# Patient Record
Sex: FEMALE | Race: WHITE | NOT HISPANIC OR LATINO | Employment: FULL TIME | URBAN - METROPOLITAN AREA
[De-identification: names, ages, dates, MRNs, and addresses within clinical notes are randomized per-mention and may not be internally consistent; named-entity substitution may affect disease eponyms.]

---

## 2017-04-03 ENCOUNTER — OFFICE VISIT (OUTPATIENT)
Dept: OBSTETRICS AND GYNECOLOGY | Facility: CLINIC | Age: 37
End: 2017-04-03
Payer: COMMERCIAL

## 2017-04-03 VITALS
WEIGHT: 157.63 LBS | SYSTOLIC BLOOD PRESSURE: 122 MMHG | DIASTOLIC BLOOD PRESSURE: 70 MMHG | BODY MASS INDEX: 27.93 KG/M2 | HEIGHT: 63 IN

## 2017-04-03 DIAGNOSIS — L70.9 ACNE, UNSPECIFIED ACNE TYPE: ICD-10-CM

## 2017-04-03 DIAGNOSIS — Z30.41 ENCOUNTER FOR SURVEILLANCE OF CONTRACEPTIVE PILLS: ICD-10-CM

## 2017-04-03 DIAGNOSIS — Z01.419 VISIT FOR GYNECOLOGIC EXAMINATION: Primary | ICD-10-CM

## 2017-04-03 PROCEDURE — 99395 PREV VISIT EST AGE 18-39: CPT | Mod: S$GLB,,, | Performed by: OBSTETRICS & GYNECOLOGY

## 2017-04-03 PROCEDURE — 99999 PR PBB SHADOW E&M-EST. PATIENT-LVL II: CPT | Mod: PBBFAC,,, | Performed by: OBSTETRICS & GYNECOLOGY

## 2017-04-03 RX ORDER — DROSPIRENONE AND ETHINYL ESTRADIOL 0.03MG-3MG
1 KIT ORAL DAILY
Qty: 84 TABLET | Refills: 3 | Status: SHIPPED | OUTPATIENT
Start: 2017-04-03 | End: 2018-04-13 | Stop reason: SDUPTHER

## 2017-04-03 NOTE — PROGRESS NOTES
HISTORY OF PRESENT ILLNESS:    Dov Hernandez is a 36 y.o. female, , No LMP recorded.,  presents for a routine exam and has no complaints. PAP DUE .  TAKING SPIRONOLACTONE; AFTER RESTARTING POOL (ACNE SYMPTOMS) - SKIN IS CLEAR AND MUCH LESS HERNANDEZ.  KIDS NOW 2YO AND 7YO    LAST VISIT 3/2016:   S/P VASECTOMY AND HAS HAD MORE PMS, ACNE - COUNSELED. IF WANTS TO RESTART OCP FOR CYCLE CONTROL AND SKIN WILL LET ME KNOW. PAP SUBMITTED     Last visit 2015 and PRIOR ROUTINE VISIT 3/2015:  PAINFUL BREAST LUMP LEFT UPPER OUTER, PAST 3-4 WEEKS, TENDER - DISCUSSED STOP OCP AND DECR CAFFEINE  ALSO ITCHING CREASE BETWEEN THIGH AND VULVA PAST MONTH - DISCUSSED YEAST AND LOCAL CARE - LOTRISONE RXN  PLANS TO D/C OCP AS SOON AS  CLEARED WITH SECOND SA S/P VASECTOMY  LAST ROUTINE VISIT 3/2015:  PAP DUE NEXT , AND REFILL OCP.  LAST VISIT 3/2014 PP:  She is status post  6 weeks ago. Her hospitalization was complicated BY SPINAL H/A. She is not breastfeeding. She desires oral contraceptives (estrogen/progesterone) for contraception. She denies postpartum depression. EVENTUALLY PLANS VASECTOMY; RXN ELIUD UNTIL THEN  2014  WITH SMALL SECOND DEGREE LAC, FEMALE    Past Medical History:   Diagnosis Date    Abnormal Pap smear     sean Guerrero pap since       Past Surgical History:   Procedure Laterality Date    COLPOSCOPY         MEDICATIONS AND ALLERGIES:      Current Outpatient Prescriptions:     drospirenone-ethinyl estradiol (AYAN) 3-0.03 mg per tablet, Take 1 tablet by mouth once daily., Disp: 84 tablet, Rfl: 3    ketoconazole (NIZORAL) 2 % cream, Apply topically 2 (two) times daily. Qd for maintenance, Disp: 60 g, Rfl: 3    ketoconazole (NIZORAL) 2 % shampoo, Apply topically every other day., Disp: 120 mL, Rfl: 5    minocycline (MINOCIN,DYNACIN) 100 MG capsule, Take 100 mg by mouth once daily., Disp: , Rfl: 3    multivitamin (ONE DAILY MULTIVITAMIN) per tablet, Take 1 tablet by mouth  once daily., Disp: , Rfl:     spironolactone (ALDACTONE) 50 MG tablet, Take 1 tablet (50 mg total) by mouth once daily. (Patient taking differently: Take 50 mg by mouth once daily. TAKING 150 MG DAILY), Disp: 90 tablet, Rfl: 3    triamcinolone acetonide 0.025% (KENALOG) 0.025 % Oint, Qd prn flare, Disp: 45 g, Rfl: 1    Review of patient's allergies indicates:   Allergen Reactions    Latex, natural rubber Rash       Family History   Problem Relation Age of Onset    Breast cancer Neg Hx     Colon cancer Neg Hx     Ovarian cancer Neg Hx     Melanoma Neg Hx     Kidney cancer Father     Cancer Brother 22     Parotid Gland cancer    Hyperlipidemia Mother        Social History     Social History    Marital status:      Spouse name: N/A    Number of children: 2    Years of education: N/A     Occupational History     [employer]     Social History Main Topics    Smoking status: Never Smoker    Smokeless tobacco: Never Used    Alcohol use Yes      Comment: social    Drug use: No    Sexual activity: Yes     Partners: Male     Birth control/ protection: None     Other Topics Concern    Not on file     Social History Narrative    Family [employer] office mgr, , 2 children (4, 18 mo), nonsmoker none, ETOH rare , GYN Yolanda       COMPREHENSIVE GYN HISTORY:  PAP History: Denies abnormal Paps.  Infection History: Denies STDs. Denies PID.  Benign History: Denies uterine fibroids. Denies ovarian cysts. Denies endometriosis. Denies other conditions.  Cancer History: Denies cervical cancer. Denies uterine cancer or hyperplasia. Denies ovarian cancer. Denies vulvar cancer or pre-cancer. Denies vaginal cancer or pre-cancer. Denies breast cancer. Denies colon cancer.  Sexual Activity History: Reports currently being sexually active  Menstrual History: Monthly, mild-moderate.  Contraception:vasectomy    ROS:  GENERAL: No weight changes. No swelling. No fatigue. No  "fever.  CARDIOVASCULAR: No chest pain. No shortness of breath. No leg cramps.   NEUROLOGICAL: No headaches. No vision changes.  BREASTS: No pain. No lumps. No discharge.  ABDOMEN: No pain. No nausea. No vomiting. No diarrhea. No constipation.  REPRODUCTIVE: No abnormal bleeding.   VULVA: No pain. No lesions. No itching.  VAGINA: No relaxation. No itching. No odor. No discharge. No lesions.  URINARY: No incontinence. No nocturia. No frequency. No dysuria.    /70  Ht 5' 2.5" (1.588 m)  Wt 71.5 kg (157 lb 10.1 oz)  BMI 28.37 kg/m2    PE:  APPEARANCE: Well nourished, well developed, in no acute distress.  AFFECT: WNL, alert and oriented x 3.  SKIN: No acne or hirsutism.  NECK: Neck symmetric, without masses or thyromegaly.  NODES: No inguinal, cervical, axillary or femoral lymph node enlargement.  CHEST: Good respiratory effort.   ABDOMEN: Soft. No tenderness or masses. No hepatosplenomegaly. No hernias.  BREASTS: Symmetrical, no skin changes, visible lesions, palpable masses or nipple discharge bilaterally.  PELVIC: External female genitalia without lesions.  Female hair distribution. Adequate perineal body, Normal urethral meatus. Vagina moist and well rugated without lesions or discharge.  No significant cystocele or rectocele present. Cervix pink without lesions, discharge or tenderness. Uterus is normal size, regular, mobile and nontender. Adnexa without masses or tenderness.  EXTREMITIES: No edema    PROCEDURES:      DIAGNOSIS:  1. Visit for gynecologic examination     2. Encounter for surveillance of contraceptive pills     3. Acne, unspecified acne type  drospirenone-ethinyl estradiol (AYAN) 3-0.03 mg per tablet       LABS AND TESTS ORDERED:    MEDICATIONS PRESCRIBED:    COUNSELING:   The patient was counseled today on ACS PAP guidelines, with recommendations for yearly pelvic exams unless their uterus, cervix, and ovaries were removed for benign reasons; in that case, examinations every 3-5 years are " recommended.  The patient was also counseled regarding monthly breast self-examination, routine STD screening for at-risk populations, prophylactic immunizations for transmitted infections such as  HPV, Pertussis, or Influenza as appropriate, and yearly mammograms when indicated by ACS guidelines.  She was advised to see her primary care physician for all other health maintenance.    FOLLOW-UP with me annually.

## 2017-05-05 ENCOUNTER — PATIENT MESSAGE (OUTPATIENT)
Dept: OBSTETRICS AND GYNECOLOGY | Facility: CLINIC | Age: 37
End: 2017-05-05

## 2017-05-05 DIAGNOSIS — Z00.00 ROUTINE GENERAL MEDICAL EXAMINATION AT A HEALTH CARE FACILITY: Primary | ICD-10-CM

## 2017-09-15 ENCOUNTER — LAB VISIT (OUTPATIENT)
Dept: LAB | Facility: HOSPITAL | Age: 37
End: 2017-09-15
Attending: FAMILY MEDICINE
Payer: COMMERCIAL

## 2017-09-15 DIAGNOSIS — Z00.00 ROUTINE GENERAL MEDICAL EXAMINATION AT A HEALTH CARE FACILITY: ICD-10-CM

## 2017-09-15 LAB
ALBUMIN SERPL BCP-MCNC: 3.4 G/DL
ALP SERPL-CCNC: 34 U/L
ALT SERPL W/O P-5'-P-CCNC: 30 U/L
ANION GAP SERPL CALC-SCNC: 3 MMOL/L
AST SERPL-CCNC: 21 U/L
BASOPHILS # BLD AUTO: 0.02 K/UL
BASOPHILS NFR BLD: 0.3 %
BILIRUB SERPL-MCNC: 0.5 MG/DL
BUN SERPL-MCNC: 11 MG/DL
CALCIUM SERPL-MCNC: 9.2 MG/DL
CHLORIDE SERPL-SCNC: 105 MMOL/L
CHOLEST SERPL-MCNC: 222 MG/DL
CHOLEST/HDLC SERPL: 3.5 {RATIO}
CO2 SERPL-SCNC: 28 MMOL/L
CREAT SERPL-MCNC: 0.9 MG/DL
DIFFERENTIAL METHOD: ABNORMAL
EOSINOPHIL # BLD AUTO: 0 K/UL
EOSINOPHIL NFR BLD: 0.6 %
ERYTHROCYTE [DISTWIDTH] IN BLOOD BY AUTOMATED COUNT: 11.8 %
EST. GFR  (AFRICAN AMERICAN): >60 ML/MIN/1.73 M^2
EST. GFR  (NON AFRICAN AMERICAN): >60 ML/MIN/1.73 M^2
GLUCOSE SERPL-MCNC: 89 MG/DL
HCT VFR BLD AUTO: 37.3 %
HDLC SERPL-MCNC: 63 MG/DL
HDLC SERPL: 28.4 %
HGB BLD-MCNC: 12.8 G/DL
LDLC SERPL CALC-MCNC: 130.4 MG/DL
LYMPHOCYTES # BLD AUTO: 1.9 K/UL
LYMPHOCYTES NFR BLD: 29.8 %
MCH RBC QN AUTO: 31.4 PG
MCHC RBC AUTO-ENTMCNC: 34.3 G/DL
MCV RBC AUTO: 91 FL
MONOCYTES # BLD AUTO: 0.4 K/UL
MONOCYTES NFR BLD: 6.3 %
NEUTROPHILS # BLD AUTO: 4 K/UL
NEUTROPHILS NFR BLD: 62.8 %
NONHDLC SERPL-MCNC: 159 MG/DL
PLATELET # BLD AUTO: 223 K/UL
PMV BLD AUTO: 9.4 FL
POTASSIUM SERPL-SCNC: 4.4 MMOL/L
PROT SERPL-MCNC: 7 G/DL
RBC # BLD AUTO: 4.08 M/UL
SODIUM SERPL-SCNC: 136 MMOL/L
TRIGL SERPL-MCNC: 143 MG/DL
TSH SERPL DL<=0.005 MIU/L-ACNC: 1.91 UIU/ML
WBC # BLD AUTO: 6.35 K/UL

## 2017-09-15 PROCEDURE — 80061 LIPID PANEL: CPT

## 2017-09-15 PROCEDURE — 36415 COLL VENOUS BLD VENIPUNCTURE: CPT

## 2017-09-15 PROCEDURE — 80053 COMPREHEN METABOLIC PANEL: CPT

## 2017-09-15 PROCEDURE — 85025 COMPLETE CBC W/AUTO DIFF WBC: CPT

## 2017-09-15 PROCEDURE — 84443 ASSAY THYROID STIM HORMONE: CPT

## 2017-09-21 ENCOUNTER — OFFICE VISIT (OUTPATIENT)
Dept: FAMILY MEDICINE | Facility: CLINIC | Age: 37
End: 2017-09-21
Payer: COMMERCIAL

## 2017-09-21 VITALS
BODY MASS INDEX: 27.97 KG/M2 | SYSTOLIC BLOOD PRESSURE: 121 MMHG | TEMPERATURE: 98 F | WEIGHT: 157.88 LBS | HEART RATE: 68 BPM | DIASTOLIC BLOOD PRESSURE: 88 MMHG | HEIGHT: 63 IN

## 2017-09-21 DIAGNOSIS — F32.81 PMDD (PREMENSTRUAL DYSPHORIC DISORDER): ICD-10-CM

## 2017-09-21 DIAGNOSIS — L70.2 ACNE VARIOLIFORMIS: ICD-10-CM

## 2017-09-21 DIAGNOSIS — Z00.00 ROUTINE GENERAL MEDICAL EXAMINATION AT A HEALTH CARE FACILITY: Primary | ICD-10-CM

## 2017-09-21 PROCEDURE — 99999 PR PBB SHADOW E&M-EST. PATIENT-LVL III: CPT | Mod: PBBFAC,,, | Performed by: FAMILY MEDICINE

## 2017-09-21 PROCEDURE — 99395 PREV VISIT EST AGE 18-39: CPT | Mod: S$GLB,,, | Performed by: FAMILY MEDICINE

## 2017-09-21 NOTE — PATIENT INSTRUCTIONS
"Follow with GYN    I offered FLuoxetine to help with PMDD, but she declines at this time.     Influenza vaccine -she declines    ==================    You can buy over the counter PROBIOTIC or ACIDOPHILUS to take daily for one month    ==========      Have you heard of the Whole 30? It eliminates all foods that are inflammatory to the gut for one month & allows the lining to heal. Then you can reintroduce foods one at a time & see how you react to each one.     Read about the Whole 30 plan. It's 30 days of "detox" for your body.  It's tough & cathartic, but if you can follow it for 30 days -- that's all you need.     [email or web address]       First try to GET YOUR HEART RATE UP EVERY DAY - just 20 minutes of strolling, or walking [place] the [place], or chasing kids. LAY & PUFF so the exercise can pump your heart muscle & push blood effectively to your entire body.  Watch the sunrise or sunset & get out in nature.      Buy a step counter (to hook onto your belt or shoe) & strive for 10,000 steps a day.     Eat MORE VEGETABLES EVERY DAY. Goal is 3 different colors of veggies daily (not canned)    Please give me updates on how you feel (fatigue, bloating, mental alertness, shortness of breath) after each food change.       ==============================================================    RECOMMENDATIONS FOR FEMALES    Prevent the #1 cause of death- cardiovascular disease (HEART ATTACK & STROKE) by checking for normal blood pressure, cholesterol, sugars, & by not smoking.     VACCINES: Yearly FLU shot,    I recommend  high fiber (5 fresh fruits or vegetables daily), low fat diet and aerobic  exercise (huffing/ puffing/ sweating for 20 min straight at least 4 days a week)    Follow up yearly fasting lipids, CMP, CBC, TSH prior.   ==============================================================      "

## 2017-09-21 NOTE — PROGRESS NOTES
Subjective:      Patient ID: Dov Hernandez is a 36 y.o. female.    Chief Complaint: Annual Exam    Here today for general exam.     She has noticed more acne & mood changes, noticeable by her  and family, prior to menses.  This began when she discontinued birth control.   Her GYN restarted and she feels better.  She is asking my opinion concerning any risks of hormones long term. She has been tx with spironolactone & minocycline by Dermatologist, but neither helped & she had recurrent yeast infections. Her  had vasectomy.     With starting back on OCP, she does notice that she has a yeast infection  prior to her monthly  cycle.     Denies any chest pain, shortness of breath, nausea vomiting constipation diarrhea, blood in stool, heartburn    Normal kidney US , dad  of kidney cancer. She denies urinary symptoms or blood in urine.       No results found for: MICALBCREAT  Lab Results   Component Value Date    LDLCALC 130.4 09/15/2017    LDLCALC 153.2 10/17/2016    CHOL 222 (H) 09/15/2017    HDL 63 09/15/2017    TRIG 143 09/15/2017       Lab Results   Component Value Date     09/15/2017    K 4.4 09/15/2017     09/15/2017    CO2 28 09/15/2017    GLU 89 09/15/2017    BUN 11 09/15/2017    CREATININE 0.9 09/15/2017    CALCIUM 9.2 09/15/2017    PROT 7.0 09/15/2017    ALBUMIN 3.4 (L) 09/15/2017    BILITOT 0.5 09/15/2017    ALKPHOS 34 (L) 09/15/2017    AST 21 09/15/2017    ALT 30 09/15/2017    ANIONGAP 3 (L) 09/15/2017    ESTGFRAFRICA >60 09/15/2017    EGFRNONAA >60 09/15/2017    WBC 6.35 09/15/2017    HGB 12.8 09/15/2017    HCT 37.3 09/15/2017    MCV 91 09/15/2017     09/15/2017    TSH 1.914 09/15/2017         Current Outpatient Prescriptions on File Prior to Visit   Medication Sig    drospirenone-ethinyl estradiol (AYAN) 3-0.03 mg per tablet Take 1 tablet by mouth once daily.    multivitamin (ONE DAILY MULTIVITAMIN) per tablet Take 1 tablet by mouth once daily.     "triamcinolone acetonide 0.025% (KENALOG) 0.025 % Oint Qd prn flare    ketoconazole (NIZORAL) 2 % cream Apply topically 2 (two) times daily. Qd for maintenance    ketoconazole (NIZORAL) 2 % shampoo Apply topically every other day.     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 She appears well-developed and well-nourished.   HENT:   Head: Normocephalic and atraumatic.   Right Ear: External ear normal.   Left Ear: External ear normal.   Nose: Nose normal.   Mouth/Throat: Oropharynx is clear and moist.   Eyes: EOM are normal. Pupils are equal, round, and reactive to light.   Neck: Neck supple. No thyromegaly present.   Cardiovascular: Normal rate, regular rhythm, normal heart sounds and intact distal pulses.    No murmur heard.  Pulmonary/Chest: Effort normal and breath sounds normal. She has no wheezes.   Abdominal: Soft. Bowel sounds are normal. She exhibits no distension and no mass. There is no tenderness. There is no rebound and no guarding.   Musculoskeletal: She exhibits no edema.   Lymphadenopathy:     She has no cervical adenopathy.   Neurological: She is alert " "and oriented to person, place, and time.   Skin: Skin is warm and dry.   Psychiatric: She has a normal mood and affect. Her behavior is normal.     Assessment:     1. Routine general medical examination at a health care facility    2. PMDD (premenstrual dysphoric disorder)    3. Acne varioliformis      Plan:          Patient Instructions   Follow with GYN    I offered FLuoxetine to help with PMDD, but she declines at this time.     Influenza vaccine -she declines    ==================    You can buy over the counter PROBIOTIC or ACIDOPHILUS to take daily for one month    ==========      Have you heard of the Whole 30? It eliminates all foods that are inflammatory to the gut for one month & allows the lining to heal. Then you can reintroduce foods one at a time & see how you react to each one.     Read about the Whole 30 plan. It's 30 days of "detox" for your body.  It's tough & cathartic, but if you can follow it for 30 days -- that's all you need.     [email or web address]       First try to GET YOUR HEART RATE UP EVERY DAY - just 20 minutes of strolling, or walking [place] the [place], or chasing kids. LAY & PUFF so the exercise can pump your heart muscle & push blood effectively to your entire body.  Watch the sunrise or sunset & get out in nature.      Buy a step counter (to hook onto your belt or shoe) & strive for 10,000 steps a day.     Eat MORE VEGETABLES EVERY DAY. Goal is 3 different colors of veggies daily (not canned)    Please give me updates on how you feel (fatigue, bloating, mental alertness, shortness of breath) after each food change.       ==============================================================    RECOMMENDATIONS FOR FEMALES    Prevent the #1 cause of death- cardiovascular disease (HEART ATTACK & STROKE) by checking for normal blood pressure, cholesterol, sugars, & by not smoking.     VACCINES: Yearly FLU shot,    I recommend  high fiber (5 fresh fruits or vegetables daily), low fat diet and " aerobic  exercise (huffing/ puffing/ sweating for 20 min straight at least 4 days a week)    Follow up yearly fasting lipids, CMP, CBC, TSH prior.   ==============================================================

## 2018-04-13 DIAGNOSIS — L70.9 ACNE, UNSPECIFIED ACNE TYPE: ICD-10-CM

## 2018-04-13 RX ORDER — DROSPIRENONE AND ETHINYL ESTRADIOL 0.03MG-3MG
1 KIT ORAL DAILY
Qty: 84 TABLET | Refills: 0 | Status: SHIPPED | OUTPATIENT
Start: 2018-04-13 | End: 2018-06-12 | Stop reason: SDUPTHER

## 2018-04-13 NOTE — TELEPHONE ENCOUNTER
----- Message from Valdo Butterfield sent at 4/13/2018  1:29 PM CDT -----  Contact: pt      Can the clinic reply in [facility]: no      Please refill the medication(s) listed below. Please call the patient when the prescription(s) is ready for  at this phone number   [phone] (home)       Medication #1 drospirenone-ethinyl estradiol (AYAN) 3-0.03 mg per tablet      Preferred Pharmacy: [facility]/pharmacy #[id number] - [place] - [address] AT [facility]

## 2018-06-12 DIAGNOSIS — L70.9 ACNE, UNSPECIFIED ACNE TYPE: ICD-10-CM

## 2018-06-13 ENCOUNTER — PATIENT MESSAGE (OUTPATIENT)
Dept: OBSTETRICS AND GYNECOLOGY | Facility: CLINIC | Age: 38
End: 2018-06-13

## 2018-06-13 RX ORDER — DROSPIRENONE AND ETHINYL ESTRADIOL 0.03MG-3MG
1 KIT ORAL DAILY
Qty: 84 TABLET | Refills: 0 | Status: SHIPPED | OUTPATIENT
Start: 2018-06-13 | End: 2018-06-13 | Stop reason: SDUPTHER

## 2018-06-13 RX ORDER — DROSPIRENONE AND ETHINYL ESTRADIOL 0.03MG-3MG
1 KIT ORAL DAILY
Qty: 84 TABLET | Refills: 0 | Status: SHIPPED | OUTPATIENT
Start: 2018-06-13 | End: 2018-09-27 | Stop reason: SDUPTHER

## 2018-06-19 ENCOUNTER — TELEPHONE (OUTPATIENT)
Dept: FAMILY MEDICINE | Facility: CLINIC | Age: 38
End: 2018-06-19

## 2018-06-19 DIAGNOSIS — Z00.00 ANNUAL PHYSICAL EXAM: Primary | ICD-10-CM

## 2018-06-19 NOTE — TELEPHONE ENCOUNTER
----- Message from Liliana Steward sent at 6/19/2018  3:25 PM CDT -----  Doctor appointment and lab have been scheduled.  Please link lab orders to the lab appointment.  Date of doctor appointment:  9/26  Physical or EP: Physical   Date of lab appointment:  9/20  Comments:

## 2018-09-20 ENCOUNTER — LAB VISIT (OUTPATIENT)
Dept: LAB | Facility: HOSPITAL | Age: 38
End: 2018-09-20
Attending: FAMILY MEDICINE
Payer: COMMERCIAL

## 2018-09-20 DIAGNOSIS — Z00.00 ANNUAL PHYSICAL EXAM: ICD-10-CM

## 2018-09-20 LAB
ALBUMIN SERPL BCP-MCNC: 3.4 G/DL
ALP SERPL-CCNC: 31 U/L
ALT SERPL W/O P-5'-P-CCNC: 19 U/L
ANION GAP SERPL CALC-SCNC: 8 MMOL/L
AST SERPL-CCNC: 16 U/L
BASOPHILS # BLD AUTO: 0.01 K/UL
BASOPHILS NFR BLD: 0.2 %
BILIRUB SERPL-MCNC: 0.5 MG/DL
BUN SERPL-MCNC: 10 MG/DL
CALCIUM SERPL-MCNC: 9.5 MG/DL
CHLORIDE SERPL-SCNC: 106 MMOL/L
CHOLEST SERPL-MCNC: 195 MG/DL
CHOLEST/HDLC SERPL: 2.7 {RATIO}
CO2 SERPL-SCNC: 24 MMOL/L
CREAT SERPL-MCNC: 0.8 MG/DL
DIFFERENTIAL METHOD: ABNORMAL
EOSINOPHIL # BLD AUTO: 0.1 K/UL
EOSINOPHIL NFR BLD: 1 %
ERYTHROCYTE [DISTWIDTH] IN BLOOD BY AUTOMATED COUNT: 11.9 %
EST. GFR  (AFRICAN AMERICAN): >60 ML/MIN/1.73 M^2
EST. GFR  (NON AFRICAN AMERICAN): >60 ML/MIN/1.73 M^2
GLUCOSE SERPL-MCNC: 92 MG/DL
HCT VFR BLD AUTO: 37.6 %
HDLC SERPL-MCNC: 72 MG/DL
HDLC SERPL: 36.9 %
HGB BLD-MCNC: 12.9 G/DL
LDLC SERPL CALC-MCNC: 101.4 MG/DL
LYMPHOCYTES # BLD AUTO: 1.7 K/UL
LYMPHOCYTES NFR BLD: 33.7 %
MCH RBC QN AUTO: 31.5 PG
MCHC RBC AUTO-ENTMCNC: 34.3 G/DL
MCV RBC AUTO: 92 FL
MONOCYTES # BLD AUTO: 0.5 K/UL
MONOCYTES NFR BLD: 10.1 %
NEUTROPHILS # BLD AUTO: 2.8 K/UL
NEUTROPHILS NFR BLD: 54.8 %
NONHDLC SERPL-MCNC: 123 MG/DL
PLATELET # BLD AUTO: 237 K/UL
PMV BLD AUTO: 9.4 FL
POTASSIUM SERPL-SCNC: 4.7 MMOL/L
PROT SERPL-MCNC: 6.7 G/DL
RBC # BLD AUTO: 4.1 M/UL
SODIUM SERPL-SCNC: 138 MMOL/L
TRIGL SERPL-MCNC: 108 MG/DL
WBC # BLD AUTO: 5.13 K/UL

## 2018-09-20 PROCEDURE — 80061 LIPID PANEL: CPT

## 2018-09-20 PROCEDURE — 80053 COMPREHEN METABOLIC PANEL: CPT

## 2018-09-20 PROCEDURE — 36415 COLL VENOUS BLD VENIPUNCTURE: CPT

## 2018-09-20 PROCEDURE — 85025 COMPLETE CBC W/AUTO DIFF WBC: CPT

## 2018-09-26 ENCOUNTER — OFFICE VISIT (OUTPATIENT)
Dept: FAMILY MEDICINE | Facility: CLINIC | Age: 38
End: 2018-09-26
Payer: COMMERCIAL

## 2018-09-26 VITALS
BODY MASS INDEX: 28.29 KG/M2 | TEMPERATURE: 99 F | HEART RATE: 65 BPM | SYSTOLIC BLOOD PRESSURE: 124 MMHG | WEIGHT: 159.63 LBS | DIASTOLIC BLOOD PRESSURE: 82 MMHG | HEIGHT: 63 IN

## 2018-09-26 DIAGNOSIS — Z00.00 ANNUAL PHYSICAL EXAM: Primary | ICD-10-CM

## 2018-09-26 DIAGNOSIS — F32.81 PMDD (PREMENSTRUAL DYSPHORIC DISORDER): ICD-10-CM

## 2018-09-26 DIAGNOSIS — R22.42 LUMP OF LEFT THIGH: ICD-10-CM

## 2018-09-26 PROCEDURE — 99395 PREV VISIT EST AGE 18-39: CPT | Mod: S$GLB,,, | Performed by: FAMILY MEDICINE

## 2018-09-26 PROCEDURE — 99999 PR PBB SHADOW E&M-EST. PATIENT-LVL IV: CPT | Mod: PBBFAC,,, | Performed by: FAMILY MEDICINE

## 2018-09-26 RX ORDER — FLUOXETINE 10 MG/1
10 CAPSULE ORAL DAILY
Qty: 30 CAPSULE | Refills: 11 | Status: SHIPPED | OUTPATIENT
Start: 2018-09-26 | End: 2019-06-17

## 2018-09-26 NOTE — PROGRESS NOTES
Subjective:      Patient ID: Dov Hernandez is a 37 y.o. female.    Chief Complaint: Annual Exam and Mass (left leg)    Here today for general exam.     She is requesting medication options to help with premenstrual syndrome, mainly her emotions.  Gynecologist gave her birth control but she does not really want to be on hormones since her symptoms are mainly emotional around her menses.     She has an enlarging tender lump on the back of her left leg she would like this evaluated    Cholesterol has greatly decreased with biking daily and eating fish, although her weight has been stable    She is still concerned about her father's hx of kidney cancer. 2014 US of kidneys was normal.  Denies any back pain or blood in urine    Denies any chest pain, shortness of breath, nausea vomiting constipation diarrhea, blood in stool, heartburn      No results found for: MICALBCREAT  Lab Results   Component Value Date    LDLCALC 101.4 09/20/2018    LDLCALC 130.4 09/15/2017    CHOL 195 09/20/2018    HDL 72 09/20/2018    TRIG 108 09/20/2018       Lab Results   Component Value Date     09/20/2018    K 4.7 09/20/2018     09/20/2018    CO2 24 09/20/2018    GLU 92 09/20/2018    BUN 10 09/20/2018    CREATININE 0.8 09/20/2018    CALCIUM 9.5 09/20/2018    PROT 6.7 09/20/2018    ALBUMIN 3.4 (L) 09/20/2018    BILITOT 0.5 09/20/2018    ALKPHOS 31 (L) 09/20/2018    AST 16 09/20/2018    ALT 19 09/20/2018    ANIONGAP 8 09/20/2018    ESTGFRAFRICA >60 09/20/2018    EGFRNONAA >60 09/20/2018    WBC 5.13 09/20/2018    HGB 12.9 09/20/2018    HCT 37.6 09/20/2018    MCV 92 09/20/2018     09/20/2018    TSH 1.914 09/15/2017         Current Outpatient Medications on File Prior to Visit   Medication Sig    drospirenone-ethinyl estradiol (ELIUD) 3-0.03 mg per tablet Take 1 tablet by mouth once daily.    multivitamin (ONE DAILY MULTIVITAMIN) per tablet Take 1 tablet by mouth once daily.    triamcinolone acetonide 0.025% (KENALOG)  "0.025 % Oint Qd prn flare    [DISCONTINUED] ketoconazole (NIZORAL) 2 % cream Apply topically 2 (two) times daily. Qd for maintenance    [DISCONTINUED] ketoconazole (NIZORAL) 2 % shampoo Apply topically every other day.     No current facility-administered medications on file prior to visit.      Past Medical History:   Diagnosis Date    Abnormal Pap smear     Yolandasean pap since    Acne varioliformis 9/21/2017    PMDD (premenstrual dysphoric disorder) 9/21/2017     Past Surgical History:   Procedure Laterality Date    COLPOSCOPY       Social History     Social History Narrative    Family [employer] office mgr, , 2 children (4, 18 mo), Mom Renee, nonsmoker none, ETOH rare , GYN Yolanda     Family History   Problem Relation Age of Onset    Kidney cancer Father 58    Cancer Brother 22        Parotid Gland cancer    Hyperlipidemia Mother     Breast cancer Neg Hx     Colon cancer Neg Hx     Ovarian cancer Neg Hx     Melanoma Neg Hx      Vitals:    09/26/18 1027   BP: 124/82   Pulse: 65   Temp: 98.5 °F (36.9 °C)   TempSrc: Oral   Weight: 72.4 kg (159 lb 9.8 oz)   Height: 5' 2.5" (1.588 m)   PainSc:   2   PainLoc: Leg     Objective:   Physical Exam   Constitutional: She is oriented to person, place, and time. She appears well-developed and well-nourished.   HENT:   Head: Normocephalic and atraumatic.   Right Ear: External ear normal.   Left Ear: External ear normal.   Nose: Nose normal.   Mouth/Throat: Oropharynx is clear and moist.   Eyes: EOM are normal. Pupils are equal, round, and reactive to light.   Neck: Neck supple. No thyromegaly present.   Cardiovascular: Normal rate, regular rhythm, normal heart sounds and intact distal pulses.   No murmur heard.  Pulmonary/Chest: Effort normal and breath sounds normal. She has no wheezes.   No CVA tenderness   Abdominal: Soft. Bowel sounds are normal. She exhibits no distension and no mass. There is no tenderness. There is no rebound and no " guarding.   Musculoskeletal: She exhibits no edema.   4 cm x 4 cm soft non tender lump on the left mid hamstring, there is a small bite wai above it   Lymphadenopathy:     She has no cervical adenopathy.   Neurological: She is alert and oriented to person, place, and time.   Skin: Skin is warm and dry.   Psychiatric: She has a normal mood and affect. Her behavior is normal.     Assessment:     1. Annual physical exam    2. Lump of left thigh    3. PMDD (premenstrual dysphoric disorder)      Plan:     Orders Placed This Encounter    US Soft Tissue Misc    FLUoxetine (PROZAC) 10 MG capsule       Patient Instructions   Can take fluoxetine 10 mg once a week to see if this helps with premenstrual dysphoric disorder, we can increase the dosage to several times a week, or a higher dosage once a week.  She is considering stopping oral contraceptive pill because she mainly wants medication for her emotions    Follow with GYN for female health & cancer prevention    ==============================  RECOMMENDATIONS FOR FEMALES  ==============================  Your #1 MEDICINE is DAILY EXERCISE - 15-20 minutes of huffing & puffing EVERY DAY.     Prevent the #1 cause of death- cardiovascular disease (HEART ATTACK & STROKE) by checking for normal blood pressure, cholesterol, sugars, & by not smoking.     VACCINES: Yearly FLU shot,     I recommend  high fiber (5 fresh fruits or vegetables daily), low fat diet and aerobic  exercise (huffing/ puffing/ sweating for 20 min straight at least 4 days a week)    Follow up yearly with mammogram, fasting lipids, CMP, CBC prior.   ==============================================================

## 2018-09-26 NOTE — PATIENT INSTRUCTIONS
Can take fluoxetine 10 mg once a week to see if this helps with premenstrual dysphoric disorder, we can increase the dosage to several times a week, or a higher dosage once a week.  She is considering stopping oral contraceptive pill because she mainly wants medication for her emotions    Follow with GYN for female health & cancer prevention    ==============================  RECOMMENDATIONS FOR FEMALES  ==============================  Your #1 MEDICINE is DAILY EXERCISE - 15-20 minutes of huffing & puffing EVERY DAY.     Prevent the #1 cause of death- cardiovascular disease (HEART ATTACK & STROKE) by checking for normal blood pressure, cholesterol, sugars, & by not smoking.     VACCINES: Yearly FLU shot,     I recommend  high fiber (5 fresh fruits or vegetables daily), low fat diet and aerobic  exercise (huffing/ puffing/ sweating for 20 min straight at least 4 days a week)    Follow up yearly with mammogram, fasting lipids, CMP, CBC prior.   ==============================================================

## 2018-09-27 ENCOUNTER — HOSPITAL ENCOUNTER (OUTPATIENT)
Dept: RADIOLOGY | Facility: HOSPITAL | Age: 38
Discharge: HOME | End: 2018-09-27
Attending: FAMILY MEDICINE
Payer: COMMERCIAL

## 2018-09-27 DIAGNOSIS — L70.9 ACNE, UNSPECIFIED ACNE TYPE: ICD-10-CM

## 2018-09-27 DIAGNOSIS — R22.42 LUMP OF LEFT THIGH: ICD-10-CM

## 2018-09-27 PROCEDURE — 76999 ECHO EXAMINATION PROCEDURE: CPT | Mod: TC

## 2018-09-27 PROCEDURE — 76882 US LMTD JT/FCL EVL NVASC XTR: CPT | Mod: 26,LT,, | Performed by: RADIOLOGY

## 2018-09-27 RX ORDER — DROSPIRENONE AND ETHINYL ESTRADIOL 0.03MG-3MG
KIT ORAL
Qty: 84 TABLET | Refills: 0 | Status: SHIPPED | OUTPATIENT
Start: 2018-09-27 | End: 2018-12-01 | Stop reason: SDUPTHER

## 2018-09-28 NOTE — PROGRESS NOTES
Hello.     Your THIGH ULTRASOUND showed nothing worrisome. It may just be slightly enlarged normal muscle tissue or fatty tissue in the area. Overall, nothing worrisome

## 2018-12-01 DIAGNOSIS — L70.9 ACNE, UNSPECIFIED ACNE TYPE: ICD-10-CM

## 2018-12-01 RX ORDER — DROSPIRENONE AND ETHINYL ESTRADIOL 0.03MG-3MG
KIT ORAL
Qty: 84 TABLET | Refills: 0 | Status: SHIPPED | OUTPATIENT
Start: 2018-12-01 | End: 2019-03-06 | Stop reason: SDUPTHER

## 2019-01-17 ENCOUNTER — TELEPHONE (OUTPATIENT)
Dept: FAMILY MEDICINE | Facility: CLINIC | Age: 39
End: 2019-01-17

## 2019-01-17 NOTE — TELEPHONE ENCOUNTER
----- Message from Demetrice Palacio sent at 1/17/2019 10:33 AM CST -----  Contact: Patient [phone]  Pt is calling to speak with someone in the office.She states that her daughter tested positive for the flu and she is not sure as to wether or not she should come in and be tested or if she can just get something sent over to her pharmacy. Please call back and advise.      Thanks

## 2019-01-17 NOTE — TELEPHONE ENCOUNTER
I sent Tamiflu for grandma due to her age & exposure to several children with flu    Since Dov is young & healthy, protocol is for her to go in & get test at urgent care if she has fever or symptoms. Then we can treat if positive . It is common for family members to be exposed, but not all of them actually acquire the flu

## 2019-01-29 DIAGNOSIS — Z20.828 EXPOSURE TO INFLUENZA: Primary | ICD-10-CM

## 2019-01-29 RX ORDER — OSELTAMIVIR PHOSPHATE 75 MG/1
75 CAPSULE ORAL 2 TIMES DAILY
Qty: 10 CAPSULE | Refills: 0 | Status: SHIPPED | OUTPATIENT
Start: 2019-01-29 | End: 2019-02-03

## 2019-03-06 DIAGNOSIS — L70.9 ACNE, UNSPECIFIED ACNE TYPE: ICD-10-CM

## 2019-03-06 RX ORDER — DROSPIRENONE AND ETHINYL ESTRADIOL 0.03MG-3MG
1 KIT ORAL DAILY
Qty: 84 TABLET | Refills: 0 | Status: SHIPPED | OUTPATIENT
Start: 2019-03-06 | End: 2019-05-15 | Stop reason: SDUPTHER

## 2019-05-13 DIAGNOSIS — Z00.00 ANNUAL PHYSICAL EXAM: Primary | ICD-10-CM

## 2019-05-15 ENCOUNTER — PATIENT MESSAGE (OUTPATIENT)
Dept: OBSTETRICS AND GYNECOLOGY | Facility: CLINIC | Age: 39
End: 2019-05-15

## 2019-05-15 DIAGNOSIS — L70.9 ACNE, UNSPECIFIED ACNE TYPE: ICD-10-CM

## 2019-05-15 RX ORDER — DROSPIRENONE AND ETHINYL ESTRADIOL 0.03MG-3MG
1 KIT ORAL DAILY
Qty: 28 TABLET | Refills: 0 | Status: SHIPPED | OUTPATIENT
Start: 2019-05-15 | End: 2019-06-17 | Stop reason: SDUPTHER

## 2019-05-15 NOTE — TELEPHONE ENCOUNTER
----- Message from Blanche Ledbetter sent at 5/15/2019 12:36 PM CDT -----  Contact: pt          Can the clinic reply in [facility]: no  Please refill the medication(s) listed below. Please call the patient when the prescription(s) is ready for  at this phone number      [phone]    Medication #1 drospirenone-ethinyl estradiol (ELIUD) 3-0.03 mg per tablet      Preferred Pharmacy:[facility]/PHARMACY #[id number] - [place] [address] [facility]

## 2019-05-16 RX ORDER — DROSPIRENONE AND ETHINYL ESTRADIOL 0.03MG-3MG
1 KIT ORAL DAILY
Qty: 28 TABLET | Refills: 0 | OUTPATIENT
Start: 2019-05-16

## 2019-06-11 DIAGNOSIS — L70.9 ACNE, UNSPECIFIED ACNE TYPE: ICD-10-CM

## 2019-06-12 RX ORDER — DROSPIRENONE AND ETHINYL ESTRADIOL 0.03MG-3MG
1 KIT ORAL DAILY
Qty: 28 TABLET | Refills: 0 | OUTPATIENT
Start: 2019-06-12

## 2019-06-17 ENCOUNTER — OFFICE VISIT (OUTPATIENT)
Dept: OBSTETRICS AND GYNECOLOGY | Facility: CLINIC | Age: 39
End: 2019-06-17
Payer: COMMERCIAL

## 2019-06-17 VITALS
BODY MASS INDEX: 28.28 KG/M2 | WEIGHT: 153.69 LBS | DIASTOLIC BLOOD PRESSURE: 76 MMHG | HEIGHT: 62 IN | SYSTOLIC BLOOD PRESSURE: 120 MMHG

## 2019-06-17 DIAGNOSIS — L70.9 ACNE, UNSPECIFIED ACNE TYPE: ICD-10-CM

## 2019-06-17 DIAGNOSIS — Z30.41 ENCOUNTER FOR SURVEILLANCE OF CONTRACEPTIVE PILLS: ICD-10-CM

## 2019-06-17 DIAGNOSIS — Z01.419 VISIT FOR GYNECOLOGIC EXAMINATION: Primary | ICD-10-CM

## 2019-06-17 PROCEDURE — 99395 PREV VISIT EST AGE 18-39: CPT | Mod: S$GLB,,, | Performed by: OBSTETRICS & GYNECOLOGY

## 2019-06-17 PROCEDURE — 88175 CYTOPATH C/V AUTO FLUID REDO: CPT

## 2019-06-17 PROCEDURE — 99999 PR PBB SHADOW E&M-EST. PATIENT-LVL III: CPT | Mod: PBBFAC,,, | Performed by: OBSTETRICS & GYNECOLOGY

## 2019-06-17 PROCEDURE — 87624 HPV HI-RISK TYP POOLED RSLT: CPT

## 2019-06-17 PROCEDURE — 99999 PR PBB SHADOW E&M-EST. PATIENT-LVL III: ICD-10-PCS | Mod: PBBFAC,,, | Performed by: OBSTETRICS & GYNECOLOGY

## 2019-06-17 PROCEDURE — 99395 PR PREVENTIVE VISIT,EST,18-39: ICD-10-PCS | Mod: S$GLB,,, | Performed by: OBSTETRICS & GYNECOLOGY

## 2019-06-17 RX ORDER — DROSPIRENONE AND ETHINYL ESTRADIOL 0.03MG-3MG
1 KIT ORAL DAILY
Qty: 84 TABLET | Refills: 3 | Status: SHIPPED | OUTPATIENT
Start: 2019-06-17 | End: 2020-06-02

## 2019-06-17 NOTE — PROGRESS NOTES
HISTORY OF PRESENT ILLNESS:    Dov Hernandez is a 38 y.o. female, , Patient's last menstrual period was 2019.,  presents for a routine exam and has no complaints.  COTESTING.  VASECTOMY, ELIUD FOR CYCLE CONTROL.  WILL D/W DERM POSS CHANGE TO SPIRONOLACTONE AND STOP ELIUD, BUT FOR NOW REFILL THRU THE YEAR.      LAST VISIT 2017:   PAP DUE .  TAKING SPIRONOLACTONE; AFTER RESTARTING POOL (ACNE SYMPTOMS) - SKIN IS CLEAR AND MUCH LESS HERNANDEZ.  KIDS NOW 2YO AND 7YO  LAST VISIT 3/2016:   S/P VASECTOMY AND HAS HAD MORE PMS, ACNE - COUNSELED. IF WANTS TO RESTART OCP FOR CYCLE CONTROL AND SKIN WILL LET ME KNOW. PAP SUBMITTED  Last visit 2015 and PRIOR ROUTINE VISIT 3/2015:  PAINFUL BREAST LUMP LEFT UPPER OUTER, PAST 3-4 WEEKS, TENDER - DISCUSSED STOP OCP AND DECR CAFFEINE  ALSO ITCHING CREASE BETWEEN THIGH AND VULVA PAST MONTH - DISCUSSED YEAST AND LOCAL CARE - LOTRISONE RXN  PLANS TO D/C OCP AS SOON AS  CLEARED WITH SECOND SA S/P VASECTOMY  LAST ROUTINE VISIT 3/2015:  PAP DUE NEXT , AND REFILL OCP.  LAST VISIT 3/2014 PP:  She is status post  6 weeks ago. Her hospitalization was complicated BY SPINAL H/A. She is not breastfeeding. She desires oral contraceptives (estrogen/progesterone) for contraception. She denies postpartum depression. EVENTUALLY PLANS VASECTOMY; RXN ELIUD UNTIL THEN  2014  WITH SMALL SECOND DEGREE LAC, FEMALE    Past Medical History:   Diagnosis Date    Abnormal Pap smear     sean Guerrero pap since    Acne varioliformis 2017    PMDD (premenstrual dysphoric disorder) 2017       Past Surgical History:   Procedure Laterality Date    COLPOSCOPY         MEDICATIONS AND ALLERGIES:      Current Outpatient Medications:     drospirenone-ethinyl estradiol (ELIUD) 3-0.03 mg per tablet, Take 1 tablet by mouth once daily., Disp: 84 tablet, Rfl: 3    multivitamin (ONE DAILY MULTIVITAMIN) per tablet, Take 1 tablet by mouth once daily., Disp: , Rfl:      Review of patient's allergies indicates:   Allergen Reactions    Latex, natural rubber Rash       Family History   Problem Relation Age of Onset    Kidney cancer Father 58    Cancer Brother 22        Parotid Gland cancer    Hyperlipidemia Mother     Breast cancer Neg Hx     Colon cancer Neg Hx     Ovarian cancer Neg Hx     Melanoma Neg Hx        Social History     Socioeconomic History    Marital status:      Spouse name: Not on file    Number of children: 2    Years of education: Not on file    Highest education level: Not on file   Occupational History    Occupation:      Employer: [employer]   Social Needs    Financial resource strain: Not on file    Food insecurity:     Worry: Not on file     Inability: Not on file    Transportation needs:     Medical: Not on file     Non-medical: Not on file   Tobacco Use    Smoking status: Never Smoker    Smokeless tobacco: Never Used   Substance and Sexual Activity    Alcohol use: Yes     Comment: social    Drug use: No    Sexual activity: Yes     Partners: Male     Birth control/protection: None   Lifestyle    Physical activity:     Days per week: Not on file     Minutes per session: Not on file    Stress: Not on file   Relationships    Social connections:     Talks on phone: Not on file     Gets together: Not on file     Attends Druze service: Not on file     Active member of club or organization: Not on file     Attends meetings of clubs or organizations: Not on file     Relationship status: Not on file   Other Topics Concern    Are you pregnant or think you may be? 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  Infection History: Denies STDs. Denies PID.  Benign History:  "Denies uterine fibroids. Denies ovarian cysts. Denies endometriosis. Denies other conditions.  Cancer History: Denies cervical cancer. Denies uterine cancer or hyperplasia. Denies ovarian cancer. Denies vulvar cancer or pre-cancer. Denies vaginal cancer or pre-cancer. Denies breast cancer. Denies colon cancer.  Sexual Activity History: Reports currently being sexually active  Menstrual History: Monthly, mild-moderate.  Contraception:vasectomy    ROS:  GENERAL: No weight changes. No swelling. No fatigue. No fever.  CARDIOVASCULAR: No chest pain. No shortness of breath. No leg cramps.   NEUROLOGICAL: No headaches. No vision changes.  BREASTS: No pain. No lumps. No discharge.  ABDOMEN: No pain. No nausea. No vomiting. No diarrhea. No constipation.  REPRODUCTIVE: No abnormal bleeding.   VULVA: No pain. No lesions. No itching.  VAGINA: No relaxation. No itching. No odor. No discharge. No lesions.  URINARY: No incontinence. No nocturia. No frequency. No dysuria.    /76   Ht 5' 2" (1.575 m)   Wt 69.7 kg (153 lb 10.6 oz)   LMP 06/08/2019   BMI 28.10 kg/m²     PE:  APPEARANCE: Well nourished, well developed, in no acute distress.  AFFECT: WNL, alert and oriented x 3.  SKIN: No acne or hirsutism.  NECK: Neck symmetric, without masses or thyromegaly.  NODES: No inguinal, cervical, axillary or femoral lymph node enlargement.  CHEST: Good respiratory effort.   ABDOMEN: Soft. No tenderness or masses. No hepatosplenomegaly. No hernias.  BREASTS: Symmetrical, no skin changes, visible lesions, palpable masses or nipple discharge bilaterally.  PELVIC: External female genitalia without lesions.  Female hair distribution. Adequate perineal body, Normal urethral meatus. Vagina moist and well rugated without lesions or discharge.  No significant cystocele or rectocele present. Cervix pink without lesions, discharge or tenderness. Uterus is normal size, regular, mobile and nontender. Adnexa without masses or " tenderness.  EXTREMITIES: No edema    PROCEDURES:  Pap    DIAGNOSIS:  1. Visit for gynecologic examination  Liquid-based pap smear, screening    HPV High Risk Genotypes, PCR   2. Encounter for surveillance of contraceptive pills     3. Acne, unspecified acne type  drospirenone-ethinyl estradiol (ELIUD) 3-0.03 mg per tablet       LABS AND TESTS ORDERED:    MEDICATIONS PRESCRIBED:    COUNSELING:   The patient was counseled today on ACS PAP guidelines, with recommendations for yearly pelvic exams unless their uterus, cervix, and ovaries were removed for benign reasons; in that case, examinations every 3-5 years are recommended.  The patient was also counseled regarding monthly breast self-examination, routine STD screening for at-risk populations, prophylactic immunizations for transmitted infections such as  HPV, Pertussis, or Influenza as appropriate, and yearly mammograms when indicated by ACS guidelines.  She was advised to see her primary care physician for all other health maintenance.    FOLLOW-UP with me annually.

## 2019-06-25 LAB
HPV HR 12 DNA CVX QL NAA+PROBE: NEGATIVE
HPV16 AG SPEC QL: NEGATIVE
HPV18 DNA SPEC QL NAA+PROBE: NEGATIVE

## 2019-07-04 ENCOUNTER — PATIENT MESSAGE (OUTPATIENT)
Dept: OBSTETRICS AND GYNECOLOGY | Facility: HOSPITAL | Age: 39
End: 2019-07-04

## 2019-09-24 ENCOUNTER — LAB VISIT (OUTPATIENT)
Dept: LAB | Facility: HOSPITAL | Age: 39
End: 2019-09-24
Attending: FAMILY MEDICINE
Payer: COMMERCIAL

## 2019-09-24 DIAGNOSIS — Z00.00 ANNUAL PHYSICAL EXAM: ICD-10-CM

## 2019-09-24 LAB
ALBUMIN SERPL BCP-MCNC: 3.4 G/DL (ref 3.5–5.2)
ALP SERPL-CCNC: 34 U/L (ref 55–135)
ALT SERPL W/O P-5'-P-CCNC: 17 U/L (ref 10–44)
ANION GAP SERPL CALC-SCNC: 4 MMOL/L (ref 8–16)
AST SERPL-CCNC: 17 U/L (ref 10–40)
BASOPHILS # BLD AUTO: 0.01 K/UL (ref 0–0.2)
BASOPHILS NFR BLD: 0.1 % (ref 0–1.9)
BILIRUB SERPL-MCNC: 0.5 MG/DL (ref 0.1–1)
BUN SERPL-MCNC: 11 MG/DL (ref 6–20)
CALCIUM SERPL-MCNC: 9.3 MG/DL (ref 8.7–10.5)
CHLORIDE SERPL-SCNC: 104 MMOL/L (ref 95–110)
CHOLEST SERPL-MCNC: 205 MG/DL (ref 120–199)
CHOLEST/HDLC SERPL: 2.7 {RATIO} (ref 2–5)
CO2 SERPL-SCNC: 30 MMOL/L (ref 23–29)
CREAT SERPL-MCNC: 0.8 MG/DL (ref 0.5–1.4)
DIFFERENTIAL METHOD: ABNORMAL
EOSINOPHIL # BLD AUTO: 0.1 K/UL (ref 0–0.5)
EOSINOPHIL NFR BLD: 0.7 % (ref 0–8)
ERYTHROCYTE [DISTWIDTH] IN BLOOD BY AUTOMATED COUNT: 11.9 % (ref 11.5–14.5)
EST. GFR  (AFRICAN AMERICAN): >60 ML/MIN/1.73 M^2
EST. GFR  (NON AFRICAN AMERICAN): >60 ML/MIN/1.73 M^2
GLUCOSE SERPL-MCNC: 87 MG/DL (ref 70–110)
HCT VFR BLD AUTO: 37.7 % (ref 37–48.5)
HDLC SERPL-MCNC: 77 MG/DL (ref 40–75)
HDLC SERPL: 37.6 % (ref 20–50)
HGB BLD-MCNC: 12.9 G/DL (ref 12–16)
LDLC SERPL CALC-MCNC: 107.8 MG/DL (ref 63–159)
LYMPHOCYTES # BLD AUTO: 2.7 K/UL (ref 1–4.8)
LYMPHOCYTES NFR BLD: 28.3 % (ref 18–48)
MCH RBC QN AUTO: 32.1 PG (ref 27–31)
MCHC RBC AUTO-ENTMCNC: 34.2 G/DL (ref 32–36)
MCV RBC AUTO: 94 FL (ref 82–98)
MONOCYTES # BLD AUTO: 1 K/UL (ref 0.3–1)
MONOCYTES NFR BLD: 10.2 % (ref 4–15)
NEUTROPHILS # BLD AUTO: 5.8 K/UL (ref 1.8–7.7)
NEUTROPHILS NFR BLD: 60.7 % (ref 38–73)
NONHDLC SERPL-MCNC: 128 MG/DL
PLATELET # BLD AUTO: 222 K/UL (ref 150–350)
PMV BLD AUTO: 9.6 FL (ref 9.2–12.9)
POTASSIUM SERPL-SCNC: 4.3 MMOL/L (ref 3.5–5.1)
PROT SERPL-MCNC: 6.8 G/DL (ref 6–8.4)
RBC # BLD AUTO: 4.02 M/UL (ref 4–5.4)
SODIUM SERPL-SCNC: 138 MMOL/L (ref 136–145)
TRIGL SERPL-MCNC: 101 MG/DL (ref 30–150)
WBC # BLD AUTO: 9.68 K/UL (ref 3.9–12.7)

## 2019-09-24 PROCEDURE — 85025 COMPLETE CBC W/AUTO DIFF WBC: CPT

## 2019-09-24 PROCEDURE — 80061 LIPID PANEL: CPT

## 2019-09-24 PROCEDURE — 36415 COLL VENOUS BLD VENIPUNCTURE: CPT

## 2019-09-24 PROCEDURE — 80053 COMPREHEN METABOLIC PANEL: CPT

## 2019-09-26 ENCOUNTER — PATIENT OUTREACH (OUTPATIENT)
Dept: ADMINISTRATIVE | Facility: HOSPITAL | Age: 39
End: 2019-09-26

## 2019-09-26 NOTE — PROGRESS NOTES
Pre-visit chart review completed.  Immunizations reviewed and updated.    Patient due for yearly flu vaccine.

## 2019-11-04 NOTE — PROGRESS NOTES
Subjective:      Dov Hernandez is a 38 y.o. y/o female who comes in today for a physical exam.    PMH:  Dov  has a past medical history of Abnormal Pap smear, Acne varioliformis (9/21/2017), and PMDD (premenstrual dysphoric disorder) (9/21/2017).    MEDS:  Dov has a current medication list which includes the following prescription(s): drospirenone-ethinyl estradiol and multivitamin.    ALLERGIES:  Review of patient's allergies indicates:   Allergen Reactions    Egg derived Diarrhea    Latex, natural rubber Rash       Surgical Hx:  Dov  has a past surgical history that includes Colposcopy.    Social Hx:  Dov  reports that she has never smoked. She has never used smokeless tobacco. She reports that she drinks alcohol. She reports that she does not use drugs. and  reports that she currently engages in sexual activity and has had partner(s) who are Male. She reports using the following method of birth control/protection: None.    Family Hx:  Dov's family history includes Cancer in her father; Cancer (age of onset: 22) in her brother; Hyperlipidemia in her mother; Kidney cancer (age of onset: 58) in her father.    Exercise: Has [employer] bike but does not use regularly. Has active lifestyle but does not dedicate time to exercise.     Diet: Healthy - lots of lean protein and veggies, low carb intake.     Preventive Health Screening & Recent Lab Results:              Annual PE:    CBC: 9/24/19, reviewed   CMP: 9/24/19, reviewed   TSH: 9/18/17, reviewed   Lipids: 9/24/19, reviewed   Hgb A1c: NA   GYN Exam: 6/17/19      Eye Exam: NA   Mammogram: NA   Colonoscopy: NA   Bone Density Study: NA   Immunizations-     Influenza: egg and latex allergies    Td: 10/28/13    Pneumovax: NA    Shingrix: NA   Other:    Review of Systems   Constitutional: Negative for fatigue, fever and unexpected weight change.   HENT: Negative for facial swelling.    Eyes: Negative for visual disturbance.   Respiratory: Positive for chest  tightness. Negative for shortness of breath.         Chest tightness 2 weeks ago for several days that resolved spontaneously   Cardiovascular: Negative for chest pain.   Gastrointestinal: Negative for constipation, diarrhea, nausea and vomiting.   Genitourinary: Negative for dysuria.   Musculoskeletal: Negative for gait problem.   Skin: Negative for rash.   Neurological: Negative for facial asymmetry and headaches.   Psychiatric/Behavioral: Negative for confusion.       Objective:     Physical Exam   Constitutional: She is oriented to person, place, and time. She appears well-developed and well-nourished. No distress.   HENT:   Head: Normocephalic.   Eyes: No scleral icterus.   Neck: Normal range of motion. Neck supple. No thyromegaly present.   Cardiovascular: Normal rate, regular rhythm and normal heart sounds.   Pulmonary/Chest: Effort normal and breath sounds normal. No stridor. No respiratory distress. She has no wheezes. She has no rales.   Abdominal: Soft. Bowel sounds are normal. She exhibits no distension and no mass. There is no tenderness. There is no rebound and no guarding.   Musculoskeletal: She exhibits no edema.   Lymphadenopathy:     She has no cervical adenopathy.   Neurological: She is alert and oriented to person, place, and time.   Skin: Skin is warm and dry. She is not diaphoretic.   Psychiatric: She has a normal mood and affect. Her behavior is normal.   Nursing note and vitals reviewed.       Assessment:     1. Annual physical exam    2. Acne varioliformis    3. Family history of renal cancer        Plan:     Dov was seen today for annual exam.    Diagnoses and all orders for this visit:    Annual physical exam  -     EKG 12-lead    Acne varioliformis    Family history of renal cancer  -     US Retroperitoneal Complete (Kidney and; Future

## 2019-11-05 ENCOUNTER — OFFICE VISIT (OUTPATIENT)
Dept: PRIMARY CARE CLINIC | Facility: CLINIC | Age: 39
End: 2019-11-05
Payer: COMMERCIAL

## 2019-11-05 VITALS
SYSTOLIC BLOOD PRESSURE: 126 MMHG | TEMPERATURE: 98 F | BODY MASS INDEX: 28.44 KG/M2 | OXYGEN SATURATION: 99 % | DIASTOLIC BLOOD PRESSURE: 82 MMHG | HEIGHT: 62 IN | WEIGHT: 154.56 LBS | HEART RATE: 75 BPM

## 2019-11-05 DIAGNOSIS — Z80.51 FAMILY HISTORY OF RENAL CANCER: ICD-10-CM

## 2019-11-05 DIAGNOSIS — L70.2 ACNE VARIOLIFORMIS: ICD-10-CM

## 2019-11-05 DIAGNOSIS — Z00.00 ANNUAL PHYSICAL EXAM: Primary | ICD-10-CM

## 2019-11-05 PROCEDURE — 93010 ELECTROCARDIOGRAM REPORT: CPT | Mod: S$GLB,,, | Performed by: INTERNAL MEDICINE

## 2019-11-05 PROCEDURE — 99395 PR PREVENTIVE VISIT,EST,18-39: ICD-10-PCS | Mod: S$GLB,,, | Performed by: NURSE PRACTITIONER

## 2019-11-05 PROCEDURE — 99999 PR PBB SHADOW E&M-EST. PATIENT-LVL IV: CPT | Mod: PBBFAC,,, | Performed by: NURSE PRACTITIONER

## 2019-11-05 PROCEDURE — 93005 ELECTROCARDIOGRAM TRACING: CPT | Mod: S$GLB,,, | Performed by: NURSE PRACTITIONER

## 2019-11-05 PROCEDURE — 93010 EKG 12-LEAD: ICD-10-PCS | Mod: S$GLB,,, | Performed by: INTERNAL MEDICINE

## 2019-11-05 PROCEDURE — 99999 PR PBB SHADOW E&M-EST. PATIENT-LVL IV: ICD-10-PCS | Mod: PBBFAC,,, | Performed by: NURSE PRACTITIONER

## 2019-11-05 PROCEDURE — 99395 PREV VISIT EST AGE 18-39: CPT | Mod: S$GLB,,, | Performed by: NURSE PRACTITIONER

## 2019-11-05 PROCEDURE — 93005 EKG 12-LEAD: ICD-10-PCS | Mod: S$GLB,,, | Performed by: NURSE PRACTITIONER

## 2019-11-18 ENCOUNTER — HOSPITAL ENCOUNTER (OUTPATIENT)
Dept: RADIOLOGY | Facility: HOSPITAL | Age: 39
Discharge: HOME | End: 2019-11-18
Attending: NURSE PRACTITIONER
Payer: COMMERCIAL

## 2019-11-18 DIAGNOSIS — Z80.51 FAMILY HISTORY OF RENAL CANCER: ICD-10-CM

## 2019-11-18 PROCEDURE — 76770 US EXAM ABDO BACK WALL COMP: CPT | Mod: 26,,, | Performed by: RADIOLOGY

## 2019-11-18 PROCEDURE — 76770 US RETROPERITONEAL COMPLETE: ICD-10-PCS | Mod: 26,,, | Performed by: RADIOLOGY

## 2019-11-18 PROCEDURE — 76770 US EXAM ABDO BACK WALL COMP: CPT | Mod: TC

## 2020-08-14 DIAGNOSIS — Z11.59 ENCOUNTER FOR HEPATITIS C SCREENING TEST FOR LOW RISK PATIENT: ICD-10-CM

## 2020-08-14 DIAGNOSIS — Z00.00 ANNUAL PHYSICAL EXAM: Primary | ICD-10-CM

## 2020-10-12 ENCOUNTER — OFFICE VISIT (OUTPATIENT)
Dept: OBSTETRICS AND GYNECOLOGY | Facility: CLINIC | Age: 40
End: 2020-10-12
Payer: COMMERCIAL

## 2020-10-12 VITALS
HEIGHT: 62 IN | SYSTOLIC BLOOD PRESSURE: 120 MMHG | BODY MASS INDEX: 27.26 KG/M2 | WEIGHT: 148.13 LBS | DIASTOLIC BLOOD PRESSURE: 80 MMHG

## 2020-10-12 DIAGNOSIS — Z30.41 ENCOUNTER FOR SURVEILLANCE OF CONTRACEPTIVE PILLS: ICD-10-CM

## 2020-10-12 DIAGNOSIS — L70.9 ACNE, UNSPECIFIED ACNE TYPE: ICD-10-CM

## 2020-10-12 DIAGNOSIS — Z01.419 VISIT FOR GYNECOLOGIC EXAMINATION: Primary | ICD-10-CM

## 2020-10-12 PROCEDURE — 99395 PREV VISIT EST AGE 18-39: CPT | Mod: S$GLB,,, | Performed by: OBSTETRICS & GYNECOLOGY

## 2020-10-12 PROCEDURE — 99999 PR PBB SHADOW E&M-EST. PATIENT-LVL III: ICD-10-PCS | Mod: PBBFAC,,, | Performed by: OBSTETRICS & GYNECOLOGY

## 2020-10-12 PROCEDURE — 99395 PR PREVENTIVE VISIT,EST,18-39: ICD-10-PCS | Mod: S$GLB,,, | Performed by: OBSTETRICS & GYNECOLOGY

## 2020-10-12 PROCEDURE — 99999 PR PBB SHADOW E&M-EST. PATIENT-LVL III: CPT | Mod: PBBFAC,,, | Performed by: OBSTETRICS & GYNECOLOGY

## 2020-10-12 RX ORDER — DROSPIRENONE AND ETHINYL ESTRADIOL 0.03MG-3MG
1 KIT ORAL DAILY
Qty: 84 TABLET | Refills: 4 | Status: SHIPPED | OUTPATIENT
Start: 2020-10-12 | End: 2021-11-01 | Stop reason: SDUPTHER

## 2020-10-12 NOTE — PROGRESS NOTES
HISTORY OF PRESENT ILLNESS:    Dov Hernandez is a 39 y.o. female, , Patient's last menstrual period was 2020.,  presents for a routine exam and has no complaints. HAD NL COTEST 2019 AND REFILL OCP - CYCLE CONTROL AND ACNE.  CONTRACEP WITH VASECTOMY.    ADDED 2 MORE Milmine TERRNAKITA TO THE FAMILY . .     2019:  COTESTING.  VASECTOMY, ELIUD FOR CYCLE CONTROL.  WILL D/W DERM POSS CHANGE TO SPIRONOLACTONE AND STOP ELIUD, BUT FOR NOW REFILL THRU THE YEAR.    LAST VISIT 2017:   PAP DUE .  TAKING SPIRONOLACTONE; AFTER RESTARTING POOL (ACNE SYMPTOMS) - SKIN IS CLEAR AND MUCH LESS HERNANDEZ.  KIDS NOW 4YO AND 5YO  LAST VISIT 3/2016:   S/P VASECTOMY AND HAS HAD MORE PMS, ACNE - COUNSELED. IF WANTS TO RESTART OCP FOR CYCLE CONTROL AND SKIN WILL LET ME KNOW. PAP SUBMITTED  Last visit 2015 and PRIOR ROUTINE VISIT 3/2015:  PAINFUL BREAST LUMP LEFT UPPER OUTER, PAST 3-4 WEEKS, TENDER - DISCUSSED STOP OCP AND DECR CAFFEINE  ALSO ITCHING CREASE BETWEEN THIGH AND VULVA PAST MONTH - DISCUSSED YEAST AND LOCAL CARE - LOTRISONE RXN  PLANS TO D/C OCP AS SOON AS  CLEARED WITH SECOND SA S/P VASECTOMY  LAST ROUTINE VISIT 3/2015:  PAP DUE NEXT , AND REFILL OCP.  LAST VISIT 3/2014 PP:  She is status post  6 weeks ago. Her hospitalization was complicated BY SPINAL H/A. She is not breastfeeding. She desires oral contraceptives (estrogen/progesterone) for contraception. She denies postpartum depression. EVENTUALLY PLANS VASECTOMY; RXN ELIUD UNTIL THEN  2014  WITH SMALL SECOND DEGREE LAC, FEMALE    Past Medical History:   Diagnosis Date    Abnormal Pap smear     sean Guerrero pap since    Acne varioliformis 2017    PMDD (premenstrual dysphoric disorder) 2017       Past Surgical History:   Procedure Laterality Date    COLPOSCOPY         MEDICATIONS AND ALLERGIES:      Current Outpatient Medications:     drospirenone-ethinyl estradioL (ELIUD) 3-0.03 mg per tablet, Take 1 tablet by  mouth once daily., Disp: 84 tablet, Rfl: 4    multivitamin (ONE DAILY MULTIVITAMIN) per tablet, Take 1 tablet by mouth once daily., Disp: , Rfl:     Review of patient's allergies indicates:   Allergen Reactions    Egg derived Diarrhea    Latex, natural rubber Rash       Family History   Problem Relation Age of Onset    Kidney cancer Father 58    Cancer Father     Cancer Brother 22        Parotid Gland cancer    Hyperlipidemia Mother     Breast cancer Neg Hx     Colon cancer Neg Hx     Ovarian cancer Neg Hx     Melanoma Neg Hx        Social History     Socioeconomic History    Marital status:      Spouse name: Not on file    Number of children: 2    Years of education: Not on file    Highest education level: Not on file   Occupational History    Occupation:      Employer: [employer]   Social Needs    Financial resource strain: Not on file    Food insecurity     Worry: Not on file     Inability: Not on file    Transportation needs     Medical: Not on file     Non-medical: Not on file   Tobacco Use    Smoking status: Never Smoker    Smokeless tobacco: Never Used   Substance and Sexual Activity    Alcohol use: Yes     Comment: social    Drug use: No    Sexual activity: Yes     Partners: Male     Birth control/protection: None   Lifestyle    Physical activity     Days per week: Not on file     Minutes per session: Not on file    Stress: Not on file   Relationships    Social connections     Talks on phone: Not on file     Gets together: Not on file     Attends [employer] service: Not on file     Active member of club or organization: Not on file     Attends meetings of clubs or organizations: Not on file     Relationship status: Not on file   Other Topics Concern    Are you pregnant or think you may be? 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  Infection History: Denies STDs. Denies PID.  Benign History: Denies uterine fibroids. Denies ovarian cysts. Denies endometriosis. Denies other conditions.  Cancer History: Denies cervical cancer. Denies uterine cancer or hyperplasia. Denies ovarian cancer. Denies vulvar cancer or pre-cancer. Denies vaginal cancer or pre-cancer. Denies breast cancer. Denies colon cancer.  Sexual Activity History: Reports currently being sexually active  Menstrual History: Monthly, mild-moderate.  Contraception:vasectomy    ROS:  GENERAL: No weight changes. No swelling. No fatigue. No fever.  CARDIOVASCULAR: No chest pain. No shortness of breath. No leg cramps.   NEUROLOGICAL: No headaches. No vision changes.  BREASTS: No pain. No lumps. No discharge.  ABDOMEN: No pain. No nausea. No vomiting. No diarrhea. No constipation.  REPRODUCTIVE: No abnormal bleeding.   VULVA: No pain. No lesions. No itching.  VAGINA: No relaxation. No itching. No odor. No discharge. No lesions.  URINARY: No incontinence. No nocturia. No frequency. No dysuria.    /80   Ht 5' 2" (1.575 m)   Wt 67.2 kg (148 lb 2.4 oz)   LMP 09/30/2020   BMI 27.10 kg/m²     PE:  APPEARANCE: Well nourished, well developed, in no acute distress.  AFFECT: WNL, alert and oriented x 3.  SKIN: No acne or hirsutism.  NECK: Neck symmetric, without masses or thyromegaly.  NODES: No inguinal, cervical, axillary or femoral lymph node enlargement.  CHEST: Good respiratory effort.   ABDOMEN: Soft. No tenderness or masses. No hepatosplenomegaly. No hernias.  BREASTS: Symmetrical, no skin changes, visible lesions, palpable masses or nipple discharge bilaterally.  PELVIC: External female genitalia without lesions.  Female hair distribution. Adequate perineal body, Normal urethral meatus. Vagina moist and well rugated without lesions or discharge.  No significant " cystocele or rectocele present. Cervix pink without lesions, discharge or tenderness. Uterus is normal size, regular, mobile and nontender. Adnexa without masses or tenderness.  EXTREMITIES: No edema    PROCEDURES:    DIAGNOSIS:  1. Visit for gynecologic examination     2. Encounter for surveillance of contraceptive pills     3. Acne, unspecified acne type  drospirenone-ethinyl estradioL (ELIUD) 3-0.03 mg per tablet       LABS AND TESTS ORDERED:    MEDICATIONS PRESCRIBED:    COUNSELING:   The patient was counseled today on ACS PAP guidelines, with recommendations for yearly pelvic exams unless their uterus, cervix, and ovaries were removed for benign reasons; in that case, examinations every 3-5 years are recommended.  The patient was also counseled regarding monthly breast self-examination, routine STD screening for at-risk populations, prophylactic immunizations for transmitted infections such as  HPV, Pertussis, or Influenza as appropriate, and yearly mammograms when indicated by ACS guidelines.  She was advised to see her primary care physician for all other health maintenance.    FOLLOW-UP with me annually.

## 2020-11-05 ENCOUNTER — LAB VISIT (OUTPATIENT)
Dept: LAB | Facility: HOSPITAL | Age: 40
End: 2020-11-05
Attending: FAMILY MEDICINE
Payer: COMMERCIAL

## 2020-11-05 DIAGNOSIS — Z11.59 ENCOUNTER FOR HEPATITIS C SCREENING TEST FOR LOW RISK PATIENT: ICD-10-CM

## 2020-11-05 DIAGNOSIS — Z00.00 ANNUAL PHYSICAL EXAM: ICD-10-CM

## 2020-11-05 LAB
ALBUMIN SERPL BCP-MCNC: 3.4 G/DL (ref 3.5–5.2)
ALP SERPL-CCNC: 34 U/L (ref 55–135)
ALT SERPL W/O P-5'-P-CCNC: 38 U/L (ref 10–44)
ANION GAP SERPL CALC-SCNC: 10 MMOL/L (ref 8–16)
AST SERPL-CCNC: 40 U/L (ref 10–40)
BASOPHILS # BLD AUTO: 0.01 K/UL (ref 0–0.2)
BASOPHILS NFR BLD: 0.2 % (ref 0–1.9)
BILIRUB SERPL-MCNC: 0.4 MG/DL (ref 0.1–1)
BUN SERPL-MCNC: 9 MG/DL (ref 6–20)
CALCIUM SERPL-MCNC: 9 MG/DL (ref 8.7–10.5)
CHLORIDE SERPL-SCNC: 107 MMOL/L (ref 95–110)
CHOLEST SERPL-MCNC: 187 MG/DL (ref 120–199)
CHOLEST/HDLC SERPL: 3.1 {RATIO} (ref 2–5)
CO2 SERPL-SCNC: 23 MMOL/L (ref 23–29)
CREAT SERPL-MCNC: 0.8 MG/DL (ref 0.5–1.4)
DIFFERENTIAL METHOD: ABNORMAL
EOSINOPHIL # BLD AUTO: 0 K/UL (ref 0–0.5)
EOSINOPHIL NFR BLD: 0.7 % (ref 0–8)
ERYTHROCYTE [DISTWIDTH] IN BLOOD BY AUTOMATED COUNT: 11.3 % (ref 11.5–14.5)
EST. GFR  (AFRICAN AMERICAN): >60 ML/MIN/1.73 M^2
EST. GFR  (NON AFRICAN AMERICAN): >60 ML/MIN/1.73 M^2
GLUCOSE SERPL-MCNC: 91 MG/DL (ref 70–110)
HCT VFR BLD AUTO: 37 % (ref 37–48.5)
HCV AB SERPL QL IA: NEGATIVE
HDLC SERPL-MCNC: 61 MG/DL (ref 40–75)
HDLC SERPL: 32.6 % (ref 20–50)
HGB BLD-MCNC: 12.7 G/DL (ref 12–16)
IMM GRANULOCYTES # BLD AUTO: 0.01 K/UL (ref 0–0.04)
IMM GRANULOCYTES NFR BLD AUTO: 0.2 % (ref 0–0.5)
LDLC SERPL CALC-MCNC: 90.4 MG/DL (ref 63–159)
LYMPHOCYTES # BLD AUTO: 1.4 K/UL (ref 1–4.8)
LYMPHOCYTES NFR BLD: 33.6 % (ref 18–48)
MCH RBC QN AUTO: 31.7 PG (ref 27–31)
MCHC RBC AUTO-ENTMCNC: 34.3 G/DL (ref 32–36)
MCV RBC AUTO: 92 FL (ref 82–98)
MONOCYTES # BLD AUTO: 0.4 K/UL (ref 0.3–1)
MONOCYTES NFR BLD: 10.6 % (ref 4–15)
NEUTROPHILS # BLD AUTO: 2.3 K/UL (ref 1.8–7.7)
NEUTROPHILS NFR BLD: 54.7 % (ref 38–73)
NONHDLC SERPL-MCNC: 126 MG/DL
NRBC BLD-RTO: 0 /100 WBC
PLATELET # BLD AUTO: 200 K/UL (ref 150–350)
PMV BLD AUTO: 9.9 FL (ref 9.2–12.9)
POTASSIUM SERPL-SCNC: 4.3 MMOL/L (ref 3.5–5.1)
PROT SERPL-MCNC: 6.7 G/DL (ref 6–8.4)
RBC # BLD AUTO: 4.01 M/UL (ref 4–5.4)
SODIUM SERPL-SCNC: 140 MMOL/L (ref 136–145)
TRIGL SERPL-MCNC: 178 MG/DL (ref 30–150)
WBC # BLD AUTO: 4.17 K/UL (ref 3.9–12.7)

## 2020-11-05 PROCEDURE — 85025 COMPLETE CBC W/AUTO DIFF WBC: CPT

## 2020-11-05 PROCEDURE — 36415 COLL VENOUS BLD VENIPUNCTURE: CPT

## 2020-11-05 PROCEDURE — 86803 HEPATITIS C AB TEST: CPT

## 2020-11-05 PROCEDURE — 80061 LIPID PANEL: CPT

## 2020-11-05 PROCEDURE — 80053 COMPREHEN METABOLIC PANEL: CPT

## 2020-11-25 ENCOUNTER — PATIENT MESSAGE (OUTPATIENT)
Dept: OBSTETRICS AND GYNECOLOGY | Facility: CLINIC | Age: 40
End: 2020-11-25

## 2020-11-27 ENCOUNTER — TELEPHONE (OUTPATIENT)
Dept: OBSTETRICS AND GYNECOLOGY | Facility: CLINIC | Age: 40
End: 2020-11-27

## 2020-11-27 NOTE — TELEPHONE ENCOUNTER
Reached out to patient , called pharmacy regarding prescription, clarified rx , patient verbalized understanding      ----- Message from Andie Camacho sent at 11/27/2020  2:29 PM CST -----  Who Called: LILLY QUIROS is the reqeust in detail: Patient is calling in reference to medication drospirenone-ethinyl estradioL (ELIUD) 3-0.03 mg per tablet. She stated she went to [facility] and they don't have prescription refill there. She would like for staff to contact her. Please adviseCan the clinic reply by [facility]?: yesBest Call Back Number:[phone]

## 2020-12-15 ENCOUNTER — OFFICE VISIT (OUTPATIENT)
Dept: PRIMARY CARE CLINIC | Facility: CLINIC | Age: 40
End: 2020-12-15
Payer: COMMERCIAL

## 2020-12-15 VITALS
WEIGHT: 148.81 LBS | OXYGEN SATURATION: 99 % | HEART RATE: 72 BPM | BODY MASS INDEX: 27.38 KG/M2 | HEIGHT: 62 IN | SYSTOLIC BLOOD PRESSURE: 126 MMHG | DIASTOLIC BLOOD PRESSURE: 84 MMHG

## 2020-12-15 DIAGNOSIS — Z00.00 ROUTINE GENERAL MEDICAL EXAMINATION AT A HEALTH CARE FACILITY: Primary | ICD-10-CM

## 2020-12-15 DIAGNOSIS — F32.81 PMDD (PREMENSTRUAL DYSPHORIC DISORDER): ICD-10-CM

## 2020-12-15 PROCEDURE — 3008F PR BODY MASS INDEX (BMI) DOCUMENTED: ICD-10-PCS | Mod: CPTII,S$GLB,, | Performed by: FAMILY MEDICINE

## 2020-12-15 PROCEDURE — 3008F BODY MASS INDEX DOCD: CPT | Mod: CPTII,S$GLB,, | Performed by: FAMILY MEDICINE

## 2020-12-15 PROCEDURE — 99999 PR PBB SHADOW E&M-EST. PATIENT-LVL III: CPT | Mod: PBBFAC,,, | Performed by: FAMILY MEDICINE

## 2020-12-15 PROCEDURE — 99999 PR PBB SHADOW E&M-EST. PATIENT-LVL III: ICD-10-PCS | Mod: PBBFAC,,, | Performed by: FAMILY MEDICINE

## 2020-12-15 PROCEDURE — 99395 PR PREVENTIVE VISIT,EST,18-39: ICD-10-PCS | Mod: S$GLB,,, | Performed by: FAMILY MEDICINE

## 2020-12-15 PROCEDURE — 99395 PREV VISIT EST AGE 18-39: CPT | Mod: S$GLB,,, | Performed by: FAMILY MEDICINE

## 2020-12-15 PROCEDURE — 1126F PR PAIN SEVERITY QUANTIFIED, NO PAIN PRESENT: ICD-10-PCS | Mod: S$GLB,,, | Performed by: FAMILY MEDICINE

## 2020-12-15 PROCEDURE — 1126F AMNT PAIN NOTED NONE PRSNT: CPT | Mod: S$GLB,,, | Performed by: FAMILY MEDICINE

## 2020-12-15 RX ORDER — FLUOXETINE 10 MG/1
10 CAPSULE ORAL DAILY
Qty: 30 CAPSULE | Refills: 11 | Status: SHIPPED | OUTPATIENT
Start: 2020-12-15 | End: 2021-12-17

## 2020-12-15 NOTE — PROGRESS NOTES
Subjective:      Patient ID: Dov Hernandez is a 40 y.o. female.    Chief Complaint: Annual Exam    Here today for general exam.     She follows with gyn.  She has been taking birth control for many years for emotions, acne.  Her  had vasectomy.  She would like to try getting off of hormones    Denies any chest pain, shortness of breath, nausea vomiting constipation diarrhea, blood in stool, heartburn      Current Outpatient Medications:     drospirenone-ethinyl estradioL (ELIUD) 3-0.03 mg per tablet, Take 1 tablet by mouth once daily., Disp: 84 tablet, Rfl: 4      Lab Results   Component Value Date    HGBA1C 4.9 06/04/2013     No results found for: MICALBCREAT  Lab Results   Component Value Date    LDLCALC 90.4 11/05/2020    LDLCALC 107.8 09/24/2019    CHOL 187 11/05/2020    HDL 61 11/05/2020    TRIG 178 (H) 11/05/2020       Lab Results   Component Value Date     11/05/2020    K 4.3 11/05/2020     11/05/2020    CO2 23 11/05/2020    GLU 91 11/05/2020    BUN 9 11/05/2020    CREATININE 0.8 11/05/2020    CALCIUM 9.0 11/05/2020    PROT 6.7 11/05/2020    ALBUMIN 3.4 (L) 11/05/2020    BILITOT 0.4 11/05/2020    ALKPHOS 34 (L) 11/05/2020    AST 40 11/05/2020    ALT 38 11/05/2020    ANIONGAP 10 11/05/2020    ESTGFRAFRICA >60 11/05/2020    EGFRNONAA >60 11/05/2020    WBC 4.17 11/05/2020    HGB 12.7 11/05/2020    HGB 12.9 09/24/2019    HCT 37.0 11/05/2020    MCV 92 11/05/2020     11/05/2020    TSH 1.914 09/15/2017    HEPCAB Negative 11/05/2020       No results found for: LH, FSH, TOTALTESTOST, PROGESTERONE, ESTRADIOL, [id number], [id number], FERRITIN, IRON, TRANSFERRIN, TIBC, FESATURATED, ZINC      Past Medical History:   Diagnosis Date    Acne varioliformis 9/21/2017    PMDD (premenstrual dysphoric disorder) 9/21/2017     Past Surgical History:   Procedure Laterality Date    COLPOSCOPY       Social History     Social History Narrative    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    , 2  "children (10 yo son, 6 yo daughter [place])    Mom Renee Tejeda    nonsmoker      GYN Yolanda     Family History   Problem Relation Age of Onset    Kidney cancer Father 58    Cancer Father     Cancer Brother 22        Parotid Gland cancer    Hyperlipidemia Mother     Breast cancer Neg Hx     Colon cancer Neg Hx     Ovarian cancer Neg Hx     Melanoma Neg Hx      Vitals:    12/15/20 1408   BP: 126/84   Pulse: 72   SpO2: 99%   Weight: 67.5 kg (148 lb 13 oz)   Height: 5' 2" (1.575 m)   PainSc: 0-No pain     Objective:   Physical Exam  Constitutional:       Appearance: She is well-developed.   HENT:      Head: Normocephalic and atraumatic.      Nose:      Comments: Wearing mask due to current COVID 19 pandemic, Nose & mouth exam deferred  Eyes:      Pupils: Pupils are equal, round, and reactive to light.   Neck:      Musculoskeletal: Neck supple.      Thyroid: No thyromegaly.   Cardiovascular:      Rate and Rhythm: Normal rate and regular rhythm.      Heart sounds: Normal heart sounds. No murmur.   Pulmonary:      Effort: Pulmonary effort is normal.      Breath sounds: Normal breath sounds. No wheezing.   Abdominal:      General: Bowel sounds are normal. There is no distension.      Palpations: Abdomen is soft. There is no mass.      Tenderness: There is no abdominal tenderness. There is no guarding or rebound.   Lymphadenopathy:      Cervical: No cervical adenopathy.   Skin:     General: Skin is warm and dry.   Neurological:      Mental Status: She is alert and oriented to person, place, and time.   Psychiatric:         Behavior: Behavior normal.       Assessment:     1. Routine general medical examination at a health care facility    2. PMDD (premenstrual dysphoric disorder)      Plan:     Orders Placed This Encounter    FLUoxetine 10 MG capsule   try daily or prn    Follow with GYN for female health & cancer prevention      ==============================  RECOMMENDATIONS FOR " FEMALES  ==============================  Your #1 MEDICINE is DAILY EXERCISE - 15-20 minutes of huffing & puffing EVERY DAY.     Prevent the #1 cause of death- cardiovascular disease (HEART ATTACK & STROKE) by checking for normal blood pressure, cholesterol, sugars, & by not smoking.     VACCINES: Yearly FLU shot,    I recommend  high fiber (5 fresh fruits or vegetables daily), low fat diet and aerobic  exercise (huffing/ puffing/ sweating for 20 min straight at least 4 days a week)    Follow up yearly with mammogram, fasting lipids, CMP, CBC prior.   ==============================================================        There are no Patient Instructions on file for this visit.

## 2020-12-15 NOTE — PROGRESS NOTES
Subjective:      Patient ID: Dov Hernandez is a 40 y.o. female.    Chief Complaint: Annual Exam    Here today for general exam.     Denies any chest pain, shortness of breath, nausea vomiting constipation diarrhea, blood in stool, heartburn      Current Outpatient Medications:     drospirenone-ethinyl estradioL (ELIUD) 3-0.03 mg per tablet, Take 1 tablet by mouth once daily., Disp: 84 tablet, Rfl: 4    FLUoxetine 10 MG capsule, Take 1 capsule (10 mg total) by mouth once daily., Disp: 30 capsule, Rfl: 11    Lab Results   Component Value Date    HGBA1C 4.9 06/04/2013     No results found for: MICALBCREAT  Lab Results   Component Value Date    LDLCALC 90.4 11/05/2020    LDLCALC 107.8 09/24/2019    CHOL 187 11/05/2020    HDL 61 11/05/2020    TRIG 178 (H) 11/05/2020       Lab Results   Component Value Date     11/05/2020    K 4.3 11/05/2020     11/05/2020    CO2 23 11/05/2020    GLU 91 11/05/2020    BUN 9 11/05/2020    CREATININE 0.8 11/05/2020    CALCIUM 9.0 11/05/2020    PROT 6.7 11/05/2020    ALBUMIN 3.4 (L) 11/05/2020    BILITOT 0.4 11/05/2020    ALKPHOS 34 (L) 11/05/2020    AST 40 11/05/2020    ALT 38 11/05/2020    ANIONGAP 10 11/05/2020    ESTGFRAFRICA >60 11/05/2020    EGFRNONAA >60 11/05/2020    WBC 4.17 11/05/2020    HGB 12.7 11/05/2020    HGB 12.9 09/24/2019    HCT 37.0 11/05/2020    MCV 92 11/05/2020     11/05/2020    TSH 1.914 09/15/2017    HEPCAB Negative 11/05/2020       No results found for: LH, FSH, TOTALTESTOST, PROGESTERONE, ESTRADIOL, [id number], [id number], FERRITIN, IRON, TRANSFERRIN, TIBC, FESATURATED, ZINC      Past Medical History:   Diagnosis Date    Acne varioliformis 9/21/2017    PMDD (premenstrual dysphoric disorder) 9/21/2017     Past Surgical History:   Procedure Laterality Date    COLPOSCOPY       Social History     Social History Narrative    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, , 2 children (8 yo son, 3 yo daughter), Mom Renee Tejeda, nonsmoker none,  "ETOH rare , GYN Yolanda     Family History   Problem Relation Age of Onset    Kidney cancer Father 58    Cancer Father     Cancer Brother 22        Parotid Gland cancer    Hyperlipidemia Mother     Breast cancer Neg Hx     Colon cancer Neg Hx     Ovarian cancer Neg Hx     Melanoma Neg Hx      Vitals:    12/15/20 1408   BP: 126/84   Pulse: 72   SpO2: 99%   Weight: 67.5 kg (148 lb 13 oz)   Height: 5' 2" (1.575 m)   PainSc: 0-No pain     Objective:   Physical Exam  Constitutional:       Appearance: She is well-developed.   HENT:      Head: Normocephalic and atraumatic.      Nose:      Comments: Wearing mask due to current COVID 19 pandemic, Nose & mouth exam deferred  Eyes:      Pupils: Pupils are equal, round, and reactive to light.   Neck:      Musculoskeletal: Neck supple.      Thyroid: No thyromegaly.   Cardiovascular:      Rate and Rhythm: Normal rate and regular rhythm.      Heart sounds: Normal heart sounds. No murmur.   Pulmonary:      Effort: Pulmonary effort is normal.      Breath sounds: Normal breath sounds. No wheezing.   Abdominal:      General: Bowel sounds are normal. There is no distension.      Palpations: Abdomen is soft. There is no mass.      Tenderness: There is no abdominal tenderness. There is no guarding or rebound.   Lymphadenopathy:      Cervical: No cervical adenopathy.   Skin:     General: Skin is warm and dry.   Neurological:      Mental Status: She is alert and oriented to person, place, and time.   Psychiatric:         Behavior: Behavior normal.       Assessment:     1. Routine general medical examination at a health care facility    2. PMDD (premenstrual dysphoric disorder)      Plan:     Orders Placed This Encounter    FLUoxetine 10 MG capsule     Try daily     She may try stopping OCP    Follow with GYN for female health & cancer prevention    Due for  Vaccines  - at your pharmacy      ==============================  RECOMMENDATIONS FOR " FEMALES  ==============================  Your #1 MEDICINE is DAILY EXERCISE - 15-20 minutes of huffing & puffing EVERY DAY.     Prevent the #1 cause of death- cardiovascular disease (HEART ATTACK & STROKE) by checking for normal blood pressure, cholesterol, sugars, & by not smoking.     VACCINES: Yearly FLU shot,     I recommend  high fiber (5 fresh fruits or vegetables daily), low fat diet and aerobic  exercise (huffing/ puffing/ sweating for 20 min straight at least 4 days a week)    Follow up yearly with mammogram, fasting lipids, CMP, CBC prior.   ==============================================================        There are no Patient Instructions on file for this visit.

## 2021-11-02 ENCOUNTER — OFFICE VISIT (OUTPATIENT)
Dept: OBSTETRICS AND GYNECOLOGY | Facility: CLINIC | Age: 41
End: 2021-11-02
Payer: COMMERCIAL

## 2021-11-02 VITALS
BODY MASS INDEX: 27.41 KG/M2 | SYSTOLIC BLOOD PRESSURE: 104 MMHG | DIASTOLIC BLOOD PRESSURE: 82 MMHG | HEIGHT: 62 IN | WEIGHT: 148.94 LBS

## 2021-11-02 DIAGNOSIS — L70.9 ACNE, UNSPECIFIED ACNE TYPE: ICD-10-CM

## 2021-11-02 DIAGNOSIS — Z01.419 VISIT FOR GYNECOLOGIC EXAMINATION: Primary | ICD-10-CM

## 2021-11-02 DIAGNOSIS — Z12.31 SCREENING MAMMOGRAM FOR HIGH-RISK PATIENT: ICD-10-CM

## 2021-11-02 DIAGNOSIS — Z30.41 ENCOUNTER FOR SURVEILLANCE OF CONTRACEPTIVE PILLS: ICD-10-CM

## 2021-11-02 PROCEDURE — 3074F SYST BP LT 130 MM HG: CPT | Mod: CPTII,S$GLB,, | Performed by: OBSTETRICS & GYNECOLOGY

## 2021-11-02 PROCEDURE — 3008F PR BODY MASS INDEX (BMI) DOCUMENTED: ICD-10-PCS | Mod: CPTII,S$GLB,, | Performed by: OBSTETRICS & GYNECOLOGY

## 2021-11-02 PROCEDURE — 3079F DIAST BP 80-89 MM HG: CPT | Mod: CPTII,S$GLB,, | Performed by: OBSTETRICS & GYNECOLOGY

## 2021-11-02 PROCEDURE — 1160F PR REVIEW ALL MEDS BY PRESCRIBER/CLIN PHARMACIST DOCUMENTED: ICD-10-PCS | Mod: CPTII,S$GLB,, | Performed by: OBSTETRICS & GYNECOLOGY

## 2021-11-02 PROCEDURE — 1160F RVW MEDS BY RX/DR IN RCRD: CPT | Mod: CPTII,S$GLB,, | Performed by: OBSTETRICS & GYNECOLOGY

## 2021-11-02 PROCEDURE — 3074F PR MOST RECENT SYSTOLIC BLOOD PRESSURE < 130 MM HG: ICD-10-PCS | Mod: CPTII,S$GLB,, | Performed by: OBSTETRICS & GYNECOLOGY

## 2021-11-02 PROCEDURE — 99395 PR PREVENTIVE VISIT,EST,18-39: ICD-10-PCS | Mod: S$GLB,,, | Performed by: OBSTETRICS & GYNECOLOGY

## 2021-11-02 PROCEDURE — 99395 PREV VISIT EST AGE 18-39: CPT | Mod: S$GLB,,, | Performed by: OBSTETRICS & GYNECOLOGY

## 2021-11-02 PROCEDURE — 1159F MED LIST DOCD IN RCRD: CPT | Mod: CPTII,S$GLB,, | Performed by: OBSTETRICS & GYNECOLOGY

## 2021-11-02 PROCEDURE — 1159F PR MEDICATION LIST DOCUMENTED IN MEDICAL RECORD: ICD-10-PCS | Mod: CPTII,S$GLB,, | Performed by: OBSTETRICS & GYNECOLOGY

## 2021-11-02 PROCEDURE — 99999 PR PBB SHADOW E&M-EST. PATIENT-LVL III: CPT | Mod: PBBFAC,,, | Performed by: OBSTETRICS & GYNECOLOGY

## 2021-11-02 PROCEDURE — 99999 PR PBB SHADOW E&M-EST. PATIENT-LVL III: ICD-10-PCS | Mod: PBBFAC,,, | Performed by: OBSTETRICS & GYNECOLOGY

## 2021-11-02 PROCEDURE — 3079F PR MOST RECENT DIASTOLIC BLOOD PRESSURE 80-89 MM HG: ICD-10-PCS | Mod: CPTII,S$GLB,, | Performed by: OBSTETRICS & GYNECOLOGY

## 2021-11-02 PROCEDURE — 3008F BODY MASS INDEX DOCD: CPT | Mod: CPTII,S$GLB,, | Performed by: OBSTETRICS & GYNECOLOGY

## 2021-11-02 RX ORDER — DROSPIRENONE AND ETHINYL ESTRADIOL 0.03MG-3MG
1 KIT ORAL DAILY
Qty: 84 TABLET | Refills: 4 | Status: SHIPPED | OUTPATIENT
Start: 2021-11-02 | End: 2022-01-17

## 2021-11-11 ENCOUNTER — TELEPHONE (OUTPATIENT)
Dept: PRIMARY CARE CLINIC | Facility: CLINIC | Age: 41
End: 2021-11-11
Payer: COMMERCIAL

## 2021-11-11 DIAGNOSIS — Z00.00 ROUTINE GENERAL MEDICAL EXAMINATION AT A HEALTH CARE FACILITY: Primary | ICD-10-CM

## 2021-12-13 ENCOUNTER — LAB VISIT (OUTPATIENT)
Dept: LAB | Facility: HOSPITAL | Age: 41
End: 2021-12-13
Attending: FAMILY MEDICINE
Payer: COMMERCIAL

## 2021-12-13 DIAGNOSIS — Z00.00 ROUTINE GENERAL MEDICAL EXAMINATION AT A HEALTH CARE FACILITY: ICD-10-CM

## 2021-12-13 LAB
ALBUMIN SERPL BCP-MCNC: 3.4 G/DL (ref 3.5–5.2)
ALP SERPL-CCNC: 29 U/L (ref 55–135)
ALT SERPL W/O P-5'-P-CCNC: 16 U/L (ref 10–44)
ANION GAP SERPL CALC-SCNC: 12 MMOL/L (ref 8–16)
AST SERPL-CCNC: 18 U/L (ref 10–40)
BASOPHILS # BLD AUTO: 0.04 K/UL (ref 0–0.2)
BASOPHILS NFR BLD: 0.7 % (ref 0–1.9)
BILIRUB SERPL-MCNC: 0.3 MG/DL (ref 0.1–1)
BUN SERPL-MCNC: 11 MG/DL (ref 6–20)
CALCIUM SERPL-MCNC: 8.6 MG/DL (ref 8.7–10.5)
CHLORIDE SERPL-SCNC: 106 MMOL/L (ref 95–110)
CHOLEST SERPL-MCNC: 188 MG/DL (ref 120–199)
CHOLEST/HDLC SERPL: 2.6 {RATIO} (ref 2–5)
CO2 SERPL-SCNC: 20 MMOL/L (ref 23–29)
CREAT SERPL-MCNC: 0.7 MG/DL (ref 0.5–1.4)
DIFFERENTIAL METHOD: ABNORMAL
EOSINOPHIL # BLD AUTO: 0.1 K/UL (ref 0–0.5)
EOSINOPHIL NFR BLD: 1.7 % (ref 0–8)
ERYTHROCYTE [DISTWIDTH] IN BLOOD BY AUTOMATED COUNT: 12.1 % (ref 11.5–14.5)
EST. GFR  (AFRICAN AMERICAN): >60 ML/MIN/1.73 M^2
EST. GFR  (NON AFRICAN AMERICAN): >60 ML/MIN/1.73 M^2
GLUCOSE SERPL-MCNC: 84 MG/DL (ref 70–110)
HCT VFR BLD AUTO: 37.7 % (ref 37–48.5)
HDLC SERPL-MCNC: 73 MG/DL (ref 40–75)
HDLC SERPL: 38.8 % (ref 20–50)
HGB BLD-MCNC: 12.3 G/DL (ref 12–16)
IMM GRANULOCYTES # BLD AUTO: 0.01 K/UL (ref 0–0.04)
IMM GRANULOCYTES NFR BLD AUTO: 0.2 % (ref 0–0.5)
LDLC SERPL CALC-MCNC: 100.6 MG/DL (ref 63–159)
LYMPHOCYTES # BLD AUTO: 1.7 K/UL (ref 1–4.8)
LYMPHOCYTES NFR BLD: 28.3 % (ref 18–48)
MCH RBC QN AUTO: 32.4 PG (ref 27–31)
MCHC RBC AUTO-ENTMCNC: 32.6 G/DL (ref 32–36)
MCV RBC AUTO: 99 FL (ref 82–98)
MONOCYTES # BLD AUTO: 0.6 K/UL (ref 0.3–1)
MONOCYTES NFR BLD: 9.1 % (ref 4–15)
NEUTROPHILS # BLD AUTO: 3.6 K/UL (ref 1.8–7.7)
NEUTROPHILS NFR BLD: 60 % (ref 38–73)
NONHDLC SERPL-MCNC: 115 MG/DL
NRBC BLD-RTO: 0 /100 WBC
PLATELET # BLD AUTO: 217 K/UL (ref 150–450)
PMV BLD AUTO: 10.2 FL (ref 9.2–12.9)
POTASSIUM SERPL-SCNC: 4.1 MMOL/L (ref 3.5–5.1)
PROT SERPL-MCNC: 6.4 G/DL (ref 6–8.4)
RBC # BLD AUTO: 3.8 M/UL (ref 4–5.4)
SODIUM SERPL-SCNC: 138 MMOL/L (ref 136–145)
TRIGL SERPL-MCNC: 72 MG/DL (ref 30–150)
WBC # BLD AUTO: 6.04 K/UL (ref 3.9–12.7)

## 2021-12-13 PROCEDURE — 85025 COMPLETE CBC W/AUTO DIFF WBC: CPT | Performed by: FAMILY MEDICINE

## 2021-12-13 PROCEDURE — 36415 COLL VENOUS BLD VENIPUNCTURE: CPT | Mod: PO | Performed by: FAMILY MEDICINE

## 2021-12-13 PROCEDURE — 80053 COMPREHEN METABOLIC PANEL: CPT | Performed by: FAMILY MEDICINE

## 2021-12-13 PROCEDURE — 80061 LIPID PANEL: CPT | Performed by: FAMILY MEDICINE

## 2021-12-17 ENCOUNTER — OFFICE VISIT (OUTPATIENT)
Dept: PRIMARY CARE CLINIC | Facility: CLINIC | Age: 41
End: 2021-12-17
Payer: COMMERCIAL

## 2021-12-17 VITALS
DIASTOLIC BLOOD PRESSURE: 84 MMHG | WEIGHT: 148.56 LBS | RESPIRATION RATE: 18 BRPM | HEART RATE: 67 BPM | HEIGHT: 62 IN | OXYGEN SATURATION: 99 % | BODY MASS INDEX: 27.34 KG/M2 | SYSTOLIC BLOOD PRESSURE: 118 MMHG | TEMPERATURE: 98 F

## 2021-12-17 DIAGNOSIS — L70.2 ACNE VARIOLIFORMIS: ICD-10-CM

## 2021-12-17 DIAGNOSIS — Z00.00 ROUTINE GENERAL MEDICAL EXAMINATION AT A HEALTH CARE FACILITY: Primary | ICD-10-CM

## 2021-12-17 DIAGNOSIS — R12 HEARTBURN: ICD-10-CM

## 2021-12-17 PROBLEM — F32.81 PMDD (PREMENSTRUAL DYSPHORIC DISORDER): Status: RESOLVED | Noted: 2017-09-21 | Resolved: 2021-12-17

## 2021-12-17 PROCEDURE — 99396 PR PREVENTIVE VISIT,EST,40-64: ICD-10-PCS | Mod: S$GLB,,, | Performed by: FAMILY MEDICINE

## 2021-12-17 PROCEDURE — 99999 PR PBB SHADOW E&M-EST. PATIENT-LVL IV: ICD-10-PCS | Mod: PBBFAC,,, | Performed by: FAMILY MEDICINE

## 2021-12-17 PROCEDURE — 99396 PREV VISIT EST AGE 40-64: CPT | Mod: S$GLB,,, | Performed by: FAMILY MEDICINE

## 2021-12-17 PROCEDURE — 99999 PR PBB SHADOW E&M-EST. PATIENT-LVL IV: CPT | Mod: PBBFAC,,, | Performed by: FAMILY MEDICINE

## 2021-12-17 RX ORDER — AZELAIC ACID 0.15 G/G
GEL TOPICAL
COMMUNITY
Start: 2021-10-13 | End: 2023-03-21

## 2021-12-17 RX ORDER — SPIRONOLACTONE 50 MG/1
TABLET, FILM COATED ORAL
COMMUNITY
Start: 2021-10-13 | End: 2023-03-21

## 2022-01-18 ENCOUNTER — PATIENT MESSAGE (OUTPATIENT)
Dept: ADMINISTRATIVE | Facility: HOSPITAL | Age: 42
End: 2022-01-18
Payer: COMMERCIAL

## 2022-01-19 ENCOUNTER — HOSPITAL ENCOUNTER (OUTPATIENT)
Dept: RADIOLOGY | Facility: HOSPITAL | Age: 42
Discharge: HOME | End: 2022-01-19
Attending: OBSTETRICS & GYNECOLOGY
Payer: COMMERCIAL

## 2022-01-19 DIAGNOSIS — Z12.31 SCREENING MAMMOGRAM FOR HIGH-RISK PATIENT: ICD-10-CM

## 2022-01-19 PROCEDURE — 77063 MAMMO DIGITAL SCREENING BILAT WITH TOMO: ICD-10-PCS | Mod: 26,,, | Performed by: RADIOLOGY

## 2022-01-19 PROCEDURE — 77063 BREAST TOMOSYNTHESIS BI: CPT | Mod: 26,,, | Performed by: RADIOLOGY

## 2022-01-19 PROCEDURE — 77063 BREAST TOMOSYNTHESIS BI: CPT | Mod: TC

## 2022-01-19 PROCEDURE — 77067 SCR MAMMO BI INCL CAD: CPT | Mod: 26,,, | Performed by: RADIOLOGY

## 2022-01-19 PROCEDURE — 77067 MAMMO DIGITAL SCREENING BILAT WITH TOMO: ICD-10-PCS | Mod: 26,,, | Performed by: RADIOLOGY

## 2022-04-13 ENCOUNTER — TELEPHONE (OUTPATIENT)
Dept: PRIMARY CARE CLINIC | Facility: CLINIC | Age: 42
End: 2022-04-13
Payer: COMMERCIAL

## 2022-04-13 NOTE — TELEPHONE ENCOUNTER
----- Message from Herlinda Schrader sent at 4/13/2022  3:43 PM CDT -----  Contact: self/[phone]  Patient called, requested a courtesy call from Dr Loyola's nurse about having some questions - did not want to specify. Thank you

## 2022-04-13 NOTE — TELEPHONE ENCOUNTER
Dov called stating she went to see Podiatry 2 wks ago for a  issue with her  toe nail , the foot doctor told her it is fungus  And he would like to start a oral anti-fungal medication x 3 mths she had labs done today to check liver and kidneys before she start med. Pt is asking will the anti-fungal med interact with her birth control

## 2022-09-27 DIAGNOSIS — Z00.00 ANNUAL PHYSICAL EXAM: Primary | ICD-10-CM

## 2022-12-13 ENCOUNTER — LAB VISIT (OUTPATIENT)
Dept: LAB | Facility: HOSPITAL | Age: 42
End: 2022-12-13
Attending: FAMILY MEDICINE
Payer: COMMERCIAL

## 2022-12-13 DIAGNOSIS — Z00.00 ANNUAL PHYSICAL EXAM: ICD-10-CM

## 2022-12-13 LAB
ALBUMIN SERPL BCP-MCNC: 3.5 G/DL (ref 3.5–5.2)
ALP SERPL-CCNC: 32 U/L (ref 55–135)
ALT SERPL W/O P-5'-P-CCNC: 23 U/L (ref 10–44)
ANION GAP SERPL CALC-SCNC: 9 MMOL/L (ref 8–16)
AST SERPL-CCNC: 18 U/L (ref 10–40)
BASOPHILS # BLD AUTO: 0.03 K/UL (ref 0–0.2)
BASOPHILS NFR BLD: 0.6 % (ref 0–1.9)
BILIRUB SERPL-MCNC: 0.6 MG/DL (ref 0.1–1)
BUN SERPL-MCNC: 12 MG/DL (ref 6–20)
CALCIUM SERPL-MCNC: 9.2 MG/DL (ref 8.7–10.5)
CHLORIDE SERPL-SCNC: 105 MMOL/L (ref 95–110)
CHOLEST SERPL-MCNC: 210 MG/DL (ref 120–199)
CHOLEST/HDLC SERPL: 2.6 {RATIO} (ref 2–5)
CO2 SERPL-SCNC: 23 MMOL/L (ref 23–29)
CREAT SERPL-MCNC: 0.8 MG/DL (ref 0.5–1.4)
DIFFERENTIAL METHOD: ABNORMAL
EOSINOPHIL # BLD AUTO: 0.1 K/UL (ref 0–0.5)
EOSINOPHIL NFR BLD: 1.4 % (ref 0–8)
ERYTHROCYTE [DISTWIDTH] IN BLOOD BY AUTOMATED COUNT: 11.9 % (ref 11.5–14.5)
EST. GFR  (NO RACE VARIABLE): >60 ML/MIN/1.73 M^2
GLUCOSE SERPL-MCNC: 88 MG/DL (ref 70–110)
HCT VFR BLD AUTO: 37.3 % (ref 37–48.5)
HDLC SERPL-MCNC: 80 MG/DL (ref 40–75)
HDLC SERPL: 38.1 % (ref 20–50)
HGB BLD-MCNC: 12.3 G/DL (ref 12–16)
IMM GRANULOCYTES # BLD AUTO: 0.01 K/UL (ref 0–0.04)
IMM GRANULOCYTES NFR BLD AUTO: 0.2 % (ref 0–0.5)
LDLC SERPL CALC-MCNC: 105.6 MG/DL (ref 63–159)
LYMPHOCYTES # BLD AUTO: 1.6 K/UL (ref 1–4.8)
LYMPHOCYTES NFR BLD: 31.5 % (ref 18–48)
MCH RBC QN AUTO: 31.9 PG (ref 27–31)
MCHC RBC AUTO-ENTMCNC: 33 G/DL (ref 32–36)
MCV RBC AUTO: 97 FL (ref 82–98)
MONOCYTES # BLD AUTO: 0.5 K/UL (ref 0.3–1)
MONOCYTES NFR BLD: 10.4 % (ref 4–15)
NEUTROPHILS # BLD AUTO: 2.8 K/UL (ref 1.8–7.7)
NEUTROPHILS NFR BLD: 55.9 % (ref 38–73)
NONHDLC SERPL-MCNC: 130 MG/DL
NRBC BLD-RTO: 0 /100 WBC
PLATELET # BLD AUTO: 234 K/UL (ref 150–450)
PMV BLD AUTO: 10.4 FL (ref 9.2–12.9)
POTASSIUM SERPL-SCNC: 4.1 MMOL/L (ref 3.5–5.1)
PROT SERPL-MCNC: 6.7 G/DL (ref 6–8.4)
RBC # BLD AUTO: 3.86 M/UL (ref 4–5.4)
SODIUM SERPL-SCNC: 137 MMOL/L (ref 136–145)
TRIGL SERPL-MCNC: 122 MG/DL (ref 30–150)
WBC # BLD AUTO: 5.08 K/UL (ref 3.9–12.7)

## 2022-12-13 PROCEDURE — 85025 COMPLETE CBC W/AUTO DIFF WBC: CPT | Performed by: FAMILY MEDICINE

## 2022-12-13 PROCEDURE — 36415 COLL VENOUS BLD VENIPUNCTURE: CPT | Mod: PO | Performed by: FAMILY MEDICINE

## 2022-12-13 PROCEDURE — 80061 LIPID PANEL: CPT | Performed by: FAMILY MEDICINE

## 2022-12-13 PROCEDURE — 80053 COMPREHEN METABOLIC PANEL: CPT | Performed by: FAMILY MEDICINE

## 2022-12-22 ENCOUNTER — OFFICE VISIT (OUTPATIENT)
Dept: PRIMARY CARE CLINIC | Facility: CLINIC | Age: 42
End: 2022-12-22
Payer: COMMERCIAL

## 2022-12-22 VITALS
BODY MASS INDEX: 28.28 KG/M2 | OXYGEN SATURATION: 99 % | SYSTOLIC BLOOD PRESSURE: 132 MMHG | TEMPERATURE: 99 F | HEART RATE: 65 BPM | WEIGHT: 153.69 LBS | HEIGHT: 62 IN | DIASTOLIC BLOOD PRESSURE: 84 MMHG

## 2022-12-22 DIAGNOSIS — Z00.00 ROUTINE GENERAL MEDICAL EXAMINATION AT A HEALTH CARE FACILITY: Primary | ICD-10-CM

## 2022-12-22 PROCEDURE — 1160F PR REVIEW ALL MEDS BY PRESCRIBER/CLIN PHARMACIST DOCUMENTED: ICD-10-PCS | Mod: CPTII,S$GLB,, | Performed by: FAMILY MEDICINE

## 2022-12-22 PROCEDURE — 99999 PR PBB SHADOW E&M-EST. PATIENT-LVL IV: CPT | Mod: PBBFAC,,, | Performed by: FAMILY MEDICINE

## 2022-12-22 PROCEDURE — 99396 PR PREVENTIVE VISIT,EST,40-64: ICD-10-PCS | Mod: S$GLB,,, | Performed by: FAMILY MEDICINE

## 2022-12-22 PROCEDURE — 3008F BODY MASS INDEX DOCD: CPT | Mod: CPTII,S$GLB,, | Performed by: FAMILY MEDICINE

## 2022-12-22 PROCEDURE — 3075F PR MOST RECENT SYSTOLIC BLOOD PRESS GE 130-139MM HG: ICD-10-PCS | Mod: CPTII,S$GLB,, | Performed by: FAMILY MEDICINE

## 2022-12-22 PROCEDURE — 99999 PR PBB SHADOW E&M-EST. PATIENT-LVL IV: ICD-10-PCS | Mod: PBBFAC,,, | Performed by: FAMILY MEDICINE

## 2022-12-22 PROCEDURE — 3008F PR BODY MASS INDEX (BMI) DOCUMENTED: ICD-10-PCS | Mod: CPTII,S$GLB,, | Performed by: FAMILY MEDICINE

## 2022-12-22 PROCEDURE — 1159F PR MEDICATION LIST DOCUMENTED IN MEDICAL RECORD: ICD-10-PCS | Mod: CPTII,S$GLB,, | Performed by: FAMILY MEDICINE

## 2022-12-22 PROCEDURE — 99396 PREV VISIT EST AGE 40-64: CPT | Mod: S$GLB,,, | Performed by: FAMILY MEDICINE

## 2022-12-22 PROCEDURE — 3079F DIAST BP 80-89 MM HG: CPT | Mod: CPTII,S$GLB,, | Performed by: FAMILY MEDICINE

## 2022-12-22 PROCEDURE — 1159F MED LIST DOCD IN RCRD: CPT | Mod: CPTII,S$GLB,, | Performed by: FAMILY MEDICINE

## 2022-12-22 PROCEDURE — 1160F RVW MEDS BY RX/DR IN RCRD: CPT | Mod: CPTII,S$GLB,, | Performed by: FAMILY MEDICINE

## 2022-12-22 PROCEDURE — 3079F PR MOST RECENT DIASTOLIC BLOOD PRESSURE 80-89 MM HG: ICD-10-PCS | Mod: CPTII,S$GLB,, | Performed by: FAMILY MEDICINE

## 2022-12-22 PROCEDURE — 3075F SYST BP GE 130 - 139MM HG: CPT | Mod: CPTII,S$GLB,, | Performed by: FAMILY MEDICINE

## 2022-12-22 NOTE — PATIENT INSTRUCTIONS
Hello. I am excited that you are committed to your your goal of Healthy Living  -   Balance in life with movement, mindfulness, awareness of what we put into our body will affect our body systems (including emotions),     ...and in turn weight loss.     I'm sorry but I'm not prescribing weight loss injectable medications, as I dont' have the availability in my schedule to see patients monthly for follow up and counselling.     Also, I my staff is not equipped to call insurances for prior authorizations of these medications. They are often not covered by insurance.     For weight loss medication options, including injectable,  I recommend you seeing one of my colleagues who is a weight loss medication specialist in [place]    =============    Dr Flores Believe you can weight loss, [phone], [address], , $80 initial visit & q mo. $25 Mounjasolo Vidal  [address] [facility], [place] [address], [phone]    [facility] $350 initial visit, $99, coupons across from [place], [facility] $25      =============    I highly recommend my Lifestyle Medicine collegue , but she does not prescribe weight loss medicine. Her approach is very mindful & finding balance in life     Dr Shawn Caruso, Pulmonologist & Lifestyle Medicine Board Certified  [email or web address]    She also offers a grocery walk-through with patients & motivational Zoom meetings

## 2022-12-22 NOTE — PROGRESS NOTES
"Subjective:      Patient ID: Dov Hernandez is a 42 y.o. female.    Chief Complaint: Annual Exam    Here today for general exam.     Labs WNL< HDL great 80 with eating great & exercise. I feel like I can't lose weight. I"d love to lose 15#    Follows w GYN    Derm prescribes OCP & spironolactone for acne    Denies any chest pain, shortness of breath, nausea vomiting constipation diarrhea, blood in stool, heartburn    Current Outpatient Medications   Medication Instructions    azelaic acid (AZELEX) 15 % gel Topical (Top)    drospirenone-ethinyl estradioL (ELIUD) 3-0.03 mg per tablet TAKE 1 TABLET BY MOUTH EVERY DAY    spironolactone (ALDACTONE) 50 MG tablet Oral       Lab Results   Component Value Date    HGBA1C 4.9 06/04/2013     No results found for: MICALBCREAT  Lab Results   Component Value Date    LDLCALC 105.6 12/13/2022    LDLCALC 100.6 12/13/2021    CHOL 210 (H) 12/13/2022    HDL 80 (H) 12/13/2022    TRIG 122 12/13/2022       Lab Results   Component Value Date     12/13/2022    K 4.1 12/13/2022     12/13/2022    CO2 23 12/13/2022    GLU 88 12/13/2022    BUN 12 12/13/2022    CREATININE 0.8 12/13/2022    CALCIUM 9.2 12/13/2022    PROT 6.7 12/13/2022    ALBUMIN 3.5 12/13/2022    BILITOT 0.6 12/13/2022    ALKPHOS 32 (L) 12/13/2022    AST 18 12/13/2022    ALT 23 12/13/2022    ANIONGAP 9 12/13/2022    ESTGFRAFRICA >60.0 12/13/2021    EGFRNONAA >60.0 12/13/2021    WBC 5.08 12/13/2022    HGB 12.3 12/13/2022    HGB 12.3 12/13/2021    HCT 37.3 12/13/2022    MCV 97 12/13/2022     12/13/2022    TSH 1.914 09/15/2017    HEPCAB Negative 11/05/2020       No results found for: LH, FSH, TOTALTESTOST, PROGESTERONE, ESTRADIOL, [id number], [id number], FERRITIN, IRON, TRANSFERRIN, TIBC, FESATURATED, ZINC      Past Medical History:   Diagnosis Date    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    OCP & aziza from derm, Doxy caused yeast    Heartburn 12/17/2021     Past Surgical History:   Procedure Laterality Date    " "COLPOSCOPY       Social History     Social History Narrative    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    , 2 children (10 yo son, 6 yo daughter [address] [place])    Mom Renee Tejeda    nonsmoker      GYN Yolanda     Family History   Problem Relation Age of Onset    Kidney cancer Father 58    Cancer Father     Cancer Brother 22        Parotid Gland cancer    Hyperlipidemia Mother     Breast cancer Neg Hx     Colon cancer Neg Hx     Ovarian cancer Neg Hx     Melanoma Neg Hx      Vitals:    12/22/22 1224   BP: 132/84   Pulse: 65   Temp: 98.6 °F (37 °C)   TempSrc: Oral   SpO2: 99%   Weight: 69.7 kg (153 lb 10.6 oz)   Height: 5' 2" (1.575 m)   PainSc: 0-No pain     Objective:   Physical Exam  Constitutional:       Appearance: She is well-developed.   HENT:      Head: Normocephalic and atraumatic.      Nose: Nose normal.      Mouth/Throat:      Mouth: Mucous membranes are moist.      Pharynx: Oropharynx is clear.   Eyes:      Pupils: Pupils are equal, round, and reactive to light.   Neck:      Thyroid: No thyromegaly.   Cardiovascular:      Rate and Rhythm: Normal rate and regular rhythm.      Heart sounds: Normal heart sounds. No murmur heard.  Pulmonary:      Effort: Pulmonary effort is normal.      Breath sounds: Normal breath sounds. No wheezing.   Abdominal:      General: Bowel sounds are normal. There is no distension.      Palpations: Abdomen is soft. There is no mass.      Tenderness: There is no abdominal tenderness. There is no guarding or rebound.   Musculoskeletal:      Cervical back: Neck supple.   Lymphadenopathy:      Cervical: No cervical adenopathy.   Skin:     General: Skin is warm and dry.   Neurological:      Mental Status: She is alert and oriented to person, place, and time.   Psychiatric:         Behavior: Behavior normal.     Assessment:     1. Routine general medical examination at a health care facility      Plan:        Follow with GYN for female health & cancer prevention  Continue " medications    ==============================  RECOMMENDATIONS FOR FEMALES  ==============================  Your #1 MEDICINE is DAILY EXERCISE - 15-20 minutes of huffing & puffing EVERY DAY.     Prevent the #1 cause of death- cardiovascular disease (HEART ATTACK & STROKE) by checking for normal blood pressure, cholesterol, sugars, & by not smoking.     VACCINES: Yearly FLU shot  I recommend  high fiber (5 fresh fruits or vegetables daily), low fat diet and aerobic  exercise (huffing/ puffing/ sweating for 20 min straight at least 4 days a week)    Follow up yearly with mammogram, fasting lipids, CMP, CBC prior.   ==============================================================

## 2023-01-12 ENCOUNTER — TELEPHONE (OUTPATIENT)
Dept: PRIMARY CARE CLINIC | Facility: CLINIC | Age: 43
End: 2023-01-12
Payer: COMMERCIAL

## 2023-01-12 DIAGNOSIS — Z12.31 SCREENING MAMMOGRAM FOR BREAST CANCER: Primary | ICD-10-CM

## 2023-01-12 NOTE — TELEPHONE ENCOUNTER
----- Message from Jesse Lopez sent at 1/12/2023  8:49 AM CST -----  Contact: Pt [phone]  Caller is requesting to schedule their annual screening mammogram appointment. Order is not listed in Epic.  Please enter order and contact patient to schedule.  Would the patient like a call back, or a response through their [facility] portal?:   yes

## 2023-03-08 ENCOUNTER — HOSPITAL ENCOUNTER (OUTPATIENT)
Dept: RADIOLOGY | Facility: HOSPITAL | Age: 43
Discharge: HOME | End: 2023-03-08
Attending: FAMILY MEDICINE
Payer: COMMERCIAL

## 2023-03-08 DIAGNOSIS — Z12.31 SCREENING MAMMOGRAM FOR BREAST CANCER: ICD-10-CM

## 2023-03-08 PROCEDURE — 77067 MAMMO DIGITAL SCREENING BILAT WITH TOMO: ICD-10-PCS | Mod: 26,,, | Performed by: RADIOLOGY

## 2023-03-08 PROCEDURE — 77067 SCR MAMMO BI INCL CAD: CPT | Mod: 26,,, | Performed by: RADIOLOGY

## 2023-03-08 PROCEDURE — 77063 BREAST TOMOSYNTHESIS BI: CPT | Mod: 26,,, | Performed by: RADIOLOGY

## 2023-03-08 PROCEDURE — 77067 SCR MAMMO BI INCL CAD: CPT | Mod: TC

## 2023-03-08 PROCEDURE — 77063 MAMMO DIGITAL SCREENING BILAT WITH TOMO: ICD-10-PCS | Mod: 26,,, | Performed by: RADIOLOGY

## 2023-03-16 ENCOUNTER — PATIENT MESSAGE (OUTPATIENT)
Dept: PRIMARY CARE CLINIC | Facility: CLINIC | Age: 43
End: 2023-03-16
Payer: COMMERCIAL

## 2023-03-17 ENCOUNTER — PATIENT MESSAGE (OUTPATIENT)
Dept: OBSTETRICS AND GYNECOLOGY | Facility: CLINIC | Age: 43
End: 2023-03-17
Payer: COMMERCIAL

## 2023-03-17 ENCOUNTER — PATIENT MESSAGE (OUTPATIENT)
Dept: PRIMARY CARE CLINIC | Facility: CLINIC | Age: 43
End: 2023-03-17
Payer: COMMERCIAL

## 2023-03-21 ENCOUNTER — OFFICE VISIT (OUTPATIENT)
Dept: OBSTETRICS AND GYNECOLOGY | Facility: CLINIC | Age: 43
End: 2023-03-21
Attending: OBSTETRICS & GYNECOLOGY
Payer: COMMERCIAL

## 2023-03-21 VITALS
HEIGHT: 62 IN | DIASTOLIC BLOOD PRESSURE: 84 MMHG | SYSTOLIC BLOOD PRESSURE: 136 MMHG | BODY MASS INDEX: 28.11 KG/M2 | WEIGHT: 152.75 LBS

## 2023-03-21 DIAGNOSIS — L70.9 ACNE, UNSPECIFIED ACNE TYPE: ICD-10-CM

## 2023-03-21 DIAGNOSIS — Z01.419 WELL WOMAN EXAM WITH ROUTINE GYNECOLOGICAL EXAM: Primary | ICD-10-CM

## 2023-03-21 PROCEDURE — 88175 CYTOPATH C/V AUTO FLUID REDO: CPT | Performed by: OBSTETRICS & GYNECOLOGY

## 2023-03-21 PROCEDURE — 1159F MED LIST DOCD IN RCRD: CPT | Mod: CPTII,S$GLB,, | Performed by: OBSTETRICS & GYNECOLOGY

## 2023-03-21 PROCEDURE — 99999 PR PBB SHADOW E&M-EST. PATIENT-LVL III: ICD-10-PCS | Mod: PBBFAC,,, | Performed by: OBSTETRICS & GYNECOLOGY

## 2023-03-21 PROCEDURE — 99999 PR PBB SHADOW E&M-EST. PATIENT-LVL III: CPT | Mod: PBBFAC,,, | Performed by: OBSTETRICS & GYNECOLOGY

## 2023-03-21 PROCEDURE — 1159F PR MEDICATION LIST DOCUMENTED IN MEDICAL RECORD: ICD-10-PCS | Mod: CPTII,S$GLB,, | Performed by: OBSTETRICS & GYNECOLOGY

## 2023-03-21 PROCEDURE — 3075F PR MOST RECENT SYSTOLIC BLOOD PRESS GE 130-139MM HG: ICD-10-PCS | Mod: CPTII,S$GLB,, | Performed by: OBSTETRICS & GYNECOLOGY

## 2023-03-21 PROCEDURE — 99396 PREV VISIT EST AGE 40-64: CPT | Mod: S$GLB,,, | Performed by: OBSTETRICS & GYNECOLOGY

## 2023-03-21 PROCEDURE — 3079F DIAST BP 80-89 MM HG: CPT | Mod: CPTII,S$GLB,, | Performed by: OBSTETRICS & GYNECOLOGY

## 2023-03-21 PROCEDURE — 3079F PR MOST RECENT DIASTOLIC BLOOD PRESSURE 80-89 MM HG: ICD-10-PCS | Mod: CPTII,S$GLB,, | Performed by: OBSTETRICS & GYNECOLOGY

## 2023-03-21 PROCEDURE — 87624 HPV HI-RISK TYP POOLED RSLT: CPT | Performed by: OBSTETRICS & GYNECOLOGY

## 2023-03-21 PROCEDURE — 3008F BODY MASS INDEX DOCD: CPT | Mod: CPTII,S$GLB,, | Performed by: OBSTETRICS & GYNECOLOGY

## 2023-03-21 PROCEDURE — 3008F PR BODY MASS INDEX (BMI) DOCUMENTED: ICD-10-PCS | Mod: CPTII,S$GLB,, | Performed by: OBSTETRICS & GYNECOLOGY

## 2023-03-21 PROCEDURE — 99396 PR PREVENTIVE VISIT,EST,40-64: ICD-10-PCS | Mod: S$GLB,,, | Performed by: OBSTETRICS & GYNECOLOGY

## 2023-03-21 PROCEDURE — 3075F SYST BP GE 130 - 139MM HG: CPT | Mod: CPTII,S$GLB,, | Performed by: OBSTETRICS & GYNECOLOGY

## 2023-03-21 NOTE — PROGRESS NOTES
"  CC: Well woman exam    Dov Hernandez is a 42 y.o. female  presents for well woman exam.  LMP: Patient's last menstrual period was 2023 (exact date)..  recent mmg requires more imaging.  Doing well with OCPs, refilled recently. Due for pap, last several have been normal.  + sexually active, partner has had a vasectomy.   x 2.      Past Medical History:   Diagnosis Date    Acne varioliformis 2017    OCP & aziza from derm, Doxy caused yeast    Heartburn 2021     Past Surgical History:   Procedure Laterality Date    COLPOSCOPY       Social History     Socioeconomic History    Marital status:     Number of children: 2   Occupational History    Occupation:      Employer: [employer]   Tobacco Use    Smoking status: Never    Smokeless tobacco: Never   Substance and Sexual Activity    Alcohol use: Yes     Comment: social    Drug use: No    Sexual activity: Yes     Partners: Male     Birth control/protection: None   Social History Narrative    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    , 2 children (10 yo son, 6 yo daughter [employer])    Mom Renee Tejeda    nonsmoker      GYN Yolanda     Family History   Problem Relation Age of Onset    Kidney cancer Father 58    Cancer Father     Cancer Brother 22        Parotid Gland cancer    Hyperlipidemia Mother     Breast cancer Neg Hx     Colon cancer Neg Hx     Ovarian cancer Neg Hx     Melanoma Neg Hx      OB History          2    Para   2    Term   2            AB        Living   2         SAB        IAB        Ectopic        Multiple        Live Births   2                 /84   Ht 5' 2" (1.575 m)   Wt 69.3 kg (152 lb 12.5 oz)   LMP 2023 (Exact Date)   BMI 27.94 kg/m²       ROS:  GENERAL: Denies weight gain or weight loss. Feeling well overall.   SKIN: Denies rash or lesions.   HEAD: Denies head injury or headache.   NODES: Denies enlarged lymph nodes.   CHEST: Denies chest pain " or shortness of breath.   CARDIOVASCULAR: Denies palpitations or left sided chest pain.   ABDOMEN: No abdominal pain, constipation, diarrhea, nausea, vomiting or rectal bleeding. Has noted an area with some bulging with laugh, does not remain swollen, non tender.  URINARY: No frequency, dysuria, hematuria, or burning on urination. No incontinence.  REPRODUCTIVE: See HPI.   BREASTS: The patient performs breast self-examination and denies pain, lumps, or nipple discharge.   HEMATOLOGIC: No easy bruisability or excessive bleeding.   MUSCULOSKELETAL: Denies joint pain or swelling.   NEUROLOGIC: Denies syncope or weakness.   PSYCHIATRIC: Denies depression, anxiety or mood swings.    PHYSICAL EXAM:  APPEARANCE: Well nourished, well developed, in no acute distress.  AFFECT: WNL, alert and oriented x 3  SKIN: No acne or hirsutism  NECK: Neck symmetric without masses or thyromegaly  NODES: No inguinal, cervical, axillary, or femoral lymph node enlargement  CHEST: Good respiratory effect  ABDOMEN: Soft.  No tenderness or masses.  No hepatosplenomegaly.  No hernias.  BREASTS: Symmetrical, no skin changes or visible lesions.  No palpable masses, nipple discharge bilaterally. Density noted caudad to nipple, fibrous in nature.  PELVIC: Normal external genitalia without lesions.  Normal hair distribution.  Adequate perineal body, normal urethral meatus.  Vagina moist and well rugated without lesions or discharge.  Cervix pink, without lesions, discharge or tenderness.  No significant cystocele or rectocele.  Bimanual exam shows uterus to be normal size, regular, mobile and nontender.  Adnexa without masses or tenderness.    EXTREMITIES: No edema.    Well woman exam with routine gynecological exam  -     Liquid-Based Pap Smear, Screening  -     HPV High Risk Genotypes, PCR    Acne, unspecified acne type            Patient was counseled today on A.C.S. Pap guidelines and recommendations for yearly pelvic exams, mammograms and  monthly self breast exams; to see her PCP for other health maintenance.     No follow-ups on file.

## 2023-03-28 ENCOUNTER — HOSPITAL ENCOUNTER (OUTPATIENT)
Dept: RADIOLOGY | Facility: HOSPITAL | Age: 43
Discharge: HOME | End: 2023-03-28
Attending: FAMILY MEDICINE
Payer: COMMERCIAL

## 2023-03-28 DIAGNOSIS — R92.8 ABNORMAL MAMMOGRAM: ICD-10-CM

## 2023-03-28 LAB
FINAL PATHOLOGIC DIAGNOSIS: NORMAL
HPV HR 12 DNA SPEC QL NAA+PROBE: NEGATIVE
HPV16 AG SPEC QL: NEGATIVE
HPV18 DNA SPEC QL NAA+PROBE: NEGATIVE
Lab: NORMAL

## 2023-03-28 PROCEDURE — 77065 DX MAMMO INCL CAD UNI: CPT | Mod: 26,RT,, | Performed by: RADIOLOGY

## 2023-03-28 PROCEDURE — 77065 MAMMO DIGITAL DIAGNOSTIC RIGHT WITH TOMO: ICD-10-PCS | Mod: 26,RT,, | Performed by: RADIOLOGY

## 2023-03-28 PROCEDURE — 77061 BREAST TOMOSYNTHESIS UNI: CPT | Mod: TC,RT

## 2023-03-28 PROCEDURE — 77061 BREAST TOMOSYNTHESIS UNI: CPT | Mod: 26,RT,, | Performed by: RADIOLOGY

## 2023-03-28 PROCEDURE — 77061 MAMMO DIGITAL DIAGNOSTIC RIGHT WITH TOMO: ICD-10-PCS | Mod: 26,RT,, | Performed by: RADIOLOGY

## 2023-03-28 NOTE — LETTER
March 29, 2023    Dov Hernandez  [address] [place] [address]      Dear Ms. Hernandez,    Your exam on 3/28/23 showed an abnormality that requires a biopsy.  The only way that you can be sure that the abnormality is benign (not cancer) is to sample the area of concern and send these samples to a lab for pathological analysis.  This breast biopsy is done at the [facility] has re-located at [address] [place] [address], entrance on [address].  It is in the [facility].  Our nurse Jose will contact you to set up your appointment.  She can be reached at [phone].  When these results are available, your health care provider or our facility will contact you concerning the results and any follow up tests or appointments that may be required.                                             A full report of your mammography results will be sent to you and: Michaela Loyola MD             He/she has been informed about the need for this biopsy.  You should contact your physician or other health care provider as soon as possible (if you have not already done so).    Breast Density: The right breast has scattered areas of fibroglandular density.If your mammogram demonstrates that you have dense breast tissue, which could hide abnormalities, and you have other risk factors for breast cancer that have been identified, you might benefit from supplemental screening tests that may be suggested by your ordering physician. Dense breast tissue, in and of itself, is a relatively common condition. Therefore, this information is not provided to cause undue concern, but rather to raise your awareness and to promote discussion with your physician regarding the presence of other risk factors, in addition to dense breast tissue. A summary of your mammography results will be sent to you, and a full mammography report will be sent to your physician and also to you. You should contact your physician if you have any  questions or concerns regarding your summary or report of results.  Your images will become part of your medical record at [facility] and they will be on file for your ongoing care. The contact number is [phone]. If, in the future, you change health care providers or go to a different location for a mammogram, you should tell them where and when this mammogram was done.      Thank you for choosing us for your healthcare needs.  We look forward to continuing to care for you and your family.    Sincerely,  Lalitha Jean MD     Result:   Mammo Digital Diagnostic Right with Homer     History:  Patient is 42 y.o. and is seen for inconclusive screening mammogram.     Films Compared:  Compared to: 03/08/2023 Mammo Digital Screening Bilat w/ Homer and   01/19/2022 Mammo Digital Screening Bilat w/ Homer     Findings:  This procedure was performed using tomosynthesis. Computer-aided detection   was utilized in the interpretation of this examination.  The right breast has scattered areas of fibroglandular density.    There are 39 mm fine linear or fine-linear branching calcifications in a   segmental distribution seen in the right breast in the middle depth. The   calcifications correlate with the prior mammogram finding.     Impression:  Right  Calcifications: Right breast 39 mm calcifications at the middle position.   Assessment: 4 - Suspicious finding. Stereotactic Biopsy is recommended.     BI-RADS Category:   Overall: 4 - Suspicious       Recommendation:  Stereotactic Biopsy is recommended. I discussed these findings and   recommendations with the patient in detail at the time of exam.      Your estimated lifetime risk of breast cancer (to age 85) based on   Tyrer-Cuzick risk assessment model is Tyrer-Cuzick: 6.57 %. According to   the American Cancer Society, patients with a lifetime breast cancer risk   of 20% or higher might benefit from supplemental screening tests.

## 2023-03-28 NOTE — PROGRESS NOTES
"Vero. I"ll follow along with your breast biopsy 4/11/23. Take care & reach out if you need anything  "

## 2023-03-29 ENCOUNTER — TELEPHONE (OUTPATIENT)
Dept: RADIOLOGY | Facility: HOSPITAL | Age: 43
End: 2023-03-29
Payer: COMMERCIAL

## 2023-03-29 NOTE — TELEPHONE ENCOUNTER
Spoke with patient. Reviewed breast biopsy procedure and reviewed instructions for breast biopsy. Patient expressed understanding and all questions were answered. Provided patient with my phone number to call for any further concerns or questions.   Patient scheduled breast biopsy at the [facility] on 4/13/2023.

## 2023-04-13 ENCOUNTER — HOSPITAL ENCOUNTER (OUTPATIENT)
Dept: RADIOLOGY | Facility: HOSPITAL | Age: 43
Discharge: HOME | End: 2023-04-13
Attending: FAMILY MEDICINE
Payer: COMMERCIAL

## 2023-04-13 DIAGNOSIS — R92.8 ABNORMAL MAMMOGRAM: ICD-10-CM

## 2023-04-13 PROCEDURE — 88342 IMHCHEM/IMCYTCHM 1ST ANTB: CPT | Mod: 26,59,, | Performed by: STUDENT IN AN ORGANIZED HEALTH CARE EDUCATION/TRAINING PROGRAM

## 2023-04-13 PROCEDURE — 88360 TUMOR IMMUNOHISTOCHEM/MANUAL: CPT | Mod: 26,,, | Performed by: STUDENT IN AN ORGANIZED HEALTH CARE EDUCATION/TRAINING PROGRAM

## 2023-04-13 PROCEDURE — 88342 CHG IMMUNOCYTOCHEMISTRY: ICD-10-PCS | Mod: 26,59,, | Performed by: STUDENT IN AN ORGANIZED HEALTH CARE EDUCATION/TRAINING PROGRAM

## 2023-04-13 PROCEDURE — 88342 IMHCHEM/IMCYTCHM 1ST ANTB: CPT | Performed by: STUDENT IN AN ORGANIZED HEALTH CARE EDUCATION/TRAINING PROGRAM

## 2023-04-13 PROCEDURE — 19081 MAMMO BREAST STEREOTACTIC BREAST BIOPSY RIGHT: ICD-10-PCS | Mod: RT,,, | Performed by: RADIOLOGY

## 2023-04-13 PROCEDURE — 88341 PR IHC OR ICC EACH ADD'L SINGLE ANTIBODY  STAINPR: ICD-10-PCS | Mod: 26,59,, | Performed by: STUDENT IN AN ORGANIZED HEALTH CARE EDUCATION/TRAINING PROGRAM

## 2023-04-13 PROCEDURE — 88305 TISSUE EXAM BY PATHOLOGIST: CPT | Performed by: STUDENT IN AN ORGANIZED HEALTH CARE EDUCATION/TRAINING PROGRAM

## 2023-04-13 PROCEDURE — 88305 TISSUE EXAM BY PATHOLOGIST: ICD-10-PCS | Mod: 26,,, | Performed by: STUDENT IN AN ORGANIZED HEALTH CARE EDUCATION/TRAINING PROGRAM

## 2023-04-13 PROCEDURE — 77065 DX MAMMO INCL CAD UNI: CPT | Mod: TC,RT

## 2023-04-13 PROCEDURE — A4648 IMPLANTABLE TISSUE MARKER: HCPCS

## 2023-04-13 PROCEDURE — 88305 TISSUE EXAM BY PATHOLOGIST: CPT | Mod: 26,,, | Performed by: STUDENT IN AN ORGANIZED HEALTH CARE EDUCATION/TRAINING PROGRAM

## 2023-04-13 PROCEDURE — 88360 TUMOR IMMUNOHISTOCHEM/MANUAL: CPT | Performed by: STUDENT IN AN ORGANIZED HEALTH CARE EDUCATION/TRAINING PROGRAM

## 2023-04-13 PROCEDURE — 25000003 PHARM REV CODE 250: Performed by: FAMILY MEDICINE

## 2023-04-13 PROCEDURE — 88360 PR  TUMOR IMMUNOHISTOCHEM/MANUAL: ICD-10-PCS | Mod: 26,,, | Performed by: STUDENT IN AN ORGANIZED HEALTH CARE EDUCATION/TRAINING PROGRAM

## 2023-04-13 PROCEDURE — 88341 IMHCHEM/IMCYTCHM EA ADD ANTB: CPT | Performed by: STUDENT IN AN ORGANIZED HEALTH CARE EDUCATION/TRAINING PROGRAM

## 2023-04-13 PROCEDURE — 27200939 MAMMO BREAST STEREOTACTIC BREAST BIOPSY RIGHT

## 2023-04-13 PROCEDURE — 88341 IMHCHEM/IMCYTCHM EA ADD ANTB: CPT | Mod: 26,59,, | Performed by: STUDENT IN AN ORGANIZED HEALTH CARE EDUCATION/TRAINING PROGRAM

## 2023-04-13 PROCEDURE — 19081 BX BREAST 1ST LESION STRTCTC: CPT | Mod: RT,,, | Performed by: RADIOLOGY

## 2023-04-13 PROCEDURE — 77065 MAMMO DIGITAL DIAGNOSTIC RIGHT: ICD-10-PCS | Mod: 26,RT,, | Performed by: RADIOLOGY

## 2023-04-13 PROCEDURE — 77065 DX MAMMO INCL CAD UNI: CPT | Mod: 26,RT,, | Performed by: RADIOLOGY

## 2023-04-13 RX ORDER — BUPIVACAINE HCL/EPINEPHRINE 0.5-1:200K
20 VIAL (ML) INJECTION ONCE
Status: COMPLETED | OUTPATIENT
Start: 2023-04-13 | End: 2023-04-13

## 2023-04-13 RX ORDER — LIDOCAINE HYDROCHLORIDE 10 MG/ML
3 INJECTION, SOLUTION EPIDURAL; INFILTRATION; INTRACAUDAL; PERINEURAL ONCE
Status: COMPLETED | OUTPATIENT
Start: 2023-04-13 | End: 2023-04-13

## 2023-04-13 RX ADMIN — LIDOCAINE HYDROCHLORIDE 30 MG: 10 INJECTION, SOLUTION EPIDURAL; INFILTRATION; INTRACAUDAL; PERINEURAL at 01:04

## 2023-04-13 RX ADMIN — BUPIVACAINE HYDROCHLORIDE AND EPINEPHRINE BITARTRATE 20 ML: 5; .005 INJECTION, SOLUTION PERINEURAL at 01:04

## 2023-04-21 ENCOUNTER — TELEPHONE (OUTPATIENT)
Dept: SURGERY | Facility: CLINIC | Age: 43
End: 2023-04-21
Payer: COMMERCIAL

## 2023-04-21 DIAGNOSIS — C50.911 INVASIVE DUCTAL CARCINOMA OF BREAST, RIGHT: Primary | ICD-10-CM

## 2023-04-21 LAB
FINAL PATHOLOGIC DIAGNOSIS: NORMAL
GROSS: NORMAL
Lab: NORMAL

## 2023-04-21 NOTE — NURSING
Oncology Navigation   Intake  Date of Diagnosis: 04/13/23  Cancer Type: Breast  Internal / External Referral: Internal  Date of Referral: 04/21/23  Initial Nurse Navigator Contact: 04/21/23  Referral to Initial Contact Timeline (days): 0  Date Worked: 04/21/23  First Appointment Available: 04/25/23  Appointment Date: 04/25/23  First Available Date vs. Scheduled Date (days): 0  Multiple appointments: Yes     Treatment  Current Status: Staging work-up    Surgical Oncologist: Dr. Jenna Pickard  Consult Date: 04/25/23    Medical Oncologist: Dr. Jessica Griffith  Consult Date: 04/25/23       Procedures: Biopsy  Biopsy Schedule Date: 04/13/23       ER: Positive  [place]: Positive  Her2: Negative       Support Systems: Spouse/significant other     Acuity      Follow Up  No follow-ups on file.

## 2023-04-21 NOTE — TELEPHONE ENCOUNTER
Called Patient with results of breast biopsy from 4-13-23. Explained that the biopsy showed Invasive ductal carcinoma. Discussed what this means and that the next step is to meet with a breast surgeon & an oncologist. Appts were made for 4-25-23 with Adal Griffith & Neeraj. Reviewed location of breast center. Patient verbalized understanding. All questions answered at this time & explained that I will also be arranging a breast MRI.

## 2023-04-23 ENCOUNTER — PATIENT MESSAGE (OUTPATIENT)
Dept: OBSTETRICS AND GYNECOLOGY | Facility: CLINIC | Age: 43
End: 2023-04-23
Payer: COMMERCIAL

## 2023-04-23 ENCOUNTER — PATIENT MESSAGE (OUTPATIENT)
Dept: PRIMARY CARE CLINIC | Facility: CLINIC | Age: 43
End: 2023-04-23
Payer: COMMERCIAL

## 2023-04-24 ENCOUNTER — PATIENT MESSAGE (OUTPATIENT)
Dept: HEMATOLOGY/ONCOLOGY | Facility: CLINIC | Age: 43
End: 2023-04-24
Payer: COMMERCIAL

## 2023-04-25 ENCOUNTER — DOCUMENTATION ONLY (OUTPATIENT)
Dept: SURGERY | Facility: CLINIC | Age: 43
End: 2023-04-25
Payer: COMMERCIAL

## 2023-04-25 ENCOUNTER — OFFICE VISIT (OUTPATIENT)
Dept: HEMATOLOGY/ONCOLOGY | Facility: CLINIC | Age: 43
End: 2023-04-25
Payer: COMMERCIAL

## 2023-04-25 ENCOUNTER — DOCUMENTATION ONLY (OUTPATIENT)
Dept: HEMATOLOGY/ONCOLOGY | Facility: CLINIC | Age: 43
End: 2023-04-25
Payer: COMMERCIAL

## 2023-04-25 ENCOUNTER — OFFICE VISIT (OUTPATIENT)
Dept: SURGERY | Facility: CLINIC | Age: 43
End: 2023-04-25
Payer: COMMERCIAL

## 2023-04-25 ENCOUNTER — PATIENT MESSAGE (OUTPATIENT)
Dept: SURGERY | Facility: CLINIC | Age: 43
End: 2023-04-25

## 2023-04-25 ENCOUNTER — HOSPITAL ENCOUNTER (OUTPATIENT)
Dept: RADIOLOGY | Facility: OTHER | Age: 43
Discharge: HOME | End: 2023-04-25
Attending: SURGERY
Payer: COMMERCIAL

## 2023-04-25 VITALS
DIASTOLIC BLOOD PRESSURE: 89 MMHG | SYSTOLIC BLOOD PRESSURE: 151 MMHG | BODY MASS INDEX: 28.97 KG/M2 | RESPIRATION RATE: 18 BRPM | WEIGHT: 153.44 LBS | HEART RATE: 80 BPM | OXYGEN SATURATION: 100 % | HEIGHT: 61 IN

## 2023-04-25 VITALS
WEIGHT: 153 LBS | DIASTOLIC BLOOD PRESSURE: 89 MMHG | HEIGHT: 61 IN | RESPIRATION RATE: 18 BRPM | OXYGEN SATURATION: 100 % | BODY MASS INDEX: 28.89 KG/M2 | SYSTOLIC BLOOD PRESSURE: 151 MMHG | HEART RATE: 80 BPM

## 2023-04-25 DIAGNOSIS — C50.911 INVASIVE DUCTAL CARCINOMA OF BREAST, RIGHT: Primary | ICD-10-CM

## 2023-04-25 DIAGNOSIS — Z17.0 MALIGNANT NEOPLASM OF OVERLAPPING SITES OF RIGHT BREAST IN FEMALE, ESTROGEN RECEPTOR POSITIVE: Primary | ICD-10-CM

## 2023-04-25 DIAGNOSIS — C50.811 MALIGNANT NEOPLASM OF OVERLAPPING SITES OF RIGHT BREAST IN FEMALE, ESTROGEN RECEPTOR POSITIVE: Primary | ICD-10-CM

## 2023-04-25 DIAGNOSIS — C50.911 INVASIVE DUCTAL CARCINOMA OF BREAST, RIGHT: ICD-10-CM

## 2023-04-25 DIAGNOSIS — Z01.818 PRE-OP TESTING: ICD-10-CM

## 2023-04-25 PROCEDURE — 99205 PR OFFICE/OUTPT VISIT, NEW, LEVL V, 60-74 MIN: ICD-10-PCS | Mod: S$GLB,,, | Performed by: SURGERY

## 2023-04-25 PROCEDURE — 3008F BODY MASS INDEX DOCD: CPT | Mod: CPTII,S$GLB,, | Performed by: SURGERY

## 2023-04-25 PROCEDURE — 1159F MED LIST DOCD IN RCRD: CPT | Mod: CPTII,S$GLB,, | Performed by: SURGERY

## 2023-04-25 PROCEDURE — 3079F DIAST BP 80-89 MM HG: CPT | Mod: CPTII,S$GLB,, | Performed by: SURGERY

## 2023-04-25 PROCEDURE — 99205 PR OFFICE/OUTPT VISIT, NEW, LEVL V, 60-74 MIN: ICD-10-PCS | Mod: S$GLB,,, | Performed by: INTERNAL MEDICINE

## 2023-04-25 PROCEDURE — 99999 PR PBB SHADOW E&M-EST. PATIENT-LVL III: ICD-10-PCS | Mod: PBBFAC,,, | Performed by: SURGERY

## 2023-04-25 PROCEDURE — 3008F BODY MASS INDEX DOCD: CPT | Mod: CPTII,S$GLB,, | Performed by: INTERNAL MEDICINE

## 2023-04-25 PROCEDURE — 77049 MRI BREAST C-+ W/CAD BI: CPT | Mod: TC

## 2023-04-25 PROCEDURE — 99205 OFFICE O/P NEW HI 60 MIN: CPT | Mod: S$GLB,,, | Performed by: SURGERY

## 2023-04-25 PROCEDURE — 99999 PR PBB SHADOW E&M-EST. PATIENT-LVL III: ICD-10-PCS | Mod: PBBFAC,,, | Performed by: INTERNAL MEDICINE

## 2023-04-25 PROCEDURE — 99205 OFFICE O/P NEW HI 60 MIN: CPT | Mod: S$GLB,,, | Performed by: INTERNAL MEDICINE

## 2023-04-25 PROCEDURE — 3077F SYST BP >= 140 MM HG: CPT | Mod: CPTII,S$GLB,, | Performed by: INTERNAL MEDICINE

## 2023-04-25 PROCEDURE — 99999 PR PBB SHADOW E&M-EST. PATIENT-LVL III: CPT | Mod: PBBFAC,,, | Performed by: SURGERY

## 2023-04-25 PROCEDURE — 3079F DIAST BP 80-89 MM HG: CPT | Mod: CPTII,S$GLB,, | Performed by: INTERNAL MEDICINE

## 2023-04-25 PROCEDURE — 77049 MRI BREAST C-+ W/CAD BI: CPT | Mod: 26,,, | Performed by: RADIOLOGY

## 2023-04-25 PROCEDURE — 3077F SYST BP >= 140 MM HG: CPT | Mod: CPTII,S$GLB,, | Performed by: SURGERY

## 2023-04-25 PROCEDURE — 1160F PR REVIEW ALL MEDS BY PRESCRIBER/CLIN PHARMACIST DOCUMENTED: ICD-10-PCS | Mod: CPTII,S$GLB,, | Performed by: SURGERY

## 2023-04-25 PROCEDURE — 25500020 PHARM REV CODE 255: Performed by: SURGERY

## 2023-04-25 PROCEDURE — 3008F PR BODY MASS INDEX (BMI) DOCUMENTED: ICD-10-PCS | Mod: CPTII,S$GLB,, | Performed by: SURGERY

## 2023-04-25 PROCEDURE — 3079F PR MOST RECENT DIASTOLIC BLOOD PRESSURE 80-89 MM HG: ICD-10-PCS | Mod: CPTII,S$GLB,, | Performed by: INTERNAL MEDICINE

## 2023-04-25 PROCEDURE — 3079F PR MOST RECENT DIASTOLIC BLOOD PRESSURE 80-89 MM HG: ICD-10-PCS | Mod: CPTII,S$GLB,, | Performed by: SURGERY

## 2023-04-25 PROCEDURE — 3077F PR MOST RECENT SYSTOLIC BLOOD PRESSURE >= 140 MM HG: ICD-10-PCS | Mod: CPTII,S$GLB,, | Performed by: INTERNAL MEDICINE

## 2023-04-25 PROCEDURE — 77049 MRI BREAST W/WO CONTRAST, W/CAD, BILATERAL: ICD-10-PCS | Mod: 26,,, | Performed by: RADIOLOGY

## 2023-04-25 PROCEDURE — A9577 INJ MULTIHANCE: HCPCS | Performed by: SURGERY

## 2023-04-25 PROCEDURE — 1160F RVW MEDS BY RX/DR IN RCRD: CPT | Mod: CPTII,S$GLB,, | Performed by: SURGERY

## 2023-04-25 PROCEDURE — 99999 PR PBB SHADOW E&M-EST. PATIENT-LVL III: CPT | Mod: PBBFAC,,, | Performed by: INTERNAL MEDICINE

## 2023-04-25 PROCEDURE — 3077F PR MOST RECENT SYSTOLIC BLOOD PRESSURE >= 140 MM HG: ICD-10-PCS | Mod: CPTII,S$GLB,, | Performed by: SURGERY

## 2023-04-25 PROCEDURE — 3008F PR BODY MASS INDEX (BMI) DOCUMENTED: ICD-10-PCS | Mod: CPTII,S$GLB,, | Performed by: INTERNAL MEDICINE

## 2023-04-25 PROCEDURE — 1159F PR MEDICATION LIST DOCUMENTED IN MEDICAL RECORD: ICD-10-PCS | Mod: CPTII,S$GLB,, | Performed by: SURGERY

## 2023-04-25 RX ADMIN — GADOBENATE DIMEGLUMINE 14 ML: 529 INJECTION, SOLUTION INTRAVENOUS at 02:04

## 2023-04-25 NOTE — NURSING
Nurse Navigator Note:     Met with patient during her consult with Dr. Pickard.  Patient and I reviewed the information she discussed with Dr. Pickard, including treatment options, diagnosis, and future plans for workup. Patient and I went through the new patient booklet, explained some of the information and why it is provided.     Also offered patient consults with our other specialty clinics: Integrative Oncology, Survivorship and/or Women's Gynecologic needs, our breast physical therapy department for pre-op and post-operative assessments, Oncologic Psychology for psychological support, and Oncologic Nutrition for nutritional counseling. Explained to patient that all of these support services are completely optional. Discussed that physical therapy may call patient to offer pre-op appt, and what that appt would entail.     Patient was given a copy of her appointments, Dr. Pickard's card, and my card. Encouraged her to call me if she has any questions or concerns or would like to schedule any additional appointments. Verbalized understanding of all information.  Oncology Navigation   Intake  Date of Diagnosis: 04/13/23  Cancer Type: Breast  Internal / External Referral: Internal  Date of Referral: 04/21/23  Initial Nurse Navigator Contact: 04/21/23  Referral to Initial Contact Timeline (days): 0  Date Worked: 04/25/23  First Appointment Available: 04/25/23  Appointment Date: 04/25/23  First Available Date vs. Scheduled Date (days): 0  Multiple appointments: Yes     Treatment  Current Status: Staging work-up    Surgical Oncologist: Dr. Jenna Pickard  Plastic Surgeon: St Bryan  Type of Surgery: bilateral mastectomy right SLNB with flap  Consult Date: 04/25/23    Medical Oncologist: Dr. Jessica Griffith  Consult Date: 04/25/23       Procedures: MRI; Genetic test  Biopsy Schedule Date: 04/13/23  Genetic Testing Date Sent: 04/25/23  MRI Schedule Date: 04/25/23    General Referrals: Integrative Medicine                                              hide    ER: Positive  [place]: Positive  Her2: Negative       Support Systems: Spouse/significant other; Family members     Acuity  Treatment Tolerability: Has not started treatment yet/treatment fully completed and side effects resolved  Hospitalization Within the Past Month: 0   Needed: 0  Support: 0  Verbalizes Financial Concerns: 0  Transportation: 0  History of noncompliance/frequent no shows and cancellations: 0  Verbalizes the need for more education: 0  Navigation Acuity: 0     Follow Up  No follow-ups on file.

## 2023-04-25 NOTE — Clinical Note
Dr. Loyola,   I am seeing Mrs. Hernandez for a new diagnosis of breast cancer.  It appears early and favorable.  We are obtaining an MRI and genetic testing for more information, but she is leaning towards bilateral mastectomies and reconstruction. She will need endocrine therapy afterwards and need for chemotherapy will be determined by her final pathology after surgery.   Thanks for sending her! Jenna Pickard MD Breast Surgical Oncology

## 2023-04-25 NOTE — H&P (VIEW-ONLY)
Breast Surgery  [facility]  Department of Surgery      REFERRING PROVIDER:   Michaela Loyola MD  [address] [place] [address]    Chief Complaint: IDC of right breast      Subjective:      Patient ID: oDv Hernandez is a 42 y.o. female who presents with IDC of the right breast.    Initial screening mammogram on 3/08/23 demonstrated right breast calcifications at the middle position. Follow-up mammogram (3/28/23) showed right breast 39 mm calcifications at the middle position. A stereotactic biopsy was performed on 23 with pathology revealing infiltrating ductal carcinoma of the breast.     Patient does routinely do self breast exams.  Patient has not noted a change on breast exam.  Patient denies nipple discharge. Patient denies to previous breast biopsy. Patient denies a personal history of breast cancer.    Findings at that time were the following:   Tumor size: 3.9 cm   Tumor ndgndrndanddndend:nd nd2nd Estrogen Receptor: Positive (30%)   Progesterone Receptor: Positive (30%)   Her-2 karrie: Negative   Lymph node status: none   Lymphatic invasion: none     GYN History:  Age of menarche was 14.   Age of menopause was N/A.    Last menstrual period was 3/17/2023.   Patient denies hormonal replacement therapy.   OCP x 10 years  Patient is .   Age of first live birth was 29.   Patient did not breast feed.    Past Medical History:   Diagnosis Date    Acne varioliformis 2017    OCP & aziza from derm, Doxy caused yeast    Heartburn 2021     Past Surgical History:   Procedure Laterality Date    COLPOSCOPY       Current Outpatient Medications on File Prior to Visit   Medication Sig Dispense Refill    drospirenone-ethinyl estradioL (ELIUD) 3-0.03 mg per tablet TAKE 1 TABLET BY MOUTH EVERY DAY 84 tablet 4     No current facility-administered medications on file prior to visit.     Social History     Socioeconomic History    Marital status:     Number of children: 2   Occupational History  "   Occupation:      Employer: [employer]   Tobacco Use    Smoking status: Never    Smokeless tobacco: Never   Substance and Sexual Activity    Alcohol use: Yes     Comment: social    Drug use: No    Sexual activity: Yes     Partners: Male     Birth control/protection: None   Social History Narrative    Family [employer]    , 2 children (10 yo son, 6 yo daughter [place])    Mom Renee Tejeda    nonsmoker      GYN Yolanda     Family History   Problem Relation Age of Onset    Kidney cancer Father 58    Cancer Father     Cancer Brother 22        Parotid Gland cancer    Hyperlipidemia Mother     Breast cancer Neg Hx     Colon cancer Neg Hx     Ovarian cancer Neg Hx     Melanoma Neg Hx         Review of Systems   Constitutional:  Negative for chills, fatigue, fever and unexpected weight change.   HENT:  Negative for congestion.    Eyes:  Negative for redness and visual disturbance.   Respiratory:  Negative for cough, shortness of breath and wheezing.    Cardiovascular:  Negative for chest pain.   Gastrointestinal:  Negative for abdominal distention and abdominal pain.   Musculoskeletal:  Negative for back pain.   Skin:  Negative for color change, pallor, rash and wound.   Neurological:  Negative for dizziness and headaches.   Objective:   BP (!) 151/89   Pulse 80   Resp 18   Ht 5' 1" (1.549 m)   Wt 69.4 kg (153 lb)   SpO2 100%   BMI 28.91 kg/m²     Physical Exam   Vitals reviewed.  Constitutional: She is oriented to person, place, and time.   HENT:   Head: Normocephalic.   Eyes: Right eye exhibits no discharge. Left eye exhibits no discharge.   Cardiovascular:  Normal rate and normal pulses.            Pulmonary/Chest: Effort normal. No respiratory distress. She has no wheezes. Right breast exhibits skin change. Right breast exhibits no inverted nipple, no mass, no nipple discharge and no tenderness. Left breast exhibits no inverted nipple, no mass, no nipple " discharge, no skin change and no tenderness.       Abdominal: Soft. Normal appearance and bowel sounds are normal. She exhibits no distension.   Musculoskeletal: Normal range of motion. No swelling. Lymphadenopathy:      Cervical: No cervical adenopathy.     Neurological: She is alert and oriented to person, place, and time.   Skin: Skin is warm and dry. Capillary refill takes less than 2 seconds. No erythema. No pallor.     Psychiatric: Her behavior is normal. Mood normal.     Radiology review: Images personally reviewed by me in the clinic.     Assessment:       1. Malignant neoplasm of overlapping sites of right breast in female, estrogen receptor positive    2. Pre-op testing        Plan:     Options for management were discussed with the patient and her family. We reviewed the existing data noting the equivalency of breast conserving surgery with radiation therapy and mastectomy. We also reviewed the guidelines of the National Comprehensive Cancer Network for Stage 1 breast carcinoma. We discussed the need for lumpectomy margins to be negative for carcinoma, the necessity for postoperative radiation therapy after breast conservation in most cases, the possibility of a failed or false negative sentinel lymph node biopsy and the potential need for complete lymphadenectomy for a failed or positive sentinel lymph node biopsy were fully discussed. In the setting of mastectomy, delayed or immediate reconstruction options are available and were discussed.     In the setting of lumpectomy, radiation therapy would be recommended majority of the time.  The duration and treatment side effects were discussed with the patient.  This will coordinated with the radiation oncologist pending final pathology.    We also discussed the role of systemic therapy in the treatment of early stage breast cancer.  We discussed that this is based on tumor biology and aquiles status and will be determined based on final pathology.  We  discussed that if the cancer is hormone positive, endocrine therapy would be recommended in most cases and its use can reduce the risk of recurrence as well as improve survival. Side effects of treatment were briefly discussed. We also discussed the potential role for chemotherapy based on a number of factors such as tumor phenotype (ER+ vs. triple negative vs. Nno6sio+) and this would be determined in coordination with the medical oncologist.    Will obtain bilateral breast MRI, scheduled for 4/26/2023  Fertility preservation was discussed with patient in detail. Referral to fertility specialist offered. Patient declined referral. States she is not planning on having more children in the future.   Met with Dr. Griffith today, will proceed with upfront surgery   The patient, in consultation with her family, has elected to proceed with bilateral total mastectomy and right sentinel lymph node biopsy. The operative risks of bleeding, infection, recurrence, scarring, and anesthetic complications and the possibility of requiring further surgery were all noted and informed consent obtained.  Referral to Plastic Surgery ([facility]) for autologous reconstruction. May be a candidate for immediate ROSITA recon if no additional findings on MRI.  NSM ok, but will need to take some skin over tumor in the lower breast.  Genetic testing to be done today.    Surgery scheduled. Follow-up in clinic roughly 14 days after surgery.     Patient was educated on breast cancer, receptors, wire localization lumpectomy, mastectomy, sentinel lymph node mapping and biopsy, axillary lymph node dissection, reconstruction, breast prosthesis with post-mastectomy bra and radiation therapy. Patient was given patient information binder including [facility] breast cancer treatment brochure.  All her questions were answered.    Total time spent with the patient: 65 minutes.  45 minutes of face to face consultation and 20 minutes of chart review and  coordination of care.

## 2023-04-25 NOTE — PROGRESS NOTES
CONSULT NOTE    Subjective:       Patient ID: Dov Hernandez is a 42 y.o. female.  MRN: [id number]  : 1980    Chief Complaint: new patient      History of Present Illness:   Dov Hernandez is a 42 y.o. female who is referred for newly diagnosed right breast IDC. She was seen in our multidisciplinary breast Clinic with Dr. Pickard prior to initiation of therapy for discussion of treatment options.  I have discussed the plan with her.       She had a normal screening mammogram in . This year, she underwent a screening mammogram in March that showed right breast calcifications, measured to be 39 mm on diagnostic mammogram.   She underwent a right breast biopsy that showed IDC, 2 mm, ER 30%, KS 30%, Her 2 karrie negative, Grade 1, low Ki 67 at 5% with associated DCIS.      She denies a breast mass or skin/ nipple changes. Accompanied by her mother and  today.     FH:   Father had RCC, diagnosed at 59, passed away at 61.   Brother had parotid gland cancer at the age of 19. In remission, had surgery and RT.     Gyn   Menarche: 14  Premenopausal 4/10/23, regular   , age at first pregnancy 29.   OCP use for 10 years for cystic acne and hormonal mood issues.     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     Stress level 2.     Oncology History:  3/8/23:  Screening mammogram  Impression:  Right  Calcifications: Right breast calcifications at the middle position. Assessment: 0 - Incomplete. Special Views: Magnification View is recommended.      Left  There is no mammographic evidence of malignancy in the left breast.     BI-RADS Category:   Overall: 0 - Incomplete: Needs Additional Imaging Evaluation    3/28/23:  Diagnostic mammogram  Impression:  Right  Calcifications: Right breast 39 mm calcifications at the middle position. Assessment: 4 - Suspicious finding. Stereotactic Biopsy is recommended.      BI-RADS Category:   Overall: 4 - Suspicious        4/13/23:  Right breast biopsy  Final Pathologic Diagnosis Breast, right, biopsy:       - Invasive ductal carcinoma       - Tumor size: 2 mm in this material        - Histologic grade (Tony Histologic Score)           - Glandular/Tubular Differentiation: 3           - Nuclear pleomorphism: 1           - Mitotic Rate: 1           - Overall Grade: grade 1       - Ductal carcinoma in situ:           - Nuclear Grade (in situ carcinoma): low-grade           - Necrosis: not present           - Architectural pattern: solid and cribriform      Breast, right, biopsy:       - Invasive ductal carcinoma       - Tumor size: 2 mm in this material        - Histologic grade (Tony Histologic Score)           - Glandular/Tubular Differentiation: 3           - Nuclear pleomorphism: 1           - Mitotic Rate: 1           - Overall Grade: grade 1       - Ductal carcinoma in situ:           - Nuclear Grade (in situ carcinoma): low-grade           - Necrosis: not present           - Architectural pattern: solid and cribriform     Note: Immunohistochemical studies for cytokeratin AE1/AE3, SMA, CK5/6 and p63 support the above diagnosis. This case is also reviewed by Dr. ANGELA Kearney, who concurs.     Results of Immunohistochemical Studies   Estrogen Receptor       - Positive nuclear staining in 30% of tumor cells       - Weak intensity     Progesterone Receptor       - Positive nuclear staining in 30% of tumor cells       - Weak intensity     HER2       - Negative     Ki-67       - Percentage of cells with nuclear positivity: less than 5%       History:  Past Medical History:   Diagnosis Date    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    OCP & aziza from derm, Doxy caused yeast    Heartburn 12/17/2021      Past Surgical History:   Procedure Laterality Date    COLPOSCOPY       Family History   Problem Relation Age of Onset    Kidney cancer Father 58    Cancer Father     Cancer Brother 22        Parotid Gland cancer    Hyperlipidemia  "Mother     Breast cancer Neg Hx     Colon cancer Neg Hx     Ovarian cancer Neg Hx     Melanoma Neg Hx       Social History     Tobacco Use    Smoking status: Never    Smokeless tobacco: Never   Substance and Sexual Activity    Alcohol use: Yes     Comment: social    Drug use: No    Sexual activity: Yes     Partners: Male     Birth control/protection: None        ROS:   Review of Systems   Constitutional:  Negative for fever, malaise/fatigue and weight loss.   HENT:  Negative for congestion, ear pain, nosebleeds and sore throat.    Eyes:  Negative for blurred vision, double vision and photophobia.   Respiratory:  Negative for cough, hemoptysis, sputum production, shortness of breath, wheezing and stridor.    Cardiovascular:  Negative for chest pain, palpitations, orthopnea and leg swelling.   Gastrointestinal:  Negative for abdominal pain, blood in stool, constipation, diarrhea, heartburn, melena, nausea and vomiting.   Genitourinary:  Negative for dysuria and hematuria.   Musculoskeletal:  Negative for back pain, myalgias and neck pain.   Skin:  Negative for itching and rash.   Neurological:  Negative for dizziness, focal weakness, seizures, weakness and headaches.   Endo/Heme/Allergies:  Negative for polydipsia. Does not bruise/bleed easily.   Psychiatric/Behavioral:  Negative for depression and memory loss. The patient is not nervous/anxious and does not have insomnia.       Objective:     Vitals:    04/25/23 0824   BP: (!) 151/89   Pulse: 80   Resp: 18   SpO2: 100%   Weight: 69.6 kg (153 lb 7 oz)   Height: 5' 1" (1.549 m)   PainSc: 0-No pain       Physical Examination:   Physical Exam  Vitals and nursing note reviewed.   Constitutional:       General: She is not in acute distress.     Appearance: She is not diaphoretic.   HENT:      Head: Normocephalic.      Mouth/Throat:      Pharynx: No oropharyngeal exudate.   Eyes:      General: No scleral icterus.     Conjunctiva/sclera: Conjunctivae normal.   Neck:      " Thyroid: No thyromegaly.   Cardiovascular:      Rate and Rhythm: Normal rate and regular rhythm.      Heart sounds: Normal heart sounds. No murmur heard.  Pulmonary:      Effort: Pulmonary effort is normal. No respiratory distress.      Breath sounds: No stridor. No wheezing or rales.   Chest:      Chest wall: No tenderness.       Abdominal:      General: Bowel sounds are normal. There is no distension.      Palpations: Abdomen is soft. There is no mass.      Tenderness: There is no abdominal tenderness. There is no rebound.   Musculoskeletal:         General: No tenderness or deformity. Normal range of motion.      Cervical back: Neck supple.   Lymphadenopathy:      Cervical: No cervical adenopathy.   Skin:     General: Skin is warm and dry.      Findings: No erythema or rash.   Neurological:      Mental Status: She is alert and oriented to person, place, and time.      Cranial Nerves: No cranial nerve deficit.      Coordination: Coordination normal.      Gait: Gait is intact.   Psychiatric:         Mood and Affect: Affect normal.         Cognition and Memory: Memory normal.         Judgment: Judgment normal.        Diagnostic Tests:  Significant Imaging: I have reviewed and interpreted all pertinent imaging results/findings.      Laboratory Data:  All pertinent labs have been reviewed.    Labs:   Lab Results   Component Value Date    WBC 7.09 04/25/2023    HGB 13.3 04/25/2023    HCT 39.2 04/25/2023    MCV 93 04/25/2023     04/25/2023       Assessment/Plan:   Malignant neoplasm of overlapping sites of right breast in female, estrogen receptor positive  cT2cN0, G1, ER/NJ + 30%, Her 2 karrie negative     We discussed that she has early stage breast cancer, clinically node negative, with favorable features given low grade and low Ki 67% and possible extensive DCIS.      She is a candidate for upfront surgery given no additional findings are noted on upcoming MRI. Given her age and FH, would recommend genetic  testing.   By the time of this dictation, she was seen by Dr. Pickard and has opted for b/l mastectomy with reconstruction.   As long as invasive tumor is >5 mm, I would recommend oncotype testing on the final pathology to determine chemotherapy benefit.   She is low- moderate ER positive and would be a candidate for endocrine therapy. We will discuss after final pathology is available.      ECOG SCORE    0 - Fully active-able to carry on all pre-disease performance without restriction           Discussion:   No follow-ups on file.    Plan was discussed with the patient at length, and she verbalized understanding. Dov was given an opportunity to ask questions that were answered to her satisfaction, and she was advised to call in the interval if any problems or questions arise.    Electronically signed by Jessica Griffith MD      Route Chart for Scheduling    Med Onc Chart Routing      Follow up with physician . 3 weeks post op   Follow up with I[facility]    Infusion scheduling note    Injection scheduling note    Labs    Imaging    Pharmacy appointment    Other referrals

## 2023-04-25 NOTE — PROGRESS NOTES
Genetics Lay Navigator Note:    Nurse Navigator : GISEL Dixon RN    Multi-D Pt. :   Surg Onc. : Neeraj  Med Onc. : Nacho    Met with patient at her consult with Dr. Pickard (4/25/2023), to facilitate genetic testing. Set patient up with [employer] genetic counselor over the phone to complete counseling prior to testing. Patient verbalized understanding to all counseling information. [employer] brochure with number to call with questions or concerns provided to patient as well as my card. Encouraged patient to call me or [employer] at any time.     Lab appointment made and patient escorted with [employer] kit to lab for specimen draw and processing. Patient instructed that results will be provided as soon as they are available. No questions or concerns from patient about plan of care.     [employer] Genetic Pedigree scanned in media and  dto this documentation note.    [employer] Tracking # [id number]

## 2023-04-25 NOTE — PROGRESS NOTES
Breast Surgery  [facility]  Department of Surgery      REFERRING PROVIDER:   Michaela Loyola MD  [address] [place] [address]    Chief Complaint: IDC of right breast      Subjective:      Patient ID: Dov Hernandez is a 42 y.o. female who presents with IDC of the right breast.    Initial screening mammogram on 3/08/23 demonstrated right breast calcifications at the middle position. Follow-up mammogram (3/28/23) showed right breast 39 mm calcifications at the middle position. A stereotactic biopsy was performed on 23 with pathology revealing infiltrating ductal carcinoma of the breast.     Patient does routinely do self breast exams.  Patient has not noted a change on breast exam.  Patient denies nipple discharge. Patient denies to previous breast biopsy. Patient denies a personal history of breast cancer.    Findings at that time were the following:   Tumor size: 3.9 cm   Tumor ndgndrndanddndend:nd nd2nd Estrogen Receptor: Positive (30%)   Progesterone Receptor: Positive (30%)   Her-2 karrie: Negative   Lymph node status: none   Lymphatic invasion: none     GYN History:  Age of menarche was 14.   Age of menopause was N/A.    Last menstrual period was 3/17/2023.   Patient denies hormonal replacement therapy.   OCP x 10 years  Patient is .   Age of first live birth was 29.   Patient did not breast feed.    Past Medical History:   Diagnosis Date    Acne varioliformis 2017    OCP & aziza from derm, Doxy caused yeast    Heartburn 2021     Past Surgical History:   Procedure Laterality Date    COLPOSCOPY       Current Outpatient Medications on File Prior to Visit   Medication Sig Dispense Refill    drospirenone-ethinyl estradioL (ELIUD) 3-0.03 mg per tablet TAKE 1 TABLET BY MOUTH EVERY DAY 84 tablet 4     No current facility-administered medications on file prior to visit.     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   HENT:  Negative for congestion.    Eyes:  Negative for redness and visual disturbance.   Respiratory:  Negative for cough, shortness of breath and wheezing.    Cardiovascular:  Negative for chest pain.   Gastrointestinal:  Negative for abdominal distention and abdominal pain.   Musculoskeletal:  Negative for back pain.   Skin:  Negative for color change, pallor, rash and wound.   Neurological:  Negative for dizziness and headaches.   Objective:   BP (!) 151/89   Pulse 80   Resp 18   Ht 5' 1" (1.549 m)   Wt 69.4 kg (153 lb)   SpO2 100%   BMI 28.91 kg/m²     Physical Exam   Vitals reviewed.  Constitutional: She is oriented to person, place, and time.   HENT:   Head: Normocephalic.   Eyes: Right eye exhibits no discharge. Left eye exhibits no discharge.   Cardiovascular:  Normal rate and normal pulses.            Pulmonary/Chest: Effort normal. No respiratory distress. She has no wheezes. Right breast exhibits skin change. Right breast exhibits no inverted nipple, no mass, no nipple discharge and no tenderness. Left breast exhibits no inverted nipple, no mass, no nipple " discharge, no skin change and no tenderness.       Abdominal: Soft. Normal appearance and bowel sounds are normal. She exhibits no distension.   Musculoskeletal: Normal range of motion. No swelling. Lymphadenopathy:      Cervical: No cervical adenopathy.     Neurological: She is alert and oriented to person, place, and time.   Skin: Skin is warm and dry. Capillary refill takes less than 2 seconds. No erythema. No pallor.     Psychiatric: Her behavior is normal. Mood normal.     Radiology review: Images personally reviewed by me in the clinic.     Assessment:       1. Malignant neoplasm of overlapping sites of right breast in female, estrogen receptor positive    2. Pre-op testing        Plan:     Options for management were discussed with the patient and her family. We reviewed the existing data noting the equivalency of breast conserving surgery with radiation therapy and mastectomy. We also reviewed the guidelines of the National Comprehensive Cancer Network for Stage 1 breast carcinoma. We discussed the need for lumpectomy margins to be negative for carcinoma, the necessity for postoperative radiation therapy after breast conservation in most cases, the possibility of a failed or false negative sentinel lymph node biopsy and the potential need for complete lymphadenectomy for a failed or positive sentinel lymph node biopsy were fully discussed. In the setting of mastectomy, delayed or immediate reconstruction options are available and were discussed.     In the setting of lumpectomy, radiation therapy would be recommended majority of the time.  The duration and treatment side effects were discussed with the patient.  This will coordinated with the radiation oncologist pending final pathology.    We also discussed the role of systemic therapy in the treatment of early stage breast cancer.  We discussed that this is based on tumor biology and aquiles status and will be determined based on final pathology.  We  discussed that if the cancer is hormone positive, endocrine therapy would be recommended in most cases and its use can reduce the risk of recurrence as well as improve survival. Side effects of treatment were briefly discussed. We also discussed the potential role for chemotherapy based on a number of factors such as tumor phenotype (ER+ vs. triple negative vs. Wst6hdt+) and this would be determined in coordination with the medical oncologist.    Will obtain bilateral breast MRI, scheduled for 4/26/2023  Fertility preservation was discussed with patient in detail. Referral to fertility specialist offered. Patient declined referral. States she is not planning on having more children in the future.   Met with Dr. Griffith today, will proceed with upfront surgery   The patient, in consultation with her family, has elected to proceed with bilateral total mastectomy and right sentinel lymph node biopsy. The operative risks of bleeding, infection, recurrence, scarring, and anesthetic complications and the possibility of requiring further surgery were all noted and informed consent obtained.  Referral to Plastic Surgery ([place]) for autologous reconstruction. May be a candidate for immediate ROSITA recon if no additional findings on MRI.  NSM ok, but will need to take some skin over tumor in the lower breast.  Genetic testing to be done today.    Surgery scheduled. Follow-up in clinic roughly 14 days after surgery.     Patient was educated on breast cancer, receptors, wire localization lumpectomy, mastectomy, sentinel lymph node mapping and biopsy, axillary lymph node dissection, reconstruction, breast prosthesis with post-mastectomy bra and radiation therapy. Patient was given patient information binder including [facility] breast cancer treatment brochure.  All her questions were answered.    Total time spent with the patient: 65 minutes.  45 minutes of face to face consultation and 20 minutes of chart review and  coordination of care.

## 2023-04-26 PROBLEM — C50.811 MALIGNANT NEOPLASM OF OVERLAPPING SITES OF RIGHT BREAST IN FEMALE, ESTROGEN RECEPTOR POSITIVE: Status: ACTIVE | Noted: 2023-04-26

## 2023-04-26 PROBLEM — Z17.0 MALIGNANT NEOPLASM OF OVERLAPPING SITES OF RIGHT BREAST IN FEMALE, ESTROGEN RECEPTOR POSITIVE: Status: ACTIVE | Noted: 2023-04-26

## 2023-04-27 ENCOUNTER — PATIENT MESSAGE (OUTPATIENT)
Dept: SURGERY | Facility: CLINIC | Age: 43
End: 2023-04-27
Payer: COMMERCIAL

## 2023-05-01 ENCOUNTER — SURGICAL CONSULT (OUTPATIENT)
Dept: PLASTIC SURGERY | Facility: CLINIC | Age: 43
End: 2023-05-01
Attending: PLASTIC SURGERY
Payer: COMMERCIAL

## 2023-05-01 VITALS — SYSTOLIC BLOOD PRESSURE: 159 MMHG | DIASTOLIC BLOOD PRESSURE: 72 MMHG | HEART RATE: 82 BPM

## 2023-05-01 DIAGNOSIS — C50.911 MALIGNANT NEOPLASM OF RIGHT FEMALE BREAST, UNSPECIFIED ESTROGEN RECEPTOR STATUS, UNSPECIFIED SITE OF BREAST: Primary | ICD-10-CM

## 2023-05-01 PROCEDURE — 99202 PR OFFICE/OUTPT VISIT, NEW, LEVL II, 15-29 MIN: ICD-10-PCS | Mod: S$GLB,,, | Performed by: PLASTIC SURGERY

## 2023-05-01 PROCEDURE — 99202 OFFICE O/P NEW SF 15 MIN: CPT | Mod: S$GLB,,, | Performed by: PLASTIC SURGERY

## 2023-05-01 NOTE — PROGRESS NOTES
Patient presents for evaluation.  She was recently diagnosed with right breast cancer     Surgical plan at this time is bilateral mastectomy, likely nipple sparing    She is here today to discuss restorative options    Past Medical History:   Diagnosis Date    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    OCP & aziza from derm, Doxy caused yeast    Heartburn 12/17/2021       Scheduled Meds:  Continuous Infusions:  PRN Meds:.    Review of patient's allergies indicates:   Allergen Reactions    Egg derived Diarrhea    Latex, natural rubber Rash     On exam:    BMI is 28.9     Adequate infraumbilical adipose tissue     Sternal notch to nipple on the left is 25 cm, same on the right     Base width is 13 cm bilaterally    Imf to nipple is 8 cm bilaterally     Assessment: Right breast cancer to undergo bilateral mastectomy    We discussed extensively restorative options.  Patient is interested in autogenous     I do believe she would be a good match considering the size of her current breasts     We did discuss also the preop intraop and postop course of an autogenous reconstruction     She does appear to have good support that would help her recovery     She also understands that any restorative plan typically involve 2 surgeries with a second-stage at about 3 4 months.  In her case that second-stage likely would involve small lift if indeed Dr. Benítez confirmed it is safe to keep the nipple     Some fat grafting would be performed at that time as well     She is seeing Dr. Roche as a 2nd opinion on Friday     She will make a decision in which direction to go afterwards     Next order of business for us would be obtain a CT of the abdomen to map the vascular anatomy issue wants to remain with our team

## 2023-05-02 ENCOUNTER — TUMOR BOARD CONFERENCE (OUTPATIENT)
Dept: SURGERY | Facility: CLINIC | Age: 43
End: 2023-05-02
Payer: COMMERCIAL

## 2023-05-02 ENCOUNTER — PATIENT MESSAGE (OUTPATIENT)
Dept: PLASTIC SURGERY | Facility: CLINIC | Age: 43
End: 2023-05-02
Payer: COMMERCIAL

## 2023-05-02 NOTE — PROGRESS NOTES
Oncology History   Malignant neoplasm of overlapping sites of right breast in female, estrogen receptor positive   4/13/2023 Biopsy    Breast, right, biopsy:   Invasive ductal carcinoma   Tumor size: 2 mm in this material   Overall Grade: grade 1     4/13/2023 Breast Tumor Markers    Estrogen: Positive (weak intensity 30%)  Progesterone: Positive (weak intensity 30%)  HER2: Negative   Ki67: 5%     4/25/2023 Genetic Testing    In process      4/25/2023 Imaging Significant Findings    MRI revealed 2 cm mass correlating with biopsy proven malignancy      4/26/2023 Initial Diagnosis    Malignant neoplasm of overlapping sites of right breast in female, estrogen receptor positive       4/26/2023 Cancer Staged    Staging form: Breast, AJCC 8th Edition  - Clinical stage from 4/26/2023: Stage IB (cT2, cN0, cM0, G1, ER+, NV+, HER2: Equivocal)       5/2/2023 Tumor Conference       The team agreed to proceed with upfront surgery for further sampling of the tumor

## 2023-05-05 ENCOUNTER — PATIENT MESSAGE (OUTPATIENT)
Dept: SURGERY | Facility: CLINIC | Age: 43
End: 2023-05-05
Payer: COMMERCIAL

## 2023-05-08 ENCOUNTER — PATIENT MESSAGE (OUTPATIENT)
Dept: OBSTETRICS AND GYNECOLOGY | Facility: CLINIC | Age: 43
End: 2023-05-08
Payer: COMMERCIAL

## 2023-05-08 ENCOUNTER — PATIENT MESSAGE (OUTPATIENT)
Dept: SURGERY | Facility: CLINIC | Age: 43
End: 2023-05-08
Payer: COMMERCIAL

## 2023-05-09 ENCOUNTER — PATIENT MESSAGE (OUTPATIENT)
Dept: PRIMARY CARE CLINIC | Facility: CLINIC | Age: 43
End: 2023-05-09
Payer: COMMERCIAL

## 2023-05-09 ENCOUNTER — PATIENT MESSAGE (OUTPATIENT)
Dept: OBSTETRICS AND GYNECOLOGY | Facility: CLINIC | Age: 43
End: 2023-05-09
Payer: COMMERCIAL

## 2023-05-09 ENCOUNTER — PATIENT MESSAGE (OUTPATIENT)
Dept: HEMATOLOGY/ONCOLOGY | Facility: CLINIC | Age: 43
End: 2023-05-09
Payer: COMMERCIAL

## 2023-05-10 ENCOUNTER — OFFICE VISIT (OUTPATIENT)
Dept: HEMATOLOGY/ONCOLOGY | Facility: CLINIC | Age: 43
End: 2023-05-10
Payer: COMMERCIAL

## 2023-05-10 DIAGNOSIS — Z17.0 MALIGNANT NEOPLASM OF OVERLAPPING SITES OF RIGHT BREAST IN FEMALE, ESTROGEN RECEPTOR POSITIVE: Primary | ICD-10-CM

## 2023-05-10 DIAGNOSIS — F41.9 ANXIETY: ICD-10-CM

## 2023-05-10 DIAGNOSIS — C50.811 MALIGNANT NEOPLASM OF OVERLAPPING SITES OF RIGHT BREAST IN FEMALE, ESTROGEN RECEPTOR POSITIVE: Primary | ICD-10-CM

## 2023-05-10 DIAGNOSIS — C50.911 INVASIVE DUCTAL CARCINOMA OF BREAST, RIGHT: ICD-10-CM

## 2023-05-10 PROCEDURE — 99214 PR OFFICE/OUTPT VISIT, EST, LEVL IV, 30-39 MIN: ICD-10-PCS | Mod: 95,,, | Performed by: INTERNAL MEDICINE

## 2023-05-10 PROCEDURE — 99214 OFFICE O/P EST MOD 30 MIN: CPT | Mod: 95,,, | Performed by: INTERNAL MEDICINE

## 2023-05-10 RX ORDER — LORAZEPAM 0.5 MG/1
0.5 TABLET ORAL EVERY 12 HOURS PRN
Qty: 30 TABLET | Refills: 0 | Status: SHIPPED | OUTPATIENT
Start: 2023-05-10 | End: 2023-09-20

## 2023-05-10 RX ORDER — VENLAFAXINE HYDROCHLORIDE 37.5 MG/1
37.5 CAPSULE, EXTENDED RELEASE ORAL DAILY
Qty: 30 CAPSULE | Refills: 2 | Status: SHIPPED | OUTPATIENT
Start: 2023-05-10 | End: 2023-08-07 | Stop reason: SDUPTHER

## 2023-05-10 NOTE — PROGRESS NOTES
FOLLOW UP TELEMEDICINE VISIT    Subjective:      Patient ID: Dov Hernandez is a 42 y.o. female.  MRN: [id number]  : 1980    An audio and visual care visit was performed with the patient because of the COVID-19 pandemic recommendations for social distancing.    TELEMEDICINE  The patient location is: home  The chief complaint leading to consultation is: Breast cancer, anxiety     Visit type: Virtual visit with synchronous audio and video    Total time spent with patient: 11 minutes  31 minutes of total time spent on the encounter, which includes face to face time and non-face to face time preparing to see the patient (eg, review of tests), obtaining and/or reviewing separately obtained history, documenting clinical information in the electronic or other health record, independently interpreting results (not separately reported) and communicating results to the patient/family/caregiver, or care coordination (not separately reported).    Each patient to whom he or she provides medical services by telemedicine is:  (1) informed of the relationship between the physician and patient and the respective role of any other health care provider with respect to management of the patient; and (2) notified that he or she may decline to receive medical services by telemedicine and may withdraw from such care at any time.    History of Present Illness:   DARIELA Hernandez is a 42 y.o. female who is referred for newly diagnosed right breast IDC.        She had a normal screening mammogram in . This year, she underwent a screening mammogram in March that showed right breast calcifications, measured to be 39 mm on diagnostic mammogram.   She underwent a right breast biopsy that showed IDC, 2 mm, ER 30%, CA 30%, Her 2 karrie negative, Grade 1, low Ki 67 at 5% with associated DCIS.      Interim history:  She has had a breast MRI that showed a 2 cm mass. We have also discussed her case at our multi d breast tumor board.    Feels nervous, tearful today. Surgery planned for 5/24.   Oncology History:  Oncology History   Malignant neoplasm of overlapping sites of right breast in female, estrogen receptor positive   4/13/2023 Biopsy    Breast, right, biopsy:   Invasive ductal carcinoma   Tumor size: 2 mm in this material   Overall Grade: grade 1     4/13/2023 Breast Tumor Markers    Estrogen: Positive (weak intensity 30%)  Progesterone: Positive (weak intensity 30%)  HER2: Negative   Ki67: 5%     4/25/2023 Genetic Testing    In process      4/25/2023 Imaging Significant Findings    MRI revealed 2 cm mass correlating with biopsy proven malignancy      4/26/2023 Initial Diagnosis    Malignant neoplasm of overlapping sites of right breast in female, estrogen receptor positive       4/26/2023 Cancer Staged    Staging form: Breast, AJCC 8th Edition  - Clinical stage from 4/26/2023: Stage IB (cT2, cN0, cM0, G1, ER+, TX+, HER2: Equivocal)       5/2/2023 Tumor Conference       The team agreed to proceed with upfront surgery for further sampling of the tumor        Past medical, surgical, family, and social history were reviewed today and there are no changes of note unless mentioned in HPI.   MEDS and ALLERGIES were reviewed with patient and meds reconciled.     History:  Past Medical History:   Diagnosis Date    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    OCP & aziza from derm, Doxy caused yeast    Heartburn 12/17/2021      Past Surgical History:   Procedure Laterality Date    COLPOSCOPY      WISDOM TOOTH EXTRACTION       Family History   Problem Relation Age of Onset    Kidney cancer Father 58    Cancer Father     Cancer Brother 22        Parotid Gland cancer    Hyperlipidemia Mother     Breast cancer Neg Hx     Colon cancer Neg Hx     Ovarian cancer Neg Hx     Melanoma Neg Hx       Social History     Tobacco Use    Smoking status: Never    Smokeless tobacco: Never   Substance and Sexual Activity    Alcohol use: Yes     Comment: social    Drug use: No     Sexual activity: Yes     Partners: Male     Birth control/protection: None        ROS:  Review of Systems   Constitutional:  Negative for fever, malaise/fatigue and weight loss.   HENT:  Negative for congestion, hearing loss, nosebleeds and sore throat.    Eyes:  Negative for double vision and photophobia.   Respiratory:  Negative for cough, hemoptysis, sputum production, shortness of breath and wheezing.    Cardiovascular:  Negative for chest pain, palpitations, orthopnea and leg swelling.   Gastrointestinal:  Negative for abdominal pain, blood in stool, constipation, diarrhea, heartburn, nausea and vomiting.   Genitourinary:  Negative for dysuria, hematuria and urgency.   Musculoskeletal:  Negative for back pain, joint pain and myalgias.   Skin:  Negative for itching and rash.   Neurological:  Negative for dizziness, tingling, seizures, weakness and headaches.   Endo/Heme/Allergies:  Negative for polydipsia. Does not bruise/bleed easily.   Psychiatric/Behavioral:  Negative for depression and memory loss. The patient is nervous/anxious. The patient does not have insomnia.      Objective:   There were no vitals filed for this visit.  Wt Readings from Last 10 Encounters:   05/15/23 68 kg (150 lb)   04/25/23 69.4 kg (153 lb)   04/25/23 69.6 kg (153 lb 7 oz)   03/21/23 69.3 kg (152 lb 12.5 oz)   12/22/22 69.7 kg (153 lb 10.6 oz)   12/17/21 67.4 kg (148 lb 9.4 oz)   11/02/21 67.5 kg (148 lb 14.7 oz)   12/15/20 67.5 kg (148 lb 13 oz)   10/12/20 67.2 kg (148 lb 2.4 oz)   11/05/19 70.1 kg (154 lb 8.7 oz)       Physical Examination:   Constitutional: she is alert, pleasant, and does not appear to be in any physical distress   HENT: Mouth/Throat:  Tongue is midline without evidence of glossitis  Eyes: No obvious jaundice or conjunctivitis.  EOM are normal.   Pulmonary/Chest: No stridor noted. No excess chest muscle movement.  Neurological: she is alert and oriented to person, place, and time. No cranial nerve deficit.  Skin:   No rash noted. No erythema.   Psychiatric: she has a normal mood and affect. Speech and memory normal.     Diagnostic Tests:  Significant Imaging: I have reviewed and interpreted all pertinent imaging results/findings.    Laboratory Data:  All pertinent labs have been reviewed.    Labs:   Lab Results   Component Value Date    WBC 7.09 04/25/2023    RBC 4.21 04/25/2023    HGB 13.3 04/25/2023    HCT 39.2 04/25/2023    MCV 93 04/25/2023     04/25/2023    GLU 86 04/25/2023     04/25/2023    K 4.2 04/25/2023    BUN 8 04/25/2023    CREATININE 0.7 04/25/2023    AST 17 04/25/2023    ALT 19 04/25/2023    BILITOT 0.5 04/25/2023     Assessment/Plan:   Malignant neoplasm of overlapping sites of right breast in female, estrogen receptor positive  cT2cN0, G1, ER/MI + 30%, Her 2 karrie negative      We discussed that she has early stage breast cancer, clinically node negative, with favorable features given low grade and low Ki 67% and possible extensive DCIS.      She is a candidate for upfront surgery given no additional findings on MRI.   Follow genetic testing.   B/l mastectomy is planned for 5/24.   If invasive tumor is >5 mm, I would recommend oncotype testing on the final pathology to determine chemotherapy benefit.   She is low- moderate ER positive and would be a candidate for endocrine therapy. We will discuss after final pathology is available.        Anxiety  Optimize supportive care and start low dose Effexor and ativan as needed.     -     Ambulatory referral/consult to Hematology/Oncology/Psychology; Future; Expected date: 05/17/2023  -     venlafaxine (EFFEXOR XR) 37.5 MG 24 hr capsule; Take 1 capsule (37.5 mg total) by mouth once daily.  Dispense: 30 capsule; Refill: 2  -     LORazepam (ATIVAN) 0.5 MG tablet; Take 1 tablet (0.5 mg total) by mouth every 12 (twelve) hours as needed for Anxiety.  Dispense: 30 tablet; Refill: 0    Invasive ductal carcinoma of breast, right       ECOG SCORE            Discussion:   No follow-ups on file.    Plan was discussed with the patient at length, and she verbalized understanding. Dov was given an opportunity to ask questions that were answered to her satisfaction, and she was advised to call in the interval if any problems or questions arise.  Discussed COVID-19 and social distancing in great detail, avoid all non-essential visits out of the home if possible and avoid sick contacts.     Electronically signed by Jessica Griffith MD     Route Chart for Scheduling    Med Onc Chart Routing  Urgent    Follow up with physician . 6/21. Needs to see Integrative and onc psych asap   Follow up with I[facility]    Infusion scheduling note    Injection scheduling note    Labs    Imaging    Pharmacy appointment    Other referrals

## 2023-05-11 ENCOUNTER — PATIENT MESSAGE (OUTPATIENT)
Dept: SURGERY | Facility: CLINIC | Age: 43
End: 2023-05-11
Payer: COMMERCIAL

## 2023-05-11 ENCOUNTER — HOSPITAL ENCOUNTER (OUTPATIENT)
Dept: RADIOLOGY | Facility: OTHER | Age: 43
Discharge: HOME | End: 2023-05-11
Attending: SPECIALIST
Payer: COMMERCIAL

## 2023-05-11 ENCOUNTER — PATIENT MESSAGE (OUTPATIENT)
Dept: HEMATOLOGY/ONCOLOGY | Facility: CLINIC | Age: 43
End: 2023-05-11
Payer: COMMERCIAL

## 2023-05-11 DIAGNOSIS — Z85.3 HISTORY OF RIGHT BREAST CANCER: ICD-10-CM

## 2023-05-11 PROCEDURE — 74174 CTA ABD&PLVS W/CONTRAST: CPT | Mod: 26,,, | Performed by: RADIOLOGY

## 2023-05-11 PROCEDURE — 74174: ICD-10-PCS | Mod: 26,,, | Performed by: RADIOLOGY

## 2023-05-11 PROCEDURE — 74174 CTA ABD&PLVS W/CONTRAST: CPT | Mod: TC

## 2023-05-11 PROCEDURE — 25500020 PHARM REV CODE 255: Performed by: SPECIALIST

## 2023-05-11 RX ADMIN — IOHEXOL 100 ML: 350 INJECTION, SOLUTION INTRAVENOUS at 12:05

## 2023-05-13 ENCOUNTER — ANESTHESIA EVENT (OUTPATIENT)
Dept: SURGERY | Facility: OTHER | Age: 43
DRG: 580 | End: 2023-05-13
Payer: COMMERCIAL

## 2023-05-13 NOTE — ANESTHESIA PREPROCEDURE EVALUATION
05/13/2023  Dov Hernandez is a 42 y.o., female.      Pre-op Assessment    I have reviewed the Patient Summary Reports.     I have reviewed the Nursing Notes. I have reviewed the NPO Status.   I have reviewed the Medications.     Review of Systems  Anesthesia Hx:  Denies Family Hx of Anesthesia complications.   Denies Personal Hx of Anesthesia complications.   Social:  Social Alcohol Use, Non-Smoker    Hematology/Oncology:  Hematology Normal      Current/Recent Cancer. Breast right   Cardiovascular:  Cardiovascular Normal Exercise tolerance: good     Pulmonary:  Pulmonary Normal    Renal/:  Renal/ Normal     Hepatic/GI:  Hepatic/GI Normal    Musculoskeletal:  Musculoskeletal Normal    Neurological:  Neurology Normal    Endocrine:  Endocrine Normal    Psych:   anxiety          Physical Exam  General: Well nourished and Cooperative    Airway:  Mallampati: I   Mouth Opening: Normal  TM Distance: Normal  Neck ROM: Normal ROM    Dental:  Intact        Anesthesia Plan  Type of Anesthesia, risks & benefits discussed:    Anesthesia Type: Gen ETT  Intra-op Monitoring Plan: Standard ASA Monitors  Post Op Pain Control Plan: multimodal analgesia  Induction:  IV  Airway Plan: Video  Informed Consent: Informed consent signed with the Patient and all parties understand the risks and agree with anesthesia plan.  All questions answered.   ASA Score: 2  Day of Surgery Review of History & Physical: H&P Update referred to the surgeon/provider.  Anesthesia Plan Notes: Recent Labs  Will take home ativan DOS    Ready For Surgery From Anesthesia Perspective.     .

## 2023-05-15 ENCOUNTER — HOSPITAL ENCOUNTER (OUTPATIENT)
Dept: PREADMISSION TESTING | Facility: OTHER | Age: 43
Discharge: HOME | End: 2023-05-15
Attending: SURGERY
Payer: COMMERCIAL

## 2023-05-15 ENCOUNTER — TELEPHONE (OUTPATIENT)
Dept: INFUSION THERAPY | Facility: HOSPITAL | Age: 43
End: 2023-05-15
Payer: COMMERCIAL

## 2023-05-15 VITALS
DIASTOLIC BLOOD PRESSURE: 87 MMHG | BODY MASS INDEX: 28.32 KG/M2 | RESPIRATION RATE: 18 BRPM | WEIGHT: 150 LBS | HEIGHT: 61 IN | OXYGEN SATURATION: 100 % | TEMPERATURE: 98 F | SYSTOLIC BLOOD PRESSURE: 158 MMHG | HEART RATE: 72 BPM

## 2023-05-15 RX ORDER — PROCHLORPERAZINE EDISYLATE 5 MG/ML
5 INJECTION INTRAMUSCULAR; INTRAVENOUS EVERY 30 MIN PRN
Status: CANCELLED | OUTPATIENT
Start: 2023-05-15

## 2023-05-15 RX ORDER — DIPHENHYDRAMINE HYDROCHLORIDE 50 MG/ML
12.5 INJECTION INTRAMUSCULAR; INTRAVENOUS EVERY 30 MIN PRN
Status: CANCELLED | OUTPATIENT
Start: 2023-05-15

## 2023-05-15 RX ORDER — SODIUM CHLORIDE, SODIUM LACTATE, POTASSIUM CHLORIDE, CALCIUM CHLORIDE 600; 310; 30; 20 MG/100ML; MG/100ML; MG/100ML; MG/100ML
INJECTION, SOLUTION INTRAVENOUS CONTINUOUS
Status: CANCELLED | OUTPATIENT
Start: 2023-05-15

## 2023-05-15 RX ORDER — HYDROMORPHONE HYDROCHLORIDE 2 MG/ML
0.4 INJECTION, SOLUTION INTRAMUSCULAR; INTRAVENOUS; SUBCUTANEOUS EVERY 5 MIN PRN
Status: CANCELLED | OUTPATIENT
Start: 2023-05-15

## 2023-05-15 RX ORDER — OXYCODONE HYDROCHLORIDE 5 MG/1
5 TABLET ORAL
Status: CANCELLED | OUTPATIENT
Start: 2023-05-15

## 2023-05-15 RX ORDER — MEPERIDINE HYDROCHLORIDE 25 MG/ML
12.5 INJECTION INTRAMUSCULAR; INTRAVENOUS; SUBCUTANEOUS ONCE AS NEEDED
Status: CANCELLED | OUTPATIENT
Start: 2023-05-15 | End: 2023-05-16

## 2023-05-15 RX ORDER — SODIUM CHLORIDE 0.9 % (FLUSH) 0.9 %
3 SYRINGE (ML) INJECTION
Status: CANCELLED | OUTPATIENT
Start: 2023-05-15

## 2023-05-15 NOTE — DISCHARGE INSTRUCTIONS
Information to Prepare you for your Surgery    PRE-ADMIT TESTING -  [phone]    [address] [facility]          Your surgery has been scheduled at [facility]. We are pleased to have the opportunity to serve you. For Further Information please call [phone].    On the day of surgery please report to the Information Desk on the 1st floor.    CONTACT YOUR PHYSICIAN'S OFFICE THE DAY PRIOR TO YOUR SURGERY TO OBTAIN YOUR ARRIVAL TIME.     The evening before surgery do not eat anything after 9 p.m. ( this includes hard candy, chewing gum and mints).  You may only have GATORADE, POWERADE AND WATER  from 9 p.m. until you leave your home.   DO NOT DRINK ANY LIQUIDS ON THE WAY TO THE HOSPITAL.      Why does your anesthesiologist allow you to drink Gatorade/Powerade before surgery?  Gatorade/Powerade helps to increase your comfort before surgery and to decrease your nausea after surgery. The carbohydrates in Gatorade/Powerade help reduce your body's stress response to surgery.  If you are a diabetic-drink only water prior to surgery.       Patients may have 2 visitors pre and post procedure. Only 2 visitors will be allowed in the Surgical building with the patient. No one under the age of 12 will be allowed into the facility.    SPECIAL MEDICATION INSTRUCTIONS: TAKE medications checked off by the Anesthesiologist on your Medication List.    Angiogram Patients: Take medications as instructed by your physician, including aspirin.     Surgery Patients:    If you take ASPIRIN - Your PHYSICIAN/SURGEON will need to inform you IF/OR when you need to stop taking aspirin prior to your surgery.     The week prior to surgery do not ot take any medications containing IBUPROFEN or NSAIDS ( Advil, Motrin, Goodys, BC, Aleve, Naproxen etc) If you are not sure if you should take a medicine please call your surgeon's office.  Ok to take Tylenol    Do Not Wear any make-up  (especially eye make-up) to surgery. Please remove any false eyelashes or eyelash extensions. If you arrive the day of surgery with makeup/eyelashes on you will be required to remove prior to surgery. (There is a risk of corneal abrasions if eye makeup/eyelash extensions are not removed)      Leave all valuables at home.   Do Not wear any jewelry or watches, including any metal in body piercings. Jewelry must be removed prior to coming to the hospital.  There is a possibility that rings that are unable to be removed may be cut off if they are on the surgical extremity.    Please remove all hair extensions, wigs, clips and any other metal accessories/ ornaments from your hair.  These items may pose a flammable/fire risk in Surgery and must be removed.    Do not shave your surgical area at least 5 days prior to your surgery. The surgical prep will be performed at the hospital according to Infection Control regulations.    Contact Lens must be removed before surgery. Either do not wear the contact lens or bring a case and solution for storage.  Please bring a container for eyeglasses or dentures as required.  Bring any paperwork your physician has provided, such as consent forms,  history and physicals, doctor's orders, etc.   Bring comfortable clothes that are loose fitting to wear upon discharge. Take into consideration the type of surgery being performed.  Maintain your diet as advised per your physician the day prior to surgery.      Adequate rest the night before surgery is advised.   Park in the Parking lot behind the hospital or in the [facility] across the street from the parking lot. Parking is complimentary.  If you will be discharged the same day as your procedure, please arrange for a responsible adult to drive you home or to accompany you if traveling by taxi.   YOU WILL NOT BE PERMITTED TO DRIVE OR TO LEAVE THE HOSPITAL ALONE AFTER SURGERY.   If you are being discharged the same day, it is  strongly recommended that you arrange for someone to remain with you for the first 24 hrs following your surgery.    The Surgeon will speak to your family/visitor after your surgery regarding the outcome of your surgery and post op care.  The Surgeon may speak to you after your surgery, but there is a possibility you may not remember the details.  Please check with your family members regarding the conversation with the Surgeon.    We strongly recommend whoever is bringing you home be present for discharge instructions.  This will ensure a thorough understanding for your post op home care.    ALL CHILDREN MUST ALWAYS BE ACCOMPANIED BY AN ADULT.    Visitors-Refer to current Visitor policy handouts.    Thank you for your cooperation.  The Staff of [facility].            Bathing Instructions with Hibiclens    Shower the evening before and morning of your procedure with Chlorhexidine (Hibiclens)  do not use Chlorhexidine on your face or genitals. Do not get in your eyes.  Wash your face with water and your regular face wash/soap  Use your regular shampoo  Apply Chlorhexidine (Hibiclens) directly on your skin or on a wet washcloth and wash gently. When showering: Move away from the shower stream when applying Chlorhexidine (Hibiclens) to avoid rinsing off too soon.  Rinse thoroughly with warm water  Do not dilute Chlorhexidine (Hibiclens)   Dry off as usual, do not use any deodorant, powder, body lotions, perfume, after shave or cologne.

## 2023-05-16 ENCOUNTER — PATIENT MESSAGE (OUTPATIENT)
Dept: PSYCHIATRY | Facility: CLINIC | Age: 43
End: 2023-05-16

## 2023-05-16 ENCOUNTER — CLINICAL SUPPORT (OUTPATIENT)
Dept: PSYCHIATRY | Facility: CLINIC | Age: 43
End: 2023-05-16
Payer: COMMERCIAL

## 2023-05-16 ENCOUNTER — TELEPHONE (OUTPATIENT)
Dept: SURGERY | Facility: CLINIC | Age: 43
End: 2023-05-16
Payer: COMMERCIAL

## 2023-05-16 DIAGNOSIS — F41.9 ANXIETY: ICD-10-CM

## 2023-05-16 DIAGNOSIS — C50.811 MALIGNANT NEOPLASM OF OVERLAPPING SITES OF RIGHT BREAST IN FEMALE, ESTROGEN RECEPTOR POSITIVE: Primary | ICD-10-CM

## 2023-05-16 DIAGNOSIS — Z17.0 MALIGNANT NEOPLASM OF OVERLAPPING SITES OF RIGHT BREAST IN FEMALE, ESTROGEN RECEPTOR POSITIVE: Primary | ICD-10-CM

## 2023-05-16 PROCEDURE — 99499 UNLISTED E&M SERVICE: CPT | Mod: 95,,, | Performed by: PSYCHOLOGIST

## 2023-05-16 PROCEDURE — 99499 NO LOS: ICD-10-PCS | Mod: 95,,, | Performed by: PSYCHOLOGIST

## 2023-05-16 NOTE — PROGRESS NOTES
INFORMED CONSENT: This case is under the supervision of a licensed clinical psychologist. Patient made aware that services are provided by a psychology intern, with suervision by a licensed clinical psychologist who is a cosigner on this note.Dov Hernandez   identify confirmed with two identifiers.  The patient has been informed of the risks and benefits associated with engaging in psychotherapy, the handling of protected health information, the rights of privacy and the limits of confidentiality. The patient has also been informed of the importance of reporting any suicidal or homicidal ideation to this or any provider to ensure safety of all parties, and the Dov Hernandez expressed understanding. The patient was agreeable to these terms and freely participates in individual psychotherapy.  PSYCHO-ONCOLOGY INTAKE    Diagnostic Interview - CPT 46435    Date: 5/16/2023  Site: [facility]     Evaluation Length (direct face-to-face time):  1 hour     Referral Source: Jessica Griffith MD   Oncologist:   PCP: Michaela Loyola MD    Clinical status of patient: Outpatient    Dov Hernandez, a 42 y.o. female, seen for initial evaluation visit.  Met with patient.    Chief complaint/reason for encounter: adjustment to illness, anxiety    Medical/Surgical History:    Patient Active Problem List   Diagnosis    Pain in joint, pelvic region and thigh    Acne varioliformis    Heartburn    Malignant neoplasm of overlapping sites of right breast in female, estrogen receptor positive       Health Behaviors:       ETOH Use: Yes (rare)       Tobacco Use: No   Illicit Drug Use:  No     Prescription Misuse:No   Caffeine: minimal   Exercise:The patient engages in environmental activity only. The patient engages in intermittent activity (bike riding, walks)   Firearms:  No    Family History:   Psychiatric illness: No     Alcohol/Drug Abuse: No     Suicide: No      Past Psychiatric History:   Inpatient  "treatment: No     Outpatient treatment: No     Prior substance abuse treatment: No     Suicide Attempts: No     Psychotropic Medications:  Current: Ativan and Effexor       Past: Prozac    Current medications as per below, allergies reviewed in chart.    Current Outpatient Medications   Medication    LORazepam (ATIVAN) 0.5 MG tablet    venlafaxine (EFFEXOR XR) 37.5 MG 24 hr capsule     No current facility-administered medications for this visit.          Social situation/Stressors: Dov Hernandez lives with her , mother and two kids (10 y.o son and 7 y.o daughter) in [place], [place].  She is a full-time business owner. The patient reports she has very good social support and enjoys spending time with her family. Patient reports her two brothers and their wives live nearby and frequently have family dinners. Dov Hernandez is  a Worship believer.  Dov Hernandez's hobbies include bike riding with her family, managing her [employer] business, and listening to music. Patient expressed her biggest stressor right now is her upcoming surgery, stating "I've never had a major surgery". Patient endorses difficulty with anxious thinking and trouble relaxing.    Additional stressors:    Strengths:Steady employment, financial stability, Housing stability, Able to vocalize needs, Values and traditions, Interpersonal relationships and supports available - family, relatives, friends, and Financial stability  Liabilities: Other: none    Current Evaluation:     Mental Status Exam: Dov Hernandez arrived  promptly for the assessment session. The patient was fully cooperative throughout the interview and was an adequate historian   Appearance: age appropriate, appropriately  dressed, adequately  groomed  Behavior/Cooperation: friendly and cooperative  Speech: normal in rate, volume, and tone  Mood: anxious  Affect: mood congruent  Thought Process: goal-directed, logical  Thought Content: normal, no suicidality, no " homicidality, delusions, or paranoia;did not appear to be responding to internal stimuli during the interview.   Orientation: grossly intact  Memory: Grossly intact  Attention Span/Concentration: Attends to interview without distraction; reports no difficulty  Fund of Knowledge: average  Estimate of Intelligence: average from verbal skills and history  Cognition: grossly intact  Insight: patient has awareness of illness; good insight into own behavior and behavior of others  Judgment: the patient's behavior is adequate to circumstances    DISTRESS SCREENING 5/16/2023 5/10/2023 4/22/2023   Distress Score 5 5 0 - No Distress   Practical Problems None of these None of these None of these   Family Problems None of these None of these None of these   Emotional Problems Depression;Fears;Nervousness;Sadness;Worry Depression;Fears;Nervousness;Sadness;Worry None of these   Spiritual / Scientology Concerns No No No   Physical Problems None of these None of these None of these              Pain: 0    History of present illness:    Oncology History   Malignant neoplasm of overlapping sites of right breast in female, estrogen receptor positive   4/13/2023 Biopsy    Breast, right, biopsy:   Invasive ductal carcinoma   Tumor size: 2 mm in this material   Overall Grade: grade 1     4/13/2023 Breast Tumor Markers    Estrogen: Positive (weak intensity 30%)  Progesterone: Positive (weak intensity 30%)  HER2: Negative   Ki67: 5%     4/25/2023 Genetic Testing    In process      4/25/2023 Imaging Significant Findings    MRI revealed 2 cm mass correlating with biopsy proven malignancy      4/26/2023 Initial Diagnosis    Malignant neoplasm of overlapping sites of right breast in female, estrogen receptor positive       4/26/2023 Cancer Staged    Staging form: Breast, AJCC 8th Edition  - Clinical stage from 4/26/2023: Stage IB (cT2, cN0, cM0, G1, ER+, MS+, HER2: Equivocal)       5/2/2023 Tumor Conference       The team agreed to proceed with  "upfront surgery for further sampling of the tumor           Dov Hernandez has adjusted to illness with slight difficulty primarily through focus on family. She has engaged in appropriate information gathering.  The patient has excellent family/friend support.  Her support system is coping adequately with the diagnosis/treatment/prognosis. Possible Illness-related psychosocial stressors include absence from work and changes in ability to engage in leisure activities.  The patient has a good partnership with [place] [facility] [place]. The patient reports the following barriers to cancer care:none.       Patient Reported Cancer Treatment Symptoms:  no complaints    Behavioral Health Symptoms:   Mood: Depression: depressed mood and anxiety no prior and prior depression:. ; no SI/HI  Breann: Denies  Psychosis: Denies   Anxiety: Feeling nervous, anxious, or on edge, Uncontrollable worry (about upcoming surgery, "what ifs"), Difficulty relaxing, and Fear of unknown;  no prior  Generalized anxiety: Denies    Panic Disorder: Denies  Social/specific phobia: Denies   OCD: Denies  Trauma: Denies  Sexual Dysfunction:  Denies  Substance abuse: denied  Cognitive functioning: denied  Health behaviors: noncontributory  Sleep: no concerns , no sleep onset difficulty , no EDS , AM caffeine, no use of OTC/melatonin/hypnotics/benzodiazepines    Pain: Ms. Hernandez reports no pain.   CAM Therapies: None         Assessment - Diagnosis - Goals:       ICD-10-CM ICD-9-CM   1. Malignant neoplasm of overlapping sites of right breast in female, estrogen receptor positive  C50.811 174.8    Z17.0 V86.0   2. Anxiety  F41.9 300.00         Plan:individual psychotherapy    Summary and Recommendations  Dov Hernandez is a 42 y.o. female referred by Jessica Griffith MD for psychological evaluation and treatment.  Ms. Hernandez appears to be coping with some difficulty with her diagnosis and proposed treatment course.  Patient has good " support system, good relationship with spouse or significant other, gets along well with co-workers, good relationship with children, and good relationship with parents. Mood protective strategies during cancer treatment were discussed.  Patient was referred to the [facility] to develop initial coping strategies. She is interested in individual therapy for cancer coping skills and will follow up with me for that purpose. Patient was given information about the availability of a yoga class. Patient was provided with active coping skills (breathing, mindfulness, activity planning/behavioral activation, and thought challenging).    GOALS:   Write down worries daily and bring to next session  Pleasant events scheduling  Relaxation exercises (instructions and options sent to patient)  Pleasant events scheduling and increased social interaction  Identify and decrease cognitive distortions contributing negatively to mood  Improve self-regulation through CBT strategies  [facility]    Next Session: Patient will schedule follow-up with . Will try to meet prior to surgery next Wednesday, May 24th depending upon availability.    Elenita Palacio MA., MS    Sade Box, PhD  Clinical Psychologist  [place] License #[id number]  [place] License #[id number]

## 2023-05-16 NOTE — TELEPHONE ENCOUNTER
Genetic Lay Navigator Note:    Called patient with the results of genetic testing. 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 Discussed that the results do not indicate any further action is needed at this time, and that the company will notify us if any additional recommendations become available. Explained that we will provide a formal copy of report via [employer] or mail to patient.    Offered patient a phone session with a genetic counselor through [employer], or a in person session with our [facility] genetic counselors. Also discussed that this will remain an available option for patient at any time in the future.     Patient was instructed to call with any additional questions or concerns. Verbalized understanding of all information.    Provider notified of results and that patient was given them.

## 2023-05-17 ENCOUNTER — TELEPHONE (OUTPATIENT)
Dept: HEMATOLOGY/ONCOLOGY | Facility: CLINIC | Age: 43
End: 2023-05-17
Payer: COMMERCIAL

## 2023-05-18 ENCOUNTER — PATIENT MESSAGE (OUTPATIENT)
Dept: OBSTETRICS AND GYNECOLOGY | Facility: CLINIC | Age: 43
End: 2023-05-18
Payer: COMMERCIAL

## 2023-05-23 ENCOUNTER — TELEPHONE (OUTPATIENT)
Dept: SURGERY | Facility: CLINIC | Age: 43
End: 2023-05-23
Payer: COMMERCIAL

## 2023-05-23 ENCOUNTER — TELEPHONE (OUTPATIENT)
Dept: INFUSION THERAPY | Facility: HOSPITAL | Age: 43
End: 2023-05-23
Payer: COMMERCIAL

## 2023-05-23 NOTE — TELEPHONE ENCOUNTER
Confirmed arrival time for surgery on 5/24/23 at the [facility] with Dr.Amy Pickard. Arrival time is for 6 am surgery is scheduled for 8 am . Nothing to eat after 9 pm this evening 5/23/23 . Clear liquids Gatorade up until patient leaves home in the morning. Informed  to please leave all jewelry home . Patient voiced understanding to this call

## 2023-05-24 ENCOUNTER — HOSPITAL ENCOUNTER (OUTPATIENT)
Dept: RADIOLOGY | Facility: OTHER | Age: 43
Discharge: HOME | End: 2023-05-24
Attending: SURGERY
Payer: COMMERCIAL

## 2023-05-24 ENCOUNTER — HOSPITAL ENCOUNTER (INPATIENT)
Facility: OTHER | Age: 43
LOS: 3 days | Discharge: HOME | DRG: 580 | End: 2023-05-27
Attending: SURGERY | Admitting: SPECIALIST
Payer: COMMERCIAL

## 2023-05-24 ENCOUNTER — ANESTHESIA (OUTPATIENT)
Dept: SURGERY | Facility: OTHER | Age: 43
DRG: 580 | End: 2023-05-24
Payer: COMMERCIAL

## 2023-05-24 ENCOUNTER — PATIENT MESSAGE (OUTPATIENT)
Dept: PSYCHOLOGY | Facility: HOSPITAL | Age: 43
End: 2023-05-24
Payer: COMMERCIAL

## 2023-05-24 DIAGNOSIS — C50.811 MALIGNANT NEOPLASM OF OVERLAPPING SITES OF RIGHT BREAST IN FEMALE, ESTROGEN RECEPTOR POSITIVE: Primary | ICD-10-CM

## 2023-05-24 DIAGNOSIS — C50.919 BREAST CANCER: ICD-10-CM

## 2023-05-24 DIAGNOSIS — C50.811 MALIGNANT NEOPLASM OF OVERLAPPING SITES OF RIGHT BREAST IN FEMALE, ESTROGEN RECEPTOR POSITIVE: ICD-10-CM

## 2023-05-24 DIAGNOSIS — Z17.0 MALIGNANT NEOPLASM OF OVERLAPPING SITES OF RIGHT BREAST IN FEMALE, ESTROGEN RECEPTOR POSITIVE: ICD-10-CM

## 2023-05-24 DIAGNOSIS — Z17.0 MALIGNANT NEOPLASM OF OVERLAPPING SITES OF RIGHT BREAST IN FEMALE, ESTROGEN RECEPTOR POSITIVE: Primary | ICD-10-CM

## 2023-05-24 LAB
B-HCG UR QL: NEGATIVE
CTP QC/QA: YES

## 2023-05-24 PROCEDURE — 63600175 PHARM REV CODE 636 W HCPCS: Performed by: SURGERY

## 2023-05-24 PROCEDURE — 63600175 PHARM REV CODE 636 W HCPCS: Performed by: STUDENT IN AN ORGANIZED HEALTH CARE EDUCATION/TRAINING PROGRAM

## 2023-05-24 PROCEDURE — 71000039 HC RECOVERY, EACH ADD'L HOUR: Performed by: SURGERY

## 2023-05-24 PROCEDURE — 25000003 PHARM REV CODE 250: Performed by: NURSE ANESTHETIST, CERTIFIED REGISTERED

## 2023-05-24 PROCEDURE — 88341 IMHCHEM/IMCYTCHM EA ADD ANTB: CPT | Mod: 59 | Performed by: PATHOLOGY

## 2023-05-24 PROCEDURE — 88341 IMHCHEM/IMCYTCHM EA ADD ANTB: CPT | Mod: 26,,, | Performed by: PATHOLOGY

## 2023-05-24 PROCEDURE — 36000709 HC OR TIME LEV III EA ADD 15 MIN: Performed by: SURGERY

## 2023-05-24 PROCEDURE — 63600175 PHARM REV CODE 636 W HCPCS: Performed by: NURSE ANESTHETIST, CERTIFIED REGISTERED

## 2023-05-24 PROCEDURE — 27800903 OPTIME MED/SURG SUP & DEVICES OTHER IMPLANTS: Performed by: SURGERY

## 2023-05-24 PROCEDURE — 88307 TISSUE EXAM BY PATHOLOGIST: CPT | Mod: 59 | Performed by: PATHOLOGY

## 2023-05-24 PROCEDURE — 63600175 PHARM REV CODE 636 W HCPCS: Performed by: ANESTHESIOLOGY

## 2023-05-24 PROCEDURE — 88342 IMHCHEM/IMCYTCHM 1ST ANTB: CPT | Mod: 26,,, | Performed by: PATHOLOGY

## 2023-05-24 PROCEDURE — 37000009 HC ANESTHESIA EA ADD 15 MINS: Performed by: SURGERY

## 2023-05-24 PROCEDURE — A9520 TC99 TILMANOCEPT DIAG 0.5MCI: HCPCS

## 2023-05-24 PROCEDURE — 88342 IMHCHEM/IMCYTCHM 1ST ANTB: CPT | Performed by: PATHOLOGY

## 2023-05-24 PROCEDURE — D9220A PRA ANESTHESIA: ICD-10-PCS | Mod: CRNA,,, | Performed by: NURSE ANESTHETIST, CERTIFIED REGISTERED

## 2023-05-24 PROCEDURE — 19303 PR MASTECTOMY, SIMPLE, COMPLETE: ICD-10-PCS | Mod: 50,,, | Performed by: SURGERY

## 2023-05-24 PROCEDURE — 38525 PR BIOPSY/REM LYMPH NODES, AXILLARY: ICD-10-PCS | Mod: 51,RT,, | Performed by: SURGERY

## 2023-05-24 PROCEDURE — D9220A PRA ANESTHESIA: Mod: ANES,,, | Performed by: ANESTHESIOLOGY

## 2023-05-24 PROCEDURE — 36000708 HC OR TIME LEV III 1ST 15 MIN: Performed by: SURGERY

## 2023-05-24 PROCEDURE — 25000003 PHARM REV CODE 250: Performed by: SURGERY

## 2023-05-24 PROCEDURE — 81025 URINE PREGNANCY TEST: CPT | Performed by: ANESTHESIOLOGY

## 2023-05-24 PROCEDURE — P9045 ALBUMIN (HUMAN), 5%, 250 ML: HCPCS | Mod: JZ,JG | Performed by: NURSE ANESTHETIST, CERTIFIED REGISTERED

## 2023-05-24 PROCEDURE — 38525 BIOPSY/REMOVAL LYMPH NODES: CPT | Mod: 51,RT,, | Performed by: SURGERY

## 2023-05-24 PROCEDURE — 88342 CHG IMMUNOCYTOCHEMISTRY: ICD-10-PCS | Mod: 26,,, | Performed by: PATHOLOGY

## 2023-05-24 PROCEDURE — C9290 INJ, BUPIVACAINE LIPOSOME: HCPCS | Performed by: SURGERY

## 2023-05-24 PROCEDURE — 37000008 HC ANESTHESIA 1ST 15 MINUTES: Performed by: SURGERY

## 2023-05-24 PROCEDURE — 99900035 HC TECH TIME PER 15 MIN (STAT)

## 2023-05-24 PROCEDURE — 27201423 OPTIME MED/SURG SUP & DEVICES STERILE SUPPLY: Performed by: SURGERY

## 2023-05-24 PROCEDURE — 19303 MAST SIMPLE COMPLETE: CPT | Mod: 50,,, | Performed by: SURGERY

## 2023-05-24 PROCEDURE — 11000001 HC ACUTE MED/SURG PRIVATE ROOM

## 2023-05-24 PROCEDURE — D9220A PRA ANESTHESIA: Mod: CRNA,,, | Performed by: NURSE ANESTHETIST, CERTIFIED REGISTERED

## 2023-05-24 PROCEDURE — 88307 TISSUE EXAM BY PATHOLOGIST: CPT | Mod: 26,,, | Performed by: PATHOLOGY

## 2023-05-24 PROCEDURE — 88307 PR  SURG PATH,LEVEL V: ICD-10-PCS | Mod: 26,,, | Performed by: PATHOLOGY

## 2023-05-24 PROCEDURE — C1781 MESH (IMPLANTABLE): HCPCS | Performed by: SURGERY

## 2023-05-24 PROCEDURE — 38900 PR INTRAOPERATIVE SENTINEL LYMPH NODE ID W DYE INJECTION: ICD-10-PCS | Mod: RT,,, | Performed by: SURGERY

## 2023-05-24 PROCEDURE — 94761 N-INVAS EAR/PLS OXIMETRY MLT: CPT

## 2023-05-24 PROCEDURE — 88341 PR IHC OR ICC EACH ADD'L SINGLE ANTIBODY  STAINPR: ICD-10-PCS | Mod: 26,,, | Performed by: PATHOLOGY

## 2023-05-24 PROCEDURE — D9220A PRA ANESTHESIA: ICD-10-PCS | Mod: ANES,,, | Performed by: ANESTHESIOLOGY

## 2023-05-24 PROCEDURE — 38900 IO MAP OF SENT LYMPH NODE: CPT | Mod: RT,,, | Performed by: SURGERY

## 2023-05-24 PROCEDURE — 63600175 PHARM REV CODE 636 W HCPCS: Mod: JZ,JG | Performed by: NURSE ANESTHETIST, CERTIFIED REGISTERED

## 2023-05-24 PROCEDURE — 25000003 PHARM REV CODE 250: Performed by: STUDENT IN AN ORGANIZED HEALTH CARE EDUCATION/TRAINING PROGRAM

## 2023-05-24 PROCEDURE — 71000033 HC RECOVERY, INTIAL HOUR: Performed by: SURGERY

## 2023-05-24 DEVICE — IMPLANTABLE DEVICE: Type: IMPLANTABLE DEVICE | Site: ABDOMEN | Status: FUNCTIONAL

## 2023-05-24 DEVICE — COUPLER MICROVAS ANSTMS 2.5MM: Type: IMPLANTABLE DEVICE | Site: BREAST | Status: FUNCTIONAL

## 2023-05-24 DEVICE — CONNECTOR NERVE 2X15MM: Type: IMPLANTABLE DEVICE | Site: BREAST | Status: FUNCTIONAL

## 2023-05-24 DEVICE — GRAFT AVANCE NERVE 1-2MMX70MM: Type: IMPLANTABLE DEVICE | Site: BREAST | Status: FUNCTIONAL

## 2023-05-24 RX ORDER — EPHEDRINE SULFATE 50 MG/ML
INJECTION, SOLUTION INTRAVENOUS
Status: DISCONTINUED | OUTPATIENT
Start: 2023-05-24 | End: 2023-05-24

## 2023-05-24 RX ORDER — MIDAZOLAM HYDROCHLORIDE 1 MG/ML
INJECTION INTRAMUSCULAR; INTRAVENOUS
Status: DISCONTINUED | OUTPATIENT
Start: 2023-05-24 | End: 2023-05-24

## 2023-05-24 RX ORDER — ACETAMINOPHEN 325 MG/1
650 TABLET ORAL EVERY 4 HOURS PRN
Status: DISCONTINUED | OUTPATIENT
Start: 2023-05-24 | End: 2023-05-27 | Stop reason: HOSPADM

## 2023-05-24 RX ORDER — PROCHLORPERAZINE EDISYLATE 5 MG/ML
5 INJECTION INTRAMUSCULAR; INTRAVENOUS EVERY 30 MIN PRN
Status: DISCONTINUED | OUTPATIENT
Start: 2023-05-24 | End: 2023-05-24 | Stop reason: HOSPADM

## 2023-05-24 RX ORDER — PROPOFOL 10 MG/ML
VIAL (ML) INTRAVENOUS
Status: DISCONTINUED | OUTPATIENT
Start: 2023-05-24 | End: 2023-05-24

## 2023-05-24 RX ORDER — LIDOCAINE HYDROCHLORIDE AND EPINEPHRINE 10; 10 MG/ML; UG/ML
INJECTION, SOLUTION INFILTRATION; PERINEURAL
Status: DISCONTINUED | OUTPATIENT
Start: 2023-05-24 | End: 2023-05-24 | Stop reason: HOSPADM

## 2023-05-24 RX ORDER — ROCURONIUM BROMIDE 10 MG/ML
INJECTION, SOLUTION INTRAVENOUS
Status: DISCONTINUED | OUTPATIENT
Start: 2023-05-24 | End: 2023-05-24

## 2023-05-24 RX ORDER — SODIUM CHLORIDE, SODIUM LACTATE, POTASSIUM CHLORIDE, CALCIUM CHLORIDE 600; 310; 30; 20 MG/100ML; MG/100ML; MG/100ML; MG/100ML
INJECTION, SOLUTION INTRAVENOUS CONTINUOUS
Status: DISCONTINUED | OUTPATIENT
Start: 2023-05-24 | End: 2023-05-24

## 2023-05-24 RX ORDER — OXYCODONE HYDROCHLORIDE 5 MG/1
10 TABLET ORAL EVERY 6 HOURS PRN
Status: DISCONTINUED | OUTPATIENT
Start: 2023-05-25 | End: 2023-05-24

## 2023-05-24 RX ORDER — CEFAZOLIN SODIUM 1 G/3ML
2 INJECTION, POWDER, FOR SOLUTION INTRAMUSCULAR; INTRAVENOUS ONCE
Status: COMPLETED | OUTPATIENT
Start: 2023-05-24 | End: 2023-05-24

## 2023-05-24 RX ORDER — LIDOCAINE HYDROCHLORIDE 20 MG/ML
INJECTION INTRAVENOUS
Status: DISCONTINUED | OUTPATIENT
Start: 2023-05-24 | End: 2023-05-24

## 2023-05-24 RX ORDER — TRANEXAMIC ACID 100 MG/ML
INJECTION, SOLUTION INTRAVENOUS
Status: DISCONTINUED | OUTPATIENT
Start: 2023-05-24 | End: 2023-05-24

## 2023-05-24 RX ORDER — KETAMINE HCL IN 0.9 % NACL 50 MG/5 ML
SYRINGE (ML) INTRAVENOUS
Status: DISCONTINUED | OUTPATIENT
Start: 2023-05-24 | End: 2023-05-24

## 2023-05-24 RX ORDER — CEPHALEXIN 500 MG/1
500 CAPSULE ORAL EVERY 8 HOURS
Status: DISCONTINUED | OUTPATIENT
Start: 2023-05-25 | End: 2023-05-27 | Stop reason: HOSPADM

## 2023-05-24 RX ORDER — SODIUM CHLORIDE 0.9 % (FLUSH) 0.9 %
3 SYRINGE (ML) INJECTION
Status: DISCONTINUED | OUTPATIENT
Start: 2023-05-24 | End: 2023-05-24 | Stop reason: HOSPADM

## 2023-05-24 RX ORDER — HYDROMORPHONE HYDROCHLORIDE 2 MG/ML
INJECTION, SOLUTION INTRAMUSCULAR; INTRAVENOUS; SUBCUTANEOUS
Status: DISCONTINUED | OUTPATIENT
Start: 2023-05-24 | End: 2023-05-24

## 2023-05-24 RX ORDER — DEXTROSE MONOHYDRATE AND SODIUM CHLORIDE 5; .9 G/100ML; G/100ML
INJECTION, SOLUTION INTRAVENOUS CONTINUOUS
Status: DISCONTINUED | OUTPATIENT
Start: 2023-05-24 | End: 2023-05-25

## 2023-05-24 RX ORDER — DIPHENHYDRAMINE HYDROCHLORIDE 50 MG/ML
12.5 INJECTION INTRAMUSCULAR; INTRAVENOUS EVERY 30 MIN PRN
Status: DISCONTINUED | OUTPATIENT
Start: 2023-05-24 | End: 2023-05-24 | Stop reason: HOSPADM

## 2023-05-24 RX ORDER — DIPHENHYDRAMINE HYDROCHLORIDE 50 MG/ML
INJECTION INTRAMUSCULAR; INTRAVENOUS
Status: DISCONTINUED | OUTPATIENT
Start: 2023-05-24 | End: 2023-05-24

## 2023-05-24 RX ORDER — INDOCYANINE GREEN AND WATER 25 MG
KIT INJECTION
Status: DISCONTINUED | OUTPATIENT
Start: 2023-05-24 | End: 2023-05-24

## 2023-05-24 RX ORDER — HYDROMORPHONE HYDROCHLORIDE 2 MG/ML
1 INJECTION, SOLUTION INTRAMUSCULAR; INTRAVENOUS; SUBCUTANEOUS EVERY 4 HOURS PRN
Status: DISCONTINUED | OUTPATIENT
Start: 2023-05-24 | End: 2023-05-25

## 2023-05-24 RX ORDER — HEPARIN SODIUM 10000 [USP'U]/ML
INJECTION, SOLUTION INTRAVENOUS; SUBCUTANEOUS
Status: DISCONTINUED | OUTPATIENT
Start: 2023-05-24 | End: 2023-05-24 | Stop reason: HOSPADM

## 2023-05-24 RX ORDER — BUPIVACAINE HYDROCHLORIDE 2.5 MG/ML
INJECTION, SOLUTION EPIDURAL; INFILTRATION; INTRACAUDAL
Status: DISCONTINUED | OUTPATIENT
Start: 2023-05-24 | End: 2023-05-24 | Stop reason: HOSPADM

## 2023-05-24 RX ORDER — VENLAFAXINE HYDROCHLORIDE 37.5 MG/1
37.5 CAPSULE, EXTENDED RELEASE ORAL DAILY
Status: DISCONTINUED | OUTPATIENT
Start: 2023-05-25 | End: 2023-05-27 | Stop reason: HOSPADM

## 2023-05-24 RX ORDER — DEXAMETHASONE SODIUM PHOSPHATE 4 MG/ML
INJECTION, SOLUTION INTRA-ARTICULAR; INTRALESIONAL; INTRAMUSCULAR; INTRAVENOUS; SOFT TISSUE
Status: DISCONTINUED | OUTPATIENT
Start: 2023-05-24 | End: 2023-05-24

## 2023-05-24 RX ORDER — MUPIROCIN 20 MG/G
OINTMENT TOPICAL 2 TIMES DAILY
Status: DISCONTINUED | OUTPATIENT
Start: 2023-05-24 | End: 2023-05-27 | Stop reason: HOSPADM

## 2023-05-24 RX ORDER — OXYCODONE HYDROCHLORIDE 5 MG/1
5 TABLET ORAL EVERY 6 HOURS PRN
Status: DISCONTINUED | OUTPATIENT
Start: 2023-05-25 | End: 2023-05-24

## 2023-05-24 RX ORDER — OXYCODONE HYDROCHLORIDE 5 MG/1
5 TABLET ORAL
Status: DISCONTINUED | OUTPATIENT
Start: 2023-05-24 | End: 2023-05-24 | Stop reason: HOSPADM

## 2023-05-24 RX ORDER — DIAZEPAM 5 MG/1
5 TABLET ORAL EVERY 6 HOURS PRN
Status: DISCONTINUED | OUTPATIENT
Start: 2023-05-24 | End: 2023-05-27 | Stop reason: HOSPADM

## 2023-05-24 RX ORDER — ALBUMIN HUMAN 50 G/1000ML
SOLUTION INTRAVENOUS CONTINUOUS PRN
Status: DISCONTINUED | OUTPATIENT
Start: 2023-05-24 | End: 2023-05-24

## 2023-05-24 RX ORDER — DOCUSATE SODIUM 100 MG/1
100 CAPSULE, LIQUID FILLED ORAL EVERY 12 HOURS
Status: DISCONTINUED | OUTPATIENT
Start: 2023-05-24 | End: 2023-05-27 | Stop reason: HOSPADM

## 2023-05-24 RX ORDER — PROPOFOL 10 MG/ML
VIAL (ML) INTRAVENOUS CONTINUOUS PRN
Status: DISCONTINUED | OUTPATIENT
Start: 2023-05-24 | End: 2023-05-24

## 2023-05-24 RX ORDER — LIDOCAINE HYDROCHLORIDE 40 MG/ML
INJECTION, SOLUTION RETROBULBAR
Status: DISCONTINUED | OUTPATIENT
Start: 2023-05-24 | End: 2023-05-24 | Stop reason: HOSPADM

## 2023-05-24 RX ORDER — HYDROMORPHONE HYDROCHLORIDE 2 MG/ML
0.5 INJECTION, SOLUTION INTRAMUSCULAR; INTRAVENOUS; SUBCUTANEOUS EVERY 6 HOURS PRN
Status: DISCONTINUED | OUTPATIENT
Start: 2023-05-24 | End: 2023-05-25

## 2023-05-24 RX ORDER — HYDROMORPHONE HYDROCHLORIDE 2 MG/ML
0.4 INJECTION, SOLUTION INTRAMUSCULAR; INTRAVENOUS; SUBCUTANEOUS EVERY 5 MIN PRN
Status: DISCONTINUED | OUTPATIENT
Start: 2023-05-24 | End: 2023-05-24 | Stop reason: HOSPADM

## 2023-05-24 RX ORDER — ENOXAPARIN SODIUM 100 MG/ML
40 INJECTION SUBCUTANEOUS ONCE
Status: COMPLETED | OUTPATIENT
Start: 2023-05-24 | End: 2023-05-24

## 2023-05-24 RX ORDER — MEPERIDINE HYDROCHLORIDE 25 MG/ML
12.5 INJECTION INTRAMUSCULAR; INTRAVENOUS; SUBCUTANEOUS ONCE AS NEEDED
Status: DISCONTINUED | OUTPATIENT
Start: 2023-05-24 | End: 2023-05-24 | Stop reason: HOSPADM

## 2023-05-24 RX ORDER — ONDANSETRON 2 MG/ML
4 INJECTION INTRAMUSCULAR; INTRAVENOUS EVERY 12 HOURS PRN
Status: DISCONTINUED | OUTPATIENT
Start: 2023-05-24 | End: 2023-05-27 | Stop reason: HOSPADM

## 2023-05-24 RX ORDER — FENTANYL CITRATE 50 UG/ML
INJECTION, SOLUTION INTRAMUSCULAR; INTRAVENOUS
Status: DISCONTINUED | OUTPATIENT
Start: 2023-05-24 | End: 2023-05-24

## 2023-05-24 RX ORDER — SUCCINYLCHOLINE CHLORIDE 20 MG/ML
INJECTION INTRAMUSCULAR; INTRAVENOUS
Status: DISCONTINUED | OUTPATIENT
Start: 2023-05-24 | End: 2023-05-24

## 2023-05-24 RX ORDER — ONDANSETRON 2 MG/ML
INJECTION INTRAMUSCULAR; INTRAVENOUS
Status: DISCONTINUED | OUTPATIENT
Start: 2023-05-24 | End: 2023-05-24

## 2023-05-24 RX ADMIN — INDOCYANINE GREEN 10 MG: KIT INTRAVENOUS at 11:05

## 2023-05-24 RX ADMIN — HYDROMORPHONE HYDROCHLORIDE 0.8 MG: 2 INJECTION INTRAMUSCULAR; INTRAVENOUS; SUBCUTANEOUS at 04:05

## 2023-05-24 RX ADMIN — DOCUSATE SODIUM 100 MG: 100 CAPSULE, LIQUID FILLED ORAL at 08:05

## 2023-05-24 RX ADMIN — ROCURONIUM BROMIDE 20 MG: 10 INJECTION INTRAVENOUS at 12:05

## 2023-05-24 RX ADMIN — CEFAZOLIN 2 G: 2 INJECTION, POWDER, FOR SOLUTION INTRAMUSCULAR; INTRAVENOUS at 07:05

## 2023-05-24 RX ADMIN — ROCURONIUM BROMIDE 25 MG: 10 INJECTION INTRAVENOUS at 10:05

## 2023-05-24 RX ADMIN — PHENYLEPHRINE HYDROCHLORIDE 0.2 MCG/KG/MIN: 10 INJECTION INTRAVENOUS at 11:05

## 2023-05-24 RX ADMIN — DEXAMETHASONE SODIUM PHOSPHATE 8 MG: 4 INJECTION, SOLUTION INTRAMUSCULAR; INTRAVENOUS at 08:05

## 2023-05-24 RX ADMIN — FENTANYL CITRATE 50 MCG: 50 INJECTION, SOLUTION INTRAMUSCULAR; INTRAVENOUS at 10:05

## 2023-05-24 RX ADMIN — FENTANYL CITRATE 50 MCG: 50 INJECTION, SOLUTION INTRAMUSCULAR; INTRAVENOUS at 01:05

## 2023-05-24 RX ADMIN — HYDROMORPHONE HYDROCHLORIDE 0.4 MG: 2 INJECTION INTRAMUSCULAR; INTRAVENOUS; SUBCUTANEOUS at 03:05

## 2023-05-24 RX ADMIN — DEXTROSE AND SODIUM CHLORIDE: 5; 900 INJECTION, SOLUTION INTRAVENOUS at 06:05

## 2023-05-24 RX ADMIN — ONDANSETRON HYDROCHLORIDE 4 MG: 2 INJECTION INTRAMUSCULAR; INTRAVENOUS at 08:05

## 2023-05-24 RX ADMIN — SODIUM CHLORIDE, SODIUM LACTATE, POTASSIUM CHLORIDE, AND CALCIUM CHLORIDE: 600; 310; 30; 20 INJECTION, SOLUTION INTRAVENOUS at 07:05

## 2023-05-24 RX ADMIN — ROCURONIUM BROMIDE 25 MG: 10 INJECTION INTRAVENOUS at 09:05

## 2023-05-24 RX ADMIN — PROPOFOL 180 MG: 10 INJECTION, EMULSION INTRAVENOUS at 08:05

## 2023-05-24 RX ADMIN — TRANEXAMIC ACID 1000 MG: 100 INJECTION, SOLUTION INTRAVENOUS at 08:05

## 2023-05-24 RX ADMIN — SODIUM CHLORIDE, SODIUM LACTATE, POTASSIUM CHLORIDE, AND CALCIUM CHLORIDE: 600; 310; 30; 20 INJECTION, SOLUTION INTRAVENOUS at 03:05

## 2023-05-24 RX ADMIN — ACETAMINOPHEN 650 MG: 325 TABLET, FILM COATED ORAL at 07:05

## 2023-05-24 RX ADMIN — SUCCINYLCHOLINE CHLORIDE 140 MG: 20 INJECTION, SOLUTION INTRAMUSCULAR; INTRAVENOUS at 08:05

## 2023-05-24 RX ADMIN — ALBUMIN (HUMAN): 2.5 SOLUTION INTRAVENOUS at 08:05

## 2023-05-24 RX ADMIN — ENOXAPARIN SODIUM 40 MG: 40 INJECTION SUBCUTANEOUS at 06:05

## 2023-05-24 RX ADMIN — SUGAMMADEX 100 MG: 100 INJECTION, SOLUTION INTRAVENOUS at 03:05

## 2023-05-24 RX ADMIN — DIPHENHYDRAMINE HYDROCHLORIDE 12.5 MG: 50 INJECTION, SOLUTION INTRAMUSCULAR; INTRAVENOUS at 02:05

## 2023-05-24 RX ADMIN — LIDOCAINE HYDROCHLORIDE 60 MG: 20 INJECTION, SOLUTION INTRAVENOUS at 08:05

## 2023-05-24 RX ADMIN — HYDROMORPHONE HYDROCHLORIDE 0.4 MG: 2 INJECTION INTRAMUSCULAR; INTRAVENOUS; SUBCUTANEOUS at 02:05

## 2023-05-24 RX ADMIN — PROPOFOL 50 MCG/KG/MIN: 10 INJECTION, EMULSION INTRAVENOUS at 02:05

## 2023-05-24 RX ADMIN — ALBUMIN (HUMAN): 2.5 SOLUTION INTRAVENOUS at 10:05

## 2023-05-24 RX ADMIN — MUPIROCIN: 20 OINTMENT TOPICAL at 09:05

## 2023-05-24 RX ADMIN — CEFAZOLIN 2 G: 330 INJECTION, POWDER, FOR SOLUTION INTRAMUSCULAR; INTRAVENOUS at 08:05

## 2023-05-24 RX ADMIN — CEFAZOLIN 2 G: 330 INJECTION, POWDER, FOR SOLUTION INTRAMUSCULAR; INTRAVENOUS at 12:05

## 2023-05-24 RX ADMIN — Medication 50 MG: at 08:05

## 2023-05-24 RX ADMIN — FENTANYL CITRATE 100 MCG: 50 INJECTION, SOLUTION INTRAMUSCULAR; INTRAVENOUS at 08:05

## 2023-05-24 RX ADMIN — EPHEDRINE SULFATE 15 MG: 50 INJECTION INTRAVENOUS at 08:05

## 2023-05-24 RX ADMIN — HYDROMORPHONE HYDROCHLORIDE 1 MG: 2 INJECTION INTRAMUSCULAR; INTRAVENOUS; SUBCUTANEOUS at 07:05

## 2023-05-24 RX ADMIN — MIDAZOLAM HYDROCHLORIDE 2 MG: 1 INJECTION, SOLUTION INTRAMUSCULAR; INTRAVENOUS at 07:05

## 2023-05-24 RX ADMIN — SODIUM CHLORIDE, SODIUM LACTATE, POTASSIUM CHLORIDE, AND CALCIUM CHLORIDE: 600; 310; 30; 20 INJECTION, SOLUTION INTRAVENOUS at 11:05

## 2023-05-24 NOTE — H&P
[facility] ER  History & Physical  Plastic Surgery    SUBJECTIVE:     Chief Complaint/Reason for Admission: Breast Cancer    History of Present Illness:  Patient ID: Dov Hernandez is a 42 y.o. female who presents with IDC of the right breast.     Initial screening mammogram on 3/08/23 demonstrated right breast calcifications at the middle position. Follow-up mammogram (3/28/23) showed right breast 39 mm calcifications at the middle position. A stereotactic biopsy was performed on 23 with pathology revealing infiltrating ductal carcinoma of the breast.      Patient does routinely do self breast exams.  Patient has not noted a change on breast exam.  Patient denies nipple discharge. Patient denies to previous breast biopsy. Patient denies a personal history of breast cancer.     Findings at that time were the following:   Tumor size: 3.9 cm   Tumor ndgndrndanddndend:nd nd2nd Estrogen Receptor: Positive (30%)   Progesterone Receptor: Positive (30%)   Her-2 karrie: Negative   Lymph node status: none   Lymphatic invasion: none      GYN History:  Age of menarche was 14.   Age of menopause was N/A.    Last menstrual period was 3/17/2023.   Patient denies hormonal replacement therapy.   OCP x 10 years  Patient is .   Age of first live birth was 29.   Patient did not breast feed.    No medications prior to admission.       Review of patient's allergies indicates:   Allergen Reactions    Egg derived Diarrhea    Latex, natural rubber Rash       Past Medical History:   Diagnosis Date    Acne varioliformis 2017    OCP & aziza from derm, Doxy caused yeast    Heartburn 2021     Past Surgical History:   Procedure Laterality Date    COLPOSCOPY      WISDOM TOOTH EXTRACTION       Family History   Problem Relation Age of Onset    Kidney cancer Father 58    Cancer Father     Cancer Brother 22        Parotid Gland cancer    Hyperlipidemia Mother     Breast cancer Neg Hx     Colon cancer Neg Hx     Ovarian cancer Neg  Hx     Melanoma Neg Hx      Social History     Tobacco Use    Smoking status: Never    Smokeless tobacco: Never   Substance Use Topics    Alcohol use: Yes     Comment: social    Drug use: No        OBJECTIVE:     Vital Signs (Most Recent):         ASSESSMENT/PLAN:     42 F with right breast cancer   - Risks of surgery explained to patient including but not limited to infection, bleeding, hematoma, seroma, asymmetry, loss of flap, loss of breast skin, loss of nipple, need for repeat surgery  - Plan for immediate ROSITA flap reconstruction of bilateral breasts following mastectomy

## 2023-05-24 NOTE — ANESTHESIA POSTPROCEDURE EVALUATION
Anesthesia Post Evaluation    Patient: Dov Hernandez    Procedure(s) Performed: Procedure(s) (LRB):  MASTECTOMY, BILATERAL (Bilateral)  BIOPSY, LYMPH NODE, SENTINEL (Right)  INJECTION, FOR SENTINEL NODE IDENTIFICATION (Right)  RECONSTRUCTION, BREAST, USING ROSITA FREE FLAP / BILATERAL DEEP INFERIOR EPIGASTRIC PERFORATORS FLAPS (Bilateral)    Final Anesthesia Type: general      Patient location during evaluation: PACU  Patient participation: Yes- Able to Participate  Level of consciousness: awake and alert  Post-procedure vital signs: reviewed and stable  Pain management: adequate  Airway patency: patent  DIANNE mitigation strategies: Extubation while patient is awake  PONV status at discharge: No PONV  Anesthetic complications: no      Cardiovascular status: hemodynamically stable  Respiratory status: unassisted  Hydration status: euvolemic  Follow-up not needed.          Vitals Value Taken Time   /78 05/24/23 1826   Temp 36.7 °C (98.1 °F) 05/24/23 1826   Pulse 99 05/24/23 1826   Resp 18 05/24/23 1826   SpO2 99 % 05/24/23 1826         Event Time   Out of Recovery 18:05:33         Pain/Eneida Score: Eneida Score: 9 (5/24/2023  5:30 PM)

## 2023-05-24 NOTE — OR NURSING
VSS while in PACU. Bilateral ROSITA flaps warm, soft and with strong arterial pulses. Patient denies pain. Report called to Anuel SPANGLER. Patient and family updated on plan of care.

## 2023-05-24 NOTE — BRIEF OP NOTE
OUTPATIENT PROGRESS NOTE    CHIEF COMPLAINT:  Chief Complaint   Patient presents with   • Office Visit   • Eye Exam       HPI:  The patient presented to the office with a complete eye exam. Her last eye exam was in  at [facility]. After she is done reading everything is very foggy. She typically has a hazy fog over things, and a halo type effect.     Doctor has reviewed and edited the chief complaint and HPI.    The patient  gave us verbal permission to discuss their medical condition in the presence of the following individual(s) in the room: n/a    ROS:  1. Do you have pain?  no  2. Do you have fever, chills, sweats or weight change? no  3. Do you have headaches or seizures? no  4. Do you have ringing in your ear/s or hearing loss? no  5. Do you have chest pain, palpitations or heart murmur? no  6. Do you have shortness of breath or wheezing? no  7. Do you have abdominal pain, nausea, vomiting, diarrhea or constipation? no  8. Do you have pain, frequent or difficulty with urination? no  9. Do you have joint or back pain? no  10. Do you have weakness, numbness or tingling? no  11. Do you have skin changes such as a rash? no  12. Do you experience easy bruising or bleeding? no  13. Are you depressed? no    Doctor has reviewed and edited the ROS and medications.    PAST MEDICAL HISTORY:  Past Medical History:   Diagnosis Date   • Eczema    • Lichen sclerosus    • Rosacea        SURGICAL HISTORY:  Past Surgical History:   Procedure Laterality Date   • Bunionectomy     •  section, low transverse     • Colonoscopy  10/07/2014    repeat 10 years   • Finger ganglion cyst excision Left 2021    Excision of ganglion cyst from left thumb interphalangeal joint - Dr aMrc   • Shoulder surgery      Right    • Tonsillectomy and adenoidectomy     • Paicines tooth extraction         FAMILY HISTORY:  Family History   Problem Relation Age of Onset   • Stroke Mother         3/5/21 stroke- passed away    • Cancer Maternal  [facility] - Surgery ([place])  Brief Operative Note    Surgery Date: 5/24/2023     Surgeon(s) and Role:  Panel 1:     * Jenna Brody MD - Primary  Panel 2:     * Mikey Roche MD - Primary     * Larry Howell MD    Assisting Surgeon: None    Pre-op Diagnosis:  IN FEMALE ESTROGEN    Post-op Diagnosis:  Post-Op Diagnosis Codes:     * Malignant neoplasm of overlapping sites of right female breast [C50.811]     * Bilateral malignant neoplasm of breast in female, estrogen receptor positive [C50.911, Z17.0, C50.912]    Procedure(s) (LRB):  MASTECTOMY, BILATERAL (Bilateral)  BIOPSY, LYMPH NODE, SENTINEL (Right)  INJECTION, FOR SENTINEL NODE IDENTIFICATION (Right)  RECONSTRUCTION, BREAST, USING ROSITA FREE FLAP / BILATERAL DEEP INFERIOR EPIGASTRIC PERFORATORS FLAPS (Bilateral)    Anesthesia: General    Operative Findings: b/l mastectomy with immediate ROSITA flaps     Estimated Blood Loss: <100mL         Specimens:   Specimen (24h ago, onward)       Start     Ordered    05/24/23 1119  Specimen to Pathology, Surgery Breast  Once        Comments: Pre-op Diagnosis: IN FEMALE ESTROGENProcedure(s):MASTECTOMY, BILATERALBIOPSY, LYMPH NODE, SENTINELINJECTION, FOR SENTINEL NODE IDENTIFICATIONLYMPHADENECTOMY / AXILLARYRECONSTRUCTION, BREAST, USING ROSITA FREE FLAP / BILATERAL DEEP INFERIOR EPIGASTRIC PERFORATORS FLAPS Number of specimens: 4Name of specimens: 1) RIGHT BREAST (SHORT-SUPERIOR, LONG-LATERAL, LOOP-SUBAREOLAR);2) RIGHT AXILLARY SENTINEL LYMPH NODE, HOT 2960;3) RIGHT BREAST NIPPLE MARGIN;4) LEFT BREAST (SHORT-SUPERIOR, LONG-LATERAL, LOOP-SUBAREOLAR)     References:    Click here for ordering Quick Tip   Question Answer Comment   Procedure Type: Breast    Specimen Class: Known or suspected malignancy    Which provider would you like to cc? JENNA BRODY    Release to patient Immediate        05/24/23 1120                      Discharge Note    OUTCOME: Patient tolerated treatment/procedure well without complication and is now  Aunt    • Cancer, Breast Maternal Grandmother    • Myocardial Infarction Maternal Grandmother    • Stroke Maternal Grandmother    • Hypertension Maternal Grandmother    • Myocardial Infarction Maternal Grandfather    • Stroke Maternal Grandfather    • Hypertension Maternal Grandfather        SOCIAL HISTORY:  Social History     Socioeconomic History   • Marital status: /Civil Union     Spouse name: Not on file   • Number of children: Not on file   • Years of education: Not on file   • Highest education level: Not on file   Occupational History   • Not on file   Tobacco Use   • Smoking status: Never   • Smokeless tobacco: Never   Vaping Use   • Vaping Use: never used   Substance and Sexual Activity   • Alcohol use: No     Alcohol/week: 0.0 standard drinks   • Drug use: No   • Sexual activity: Yes     Partners: Male     Birth control/protection: None     Comment: Ablation   Other Topics Concern   • Not on file   Social History Narrative   • Not on file     Social Determinants of Health     Financial Resource Strain: Not on file   Food Insecurity: Not on file   Transportation Needs: Not on file   Physical Activity: Not on file   Stress: Not on file   Social Connections: Not on file   Intimate Partner Violence: Not At Risk   • Social Determinants: Intimate Partner Violence Past Fear: No   • Social Determinants: Intimate Partner Violence Current Fear: No     Doctor has reviewed the entrance examination performed by the technician.    OCULAR EXAMINATION:  Base Eye Exam     Visual Acuity (Snellen - Linear)       Right Left    Dist cc 20/20 -2 20/25 +2    Dist ph cc  No Improvement    Correction: Glasses          Tonometry (Applanation, 8:28 AM)       Right Left    Pressure 16 14          Pupils       Pupils React APD    Right PERRL Brisk Negative    Left PERRL Brisk Negative          Visual Fields       Left Right     Full Full          Extraocular Movement       Right Left     Full, Ortho Full, Ortho           ready for discharge.    DISPOSITION: Admitted as an Inpatient    FINAL DIAGNOSIS:  <principal problem not specified>    FOLLOWUP: In clinic    DISCHARGE INSTRUCTIONS:  No discharge procedures on file.     Neuro/Psych     Oriented x3: Yes    Mood/Affect: Normal          Dilation     Both eyes: 2.5% Phenylephrine / 1% Tropicamide @ 8:28 AM            Additional Tests     Color       Right Left    Ishihara 8/8 8/8            Additional Notes    Angles open ou     Slit Lamp and Fundus Exam     External Exam       Right Left    External Normal Normal          Slit Lamp Exam       Right Left    Lids/Lashes Normal Normal    Conjunctiva/Sclera Clear Clear    Cornea Minimally reduced LL, no NaFl uptake Minimally reduced LL, no NaFl uptake    Anterior Chamber Deep and quiet Deep and quiet    Iris Round and regular Round and regular    Lens Clear Clear    Anterior Vitreous (+) PVD (+) PVD          Fundus Exam       Right Left    Disc Flat, pink with a healthy rim Flat, pink with a healthy rim    C/D Ratio Vertical 0.25 0.30    C/D Ratio Horizontal 0.25 0.30    Macula Healthy with sharp foveal reflex Healthy with sharp foveal reflex    Vessels Healthy with normal Artery-Vein ratio Healthy with normal Artery-Vein ratio    Periphery Flat, healthy, without tears or detachments Flat, healthy, without tears or detachments            Refraction     Wearing Rx       Sphere Cylinder Axis Add    Right -1.50 +1.50 175 +2.25id    Left -0.75 +0.75 127 +2.25    Age: 8-9 years    Type: Progressive          Wearing Rx #2       Sphere Cylinder Axis Add    Right -1.50 +1.00 017     Left -0.50 +0.50 147     Age: 3 year    Type: Progressive          Wearing Rx Comments    Rx 1: Prefers older  Rx 2: Newer - had a difficult time adapting           Manifest Refraction (Auto)       Sphere Cylinder Axis    Right -2.00 +1.50 169    Left -0.50 +1.25 135          Cycloplegic Refraction       Sphere Cylinder Axis Dist VA Add    Right -0.25 -1.00 088 20/20 +2.00    Left +0.25 -0.50 060 20/20 +2.00          Final Rx       Sphere Cylinder Axis Add    Right -1.25 +1.00 178 +2.25    Left -0.50 +0.50 150 +2.25    Type: Progressive    Expiration Date: 2/24/2024                         ASSESSMENT:  1. Astigmatism of both eyes with presbyopia    2. Posterior vitreous detachment of both eyes          PLAN:       1. Updated and released glasses prescription. Mild change to prescription. Encouraged update as patient could appreciate difference. Educated on adaptation period as patient's current glasses are 8+ years old. Patient understood. Will re-evaluate in one year, unless needed sooner. Monitor.     *Encouraged use of artificial tears if eyes blur after reading for extended period of time.    2. No s/s of retinal tears or detachments 360 OU. Educated on s/s of retinal tears and detachments. Patient know to call the clinic for dilated exam if experiencing any. Will continue to monitor at this time, unless needed sooner.    Orders Placed This Encounter   • REFRACTION       The patient was dilated today. She knows that the dilation will cause light sensitivity and blurry near vision for the next 4-6 hours. Disposable sunglasses were provided.    FOLLOW UP:  Return in about 1 year (around 2/24/2024).    FINAL GLASSES PRESCRIPTION:  Final Rx       Sphere Cylinder Axis Add    Right -1.25 +1.00 178 +2.25    Left -0.50 +0.50 150 +2.25    Type: Progressive    Expiration Date: 2/24/2024

## 2023-05-24 NOTE — BRIEF OP NOTE
[facility] - Surgery ([place])  Brief Operative Note    SUMMARY     Surgery Date: 5/24/2023     Surgeon(s) and Role:  Panel 1:     * Jenna Pickard MD - Primary     * Cherri Tse MD - Resident assisting  Panel 2:     * Mikey Roche MD - Primary     * Larry Howell MD    Assisting Surgeon: None    Pre-op Diagnosis:         * Malignant neoplasm of overlapping sites of right female breast [C50.811]     * Bilateral malignant neoplasm of breast in female, estrogen receptor positive [C50.911, Z17.0, C50.912]    Post-op Diagnosis:  Post-Op Diagnosis Codes:     * Malignant neoplasm of overlapping sites of right female breast [C50.811]     * Bilateral malignant neoplasm of breast in female, estrogen receptor positive [C50.911, Z17.0, C50.912]    Procedure(s) (LRB):  MASTECTOMY, BILATERAL (Bilateral)  BIOPSY, LYMPH NODE, SENTINEL (Right)  INJECTION, FOR SENTINEL NODE IDENTIFICATION (Right)  RECONSTRUCTION, BREAST, USING ROSITA FREE FLAP / BILATERAL DEEP INFERIOR EPIGASTRIC PERFORATORS FLAPS (Bilateral)    Anesthesia: General    Operative Findings: bilateral mastectomy, right sentinel lymph node biopsy    Estimated Blood Loss: minimal           Specimens:   Specimen (24h ago, onward)       Start     Ordered    05/24/23 1119  Specimen to Pathology, Surgery Breast  Once        Comments: Pre-op Diagnosis: IN FEMALE ESTROGENProcedure(s):MASTECTOMY, BILATERALBIOPSY, LYMPH NODE, SENTINELINJECTION, FOR SENTINEL NODE IDENTIFICATIONLYMPHADENECTOMY / AXILLARYRECONSTRUCTION, BREAST, USING ROSITA FREE FLAP / BILATERAL DEEP INFERIOR EPIGASTRIC PERFORATORS FLAPS Number of specimens: 4Name of specimens: 1) RIGHT BREAST (SHORT-SUPERIOR, LONG-LATERAL, LOOP-SUBAREOLAR);2) RIGHT AXILLARY SENTINEL LYMPH NODE, HOT 2960;3) RIGHT BREAST NIPPLE MARGIN;4) LEFT BREAST (SHORT-SUPERIOR, LONG-LATERAL, LOOP-SUBAREOLAR)     References:    Click here for ordering Quick Tip   Question Answer Comment   Procedure Type: Breast    Specimen Class: Known or  suspected malignancy    Which provider would you like to cc? GOLDIE BRODY.    Release to patient Immediate        05/24/23 1120                    Cherri Tse MD  General Surgery, PGY-1

## 2023-05-24 NOTE — ANESTHESIA PROCEDURE NOTES
Intubation    Date/Time: 5/24/2023 8:12 AM  Performed by: Isela Chand  Authorized by: Froylan Cedeno MD     Intubation:     Induction:  Intravenous    Intubated:  Postinduction    Mask Ventilation:  Easy mask    Attempts:  1    Attempted By:  CRNA    Method of Intubation:  Video laryngoscopy    Blade:  Keys 3    Laryngeal View Grade: Grade I - full view of cords      Difficult Airway Encountered?: No      Complications:  None    Airway Device:  Oral endotracheal tube    Airway Device Size:  7.0    Style/Cuff Inflation:  Cuffed (inflated to minimal occlusive pressure)    Tube secured:  20    Secured at:  The lips    Placement Verified By:  Capnometry    Complicating Factors:  None    Findings Post-Intubation:  BS equal bilateral and atraumatic/condition of teeth unchanged    
Patient

## 2023-05-24 NOTE — TRANSFER OF CARE
Anesthesia Transfer of Care Note    Patient: Dov Hernandez    Procedure(s) Performed: Procedure(s) (LRB):  MASTECTOMY, BILATERAL (Bilateral)  BIOPSY, LYMPH NODE, SENTINEL (Right)  INJECTION, FOR SENTINEL NODE IDENTIFICATION (Right)  RECONSTRUCTION, BREAST, USING ROSITA FREE FLAP / BILATERAL DEEP INFERIOR EPIGASTRIC PERFORATORS FLAPS (Bilateral)    Patient location: PACU    Anesthesia Type: general    Transport from OR: Transported from OR on 2-3 L/min O2 by NC with adequate spontaneous ventilation    Post pain: adequate analgesia    Post assessment: no apparent anesthetic complications    Post vital signs: stable    Level of consciousness: responds to stimulation and sedated    Nausea/Vomiting: no nausea/vomiting    Transfer of care protocol was followed      Last vitals:   Visit Vitals  BP (!) 140/84 (BP Location: Right arm, Patient Position: Lying)   Pulse 77   Temp 37.1 °C (98.7 °F) (Oral)   Resp 18   LMP 05/01/2023   SpO2 97%   Breastfeeding No

## 2023-05-24 NOTE — PLAN OF CARE
Pt transferred from PACU via bed w/ RN and transport. AAOX4. VSS on 2L O2 NC. D5NaCl infusing @100 to 20g L wrist. B breast flap WNL. Doppler signals strong. Dressings to b breast and abdomen CDI. Benjamin X4 in place draining sanguinous fluid.  Abdominal binder and surgical bra on. Epps to gravity; draining clear yellow urine.Pt tolerating clear liquid diet. Pt has call light in reach, side rails up X2, bed in low position and, TEDs/SCDs nonskid socks on. Pt lying in bed in no distress w/ family at the bedside. Will continue to monitor.

## 2023-05-24 NOTE — OP NOTE
Operative Note     5/24/2023    PRE-OP DIAGNOSIS: IN FEMALE ESTROGEN      POST-OP DIAGNOSIS: Post-Op Diagnosis Codes:     * Malignant neoplasm of overlapping sites of right female breast [C50.811]     * Bilateral malignant neoplasm of breast in female, estrogen receptor positive [C50.911, Z17.0, C50.912]    Procedure(s):  MASTECTOMY, BILATERAL Total Nipple Sparing  BIOPSY, LYMPH NODE, SENTINEL RIGHT  INJECTION, FOR SENTINEL NODE IDENTIFICATION RIGHT  RECONSTRUCTION, BREAST, USING ROSITA FREE FLAP / BILATERAL DEEP INFERIOR EPIGASTRIC PERFORATORS FLAPS - to be dictated separately by Dr. Roche    SURGEON: Surgeon(s) and Role:  Panel 1:     * Jenna Pickard MD - Primary  Panel 2:     * Mikey Roche MD - Primary     * Larry Howell MD    ANESTHESIA: General     OPERATIVE FINDINGS: Healthy appearing skin flaps and nipples at the completion of the mastectomy.    INDICATION FOR PROCEDURE: This patient presents with a history of invasive carcinoms of the right breast    PROCEDURE IN DETAIL:  Dov Hernandez is a 42 y.o. female brought to the operating room for definitive surgery of invasive carcinoma of the right breast.  The patient has elected to undergo bilateral nipple sparing mastectomy with sentinel lymph node biopsy for aquiles assessment. The patient was informed of the possible risks and complications of the procedure, including but not limited to anesthetic risks, bleeding, infection, and need for additional surgery.  The patient concurred with the proposed plan, and has given informed consent.  The site of surgery was properly noted/marked in the preoperative holding area.    The patient was then brought to the operating room and placed in the supine position with both upper extremities extended.  general anesthesia was administered. Perioperative antibiotics were administered consisting of Ancef and a time out was performed confirming the patient, site, and procedure.  The patient's right breast was injected  with technetium to facilitate sentinel lymph node identification. The bilateral chest and axilla was then prepped and draped in the usual sterile fashion.    We first turned our attention to the left prophylactic breast where an inrfamammary incision was made, sparing the nipple areolar complex.  The incision was made with a plasmablade and deepened through the subcutaneous tissues with plasmablade.  Skin flaps were raised to the clavicle superiorly, to the lateral border of the sternum medially, to the inframammary fold inferiorly, and to the anterior border of the latissimus dorsi muscle laterally. Lighted retractors were used to assist in the creation of the skin flaps. The tissue beneath the nipple areolar complex was sharply dissected being careful to dissect to the dermis.  We were careful to ensure that the ductal tissue beneath the nipple was removed.  The breast tissue was then excised off the chest wall using plasmablade and taking care to incorporate the pectoralis fascia while leaving the serratus fascia behind.  The resulting mastectomy specimen was marked using a short stitch superiorly and a long stitch laterally, looped stitch subareolar.  The breast was sent to pathology for permanent evaluation.   The operative field was irrigated with normal saline and all bleeding points were secured with plasmablade.   The incision will be closed by the plastic surgery service.        We then turned our attention back to the right breast where an inframammary incision was fashioned sparing the nipple areolar complex.  The incision was made with a plasmablade and extended through the subcutaneous tissues with plasmablade.  Skin flaps were raised to the clavicle superiorly.  We then proceeded to raise the remainder of the flaps to the lateral border of the sternum medially, to the inframammary fold inferiorly, and to the anterior border of the latissimus dorsi muscle laterally.  Lighted retractors were used to  assist in the creation of the skin flaps. The tissue beneath the nipple areolar complex was sharply dissected being careful to dissect to the dermis.  We were careful to ensure that the ductal tissue beneath the nipple was removed. The breast tissue was excised off the chest wall taking care to incorporate the pectoralis fascia while leaving the serratus fascia behind.  The resulting mastectomy specimen was marked using a short stitch superiorly and long stitch laterally, looped stitch subareolar.  The breast was sent to pathology for permanent evaluation.      We then turned our attention to the right axilla.   The gamma probe was used to identify an area of increased radioactivity within the lower axilla.   The clavipectoral sheath was sharply incised to reveal the level I axillary lymph nodes. The probe was used to identify a single node with increased radioactivity.  This node was brought into the operative field and carefully dissected free of the surrounding lymphovascular structures.  The highest ex vivo count of the node was 2960.  The node was then sent to pathology for frozen section evaluation, labeled as sentinel node #1.  A total of 1 axillary sentinel nodes and 0 axillary non-sentinel nodes were identified, excised and submitted to pathology.  Bed counts were obtained to confirm that the 10% rule had not been violated.   The wound was irrigated with normal saline, and all bleeding points were secured with cautery.       Therefore, the operative field was irrigated with normal saline and all bleeding points were secured with Bovie electrocautery.  The incision will be closed by the plastic surgery service.      At the end of my portion of the operation, all sponge, instrument, and needle counts x 2 were correct.    ESTIMATED BLOOD LOSS: less than 50 mL    COMPLICATIONS: none    DISPOSITION: intraop transfer to the plastic surgery service    ATTESTATION:   I was present and scrubbed for the entire  procedure.    Blanch Node Biopsy for Breast Cancer  Operation performed with curative intent Yes   Tracer(s) used to identify sentinel nodes in the upfront surgery (non-neoadjuvant) setting Radioactive tracer   Tracer(s) used to identify sentinel nodes in the neoadjuvant setting N/A   All nodes (colored or non-colored) present at the end of a dye-filled lymphatic channel were removed N/A   All significantly radioactive nodes were removed Yes   All palpably suspicious nodes were removed N/A   Biopsy-proven positive nodes marked with clips prior to chemotherapy were identified and removed N/A

## 2023-05-25 LAB
ANION GAP SERPL CALC-SCNC: 6 MMOL/L (ref 8–16)
BASOPHILS # BLD AUTO: 0.01 K/UL (ref 0–0.2)
BASOPHILS NFR BLD: 0.1 % (ref 0–1.9)
BUN SERPL-MCNC: 4 MG/DL (ref 6–20)
CALCIUM SERPL-MCNC: 8.6 MG/DL (ref 8.7–10.5)
CHLORIDE SERPL-SCNC: 105 MMOL/L (ref 95–110)
CO2 SERPL-SCNC: 27 MMOL/L (ref 23–29)
CREAT SERPL-MCNC: 0.7 MG/DL (ref 0.5–1.4)
DIFFERENTIAL METHOD: ABNORMAL
EOSINOPHIL # BLD AUTO: 0 K/UL (ref 0–0.5)
EOSINOPHIL NFR BLD: 0 % (ref 0–8)
ERYTHROCYTE [DISTWIDTH] IN BLOOD BY AUTOMATED COUNT: 11.7 % (ref 11.5–14.5)
EST. GFR  (NO RACE VARIABLE): >60 ML/MIN/1.73 M^2
GLUCOSE SERPL-MCNC: 104 MG/DL (ref 70–110)
HCT VFR BLD AUTO: 31.5 % (ref 37–48.5)
HGB BLD-MCNC: 10.9 G/DL (ref 12–16)
IMM GRANULOCYTES # BLD AUTO: 0.03 K/UL (ref 0–0.04)
IMM GRANULOCYTES NFR BLD AUTO: 0.3 % (ref 0–0.5)
LYMPHOCYTES # BLD AUTO: 2 K/UL (ref 1–4.8)
LYMPHOCYTES NFR BLD: 22.4 % (ref 18–48)
MCH RBC QN AUTO: 33.2 PG (ref 27–31)
MCHC RBC AUTO-ENTMCNC: 34.6 G/DL (ref 32–36)
MCV RBC AUTO: 96 FL (ref 82–98)
MONOCYTES # BLD AUTO: 1 K/UL (ref 0.3–1)
MONOCYTES NFR BLD: 11.4 % (ref 4–15)
NEUTROPHILS # BLD AUTO: 5.7 K/UL (ref 1.8–7.7)
NEUTROPHILS NFR BLD: 65.8 % (ref 38–73)
NRBC BLD-RTO: 0 /100 WBC
PLATELET # BLD AUTO: 177 K/UL (ref 150–450)
PMV BLD AUTO: 9.8 FL (ref 9.2–12.9)
POTASSIUM SERPL-SCNC: 3.7 MMOL/L (ref 3.5–5.1)
RBC # BLD AUTO: 3.28 M/UL (ref 4–5.4)
SODIUM SERPL-SCNC: 138 MMOL/L (ref 136–145)
WBC # BLD AUTO: 8.71 K/UL (ref 3.9–12.7)

## 2023-05-25 PROCEDURE — 63600175 PHARM REV CODE 636 W HCPCS: Performed by: STUDENT IN AN ORGANIZED HEALTH CARE EDUCATION/TRAINING PROGRAM

## 2023-05-25 PROCEDURE — 25000003 PHARM REV CODE 250: Performed by: SPECIALIST

## 2023-05-25 PROCEDURE — 80048 BASIC METABOLIC PNL TOTAL CA: CPT | Performed by: STUDENT IN AN ORGANIZED HEALTH CARE EDUCATION/TRAINING PROGRAM

## 2023-05-25 PROCEDURE — 85025 COMPLETE CBC W/AUTO DIFF WBC: CPT | Performed by: STUDENT IN AN ORGANIZED HEALTH CARE EDUCATION/TRAINING PROGRAM

## 2023-05-25 PROCEDURE — 11000001 HC ACUTE MED/SURG PRIVATE ROOM

## 2023-05-25 PROCEDURE — 94799 UNLISTED PULMONARY SVC/PX: CPT

## 2023-05-25 PROCEDURE — 36415 COLL VENOUS BLD VENIPUNCTURE: CPT | Performed by: STUDENT IN AN ORGANIZED HEALTH CARE EDUCATION/TRAINING PROGRAM

## 2023-05-25 PROCEDURE — 94761 N-INVAS EAR/PLS OXIMETRY MLT: CPT

## 2023-05-25 PROCEDURE — 25000003 PHARM REV CODE 250: Performed by: STUDENT IN AN ORGANIZED HEALTH CARE EDUCATION/TRAINING PROGRAM

## 2023-05-25 RX ORDER — OXYCODONE AND ACETAMINOPHEN 5; 325 MG/1; MG/1
1 TABLET ORAL EVERY 4 HOURS PRN
Status: DISCONTINUED | OUTPATIENT
Start: 2023-05-25 | End: 2023-05-25

## 2023-05-25 RX ORDER — KETOROLAC TROMETHAMINE 10 MG/1
10 TABLET, FILM COATED ORAL EVERY 6 HOURS SCHEDULED
Status: DISCONTINUED | OUTPATIENT
Start: 2023-05-25 | End: 2023-05-27 | Stop reason: HOSPADM

## 2023-05-25 RX ORDER — OXYCODONE HYDROCHLORIDE 5 MG/1
10 TABLET ORAL EVERY 6 HOURS PRN
Status: DISCONTINUED | OUTPATIENT
Start: 2023-05-25 | End: 2023-05-26

## 2023-05-25 RX ORDER — HYDROMORPHONE HYDROCHLORIDE 1 MG/ML
1 INJECTION, SOLUTION INTRAMUSCULAR; INTRAVENOUS; SUBCUTANEOUS EVERY 6 HOURS PRN
Status: DISCONTINUED | OUTPATIENT
Start: 2023-05-25 | End: 2023-05-27 | Stop reason: HOSPADM

## 2023-05-25 RX ORDER — ENOXAPARIN SODIUM 100 MG/ML
40 INJECTION SUBCUTANEOUS EVERY 24 HOURS
Status: DISCONTINUED | OUTPATIENT
Start: 2023-05-25 | End: 2023-05-27 | Stop reason: HOSPADM

## 2023-05-25 RX ORDER — OXYCODONE AND ACETAMINOPHEN 5; 325 MG/1; MG/1
1 TABLET ORAL EVERY 4 HOURS PRN
Status: DISCONTINUED | OUTPATIENT
Start: 2023-05-25 | End: 2023-05-27 | Stop reason: HOSPADM

## 2023-05-25 RX ADMIN — CEFAZOLIN 2 G: 2 INJECTION, POWDER, FOR SOLUTION INTRAMUSCULAR; INTRAVENOUS at 04:05

## 2023-05-25 RX ADMIN — HYDROMORPHONE HYDROCHLORIDE 0.5 MG: 2 INJECTION INTRAMUSCULAR; INTRAVENOUS; SUBCUTANEOUS at 12:05

## 2023-05-25 RX ADMIN — HYDROMORPHONE HYDROCHLORIDE 1 MG: 2 INJECTION INTRAMUSCULAR; INTRAVENOUS; SUBCUTANEOUS at 08:05

## 2023-05-25 RX ADMIN — HYDROMORPHONE HYDROCHLORIDE 1 MG: 2 INJECTION INTRAMUSCULAR; INTRAVENOUS; SUBCUTANEOUS at 04:05

## 2023-05-25 RX ADMIN — DEXTROSE AND SODIUM CHLORIDE: 5; 900 INJECTION, SOLUTION INTRAVENOUS at 04:05

## 2023-05-25 RX ADMIN — ACETAMINOPHEN 650 MG: 325 TABLET, FILM COATED ORAL at 08:05

## 2023-05-25 RX ADMIN — CEPHALEXIN 500 MG: 500 CAPSULE ORAL at 06:05

## 2023-05-25 RX ADMIN — DOCUSATE SODIUM 100 MG: 100 CAPSULE, LIQUID FILLED ORAL at 09:05

## 2023-05-25 RX ADMIN — KETOROLAC TROMETHAMINE 10 MG: 10 TABLET, FILM COATED ORAL at 04:05

## 2023-05-25 RX ADMIN — OXYCODONE HYDROCHLORIDE 10 MG: 5 TABLET ORAL at 09:05

## 2023-05-25 RX ADMIN — VENLAFAXINE HYDROCHLORIDE 37.5 MG: 37.5 CAPSULE, EXTENDED RELEASE ORAL at 08:05

## 2023-05-25 RX ADMIN — ENOXAPARIN SODIUM 40 MG: 40 INJECTION SUBCUTANEOUS at 05:05

## 2023-05-25 RX ADMIN — OXYCODONE HYDROCHLORIDE 10 MG: 5 TABLET ORAL at 05:05

## 2023-05-25 RX ADMIN — OXYCODONE HYDROCHLORIDE 10 MG: 5 TABLET ORAL at 01:05

## 2023-05-25 RX ADMIN — CEFAZOLIN 2 G: 2 INJECTION, POWDER, FOR SOLUTION INTRAMUSCULAR; INTRAVENOUS at 01:05

## 2023-05-25 RX ADMIN — MUPIROCIN: 20 OINTMENT TOPICAL at 09:05

## 2023-05-25 RX ADMIN — DOCUSATE SODIUM 100 MG: 100 CAPSULE, LIQUID FILLED ORAL at 08:05

## 2023-05-25 NOTE — ASSESSMENT & PLAN NOTE
42 year old female s/p bilateral mastectomy and ROSITA flap reconstruction.    - Doing well  - MM pain control  - Rest of care per plastics team

## 2023-05-25 NOTE — PROGRESS NOTES
[facility] Surg ([address])  General Surgery  Progress Note    Subjective:     History of Present Illness:  No notes on file    Post-Op Info:  Procedure(s) (LRB):  MASTECTOMY, BILATERAL (Bilateral)  BIOPSY, LYMPH NODE, SENTINEL (Right)  INJECTION, FOR SENTINEL NODE IDENTIFICATION (Right)  RECONSTRUCTION, BREAST, USING ROSITA FREE FLAP / BILATERAL DEEP INFERIOR EPIGASTRIC PERFORATORS FLAPS (Bilateral)   1 Day Post-Op     Interval History: No acute events overnight. HDS on RA. Complaining of some pain at the abdominal site, however it has been controlled with medications. Did need several doses of dilaudid yesterday. Drains are serosanguinous.    Medications:  Continuous Infusions:  Scheduled Meds:   cephALEXin  500 mg Oral Q8H    docusate sodium  100 mg Oral Q12H    enoxparin  40 mg Subcutaneous Q24H (prophylaxis, 1700)    mupirocin   Nasal BID    venlafaxine  37.5 mg Oral Daily     PRN Meds:acetaminophen, diazePAM, HYDROmorphone, ondansetron, oxyCODONE, oxyCODONE-acetaminophen     Review of patient's allergies indicates:   Allergen Reactions    Egg derived Diarrhea    Latex, natural rubber Rash     Objective:     Vital Signs (Most Recent):  Temp: 97.1 °F (36.2 °C) (05/25/23 1209)  Pulse: 101 (05/25/23 1209)  Resp: 16 (05/25/23 1304)  BP: 130/71 (05/25/23 1209)  SpO2: 96 % (05/25/23 1209) Vital Signs (24h Range):  Temp:  [97.1 °F (36.2 °C)-98.1 °F (36.7 °C)] 97.1 °F (36.2 °C)  Pulse:  [] 101  Resp:  [16-20] 16  SpO2:  [93 %-100 %] 96 %  BP: (130-170)/(71-84) 130/71     Weight: 68 kg (150 lb)  Body mass index is 28.36 kg/m².    Intake/Output - Last 3 Shifts         05/23 0700  05/24 0659 05/24 0700  05/25 0659 05/25 0700  05/26 0659    P.O.  240 240    I.V. (mL/kg)  3800 (55.9)     Total Intake(mL/kg)  4040 (59.4) 240 (3.5)    Urine (mL/kg/hr)  2075 (1.3) 700 (1.3)    Drains  270 90    Total Output  2345 790    Net  +1695 -550                    Physical Exam  Vitals and nursing note reviewed.    Constitutional:       General: She is not in acute distress.  HENT:      Head: Normocephalic and atraumatic.      Mouth/Throat:      Mouth: Mucous membranes are moist.   Eyes:      Extraocular Movements: Extraocular movements intact.      Pupils: Pupils are equal, round, and reactive to light.   Cardiovascular:      Rate and Rhythm: Normal rate and regular rhythm.   Pulmonary:      Effort: Pulmonary effort is normal. No respiratory distress.   Chest:      Comments: Incisions c/d/I  Drains serosangunious  Flaps appear healthy  Abdominal:      Comments: Incisions c/d/I  Drains serosanguinous   Skin:     General: Skin is warm and dry.   Neurological:      General: No focal deficit present.      Mental Status: She is alert and oriented to person, place, and time.        Significant Labs:  I have reviewed all pertinent lab results within the past 24 hours.    Significant Diagnostics:  I have reviewed all pertinent imaging results/findings within the past 24 hours.    Assessment/Plan:     Malignant neoplasm of overlapping sites of right breast in female, estrogen receptor positive  42 year old female s/p bilateral mastectomy and ROSITA flap reconstruction.    - Doing well  - MM pain control  - Rest of care per plastics team        Saira Villarreal MD  General Surgery  [facility] Surg ([address])

## 2023-05-25 NOTE — NURSING
Pt ambulated to BR with assist x2. Gait slow and steady. Voided without difficulty in BR. 1st void post bhardwaj removal. Pt sat at side of bed for a few minutes then got back in bed. Declined to sit up in chair.

## 2023-05-25 NOTE — SUBJECTIVE & OBJECTIVE
Interval History: No acute events overnight. HDS on RA. Complaining of some pain at the abdominal site, however it has been controlled with medications. Did need several doses of dilaudid yesterday. Drains are serosanguinous.    Medications:  Continuous Infusions:  Scheduled Meds:   cephALEXin  500 mg Oral Q8H    docusate sodium  100 mg Oral Q12H    enoxparin  40 mg Subcutaneous Q24H (prophylaxis, 1700)    mupirocin   Nasal BID    venlafaxine  37.5 mg Oral Daily     PRN Meds:acetaminophen, diazePAM, HYDROmorphone, ondansetron, oxyCODONE, oxyCODONE-acetaminophen     Review of patient's allergies indicates:   Allergen Reactions    Egg derived Diarrhea    Latex, natural rubber Rash     Objective:     Vital Signs (Most Recent):  Temp: 97.1 °F (36.2 °C) (05/25/23 1209)  Pulse: 101 (05/25/23 1209)  Resp: 16 (05/25/23 1304)  BP: 130/71 (05/25/23 1209)  SpO2: 96 % (05/25/23 1209) Vital Signs (24h Range):  Temp:  [97.1 °F (36.2 °C)-98.1 °F (36.7 °C)] 97.1 °F (36.2 °C)  Pulse:  [] 101  Resp:  [16-20] 16  SpO2:  [93 %-100 %] 96 %  BP: (130-170)/(71-84) 130/71     Weight: 68 kg (150 lb)  Body mass index is 28.36 kg/m².    Intake/Output - Last 3 Shifts         05/23 0700 05/24 0659 05/24 0700 05/25 0659 05/25 0700 05/26 0659    P.O.  240 240    I.V. (mL/kg)  3800 (55.9)     Total Intake(mL/kg)  4040 (59.4) 240 (3.5)    Urine (mL/kg/hr)  2075 (1.3) 700 (1.3)    Drains  270 90    Total Output  2345 790    Net  +1695 -550                    Physical Exam  Vitals and nursing note reviewed.   Constitutional:       General: She is not in acute distress.  HENT:      Head: Normocephalic and atraumatic.      Mouth/Throat:      Mouth: Mucous membranes are moist.   Eyes:      Extraocular Movements: Extraocular movements intact.      Pupils: Pupils are equal, round, and reactive to light.   Cardiovascular:      Rate and Rhythm: Normal rate and regular rhythm.   Pulmonary:      Effort: Pulmonary effort is normal. No respiratory  distress.   Chest:      Comments: Incisions c/d/I  Drains serosangunious  Flaps appear healthy  Abdominal:      Comments: Incisions c/d/I  Drains serosanguinous   Skin:     General: Skin is warm and dry.   Neurological:      General: No focal deficit present.      Mental Status: She is alert and oriented to person, place, and time.        Significant Labs:  I have reviewed all pertinent lab results within the past 24 hours.    Significant Diagnostics:  I have reviewed all pertinent imaging results/findings within the past 24 hours.

## 2023-05-25 NOTE — CARE UPDATE
05/25/23 0900   Patient Assessment/Suction   Level of Consciousness (AVPU) alert   Respiratory Effort Normal;Unlabored   Expansion/Accessory Muscles/Retractions no use of accessory muscles   All Lung Fields Breath Sounds clear;equal bilaterally   PRE-TX-O2   Device (Oxygen Therapy) room air   SpO2 99 %   Pulse Oximetry Type Intermittent   $ Pulse Oximetry - Multiple Charge Pulse Oximetry - Multiple   Incentive Spirometer   $ Incentive Spirometer Charges done with encouragement   Administration (IS) instruction provided, follow-up   Number of Repetitions (IS) 10   Level Incentive Spirometer (mL) 1000   Patient Tolerance (IS) good

## 2023-05-25 NOTE — PLAN OF CARE
Patient on RA with sats as documented.  Pt doing IS @  1250 L. Will cont to encourage deep breathing and coughing. No apparent respiratory distress noted and will cont to monitor

## 2023-05-26 PROCEDURE — 63600175 PHARM REV CODE 636 W HCPCS: Performed by: STUDENT IN AN ORGANIZED HEALTH CARE EDUCATION/TRAINING PROGRAM

## 2023-05-26 PROCEDURE — 94761 N-INVAS EAR/PLS OXIMETRY MLT: CPT

## 2023-05-26 PROCEDURE — 25000003 PHARM REV CODE 250: Performed by: SPECIALIST

## 2023-05-26 PROCEDURE — 25000003 PHARM REV CODE 250: Performed by: STUDENT IN AN ORGANIZED HEALTH CARE EDUCATION/TRAINING PROGRAM

## 2023-05-26 PROCEDURE — 11000001 HC ACUTE MED/SURG PRIVATE ROOM

## 2023-05-26 PROCEDURE — 99900035 HC TECH TIME PER 15 MIN (STAT)

## 2023-05-26 RX ORDER — OXYCODONE HYDROCHLORIDE 5 MG/1
10 TABLET ORAL EVERY 4 HOURS PRN
Status: DISCONTINUED | OUTPATIENT
Start: 2023-05-26 | End: 2023-05-27 | Stop reason: HOSPADM

## 2023-05-26 RX ORDER — KETOROLAC TROMETHAMINE 10 MG/1
10 TABLET, FILM COATED ORAL EVERY 6 HOURS
Qty: 20 TABLET | Refills: 0 | Status: SHIPPED | OUTPATIENT
Start: 2023-05-26 | End: 2023-05-31

## 2023-05-26 RX ADMIN — MUPIROCIN: 20 OINTMENT TOPICAL at 09:05

## 2023-05-26 RX ADMIN — KETOROLAC TROMETHAMINE 10 MG: 10 TABLET, FILM COATED ORAL at 12:05

## 2023-05-26 RX ADMIN — CEPHALEXIN 500 MG: 500 CAPSULE ORAL at 10:05

## 2023-05-26 RX ADMIN — KETOROLAC TROMETHAMINE 10 MG: 10 TABLET, FILM COATED ORAL at 05:05

## 2023-05-26 RX ADMIN — ENOXAPARIN SODIUM 40 MG: 40 INJECTION SUBCUTANEOUS at 05:05

## 2023-05-26 RX ADMIN — CEPHALEXIN 500 MG: 500 CAPSULE ORAL at 05:05

## 2023-05-26 RX ADMIN — CEPHALEXIN 500 MG: 500 CAPSULE ORAL at 02:05

## 2023-05-26 RX ADMIN — OXYCODONE HYDROCHLORIDE 10 MG: 5 TABLET ORAL at 02:05

## 2023-05-26 RX ADMIN — OXYCODONE HYDROCHLORIDE 10 MG: 5 TABLET ORAL at 09:05

## 2023-05-26 RX ADMIN — OXYCODONE HYDROCHLORIDE 10 MG: 5 TABLET ORAL at 03:05

## 2023-05-26 RX ADMIN — MUPIROCIN: 20 OINTMENT TOPICAL at 08:05

## 2023-05-26 RX ADMIN — KETOROLAC TROMETHAMINE 10 MG: 10 TABLET, FILM COATED ORAL at 11:05

## 2023-05-26 RX ADMIN — DOCUSATE SODIUM 100 MG: 100 CAPSULE, LIQUID FILLED ORAL at 09:05

## 2023-05-26 RX ADMIN — OXYCODONE HYDROCHLORIDE 10 MG: 5 TABLET ORAL at 07:05

## 2023-05-26 RX ADMIN — VENLAFAXINE HYDROCHLORIDE 37.5 MG: 37.5 CAPSULE, EXTENDED RELEASE ORAL at 09:05

## 2023-05-26 NOTE — DISCHARGE SUMMARY
[facility] ([address])  Discharge Note  Short Stay    Procedure(s) (LRB):  MASTECTOMY, BILATERAL (Bilateral)  BIOPSY, LYMPH NODE, SENTINEL (Right)  INJECTION, FOR SENTINEL NODE IDENTIFICATION (Right)  RECONSTRUCTION, BREAST, USING ROSITA FREE FLAP / BILATERAL DEEP INFERIOR EPIGASTRIC PERFORATORS FLAPS (Bilateral)      OUTCOME: Patient tolerated treatment/procedure well without complication and is now ready for discharge.    DISPOSITION: Home     FINAL DIAGNOSIS:  Malignant neoplasm of overlapping sites of right breast in female, estrogen receptor positive    FOLLOWUP: In clinic    DISCHARGE INSTRUCTIONS:    Discharge Procedure Orders   Diet Adult Regular     Notify your health care provider if you experience any of the following:  increased confusion or weakness     Notify your health care provider if you experience any of the following:  persistent dizziness, light-headedness, or visual disturbances     Notify your health care provider if you experience any of the following:  worsening rash     Notify your health care provider if you experience any of the following:  severe persistent headache     Notify your health care provider if you experience any of the following:  difficulty breathing or increased cough     Notify your health care provider if you experience any of the following:  redness, tenderness, or signs of infection (pain, swelling, redness, odor or green/yellow discharge around incision site)     Notify your health care provider if you experience any of the following:  severe uncontrolled pain     Notify your health care provider if you experience any of the following:  persistent nausea and vomiting or diarrhea     Notify your health care provider if you experience any of the following:  temperature >100.4     Leave dressing on - Keep it clean, dry, and intact until clinic visit   Order Comments: Daily shower     Weight bearing restrictions (specify):   Order Comments: Nothing heavier than  5lbs for 6 weeks        TIME SPENT ON DISCHARGE: 20 minutes

## 2023-05-26 NOTE — PLAN OF CARE
05/26/23 1504   Final Note   Assessment Type Final Discharge Note   Anticipated Discharge Disposition Home   Hospital Resources/Appts/Education Provided Appointments scheduled and added to AVS   Post-Acute Status   Discharge Delays None known at this time     Pt's  is at the bedside and will take her home. Pt is clear for discharge from a CM perspective.

## 2023-05-26 NOTE — PROGRESS NOTES
PRS Progress Note     Resting comfortably.   Afvss     Bilateral breast flaps healthy  Skin flaps healthy     Plan: pod 1  Q4h doppler checks  Epps out, void check  Advance diet  Dvt ppx

## 2023-05-26 NOTE — DISCHARGE INSTRUCTIONS
Take pain medications and antibiotics as prescribed   Record drain outputs daily and bring recordings to clinic  Take daily shower with dressings in place  Wear surgical bra and binder at all times aside from when showering   No heavy lifting for 6 weeks  Follow up Dr. Roche within 1 week post op

## 2023-05-26 NOTE — PLAN OF CARE
Pt AAOx4. VSS. Poc reviewed with pt and questions answered. Repositioning with minimal assist. Good appetite and tolerating diet well. Voiding adequately up to bathroom x1 assist, urine clear and yellow. B/L breast soft, warm, and pink. Flap and doppler checks maintained Q4. Good blood flow heard with doppler checks. Drain care performed and teaching provided. Encouraged use of IS throughout shift. Pain managed with prn and scheduled p.o. meds Pt remains free from falls, injury, and skin breakdown. All needs and concerns met, purposeful rounding done, and safety maintained.  at bedside. Call light in reach. Will continue to monitor.

## 2023-05-26 NOTE — PROGRESS NOTES
PRS Progress Note    Resting comfortably.   Afvss    Bilateral breast flaps healthy  Skin flaps healthy    Plan: pod 2  D/c dopplers  Shower  Reassess this afternoon for discharge  Dvt ppx

## 2023-05-26 NOTE — PLAN OF CARE
05/26/23 1456   Discharge Assessment   Assessment Type Discharge Planning Assessment   Confirmed/corrected address, phone number and insurance Yes   Confirmed Demographics Correct on Facesheet   Source of Information patient   People in Home spouse   Do you expect to return to your current living situation? Yes   Do you have help at home or someone to help you manage your care at home? Yes   Prior to hospitilization cognitive status: Alert/Oriented   Current cognitive status: Alert/Oriented   Walking or Climbing Stairs   (None)   Dressing/Bathing   (None)   Equipment Currently Used at Home none   Readmission within 30 days? No   Patient currently being followed by outpatient case management? No   Do you currently have service(s) that help you manage your care at home? No   Do you take prescription medications? No   Do you have prescription coverage? Yes   Who is going to help you get home at discharge?    How do you get to doctors appointments? car, drives self;family or friend will provide   Are you on dialysis? No   Do you take coumadin? No   Discharge Plan A Home with family   Discharge Plan B Home Health   DME Needed Upon Discharge  none   Discharge Plan discussed with: Patient   Financial Resource Strain   How hard is it for you to pay for the very basics like food, housing, medical care, and heating? Not hard   Housing Stability   In the last 12 months, was there a time when you were not able to pay the mortgage or rent on time? N   In the last 12 months, how many places have you lived? 1   In the last 12 months, was there a time when you did not have a steady place to sleep or slept in a shelter (including now)? N   Transportation Needs   In the past 12 months, has lack of transportation kept you from medical appointments or from getting medications? no   In the past 12 months, has lack of transportation kept you from meetings, work, or from getting things needed for daily living? No   Food  Insecurity   Within the past 12 months, you worried that your food would run out before you got the money to buy more. Never true   Within the past 12 months, the food you bought just didn't last and you didn't have money to get more. Never true   Stress   Do you feel stress - tense, restless, nervous, or anxious, or unable to sleep at night because your mind is troubled all the time - these days? Not at all   Social Connections   In a typical week, how many times do you talk on the phone with family, friends, or neighbors? More than 3   How often do you get together with friends or relatives? More than 3   How often do you attend Faith or Buddhist services? More than 4   Do you belong to any clubs or organizations such as Faith groups, unions, fraternal or athletic groups, or school groups? Yes   How often do you attend meetings of the clubs or organizations you belong to? More than 4   Are you , , , , never , or living with a partner?    Alcohol Use   Q1: How often do you have a drink containing alcohol? Monthly or l   Q2: How many drinks containing alcohol do you have on a typical day when you are drinking? 1 or 2   Q3: How often do you have six or more drinks on one occasion? Never     Patient's  will pick her up at discharge. Pt's PCP is correct on the facesheet.

## 2023-05-27 VITALS
BODY MASS INDEX: 28.32 KG/M2 | HEIGHT: 61 IN | OXYGEN SATURATION: 96 % | DIASTOLIC BLOOD PRESSURE: 74 MMHG | TEMPERATURE: 98 F | WEIGHT: 150 LBS | SYSTOLIC BLOOD PRESSURE: 121 MMHG | RESPIRATION RATE: 18 BRPM | HEART RATE: 81 BPM

## 2023-05-27 PROCEDURE — 25000003 PHARM REV CODE 250: Performed by: SPECIALIST

## 2023-05-27 PROCEDURE — 94761 N-INVAS EAR/PLS OXIMETRY MLT: CPT

## 2023-05-27 PROCEDURE — 25000003 PHARM REV CODE 250: Performed by: STUDENT IN AN ORGANIZED HEALTH CARE EDUCATION/TRAINING PROGRAM

## 2023-05-27 PROCEDURE — 99900035 HC TECH TIME PER 15 MIN (STAT)

## 2023-05-27 RX ADMIN — CEPHALEXIN 500 MG: 500 CAPSULE ORAL at 06:05

## 2023-05-27 RX ADMIN — KETOROLAC TROMETHAMINE 10 MG: 10 TABLET, FILM COATED ORAL at 06:05

## 2023-05-27 RX ADMIN — MUPIROCIN: 20 OINTMENT TOPICAL at 08:05

## 2023-05-27 RX ADMIN — KETOROLAC TROMETHAMINE 10 MG: 10 TABLET, FILM COATED ORAL at 11:05

## 2023-05-27 RX ADMIN — OXYCODONE HYDROCHLORIDE 10 MG: 5 TABLET ORAL at 03:05

## 2023-05-27 RX ADMIN — VENLAFAXINE HYDROCHLORIDE 37.5 MG: 37.5 CAPSULE, EXTENDED RELEASE ORAL at 08:05

## 2023-05-27 RX ADMIN — OXYCODONE HYDROCHLORIDE 10 MG: 5 TABLET ORAL at 11:05

## 2023-05-27 RX ADMIN — OXYCODONE HYDROCHLORIDE 10 MG: 5 TABLET ORAL at 08:05

## 2023-05-27 NOTE — PLAN OF CARE
Free from falls, injury, or skin breakdown this hospital admission.    Pt eager & in agreement w/ DC. VU of DC instructions and the need to attend follow-up appointment--paperwork passed & explained. Toradol filled and delivered to bedside. Simethicone script given to pt. IV removed w/ cath tip intact, WNL. Voiding, ambulating, & tolerating PO well.Dressing to B breasts and abdomen CDI. JPx4 in place draining serous fluid. To be DCd home w/ family--will be escorted downstairs via  transport team once dressed, ready & ride arrives. Pt discharged in no distress w/ family. Pt given extra abd pads, measuring cups, jayna drain log (instructed pt to bring to f/u appointment).

## 2023-05-27 NOTE — PROGRESS NOTES
PRS Progress Note     Resting comfortably.   Afvss     Bilateral breast flaps healthy  Skin flaps healthy     Plan: pod 3  Pain control  Simethicone   Dvt ppx

## 2023-05-29 ENCOUNTER — PATIENT OUTREACH (OUTPATIENT)
Dept: ADMINISTRATIVE | Facility: CLINIC | Age: 43
End: 2023-05-29
Payer: COMMERCIAL

## 2023-05-29 NOTE — PROGRESS NOTES
C3 nurse attempted to contact Dov Hernandez for a TCC post hospital discharge follow up call. No answer. Left voicemail with callback information. The patient does not have a scheduled HOSFU appointment. Message sent to PCP staff for assistance with scheduling visit with patient.

## 2023-05-30 ENCOUNTER — TELEPHONE (OUTPATIENT)
Dept: PRIMARY CARE CLINIC | Facility: CLINIC | Age: 43
End: 2023-05-30
Payer: COMMERCIAL

## 2023-05-30 NOTE — PROGRESS NOTES
Attempted to return a call from the pt. That was left in the TCM box.  2nd attempt to make TCC Call. No answer.  Left voicemail. Please call [phone] leave your first and last name and date of birth for Meghana. I will return your call.

## 2023-05-30 NOTE — PROGRESS NOTES
C3 nurse spoke with Dov Hernandez for a TCC post hospital discharge follow up call. The patient does not have a scheduled HOSFU appointment with Michaela Loyola MD within 5-7 days post hospital discharge date 05/27/23. C3 nurse was unable to schedule HOSFU appointment in [facility].    Message sent to PCP staff requesting they contact patient and schedule follow up appointment.

## 2023-05-30 NOTE — TELEPHONE ENCOUNTER
----- Message from Adriana Lott sent at 5/30/2023 12:31 PM CDT -----  Contact: Self/[phone]  Patient needs a Hosp follow up appt with their PCP only.       When is the next available appointment:      Symptoms:  Double Masectomy/Resection( Due to Breast Cancer)     Discharge date:5/27     Needs to be seen by:6/2    Would you prefer an answer via [employer]?: No      Comments:  pt stated that she would like to do a virtual hospital follow up

## 2023-05-31 ENCOUNTER — PATIENT MESSAGE (OUTPATIENT)
Dept: SURGERY | Facility: CLINIC | Age: 43
End: 2023-05-31
Payer: COMMERCIAL

## 2023-06-01 NOTE — PHYSICIAN QUERY
PT Name: Dov Hernandez  MR #: [id number]    DOCUMENTATION CLARIFICATION     CDS/: Prabha Mckeon               Contact information: [email or web address]  This form is a permanent document in the medical record.     Query Date: June 1, 2023    By submitting this query, we are merely seeking further clarification of documentation.  Please utilize your independent clinical judgment when addressing the question(s) below.    The medical record contains the following:  Pathology Findings Location in Medical Record   Final Pathologic Diagnosis:  1. Right breast, nipple sparing mastectomy:   Invasive ductal carcinoma, measuring 22 mm (2.2 cm) in greatest dimension.   Ductal carcinoma in situ (DCIS), intermediate nuclear grade, is also present.   Margins:   Closest approach of invasive tumor to margins: Invasive tumor is located 3 mm (0.3 cm) from the anterior inferior/green inked surgical margin. All other margins are greater than 10 mm (greater than 1 cm) from invasive tumor.   Closest approach of DCIS ot margins: All margins are greater than 10 mm (greater than 1 cm) from DCIS.   Microcalcifications are present and associated with DCIS and non-neoplastic breast tissue.   Lymphovascular invasion is present.   An X-shaped biopsy clip and biopsy site changes are present.     2. Right axillary sentinel lymph node, hot 2960, excisional biopsy:   One (1) lymph node positive for metastatic carcinoma (1/1).   Confirmed by positive immunohistochemical staining in the areas of metastatic tumor with cytokeratins AE1/AE3, cam 5.2 and wide spectrum keratin with satisfactory positive and negative controls.   Largest metastatic focus measures 6 mm (0.6 cm) in greatest dimension.   Extranodal extension is not identified.    3. Right breast, nipple margin, biopsy:   Benign breast tissue.   Negative for atypia or malignancy.     4. Left breast, nipple sparing mastectomy:   Benign breast tissue with fibrocystic changes  including microcysts, apocrine metaplastic cysts and stromal fibrosis.   No evidence of atypia or malignancy.    Lymphatic and/or vascular invasion: Present    Regional lymph nodes:   Regional lymph node status: Regional lymph nodes are present and tumor is present in the regional lymph node.   Number lymph nodes with macrometastasis: 1   Number of lymph nodes with micrometastasis: 0   Number lymph nodes with isolated tumor cells: 0   The largest aquiles metastatic deposit: 6 mm (0.6 cm)   Extranodal extension: Not identified   Total number of lymph nodes examined (sentinel and non-sentinel): 1   Will number of sentinel nodes examined: 1      Pathologic stage classification: TNM descriptors: Not applicable   Primary tumor:   pT2: Tumor greater than 20 mm but less than or equal to 50 mm in greatest dimension.   Regional lymph nodes:   (sn)pN1a:   Metastasis in 1 to 3 axillary lymph nodes, at least 1 metastasis larger than 2 mm.   Distant metastasis: pMX: Cannot be assessed   Pathology Report Collection Date: 05/24, Reported Date 05/31       Please clarify the pathology findings regarding lymph node metastasis.    [ x ] Pathology findings noted above are ruled in/confirmed as diagnoses   [  ] Pathology findings noted above are not confirmed as diagnoses   [  ] Other diagnosis (please specify): ___________   [  ] Clinically Undetermined     Please document in your progress notes daily for the duration of treatment until resolved and include in your discharge summary.    Form No. [id number]

## 2023-06-01 NOTE — OP NOTE
[facility] ([address])  Plastic Surgery  Operative Note    SUMMARY     Date of Procedure: 5/24/2023     Surgeon: Mikey Roche MD     Co Surgeon: Larry Howell MD     Pre-operative Dx:  1. Right breast cancer  2. Acquired absence of bilateral breasts  3. Hypoesthesia of bilateral breasts     Post-operative Dx:  1. Right breast cancer  2. Acquired absence of bilateral breasts  3. Hypoesthesia of bilateral breasts     PROCEDURE:  1. Right breast immediate reconstruction with deep inferior epigastric  flap using a 2.5 mm venous    2. Left breast immediate reconstruction with deep inferior epigastric  flap using a 2.5 mm venous    3. Bilateral nerve grafting to nipple areolar complex from lateral 4th intercostal nerve with 70mm x 1mm allograft for resensation  4. Bilateral partial 3rd rib resection  5. Reinforcement of abdominal fascia with Phasix mesh [id number]  6. Intraoperative tissue angiography using the SPY Elite System.  7. Bilateral intercostal nerve blocks with Exparel.   8. Bilateral TAP blocks with Exparel     ANESTHESIA:  General.     COMPLICATIONS:  None.     ESTIMATED BLOOD LOSS:  150 cc.     INDICATION FOR PROCEDURE:  The patient is a 42-year-old female diagnosed with right breast cancer. She presents for bilateral nipple-sparing mastectomies and autologous reconstruction with ROSITA flaps   with neurotization. Preoperative imaging demonstrated adequate blood supply of the deep inferior epigastric  vessels and plan was made for immediate bilateral ROSITA flap reconstruction.  In addition, she will undergo allograft nerve grafting to bilateral ROSITA flaps to try to recreate resensation. Risks, benefits, and alternatives were discussed.  All questions were answered, and she wishes to proceed.     PROCEDURE IN DETAIL:  The patient was marked in the preoperative holding area.  On both breasts, a nipple sparing incision was designed in conjunction with Dr. Pickard.  The operative sites were marked including the sternal midline, IMF, anterior axillary line and abdominal flap outline.  The ROSITA perforators were marked based on the preoperative mapping from the abdominal imaging studies.  She was then taken to the operating room and placed on table in supine position.  General anesthesia was administered.  Preoperative antibiotics were given.  The chest and abdomen were prepped and draped in normal sterile fashion.  The surgery was performed in a 2-team approach.  Dr. Pickard  performed  bilateral  nipple-sparing mastectomies  and  sentinel node biopsies.  Please see her operative note for details.  While doing this, I began raising the abdominal ROSITA flaps.       The ROSITA flap harvest proceeded with the incision through the lower abdominal skin and subcutaneous tissue.  The SIEV vessels were dissected out bilaterally for several centimeters in case they were needed for additional venous drainage.  These were clipped with hemoclips.  Dissection was then carried down to the level of fascia.  Incision was then made along the upper portion of the flap bilaterally with slight beveling to capture additional subcutaneous fat.  The superior abdominal wall flap was elevated to the level of the xiphoid superiorly and to the rib margins laterally with careful attention to preserve lateral perforators.  The right abdominal ROSITA flap harvest was then proceeded.  The lateral perforators were identified.  The umbilicus was released to the midline and incised.  Medial row perforators were also identified.  The central  that was dominant was chosen for the flap harvest along with one other  in the same row.  This was consistent with a dominant  identified on preoperative imaging.  A fascial incision was made following the pedicle inferiorly.  The  dissection was carried out, maintaining the  of choice and the second one in line.  Dissection was  carried from the  to the deep inferior epigastric artery and vein.  This dissection then proceeded along these vessels all the way to the origin of the deep inferior epigastric artery and vein.  Once elevated, the flap viability was confirmed by doppler signal with capillary refill and healthy bleeding from the skin edges.  I then proceeded with harvest of the left abdominal ROSITA flap.  This was done in a similar fashion.  The lateral row followed by medial row perforators were identified.  The central dominant  was included and chosen for flap harvest along with one other.  A fascial incision was made along these perforators and along the course of the pedicle inferiorly.   dissection was carried out maintaining the perforators chosen.  Dissection was again carried from the  to the deep inferior epigastric artery and vein and then proceeded towards the origin of the deep inferior epigastric artery and vein vessels.  Again, the health of the flap was confirmed.       Once Dr. Pickard finished her portion of case, the microscope was brought in the room and set up.  Next,  the internal mammaries were dissected out in the chest.  The right internal mammary vessels were isolated followed by the left.  First and third rib was identified.  Electrocautery was used to dissect through the pectoralis muscle to create a superior muscle flap while maintaining the integrity of the muscle.  The 3rd rib was exposed, and the perichondrium incised.  The perichondrium was then elevated off the cartilages portion with the [place] elevator.  Bovie was used to dissect the intercostal muscle, free from the second and 4th rib superiorly and inferiorly and along the length of the cartilaginous portion of the rib, that portion of the rib was then removed leaving the posterior perichondrium intact.  The perichondrium was then split, and the internal mammary vessels identified.  Bipolar electrocautery was  then used to dissect the remaining intercostal muscle fibers away.  Perforating branches of the RICHARD and IMV were carefully ligated. After this was done; attention was turned to the 4th intercostal space just lateral to the pectoralis muscle.      Under Loupe magnification, the 4th intercostal muscle was divided along the muscle fibers until the intercostal nerve was identified. Using minimal touch technique without causing trauma, the nerve was dissected from circumferentially from the surrounding muscle fibers for several centimeters and marked with at marking pen.  Once the portion of vessels were freed, the remaining dissection was performed under the operating microscope.  The vessels were cleaned of surrounding fat and prepared for microsurgical anastomosis. An identical procedure was performed on the left chest internal mammary vessels. A target nerve underneath each NAC was identified and marked for later.      The microscope was then brought in the field, and the chest vessels were additionally prepared.  I started with the left chest, the right abdominal ROSITA flap was doubly ligated, and the vessels divided.  It weighed 470 g which was similar to the mastectomy weight of 530g.  The deep inferior epigastric vein was connected to the left internal mammary vein with a 2.5 mm venous  and the deep inferior epigastric artery was sutured to the left internal mammary artery with 9-0 nylon on  needles. Excellent perfusion was noted in the flap after the anastomosis.  After the anastomosis, an internal Doppler was placed.  This was secured temporarily and then attention was directed to the contralateral breast.  The left abdominal ROSITA flap was brought up to the right chest.  It weighed 442 g, similar to the mastectomy weight of 518 g.  Venous anastomosis was completed with a 3.0 mm venous  and arterial anastomosis was completed with 9-0 nylon on a  needle similar to the contralateral side.   Again, excellent perfusion was noted after the anastomosis, and an internal Doppler was placed in a similar fashion.       Next, the intraoperative tissue angiography was performed using the SPY Elite system.  This was done by injecting 4 cc of indocyanine green intravenously and assessing the breast skin flaps and ROSITA flaps for perfusion.  The skin flaps showed excellent perfusion as did the ROSITA flaps. Skin paddles were designed and excess skin de-epithelized. The flaps were then secured. They were sutured in place medially, superiorly, inferiorly to give shape to the flap with 2-0 Vicryl. Hemostasis was achieved. Next, a 19-Khmer Kush drain was placed and secured in each breast and sewn to the skin with 2-0 prolene.  Intercostal nerve blocks were performed bilaterally with a mixture of Marcaine and Exparel.       Next, attention was directed to connecting the nerve allografts. The lateral 4th intercostal nerve had been isolated and marked on each side just after the mastectomies were complete. This was dissected back to healthy pouting fascicles bilaterally. The 70mm nerve grafts were opened and prepared on the back table. Using the nerve conduits provided, each nerve graft was connected to the cutaneous nerve isolated underneath each NAC using 9.0 nylon on a spatulated needle. A 1mm gap was left between each nerve and the allograft within the connector. The other side of the nerve allograft was connected to the lateral 4th intercostal nerve on each side using a similar connector with 9.0 nylon on a spatulated needle. Again, a 1mm gap was preserved between the lateral 4th intercostal nerve and the allograft within the connector bilaterally. After completing the nerve connections to recreate sensation, closure of each breast was performed.  The breasts were then closed in multiple layers using 2-0 Monocryl for deep layer and 3-0 Monocryl for the skin.      Attention was then directed to the abdominal closure.   The patient was flexed. Two 19-[place] Kush drains were placed and secured.  Bilateral TAP blocks were performed with Marcaine and Exparel.  Fascia was closed in multiple layers using 0 Prolene and 0 V-Loc suture, and there was a slight weakness which was reinforced with 10x15 cm phasix mesh. It was sutured in position with 2.0 PDS. The abdomen was then closed in multiple layers using 0 PDO Quill for Ankush's fascia followed by 2-0 Monocryl for deep dermal and 2-0 Quill suture for the skin.  The umbilicus was brought out in its new position, secured with 3-0 and 4-0 Prolene.  Sterile dressings and surgical bra were applied.  The patient was extubated and taken to PACU in satisfactory condition.  All counts were correct.             Implants:   Implant Name Type Inv. Item Serial No.  Lot No. LRB No. Used Action   [id number]  GRAFT AVANCE NERVE [id number] AXOGEN [id number] Left 1 Implanted   [id number]  GRAFT AVANCE NERVE [id number] AXOGEN [id number] Right 1 Implanted   CONNECTOR NERVE 2X15MM - [id number]  CONNECTOR NERVE 2X15MM  AXOGEN [id number] Left 1 Implanted   CONNECTOR NERVE 2X15MM - [id number]  CONNECTOR NERVE 2X15MM  AXOGEN [id number] Right 1 Implanted    MICROVAS ANSTMS 2.5MM - [id number]   MICROVAS ANSTMS 2.5MM  [employer] [id number] Left 1 Implanted    MICROVAS ANSTMS 2.5MM - [id number]   MICROVAS ANSTMS 2.5MM  [employer] [id number] Right 1 Implanted   MESH HERNIA PHASIX 6X8IN RND - [id number]  MESH HERNIA PHASIX 6X8IN RND  [employer] [id number] N/A 1 Implanted       Specimens:   Specimen (24h ago, onward)      None                    Condition: Good    Disposition: PACU - hemodynamically stable.    Attestation: I was present and scrubbed for the entire procedure.

## 2023-06-05 ENCOUNTER — PATIENT MESSAGE (OUTPATIENT)
Dept: PSYCHIATRY | Facility: CLINIC | Age: 43
End: 2023-06-05
Payer: COMMERCIAL

## 2023-06-06 ENCOUNTER — TUMOR BOARD CONFERENCE (OUTPATIENT)
Dept: SURGERY | Facility: CLINIC | Age: 43
End: 2023-06-06
Payer: COMMERCIAL

## 2023-06-06 ENCOUNTER — PATIENT MESSAGE (OUTPATIENT)
Dept: HEMATOLOGY/ONCOLOGY | Facility: CLINIC | Age: 43
End: 2023-06-06
Payer: COMMERCIAL

## 2023-06-06 ENCOUNTER — DOCUMENTATION ONLY (OUTPATIENT)
Dept: HEMATOLOGY/ONCOLOGY | Facility: CLINIC | Age: 43
End: 2023-06-06
Payer: COMMERCIAL

## 2023-06-06 NOTE — PROGRESS NOTES
Breast Tumor Board 06/06/2023  Presenting Provider:  Jenna Pickard MD  Cancer Genetics Provider:  Valdo Loja DNP     Gene Variant Review:    [id number](SDHA):c.1724C>T ([id number].[id number])  Interpretation:  Uncertain significance    Reference:  National Center for Biotechnology Information. ClinVar; [ANM964354825.15], https://www.ncbi.nlm.nih.gov/clinvar/variation/UAE856361788.15 (accessed June 6, 2023).

## 2023-06-06 NOTE — PROGRESS NOTES
Oncology History   Malignant neoplasm of overlapping sites of right breast in female, estrogen receptor positive   4/13/2023 Biopsy    Breast, right, biopsy:   Invasive ductal carcinoma   Tumor size: 2 mm in this material   Overall Grade: grade 1     4/13/2023 Breast Tumor Markers    Estrogen: Positive (weak intensity 30%)  Progesterone: Positive (weak intensity 30%)  HER2: Negative   Ki67: 5%     4/25/2023 Genetic Testing    Negative, VUS x 1(SDHA)     4/25/2023 Imaging Significant Findings    MRI revealed 2 cm mass correlating with biopsy proven malignancy      4/26/2023 Initial Diagnosis    Malignant neoplasm of overlapping sites of right breast in female, estrogen receptor positive     4/26/2023 Cancer Staged    Staging form: Breast, AJCC 8th Edition  - Clinical stage from 4/26/2023: Stage IB (cT2, cN0, cM0, G1, ER+, NE+, HER2: Equivocal)     5/2/2023 Tumor Conference       The team agreed to proceed with upfront surgery for further sampling of the tumor     5/24/2023 Breast Surgery    Bilateral mastectomy with ROSITA flap reconstruction and right SLNB     6/6/2023 Tumor Conference     There team discussed further axillary dissection versus proceeding with XRT. If ALND and no further positive nodes then no XRT would be recommended. If there are additional nodes, then she would be recommended for XRT.   Chemotherapy would be most likely recommended as well as adjuvant endocrine therapy   Shameka trial?

## 2023-06-07 ENCOUNTER — OFFICE VISIT (OUTPATIENT)
Dept: SURGERY | Facility: CLINIC | Age: 43
End: 2023-06-07
Payer: COMMERCIAL

## 2023-06-07 DIAGNOSIS — Z17.0 MALIGNANT NEOPLASM OF OVERLAPPING SITES OF RIGHT BREAST IN FEMALE, ESTROGEN RECEPTOR POSITIVE: ICD-10-CM

## 2023-06-07 DIAGNOSIS — C50.811 MALIGNANT NEOPLASM OF OVERLAPPING SITES OF RIGHT BREAST IN FEMALE, ESTROGEN RECEPTOR POSITIVE: ICD-10-CM

## 2023-06-07 DIAGNOSIS — Z09 POSTOP CHECK: Primary | ICD-10-CM

## 2023-06-07 PROCEDURE — 99024 POSTOP FOLLOW-UP VISIT: CPT | Mod: 95,,, | Performed by: NURSE PRACTITIONER

## 2023-06-07 PROCEDURE — 99024 PR POST-OP FOLLOW-UP VISIT: ICD-10-PCS | Mod: 95,,, | Performed by: NURSE PRACTITIONER

## 2023-06-07 NOTE — PROGRESS NOTES
Established Patient - Audio Only Telehealth Visit     The patient location is: [place]  The chief complaint leading to consultation is: pathology review/post-op discussion   Visit type: Virtual visit with audio only (telephone)  Total time spent with patient: 15 minutes       The reason for the audio only service rather than synchronous audio and video virtual visit was related to technical difficulties or patient preference/necessity.     Each patient to whom I provide medical services by telemedicine is:  (1) informed of the relationship between the physician and patient and the respective role of any other health care provider with respect to management of the patient; and (2) notified that they may decline to receive medical services by telemedicine and may withdraw from such care at any time. Patient verbally consented to receive this service via voice-only telephone call.       HPI:  This is a 42 y.o. female with a stage pT2 N1a grade 2 ER + [place] + HER2 - IDC of the right breast.    Assessment and plan: The patient is status post bilateral mastectomy and sentinel node biopsy with ROSITA flap reconstruction on 5/24/2023.  Final pathology showed 2.2 cm IDC with clear margins. 1/1 lymph node was positive for metastatic carcinoma (6 mm deposit). Left breast specimen was benign.     Patient is recovering well. She denies fever, chills, chest pain or shortness of breath.  Her pain is well controlled.      Discussed multi-disciplinary conference discussion and recommendations     This service was not originating from a related E/M service provided within the previous 7 days nor will  to an E/M service or procedure within the next 24 hours or my soonest available appointment.  Prevailing standard of care was able to be met in this audio-only visit.

## 2023-06-13 ENCOUNTER — TELEMEDICINE (OUTPATIENT)
Dept: PRIMARY CARE CLINIC | Facility: CLINIC | Age: 43
End: 2023-06-13
Payer: COMMERCIAL

## 2023-06-13 ENCOUNTER — PATIENT MESSAGE (OUTPATIENT)
Dept: SURGERY | Facility: CLINIC | Age: 43
End: 2023-06-13
Payer: COMMERCIAL

## 2023-06-13 ENCOUNTER — PATIENT MESSAGE (OUTPATIENT)
Dept: HEMATOLOGY/ONCOLOGY | Facility: CLINIC | Age: 43
End: 2023-06-13
Payer: COMMERCIAL

## 2023-06-13 DIAGNOSIS — C50.911 INVASIVE DUCTAL CARCINOMA OF BREAST, RIGHT: Primary | ICD-10-CM

## 2023-06-13 DIAGNOSIS — C50.811 MALIGNANT NEOPLASM OF OVERLAPPING SITES OF RIGHT BREAST IN FEMALE, ESTROGEN RECEPTOR POSITIVE: Primary | ICD-10-CM

## 2023-06-13 DIAGNOSIS — Z17.0 MALIGNANT NEOPLASM OF OVERLAPPING SITES OF RIGHT BREAST IN FEMALE, ESTROGEN RECEPTOR POSITIVE: Primary | ICD-10-CM

## 2023-06-13 NOTE — PROGRESS NOTES
Assessment:     1. Malignant neoplasm of overlapping sites of right breast in female, estrogen receptor positive      Plan:        Follow w oncologist  Moods stable, continue effexor prescribed by oncologisty  VIRTUAL  There are no Patient Instructions on file for this visit.    Patient ID: Dov Hernandez is a 42 y.o. female.    42 y.o. female with a stage pT2 N1a grade 2 ER + [place] + HER2 - IDC of the right breast. Bilateral mastectomy, 1 lymph node positive    She will get Chemotherapy    Surgeon appt Thurs, awaiting Radiation appt    She is worried, but knows she has more treatment ahead. Mom lives w her,  supportive. Oncologist did give a few Ativan prn extreme anxiety.     Current Outpatient Medications   Medication Instructions    LORazepam (ATIVAN) 0.5 mg, Oral, Every 12 hours PRN    venlafaxine (EFFEXOR XR) 37.5 mg, Oral, Daily       Lab Results   Component Value Date    HGBA1C 4.9 06/04/2013     No results found for: MICALBCREAT  Lab Results   Component Value Date    LDLCALC 105.6 12/13/2022    LDLCALC 100.6 12/13/2021    CHOL 210 (H) 12/13/2022    HDL 80 (H) 12/13/2022    TRIG 122 12/13/2022       Lab Results   Component Value Date     05/25/2023    K 3.7 05/25/2023     05/25/2023    CO2 27 05/25/2023     05/25/2023    BUN 4 (L) 05/25/2023    CREATININE 0.7 05/25/2023    CALCIUM 8.6 (L) 05/25/2023    PROT 7.1 04/25/2023    ALBUMIN 3.7 04/25/2023    BILITOT 0.5 04/25/2023    ALKPHOS 35 (L) 04/25/2023    AST 17 04/25/2023    ALT 19 04/25/2023    ANIONGAP 6 (L) 05/25/2023    ESTGFRAFRICA >60.0 12/13/2021    EGFRNONAA >60.0 12/13/2021    WBC 8.71 05/25/2023    HGB 10.9 (L) 05/25/2023    HGB 13.3 04/25/2023    HCT 31.5 (L) 05/25/2023    MCV 96 05/25/2023     05/25/2023    TSH 1.914 09/15/2017    HEPCAB Negative 11/05/2020       No results found for: LH, FSH, TOTALTESTOST, PROGESTERONE, ESTRADIOL, [id number], [id number], FERRITIN, IRON, TRANSFERRIN, TIBC, FESATURATED,  "ZINC      Past Medical History:   Diagnosis Date    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    OCP & aziza from derm, Doxy caused yeast    Heartburn 12/17/2021    Strawberry wai 1981    back of neck    Strawberry wai 1981    under nose     Past Surgical History:   Procedure Laterality Date    BILATERAL MASTECTOMY Bilateral 5/24/2023    Procedure: MASTECTOMY, BILATERAL;  Surgeon: Jenna Pickard MD;  Location: [facility];  Service: General;  Laterality: Bilateral;  2.5 HOURS / EMAIL SENT 5-9 @ 11:39 LK    COLPOSCOPY      INJECTION FOR SENTINEL NODE IDENTIFICATION Right 5/24/2023    Procedure: INJECTION, FOR SENTINEL NODE IDENTIFICATION;  Surgeon: Jenna Pickard MD;  Location: [facility];  Service: General;  Laterality: Right;    RECONSTRUCTION OF BREAST WITH DEEP INFERIOR EPIGASTRIC ARTERY  (ROSITA) FREE FLAP Bilateral 5/24/2023    Procedure: RECONSTRUCTION, BREAST, USING ROSITA FREE FLAP / BILATERAL DEEP INFERIOR EPIGASTRIC PERFORATORS FLAPS;  Surgeon: Mikey Roche MD;  Location: [facility];  Service: Plastics;  Laterality: Bilateral;    SENTINEL LYMPH NODE BIOPSY Right 5/24/2023    Procedure: BIOPSY, LYMPH NODE, SENTINEL;  Surgeon: Jenna Pickard MD;  Location: [facility];  Service: General;  Laterality: Right;    WISDOM TOOTH EXTRACTION       Social History     Social History Narrative    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    , 2 children (10 yo son, 6 yo daughter [place])    Mom Renee Tejeda    nonsmoker      GYN Yolanda     Family History   Problem Relation Age of Onset    Hyperlipidemia Mother     Kidney cancer Father 58    Cancer Brother 22        Parotid Gland cancer    Birth marks Child 0        strawberry wai(s)    Birth marks Child 0        strawberry wai(s)    Café-au-lait spots Maternal Uncle         "a small patch on his neck"    Café-au-lait spots Other         "a spot on his arm and partial torso"    Other Other 2        tested negative for macrocephaly    Cervical cancer Other     Throat " cancer Other     Pancreatic cancer Other         1 second cousin ()    Other Other         brain tumors (several 2nd cousins)    Breast cancer Neg Hx     Colon cancer Neg Hx     Ovarian cancer Neg Hx     Melanoma Neg Hx      There were no vitals filed for this visit.  Objective:   Physical Exam

## 2023-06-14 ENCOUNTER — OFFICE VISIT (OUTPATIENT)
Dept: HEMATOLOGY/ONCOLOGY | Facility: CLINIC | Age: 43
End: 2023-06-14
Payer: COMMERCIAL

## 2023-06-14 ENCOUNTER — OFFICE VISIT (OUTPATIENT)
Dept: RADIATION ONCOLOGY | Facility: CLINIC | Age: 43
End: 2023-06-14
Payer: COMMERCIAL

## 2023-06-14 DIAGNOSIS — C50.811 MALIGNANT NEOPLASM OF OVERLAPPING SITES OF RIGHT BREAST IN FEMALE, ESTROGEN RECEPTOR POSITIVE: Primary | ICD-10-CM

## 2023-06-14 DIAGNOSIS — Z17.0 MALIGNANT NEOPLASM OF OVERLAPPING SITES OF RIGHT BREAST IN FEMALE, ESTROGEN RECEPTOR POSITIVE: Primary | ICD-10-CM

## 2023-06-14 DIAGNOSIS — F41.9 ANXIETY: ICD-10-CM

## 2023-06-14 DIAGNOSIS — C77.9 SECONDARY ADENOCARCINOMA OF LYMPH NODE: ICD-10-CM

## 2023-06-14 PROCEDURE — 99215 OFFICE O/P EST HI 40 MIN: CPT | Mod: 95,,, | Performed by: INTERNAL MEDICINE

## 2023-06-14 PROCEDURE — 99204 OFFICE O/P NEW MOD 45 MIN: CPT | Mod: 95,,, | Performed by: RADIOLOGY

## 2023-06-14 PROCEDURE — 99215 PR OFFICE/OUTPT VISIT, EST, LEVL V, 40-54 MIN: ICD-10-PCS | Mod: 95,,, | Performed by: INTERNAL MEDICINE

## 2023-06-14 PROCEDURE — 1111F PR DISCHARGE MEDS RECONCILED W/ CURRENT OUTPATIENT MED LIST: ICD-10-PCS | Mod: CPTII,95,, | Performed by: RADIOLOGY

## 2023-06-14 PROCEDURE — 1111F DSCHRG MED/CURRENT MED MERGE: CPT | Mod: CPTII,95,, | Performed by: INTERNAL MEDICINE

## 2023-06-14 PROCEDURE — 1160F PR REVIEW ALL MEDS BY PRESCRIBER/CLIN PHARMACIST DOCUMENTED: ICD-10-PCS | Mod: CPTII,95,, | Performed by: RADIOLOGY

## 2023-06-14 PROCEDURE — 1160F RVW MEDS BY RX/DR IN RCRD: CPT | Mod: CPTII,95,, | Performed by: RADIOLOGY

## 2023-06-14 PROCEDURE — 1159F PR MEDICATION LIST DOCUMENTED IN MEDICAL RECORD: ICD-10-PCS | Mod: CPTII,95,, | Performed by: RADIOLOGY

## 2023-06-14 PROCEDURE — 1111F PR DISCHARGE MEDS RECONCILED W/ CURRENT OUTPATIENT MED LIST: ICD-10-PCS | Mod: CPTII,95,, | Performed by: INTERNAL MEDICINE

## 2023-06-14 PROCEDURE — 99204 PR OFFICE/OUTPT VISIT, NEW, LEVL IV, 45-59 MIN: ICD-10-PCS | Mod: 95,,, | Performed by: RADIOLOGY

## 2023-06-14 PROCEDURE — 1159F MED LIST DOCD IN RCRD: CPT | Mod: CPTII,95,, | Performed by: RADIOLOGY

## 2023-06-14 PROCEDURE — 1111F DSCHRG MED/CURRENT MED MERGE: CPT | Mod: CPTII,95,, | Performed by: RADIOLOGY

## 2023-06-14 NOTE — PROGRESS NOTES
FOLLOW UP TELEMEDICINE VISIT    Subjective:      Patient ID: Dvo Hernandez is a 42 y.o. female.  MRN: [id number]  : 1980    An audio and visual care visit was performed with the patient because of the COVID-19 pandemic recommendations for social distancing.    TELEMEDICINE  The patient location is: home  The chief complaint leading to consultation is: Breast cancer, anxiety     Visit type: Virtual visit with synchronous audio and video    Total time spent with patient: 11 minutes  40 minutes of total time spent on the encounter, which includes 20 min  face to face time and 20 min non-face to face time preparing to see the patient (eg, review of tests), obtaining and/or reviewing separately obtained history, documenting clinical information in the electronic or other health record, independently interpreting results (not separately reported) and communicating results to the patient/family/caregiver, or care coordination (not separately reported).    Each patient to whom he or she provides medical services by telemedicine is:  (1) informed of the relationship between the physician and patient and the respective role of any other health care provider with respect to management of the patient; and (2) notified that he or she may decline to receive medical services by telemedicine and may withdraw from such care at any time.    History of Present Illness:   HPI   Dov Hernandez is a 42 y.o. female who is referred for right breast IDC.        She had a normal screening mammogram in . This year, she underwent a screening mammogram in March that showed right breast calcifications, measured to be 39 mm on diagnostic mammogram.   She underwent a right breast biopsy that showed IDC, 2 mm, ER 30%, CO 30%, Her 2 karrie negative, Grade 1, low Ki 67 at 5% with associated DCIS.      Interim history:  She has had a breast MRI that showed a 2 cm mass. We have also discussed her case at our multi d breast tumor board.    She underwent b/l mastectomy with ROSITA flap reconstruction and right SLNB. Tumor size was 2.2 cm, 1/1 LN was positive for metastatic carcinoma.     Case discussed at tumor board. Pros and cons of further ALND vs XRT discussed. She has a close follow up with Dr. Pickard and is seeing Dr. Ken today.    Oncotype has been sent, is pending.      Oncology History:  Oncology History   Malignant neoplasm of overlapping sites of right breast in female, estrogen receptor positive   4/13/2023 Biopsy    Breast, right, biopsy:   Invasive ductal carcinoma   Tumor size: 2 mm in this material   Overall Grade: grade 1     4/13/2023 Breast Tumor Markers    Estrogen: Positive (weak intensity 30%)  Progesterone: Positive (weak intensity 30%)  HER2: Negative   Ki67: 5%     4/25/2023 Genetic Testing    Negative, VUS x 1(SDHA)     4/25/2023 Imaging Significant Findings    MRI revealed 2 cm mass correlating with biopsy proven malignancy      4/26/2023 Initial Diagnosis    Malignant neoplasm of overlapping sites of right breast in female, estrogen receptor positive     4/26/2023 Cancer Staged    Staging form: Breast, AJCC 8th Edition  - Clinical stage from 4/26/2023: Stage IB (cT2, cN0, cM0, G1, ER+, VT+, HER2: Equivocal)     5/2/2023 Tumor Conference       The team agreed to proceed with upfront surgery for further sampling of the tumor     5/24/2023 Breast Surgery    Bilateral mastectomy with ROSITA flap reconstruction and right SLNB     6/6/2023 Tumor Conference     There team discussed further axillary dissection versus proceeding with XRT. If ALND and no further positive nodes then no XRT would be recommended. If there are additional nodes, then she would be recommended for XRT.   Chemotherapy would be most likely recommended as well as adjuvant endocrine therapy   Shameka trial?           Past medical, surgical, family, and social history were reviewed today and there are no changes of note unless mentioned in HPI.   MEDS and  "ALLERGIES were reviewed with patient and meds reconciled.     History:  Past Medical History:   Diagnosis Date    Acne varioliformis 2017    OCP & aziza from derm, Doxy caused yeast    Heartburn 2021    Strawberry wai 1980    back of neck    Strawberry wai 1981    under nose      Past Surgical History:   Procedure Laterality Date    BILATERAL MASTECTOMY Bilateral 2023    Procedure: MASTECTOMY, BILATERAL;  Surgeon: Jenna Pickard MD;  Location: [facility];  Service: General;  Laterality: Bilateral;  2.5 HOURS / EMAIL SENT  @ 11:39 LK    COLPOSCOPY      INJECTION FOR SENTINEL NODE IDENTIFICATION Right 2023    Procedure: INJECTION, FOR SENTINEL NODE IDENTIFICATION;  Surgeon: Jenna Pickard MD;  Location: [facility];  Service: General;  Laterality: Right;    RECONSTRUCTION OF BREAST WITH DEEP INFERIOR EPIGASTRIC ARTERY  (ROSITA) FREE FLAP Bilateral 2023    Procedure: RECONSTRUCTION, BREAST, USING ROSITA FREE FLAP / BILATERAL DEEP INFERIOR EPIGASTRIC PERFORATORS FLAPS;  Surgeon: Mikey Roche MD;  Location: [facility];  Service: Plastics;  Laterality: Bilateral;    SENTINEL LYMPH NODE BIOPSY Right 2023    Procedure: BIOPSY, LYMPH NODE, SENTINEL;  Surgeon: Jenna Pickard MD;  Location: [facility];  Service: General;  Laterality: Right;    WISDOM TOOTH EXTRACTION       Family History   Problem Relation Age of Onset    Hyperlipidemia Mother     Kidney cancer Father 58    Cancer Brother 22        Parotid Gland cancer    Birth marks Child 0        strawberry wai(s)    Birth marks Child 0        strawberry wai(s)    Café-au-lait spots Maternal Uncle         "a small patch on his neck"    Café-au-lait spots Other         "a spot on his arm and partial torso"    Other Other 2        tested negative for macrocephaly    Cervical cancer Other     Throat cancer Other     Pancreatic cancer Other         1 second cousin ()    Other Other         brain tumors (several 2nd cousins)    " Breast cancer Neg Hx     Colon cancer Neg Hx     Ovarian cancer Neg Hx     Melanoma Neg Hx       Social History     Tobacco Use    Smoking status: Never    Smokeless tobacco: Never   Substance and Sexual Activity    Alcohol use: Yes     Comment: social    Drug use: No    Sexual activity: Yes     Partners: Male     Birth control/protection: None        ROS:  Review of Systems   Constitutional:  Negative for fever, malaise/fatigue and weight loss.   HENT:  Negative for congestion, hearing loss, nosebleeds and sore throat.    Eyes:  Negative for double vision and photophobia.   Respiratory:  Negative for cough, hemoptysis, sputum production, shortness of breath and wheezing.    Cardiovascular:  Negative for chest pain, palpitations, orthopnea and leg swelling.   Gastrointestinal:  Negative for abdominal pain, blood in stool, constipation, diarrhea, heartburn, nausea and vomiting.   Genitourinary:  Negative for dysuria, hematuria and urgency.   Musculoskeletal:  Negative for back pain, joint pain and myalgias.   Skin:  Negative for itching and rash.   Neurological:  Negative for dizziness, tingling, seizures, weakness and headaches.   Endo/Heme/Allergies:  Negative for polydipsia. Does not bruise/bleed easily.   Psychiatric/Behavioral:  Negative for depression and memory loss. The patient is nervous/anxious. The patient does not have insomnia.      Objective:   There were no vitals filed for this visit.  Wt Readings from Last 10 Encounters:   05/25/23 68 kg (150 lb)   05/15/23 68 kg (150 lb)   04/25/23 69.4 kg (153 lb)   04/25/23 69.6 kg (153 lb 7 oz)   03/21/23 69.3 kg (152 lb 12.5 oz)   12/22/22 69.7 kg (153 lb 10.6 oz)   12/17/21 67.4 kg (148 lb 9.4 oz)   11/02/21 67.5 kg (148 lb 14.7 oz)   12/15/20 67.5 kg (148 lb 13 oz)   10/12/20 67.2 kg (148 lb 2.4 oz)       Physical Examination:   Constitutional: she is alert, pleasant, and does not appear to be in any physical distress   HENT: Mouth/Throat:  Tongue is midline  without evidence of glossitis  Eyes: No obvious jaundice or conjunctivitis.  EOM are normal.   Pulmonary/Chest: No stridor noted. No excess chest muscle movement.  Neurological: she is alert and oriented to person, place, and time. No cranial nerve deficit.  Skin:  No rash noted. No erythema.   Psychiatric: she has a normal mood and affect. Speech and memory normal.     Diagnostic Tests:  Significant Imaging: I have reviewed and interpreted all pertinent imaging results/findings.    Laboratory Data:  All pertinent labs have been reviewed.    Labs:   Lab Results   Component Value Date    WBC 8.71 05/25/2023    RBC 3.28 (L) 05/25/2023    HGB 10.9 (L) 05/25/2023    HCT 31.5 (L) 05/25/2023    MCV 96 05/25/2023     05/25/2023     05/25/2023     05/25/2023    K 3.7 05/25/2023    BUN 4 (L) 05/25/2023    CREATININE 0.7 05/25/2023    AST 17 04/25/2023    ALT 19 04/25/2023    BILITOT 0.5 04/25/2023     Assessment/Plan:   Malignant neoplasm of overlapping sites of right breast in female, estrogen receptor positive  cT2cN0, G1, ER/MA + 30%, Her 2 karrie negative Ki 67 5%   pT2 pN1a     Genetics VUS     We discussed that she has clinically node positive disease. Will be co ordinating with the surgical and RT team to determine whether she will opt for ALND vs XRT understanding higher risk of lymphedema in the former and may derive similar benefit from either procedure. She is understandably overwhelmed with the choices she is facing and is frustrated as the recommendations have not been clear. Support was provided.     As for adjuvant therapy, given she is premenopausal and node +, adjuvant chemotherapy is appropriate based on RxPonder trial results showing 5 year IDFS of 5%. Oncotype may give prognostic information and if very low, could consider to omit anthracycline based therapy vs OFS and AI alone.  I will make a final decision with her once Oncotype results are available.     Anxiety  Feels that Effexor is  helping. She also uses ativan infrequently.          ECOG SCORE           Discussion:   No follow-ups on file.    Plan was discussed with the patient at length, and she verbalized understanding. Dov was given an opportunity to ask questions that were answered to her satisfaction, and she was advised to call in the interval if any problems or questions arise.  Discussed COVID-19 and social distancing in great detail, avoid all non-essential visits out of the home if possible and avoid sick contacts.     Electronically signed by Jessica Griffith MD     Route Chart for Scheduling    Med Onc Chart Routing      Follow up with physician 1 week. scheduled   Follow up with IDA    Infusion scheduling note    Injection scheduling note    Labs    Imaging    Pharmacy appointment    Other referrals

## 2023-06-15 ENCOUNTER — OFFICE VISIT (OUTPATIENT)
Dept: SURGERY | Facility: CLINIC | Age: 43
End: 2023-06-15
Payer: COMMERCIAL

## 2023-06-15 ENCOUNTER — TELEPHONE (OUTPATIENT)
Dept: INFUSION THERAPY | Facility: HOSPITAL | Age: 43
End: 2023-06-15
Payer: COMMERCIAL

## 2023-06-15 VITALS
DIASTOLIC BLOOD PRESSURE: 76 MMHG | BODY MASS INDEX: 28.86 KG/M2 | SYSTOLIC BLOOD PRESSURE: 133 MMHG | HEIGHT: 60 IN | WEIGHT: 147 LBS | OXYGEN SATURATION: 99 % | HEART RATE: 77 BPM

## 2023-06-15 DIAGNOSIS — Z17.0 MALIGNANT NEOPLASM OF OVERLAPPING SITES OF RIGHT BREAST IN FEMALE, ESTROGEN RECEPTOR POSITIVE: Primary | ICD-10-CM

## 2023-06-15 DIAGNOSIS — C50.811 MALIGNANT NEOPLASM OF OVERLAPPING SITES OF RIGHT BREAST IN FEMALE, ESTROGEN RECEPTOR POSITIVE: Primary | ICD-10-CM

## 2023-06-15 PROCEDURE — 3008F PR BODY MASS INDEX (BMI) DOCUMENTED: ICD-10-PCS | Mod: CPTII,S$GLB,, | Performed by: SURGERY

## 2023-06-15 PROCEDURE — 99999 PR PBB SHADOW E&M-EST. PATIENT-LVL III: CPT | Mod: PBBFAC,,, | Performed by: SURGERY

## 2023-06-15 PROCEDURE — 3075F SYST BP GE 130 - 139MM HG: CPT | Mod: CPTII,S$GLB,, | Performed by: SURGERY

## 2023-06-15 PROCEDURE — 3078F DIAST BP <80 MM HG: CPT | Mod: CPTII,S$GLB,, | Performed by: SURGERY

## 2023-06-15 PROCEDURE — 99024 POSTOP FOLLOW-UP VISIT: CPT | Mod: S$GLB,,, | Performed by: SURGERY

## 2023-06-15 PROCEDURE — 3008F BODY MASS INDEX DOCD: CPT | Mod: CPTII,S$GLB,, | Performed by: SURGERY

## 2023-06-15 PROCEDURE — 3075F PR MOST RECENT SYSTOLIC BLOOD PRESS GE 130-139MM HG: ICD-10-PCS | Mod: CPTII,S$GLB,, | Performed by: SURGERY

## 2023-06-15 PROCEDURE — 3078F PR MOST RECENT DIASTOLIC BLOOD PRESSURE < 80 MM HG: ICD-10-PCS | Mod: CPTII,S$GLB,, | Performed by: SURGERY

## 2023-06-15 PROCEDURE — 99999 PR PBB SHADOW E&M-EST. PATIENT-LVL III: ICD-10-PCS | Mod: PBBFAC,,, | Performed by: SURGERY

## 2023-06-15 PROCEDURE — 99024 PR POST-OP FOLLOW-UP VISIT: ICD-10-PCS | Mod: S$GLB,,, | Performed by: SURGERY

## 2023-06-15 NOTE — PROGRESS NOTES
[facility]       Post-Op        REFERRING PHYSICIAN:         Michaela Loyola MD    MEDICAL ONCOLOGIST:    MD Nacho  RADIATION ONCOLOGIST:   Dr. Ken    DIAGNOSIS:    This is a 42 y.o. female with a stage pT2 N1a Mx grade 2 ER + SD + HER2 - IDC with DCIS of the right breast.    TREATMENT SUMMARY:  The patient is status post bilateral total mastectomy and right sentinel node biopsy with ROSITA flap recon ([facility]) on 5/24/2023.      Final pathology showed:  Closest approach of invasive tumor to margins:  Invasive tumor is located 3 mm (0.3 cm) from the anterior inferior/green inked surgical margin.  All other margins are greater than 10 mm (greater than 1 cm) from invasive tumor.    - Closest approach of DCIS ot margins:  All margins are greater than 10 mm (greater than 1 cm) from DCIS.     Regional lymph nodes:   Number lymph nodes with macrometastasis: 1   - The largest aquiles metastatic deposit:  6 mm (0.6 cm)   Number of lymph nodes with micrometastasis:  0   Number lymph nodes with isolated tumor cells:  0     Tumor Conference      There team discussed further axillary dissection versus proceeding with XRT. If ALND and no further positive nodes then no XRT would be recommended. If there are additional nodes, then she would be recommended for XRT.   Chemotherapy would be most likely recommended as well as adjuvant endocrine therapy   Shameka trial?        INTERVAL HISTORY:   Dov Hernandez comes in for a post-op check.  She denies fever, chills, chest pain or shortness of breath.  Her pain is well controlled.      Oncotype pending    MEDICATIONS:  Current Outpatient Medications   Medication Sig Dispense Refill    LORazepam (ATIVAN) 0.5 MG tablet Take 1 tablet (0.5 mg total) by mouth every 12 (twelve) hours as needed for Anxiety. (Patient not taking: Reported on 5/30/2023) 30 tablet 0    venlafaxine (EFFEXOR XR) 37.5 MG 24 hr capsule Take 1 capsule (37.5 mg total) by mouth once daily. 30 capsule 2      No current facility-administered medications for this visit.       ALLERGIES:   Review of patient's allergies indicates:   Allergen Reactions    Egg derived Diarrhea    Latex, natural rubber Rash       PHYSICAL EXAMINATION:   General:  This is a well appearing female with appropriate speech, affect and gait.     Breast:  Incision clean, dry, and intact    IMPRESSION:   The patient has had an uneventful postoperative course.    PLAN:   1. Long discussion regarding ALND vs XRT only. Case discussed at TB. If ALND performed and no further nodes, possibly no XRT, but certainly if additional nodes are positive, would also need XRT at her age with LVI and low ER positivity.  I explained that I think it is optimal for her to avoid getting both due to lymphedema risk.  We went over aquiles studies back as far as Z011 showing that up to 30% of patients had residual positive nodes but outcomes were the same if they underwent the recommended adjuvant therapies.  Chemotherapy recommended as well as endocrine therapy (Dr. Griffith). Awaiting oncotype for additional info.  May help with chemo regimen and strength of radiation recommendation depending on score (Low Ki67).  2. Patient would like more time to consider her options. She will be in contact with her decision.   These therapies cannot begin for now anyway.  3. The patient is advised in continued exam of the breast chest wall and to report to this office sooner should she note any areas of abnormality or concern.

## 2023-06-16 ENCOUNTER — DOCUMENTATION ONLY (OUTPATIENT)
Dept: HEMATOLOGY/ONCOLOGY | Facility: CLINIC | Age: 43
End: 2023-06-16
Payer: COMMERCIAL

## 2023-06-16 NOTE — PROGRESS NOTES
Pt met with Dr Pickard for post op appointment.  Med onc appt made, reviewed location, date and time, pt verbalized understanding.  No additional needs at present.   Oncology Navigation   Intake  Date of Diagnosis: 04/13/23  Cancer Type: Breast  Internal / External Referral: Internal  Date of Referral: 04/21/23  Initial Nurse Navigator Contact: 04/21/23  Referral to Initial Contact Timeline (days): 0  Date Worked: 06/15/23  First Appointment Available: 04/25/23  Appointment Date: 04/25/23  First Available Date vs. Scheduled Date (days): 0  Multiple appointments: Yes     Treatment  Current Status: Staging work-up    Surgical Oncologist: Dr. Jenna Pickard  Plastic Surgeon: [facility]  Type of Surgery: bilateral mastectomy right SLNB with flap  Consult Date: 04/25/23    Medical Oncologist: Nacho  Consult Date: 06/14/23    Radiation Oncologist: Jesus Manuel    Procedures: Genomic testing  Biopsy Schedule Date: 04/13/23  Genetic Testing Date Sent: 04/25/23  MRI Schedule Date: 04/25/23  Genomic Testing Date Sent: 06/14/23    General Referrals: [facility]                                             hide    ER: Positive  NH: Positive  Her2: Negative    Radiation Oncologist: Jesus Manuel    Support Systems: Spouse/significant other; Family members     Acuity  Treatment Tolerability: Has not started treatment yet/treatment fully completed and side effects resolved  Hospitalization Within the Past Month: 0   Needed: 0  Support: 0  Verbalizes Financial Concerns: 0  Transportation: 0  History of noncompliance/frequent no shows and cancellations: 0  Verbalizes the need for more education: 0  Navigation Acuity: 0     Follow Up  Follow up in about 1 week (around 6/23/2023), or oncotype?  pt decide on surgery?.

## 2023-06-17 NOTE — PROGRESS NOTES
The patient location is: home  The chief complaint leading to consultation is: breast cancer     Visit type: audiovisual    Face to Face time with patient: 30 minutes   45 minutes of total time spent on the encounter, which includes face to face time and non-face to face time preparing to see the patient (eg, review of tests), Obtaining and/or reviewing separately obtained history, Documenting clinical information in the electronic or other health record, Independently interpreting results (not separately reported) and communicating results to the patient/family/caregiver, or Care coordination (not separately reported).     Each patient to whom he or she provides medical services by telemedicine is:  (1) informed of the relationship between the physician and patient and the respective role of any other health care provider with respect to management of the patient; and (2) notified that he or she may decline to receive medical services by telemedicine and may withdraw from such care at any time.    HISTORY OF PRESENT ILLNESS:   This patient presents for discussion of treatment options for her breast cancer.      Mrs. Hernandez was referred for further evaluation after diagnostic MMG on 3/28/23 confirmed a 3.9 cm area of linear calcification in the Rt. breast at the middle depth position.  Core biopsies on 4/13/23 revealed invasive ductal carcinoma, histologic grade 3, nuclear grade 1, mitotic index 1.  30% of the cells were weakly ER and KY positive, Her - 2 was negative, Ki-67 was < 5%.  MRI revealed a 2 x 1.4 x 1.3 cm irregularly shaped mass with irregular margins in the Rt. breast at the 6 o'clock position.  On 5/24/23 the patient underwent bilateral nipple sparing mastectomy with immediate ROSITA flap reconstruction.  Pathology revealed a 2.2 cm focus of invasive ductal carcinoma histologic grade 3, nuclear grade 2, mitotic index 2 with associated DCIS. EIC was negative. The closest margin was anterior at 3 mm, all  other margins were > 1 cm.  Lymphovascular invasion was present.  One sentinel node was positive for a 6 mm focus of metastatic carcinoma.  There was no extranodal extension. Oncotype is pending.  The patient presents for discussion of further management.  Today the patient states she feels well.    REVIEW OF SYSTEMS:   Review of Systems   Constitutional:  Negative for chills, fever and malaise/fatigue.   Cardiovascular:  Negative for chest pain.     GYN HISTORY:  Age of menarche was 14.   Age of menopause was N/A.    Last menstrual period was 3/17/2023.   Patient denies hormonal replacement therapy.   OCP x 10 years  Patient is .   Age of first live birth was 29.   Patient did not breast feed.    PAST MEDICAL HISTORY:  Past Medical History:   Diagnosis Date    Acne varioliformis 2017    OCP & aziza from derm, Doxy caused yeast    Heartburn 2021    Strawberry wai 1981    back of neck    Strawberry wai 1981    under nose       PAST SURGICAL HISTORY:  Past Surgical History:   Procedure Laterality Date    BILATERAL MASTECTOMY Bilateral 2023    Procedure: MASTECTOMY, BILATERAL;  Surgeon: Jenna Pickard MD;  Location: [facility];  Service: General;  Laterality: Bilateral;  2.5 HOURS / EMAIL SENT  @ 11:39 LK    COLPOSCOPY      INJECTION FOR SENTINEL NODE IDENTIFICATION Right 2023    Procedure: INJECTION, FOR SENTINEL NODE IDENTIFICATION;  Surgeon: Jenna Pickard MD;  Location: [facility];  Service: General;  Laterality: Right;    RECONSTRUCTION OF BREAST WITH DEEP INFERIOR EPIGASTRIC ARTERY  (ROSITA) FREE FLAP Bilateral 2023    Procedure: RECONSTRUCTION, BREAST, USING ROSITA FREE FLAP / BILATERAL DEEP INFERIOR EPIGASTRIC PERFORATORS FLAPS;  Surgeon: Mikey Roche MD;  Location: [facility];  Service: Plastics;  Laterality: Bilateral;    SENTINEL LYMPH NODE BIOPSY Right 2023    Procedure: BIOPSY, LYMPH NODE, SENTINEL;  Surgeon: Jenna Pickard MD;  Location: [facility];  Service:  General;  Laterality: Right;    WISDOM TOOTH EXTRACTION         ALLERGIES:   Review of patient's allergies indicates:   Allergen Reactions    Egg derived Diarrhea    Latex, natural rubber Rash       MEDICATIONS:  Current Outpatient Medications   Medication Sig    LORazepam (ATIVAN) 0.5 MG tablet Take 1 tablet (0.5 mg total) by mouth every 12 (twelve) hours as needed for Anxiety.    venlafaxine (EFFEXOR XR) 37.5 MG 24 hr capsule Take 1 capsule (37.5 mg total) by mouth once daily.     No current facility-administered medications for this visit.       SOCIAL HISTORY:  Social History     Socioeconomic History    Marital status:     Number of children: 2   Occupational History    Occupation:      Employer: [employer]   Tobacco Use    Smoking status: Never    Smokeless tobacco: Never   Substance and Sexual Activity    Alcohol use: Yes     Comment: social    Drug use: No    Sexual activity: Yes     Partners: Male     Birth control/protection: None   Social History Narrative    Family [employer] mgr    , 2 children (10 yo son, 8 yo daughter [place])    Mom Renee Tejeda    nonsmoker      GYN Yolanda     Social Determinants of Health     Financial Resource Strain: Low Risk     Difficulty of Paying Living Expenses: Not hard at all   Food Insecurity: No Food Insecurity    Worried About Running Out of Food in the Last Year: Never true    Ran Out of Food in the Last Year: Never true   Transportation Needs: No Transportation Needs    Lack of Transportation (Medical): No    Lack of Transportation (Non-Medical): No   Stress: No Stress Concern Present    Feeling of Stress : Not at all   Social Connections: Socially Integrated    Frequency of Communication with Friends and Family: More than three times a week    Frequency of Social Gatherings with Friends and Family: More than three times a week    Attends Quaker Services: More than 4 times per year    Active Member of  "Clubs or Organizations: Yes    Attends Club or Organization Meetings: More than 4 times per year    Marital Status:    Housing Stability: Low Risk     Unable to Pay for Housing in the Last Year: No    Number of Places Lived in the Last Year: 1    Unstable Housing in the Last Year: No       FAMILY HISTORY:  Family History   Problem Relation Age of Onset    Hyperlipidemia Mother     Kidney cancer Father 58    Cancer Brother 22        Parotid Gland cancer    Birth marks Child 0        strawberry wai(s)    Birth marks Child 0        strawberry wai(s)    Café-au-lait spots Maternal Uncle         "a small patch on his neck"    Café-au-lait spots Other         "a spot on his arm and partial torso"    Other Other 2        tested negative for macrocephaly    Cervical cancer Other     Throat cancer Other     Pancreatic cancer Other         1 second cousin ()    Other Other         brain tumors (several 2nd cousins)    Breast cancer Neg Hx     Colon cancer Neg Hx     Ovarian cancer Neg Hx     Melanoma Neg Hx          PHYSICAL EXAMINATION:  There were no vitals filed for this visit.  Physical Exam  Constitutional:       General: She is not in acute distress.     Appearance: Normal appearance.   Pulmonary:      Effort: Pulmonary effort is normal. No respiratory distress.   Neurological:      Mental Status: She is alert and oriented to person, place, and time.   Psychiatric:         Mood and Affect: Mood normal.         Judgment: Judgment normal.       ASSESSMENT/PLAN:  Stage IB (T2, N1, M0, G2, ER/NV weakly +, Her-2 -) carcinoma of the Rt. breast    ECO    I had a long discussion with the patient.  Explained the rational for further management of the axilla.  Discussed the options of radiotherapy to the reconstructed breast and axilla vs. axillary dissection.  Discussed the data supporting radiotherapy offering equally low recurrence rates as surgical resection.  Explained she also has some relative " indications for post mastectomy irradiation, namely T2 lesion with weak ER/KY, and LVI.  Overall, given her age, I think she would be better served with post mastectomy radiotherapy offering a low risk of local regional recurrence with a decreased risk of lymphedema.  The patient was agreeable with this plan.  Discussed with Dr. Pickard who also supports radiotherapy in this setting.  Thank you for allowing us to participate in the care of this patient.     I spent approximately 60 minutes reviewing the available records and evaluating the patient, out of which over 50% of the time was spent face to face with the patient in counseling and coordinating this patient's care.

## 2023-06-19 ENCOUNTER — CLINICAL SUPPORT (OUTPATIENT)
Dept: REHABILITATION | Facility: HOSPITAL | Age: 43
End: 2023-06-19
Payer: COMMERCIAL

## 2023-06-19 ENCOUNTER — TELEPHONE (OUTPATIENT)
Dept: SURGERY | Facility: CLINIC | Age: 43
End: 2023-06-19
Payer: COMMERCIAL

## 2023-06-19 DIAGNOSIS — M25.612 STIFFNESS OF LEFT SHOULDER JOINT: Primary | ICD-10-CM

## 2023-06-19 DIAGNOSIS — Z17.0 MALIGNANT NEOPLASM OF OVERLAPPING SITES OF RIGHT BREAST IN FEMALE, ESTROGEN RECEPTOR POSITIVE: ICD-10-CM

## 2023-06-19 DIAGNOSIS — R29.3 ABNORMAL POSTURE: ICD-10-CM

## 2023-06-19 DIAGNOSIS — Z91.89 AT RISK FOR LYMPHEDEMA: ICD-10-CM

## 2023-06-19 DIAGNOSIS — R29.898 WEAKNESS OF LEFT UPPER EXTREMITY: ICD-10-CM

## 2023-06-19 DIAGNOSIS — M25.611 STIFFNESS OF RIGHT SHOULDER JOINT: ICD-10-CM

## 2023-06-19 DIAGNOSIS — C50.811 MALIGNANT NEOPLASM OF OVERLAPPING SITES OF RIGHT BREAST IN FEMALE, ESTROGEN RECEPTOR POSITIVE: ICD-10-CM

## 2023-06-19 DIAGNOSIS — R29.898 WEAKNESS OF RIGHT UPPER EXTREMITY: ICD-10-CM

## 2023-06-19 PROCEDURE — 97110 THERAPEUTIC EXERCISES: CPT

## 2023-06-19 PROCEDURE — 97140 MANUAL THERAPY 1/> REGIONS: CPT

## 2023-06-19 PROCEDURE — 97535 SELF CARE MNGMENT TRAINING: CPT

## 2023-06-19 PROCEDURE — 97162 PT EVAL MOD COMPLEX 30 MIN: CPT

## 2023-06-19 NOTE — TELEPHONE ENCOUNTER
Patient called me for follow up regarding post-op appointment with Dr. Pickard. She discussed her options with family over the weekend. She does not wish to undergo further surgery at this time and would like to proceed with XRT. I informed her I will message her care team with decision.

## 2023-06-19 NOTE — PLAN OF CARE
"[facility]   Physical Therapy Initial Evaluation      Name: Dov Tejeda Mary  Clinic Number: [id number]    Therapy Diagnosis:   Encounter Diagnoses   Name Primary?    Stiffness of left shoulder joint Yes    Stiffness of right shoulder joint     Weakness of left upper extremity     Weakness of right upper extremity     At risk for lymphedema     Abnormal posture     Malignant neoplasm of overlapping sites of right breast in female, estrogen receptor positive         Physician: Jenna Pickard MD    Physician Orders: PT Eval and Treat   Medical Diagnosis from Referral: C50.811,Z17.0 (ICD-10-CM) - Malignant neoplasm of overlapping sites of right breast in female, estrogen receptor positive  Evaluation Date: 06/19/2023  Authorization Period Expiration: 05/03/2024  Plan of Care Expiration: 08/18/2023  Progress Note Due: 07/19/2023  Visit # / Visits Authorized: 1 / 1   FOTO: 1/3    Precautions: standard, cancer, anxiety, latex allergy     Time In: 11:00 AM  Time Out: 12:09 PM  Total Appointment Time (timed & untimed codes): 69 minutes    Subjective     Date of Onset: Screening mammogram 03/08/2023 revealed RIGHT breast calcifications. Workup biopsy on 04/13/2023 revealed stage IB (cT2, cN0, cM0, G1, ER+, MI+, HER2: Equivocal) IDC with associated DCIS of the RIGHT breast.    History of Current Condition: oDv is a 42 y.o. female that presents to [facility] outpatient physical therapy clinic at the [facility] s/p BILATERAL breast surgery.     Patient reports fluid retention through trunk (left > right), attributed to limited upper extremity use following surgery. Patient denies significant difficulty / discomfort through abdomen following ROSITA flap reconstruction but still sleeping in recliner for comfort. Continued ongoing "tightness" through chest and trunk when reaching overhead (LUE > RUE).     Cancer-Related Surgery and Date: BILATERAL mastectomy with SLNB (1x) per Dr. Pickard and " concurrent BILATERAL ROSITA flap reconstruction per Adal Roche and Lynda on 05/24/2023.    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  Exercise Routine Prior to Onset: walking, [employer]   Hand Dominance: right    Pain:  Current: 0/10,  Worst: 0/10,  Best: 0/10   Location: N/A  Description: N/A  Aggravating Factors: N/A  Easing Factors: N/A    Patients Goals: to improve range of motion and return to prior level of function     Medical History:   Past Medical History:   Diagnosis Date    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    OCP & aziza from derm, Doxy caused yeast    Heartburn 12/17/2021    Strawberry wai 1981    back of neck    Strawberry wai 1981    under nose       Surgical History:   Dov Hernandez  has a past surgical history that includes Colposcopy; [place] tooth extraction; Bilateral mastectomy (Bilateral, 5/24/2023); [place] lymph node biopsy (Right, 5/24/2023); Injection for sentinel node identification (Right, 5/24/2023); and Reconstruction of breast with deep inferior epigastric artery  (ROSITA) free flap (Bilateral, 5/24/2023).    Medications:   Dov has a current medication list which includes the following prescription(s): lorazepam and venlafaxine.    Allergies:   Review of patient's allergies indicates:   Allergen Reactions    Egg derived Diarrhea    Latex, natural rubber Rash        Objective     Mental Status: A/O x 3    Postural Exam / Scapula Alignment: FHP, rounded shoulders    Skin Integrity: not formally assessed  Scar Location:  "N/A  Appearance: N/A  Signs of Infection: N/A  Drainage: N/A  Color: N/A    Edema: mild (improving)  Location: lateral trunk     Axillary Web Syndrome / Cording:  Location: none  Degree of Cording: N/A  Number of Cords Present: N/A    Sensation: post-operative numbness noted (lateral breasts, chest)       Shoulder Range of Motion:   Active ROM LEFT RIGHT   Flexion 130 130   Abduction 115 115   Extension 55 55   IR/90deg 90 90   ER/90deg 75 90       Upper Extremity Strength:   (L) UE (R) UE   Shoulder Flexion 4/5 4/5   Shoulder Abduction 4+/5 4+/5   Shoulder IR 3+/5 3+/5   Shoulder ER 3+/5 3+/5   Elbow Flexion 4/5 4/5   Elbow Extension 3+/5 3+/5    54.2 lbs 56.4 lbs       Circumferential Measurements: for early detection of lymphedema  LANDMARK LEFT UE RIGHT UE DIFFERENCE   E + 8" 35 cm 36 cm 1 cm   E + 6" 31 cm 31 cm 0 cm   E + 4" 26 cm 27 cm 1 cm   E + 2" 24.5 cm 24.5 cm 0 cm   Elbow 24.5 cm 23.5 cm 1 cm   W+ 8" 24.5 cm 24 cm 0.5 cm   W +  6" 23 cm 24 cm 1 cm   W + 4" 20.5 cm 21.5 cm 1 cm   Wrist 16 cm 15 cm 1 cm   DPC 18.5 cm 19 cm 0.5 cm   IP Thumb 5.5 cm 5.5 cm 0 cm       Functional Mobility (bed mobility, transfers): independent    ADL's: requires assistance to wash hair (secondary to tightness / stiffness through shoulders)    Gait Assessment:  - Assistive Device Used: none  - Assistance: independent  - Distance: community distances       Limitation / Restriction for FOTO Shoulder Survey    Therapist reviewed FOTO scores for Dov Tejeda Mary on 6/19/2023.   FOTO documents entered into [employer] - see Media section.    Limitation Score: 47%         Treatment     Total Treatment Time (time-based codes) Separate from Evaluation: 40 minutes       Dov received the treatments listed below:      Therapeutic exercises to develop strength, cardiovascular endurance, range of motion, and flexibility for 15 minutes:    Mat Exercises:  - AAROM wand flexion (cane)  1 set x 5 reps  - Butterfly stretch (forehead)   1 set x " 5 reps    Standing Exercises:  - Wall slides (flex, abd)   1 set x 5 reps each      Manual therapy techniques to decrease pain as well as increase joint mobility, soft tissue mobility, and function for 10 minutes including:    - Soft tissue mobilization to bilateral pec minor      Self-care / home management education regarding lymphedema management for 15 minutes including:    - Lymphedema etiology and management plans.    - Written lymphedema risk reductions and precautions provided      Patient Education and Home Exercises     Education Provided Regarding:   - Role of physical therapy in multi-disciplinary team  - Goals for physical therapy, scheduling  - Home exercise program, exercise technique  - Energy conservation techniques    Written Home Exercises Provided: yes. Exercises were reviewed, and Dov was able to demonstrate them prior to the end of the session. Dov demonstrated good  understanding of the education provided. See EMR under Patient Instructions for exercises provided during therapy sessions.    Assessment     Dov is a 42 y.o. female referred to outpatient Physical Therapy with a medical diagnosis of RIGHT breast cancer. Patient presents with decreased bilateral upper extremity active range of motion, decreased bilateral upper extremity strength, abnormal posture, increased risk for lymphedema, and limited tolerance to functional reaching / carrying / pushing / pulling / lifting activities of daily living.     Patient prognosis is Good.   Patient will benefit from skilled outpatient Physical Therapy to address the deficits stated above and in the chart below, provide patient / family education, and to maximize patient's level of independence.     Plan of Care Discussed with Patient: Yes  Patient's spiritual, cultural, and educational needs considered, and patient is agreeable to the plan of care and goals as stated below:     Anticipated Barriers for Therapy: none anticipated    Medical  Necessity is demonstrated by the following:  History  Co-morbidities and personal factors that may impact the plan of care [] LOW: no personal factors / co-morbidities  [x] MODERATE: 1-2 personal factors / co-morbidities  [] HIGH: 3+ personal factors / co-morbidities    Moderate / High Support Documentation:   Co-morbidities affecting plan of care:   - History of cancer  - Anxiety    Personal Factors:   no deficits     Examination  Body Structures and Functions, activity limitations and participation restrictions that may impact the plan of care [] LOW: addressing 1-2 elements  [x] MODERATE: 3+ elements  [] HIGH: 4+ elements (please support below)    Moderate / High Support Documentation:  Body Regions:  - Upper extremities  - Trunk    Body Systems:   - Gross symmetry  - Range of motion  - Strength  - Gross coordinated movement  - Motor control  - Motor learning  - Skin integrity    Mobility:   - Lifting, carrying items    Community and Social Life:  - Recreation and leisure    Domestic Life:  - Cooking, house work      Clinical Presentation [] LOW: stable  [x] MODERATE: Evolving  [] HIGH: Unstable     Decision Making / Complexity Score: moderate     Goals:   Short Term Goals: 4 Weeks  Patient will demonstrate 100% understanding of lymphedema risk reduction practices, including self-monitoring for lymphedema (progressing, not met).  Patient will demonstrate independence with established home exercise program for improved strength, functional mobility, range of motion, posture, and endurance (progressing, not met).   Patient will increase BILATERAL shoulder FLEXION active range of motion to 155 degrees for improved independence with functional reaching, carrying, pushing, and pulling tasks (progressing, not met).   Patient will increase BILATERAL shoulder ABDUCTION active range of motion to 150 degrees for improved independence with functional reaching, carrying, pushing, and pulling tasks (progressing, not met).    Patient will increase gross BILATERAL upper extremity musculature to 4+/5 for improved independence with functional reaching, carrying, pushing, and pulling activities of daily living (progressing, not met).     Long Term Goals: 8 Weeks   Patient will be independent with updated home exercise program for improved functional mobility, posture, strength, and endurance (progressing, not met).  Patient will increase BILATERAL shoulder FLEXION active range of motion to 180 degrees for improved independence with functional reaching, carrying, pushing, and pulling tasks (progressing, not met).   Patient will increase BILATERAL shoulder ABDUCTION active range of motion to 180 degrees for improved independence with functional reaching, carrying, pushing, and pulling tasks (progressing, not met).   Patient will increase gross BILATERAL upper extremity musculature to 5/5 for improved independence with functional reaching, carrying, pushing, and pulling activities of daily living (progressing, not met).   Patient will report decrease in overall worst pain to 2/10 at discharge for improved tolerance to functional reaching, carrying, pushing, and pulling activities of daily living (progressing, not met).  Patient will report compliance with walking program 5x/week for 10 minutes / day to improve overall cardiovascular function and decrease cancer-related fatigue at discharge (progressing, not met).       Plan     Plan of Care Certification: 6/19/2023 to 08/18/2023.    Outpatient Physical Therapy 2 times weekly for 8 weeks to include the following interventions: Gait Training, Manual Therapy, Neuromuscular Re-ed, Patient Education, Self Care, Therapeutic Activities, Therapeutic Exercise, and IASTM.     Ce Vieira, PT

## 2023-06-19 NOTE — PATIENT INSTRUCTIONS
PRE / POST OP PATIENT EDUCATION    Lymphedema - Identification and Prevention     Lymphedema - is the swelling of a body area or extremity caused by the accumulation of lymphatic fluid.  There is a risk for lymphedema with the removal of lymph nodes, trauma or radiation therapy.  Treatment of breast cancer often involves surgery: mastectomy or lumpectomy. Some of the lymph nodes in the underarm (called axillary lymph nodes) may be removed and checked to see if they contain cancer cells.     During breast surgery when axillary lymph nodes are removed (with sentinel node biopsy or axillary dissection) or are treated with radiation therapy, the lymphatic system may become impaired. This may prevent lymphatic fluid from leaving the area therefore, causing lymphedema.     Lymphatic fluid is a normal part of the circulatory system. Its function is to remove waste products and to produce cells vital to fighting infection. Swelling occurs when the vessels become restricted and the lymphatic fluid is unable to freely flow through them.  If lymphedema is left untreated, the affected limb could progressively become more swollen, which could lead to hardening of the skin, bulkiness in the limb, infection and impaired wound healing.     There are things you can do to decrease the chance of developing lymphedema.        [email or web address]            The information presented is intended for general information and educational purposes. It is not intended to replace the advice of your health care provider. Contact your health care provider if you believe you have a health problem.

## 2023-06-21 ENCOUNTER — OFFICE VISIT (OUTPATIENT)
Dept: HEMATOLOGY/ONCOLOGY | Facility: CLINIC | Age: 43
End: 2023-06-21
Payer: COMMERCIAL

## 2023-06-21 ENCOUNTER — PATIENT MESSAGE (OUTPATIENT)
Dept: HEMATOLOGY/ONCOLOGY | Facility: CLINIC | Age: 43
End: 2023-06-21

## 2023-06-21 ENCOUNTER — TELEPHONE (OUTPATIENT)
Dept: HEMATOLOGY/ONCOLOGY | Facility: CLINIC | Age: 43
End: 2023-06-21

## 2023-06-21 VITALS
RESPIRATION RATE: 14 BRPM | TEMPERATURE: 96 F | HEIGHT: 61 IN | DIASTOLIC BLOOD PRESSURE: 82 MMHG | OXYGEN SATURATION: 100 % | BODY MASS INDEX: 27.78 KG/M2 | SYSTOLIC BLOOD PRESSURE: 131 MMHG

## 2023-06-21 DIAGNOSIS — C77.9 SECONDARY ADENOCARCINOMA OF LYMPH NODE: ICD-10-CM

## 2023-06-21 DIAGNOSIS — C50.911 INVASIVE DUCTAL CARCINOMA OF BREAST, RIGHT: Primary | ICD-10-CM

## 2023-06-21 DIAGNOSIS — F41.9 ANXIETY: ICD-10-CM

## 2023-06-21 DIAGNOSIS — Z17.0 MALIGNANT NEOPLASM OF OVERLAPPING SITES OF RIGHT BREAST IN FEMALE, ESTROGEN RECEPTOR POSITIVE: Primary | ICD-10-CM

## 2023-06-21 DIAGNOSIS — C50.811 MALIGNANT NEOPLASM OF OVERLAPPING SITES OF RIGHT BREAST IN FEMALE, ESTROGEN RECEPTOR POSITIVE: Primary | ICD-10-CM

## 2023-06-21 PROCEDURE — 1159F PR MEDICATION LIST DOCUMENTED IN MEDICAL RECORD: ICD-10-PCS | Mod: CPTII,S$GLB,, | Performed by: INTERNAL MEDICINE

## 2023-06-21 PROCEDURE — 1111F PR DISCHARGE MEDS RECONCILED W/ CURRENT OUTPATIENT MED LIST: ICD-10-PCS | Mod: CPTII,S$GLB,, | Performed by: INTERNAL MEDICINE

## 2023-06-21 PROCEDURE — 99215 PR OFFICE/OUTPT VISIT, EST, LEVL V, 40-54 MIN: ICD-10-PCS | Mod: S$GLB,,, | Performed by: INTERNAL MEDICINE

## 2023-06-21 PROCEDURE — 3075F SYST BP GE 130 - 139MM HG: CPT | Mod: CPTII,S$GLB,, | Performed by: INTERNAL MEDICINE

## 2023-06-21 PROCEDURE — 1159F MED LIST DOCD IN RCRD: CPT | Mod: CPTII,S$GLB,, | Performed by: INTERNAL MEDICINE

## 2023-06-21 PROCEDURE — 99999 PR PBB SHADOW E&M-EST. PATIENT-LVL III: ICD-10-PCS | Mod: PBBFAC,,, | Performed by: INTERNAL MEDICINE

## 2023-06-21 PROCEDURE — 3079F DIAST BP 80-89 MM HG: CPT | Mod: CPTII,S$GLB,, | Performed by: INTERNAL MEDICINE

## 2023-06-21 PROCEDURE — 99999 PR PBB SHADOW E&M-EST. PATIENT-LVL III: CPT | Mod: PBBFAC,,, | Performed by: INTERNAL MEDICINE

## 2023-06-21 PROCEDURE — 3008F PR BODY MASS INDEX (BMI) DOCUMENTED: ICD-10-PCS | Mod: CPTII,S$GLB,, | Performed by: INTERNAL MEDICINE

## 2023-06-21 PROCEDURE — 3079F PR MOST RECENT DIASTOLIC BLOOD PRESSURE 80-89 MM HG: ICD-10-PCS | Mod: CPTII,S$GLB,, | Performed by: INTERNAL MEDICINE

## 2023-06-21 PROCEDURE — 99215 OFFICE O/P EST HI 40 MIN: CPT | Mod: S$GLB,,, | Performed by: INTERNAL MEDICINE

## 2023-06-21 PROCEDURE — 3075F PR MOST RECENT SYSTOLIC BLOOD PRESS GE 130-139MM HG: ICD-10-PCS | Mod: CPTII,S$GLB,, | Performed by: INTERNAL MEDICINE

## 2023-06-21 PROCEDURE — 3008F BODY MASS INDEX DOCD: CPT | Mod: CPTII,S$GLB,, | Performed by: INTERNAL MEDICINE

## 2023-06-21 PROCEDURE — 1111F DSCHRG MED/CURRENT MED MERGE: CPT | Mod: CPTII,S$GLB,, | Performed by: INTERNAL MEDICINE

## 2023-06-21 NOTE — TELEPHONE ENCOUNTER
"----- Message from Regino Niño sent at 6/21/2023  1:04 PM CDT -----  Consult/Advisory:          Name Of Caller: [employer]      Contact Preference?: [phone]      Provider line   [phone]    Case ref # [id number]        Provider Name: Nacho      Does patient feel the need to be seen today? No      What is the nature of the call?: Calling to speak w/ nurse about Digach[facility] medication. Requesting the following: dosage, frequency, treating MD info (name, location, + NPI #), location of administration, treating MD specialty, facility or office name + address, and the state where administration will occur        Additional Notes:  "Thank you for all that you do for our patients"      "

## 2023-06-21 NOTE — PLAN OF CARE
START ON PATHWAY REGIMEN - Breast    [id number]        Docetaxel (Taxotere)       Cyclophosphamide           Additional Orders: Premedicate with dexamethasone 8 mg PO bid for three   days beginning 1 day prior to therapy    **Always confirm dose/schedule in your pharmacy ordering system**    Patient Characteristics:  Postoperative without Neoadjuvant Therapy (Pathologic Staging), Invasive   Disease, Adjuvant Therapy, HER2 Negative/Unknown/Equivocal, ER Positive, Node   Positive, Node Positive (1-3), Genomic Testing Not Performed, Chemotherapy   Candidate  Therapeutic Status: Postoperative without Neoadjuvant Therapy (Pathologic   Staging)  AJCC Grade: G1  AJCC N Category: pN1a  AJCC M Category: cM0  ER Status: Positive (+)  AJCC 8 Stage Grouping: IA  HER2 Status: Negative (-)  Oncotype Dx Recurrence Score: Not Appropriate  AJCC T Category: pT2  RI Status: Positive (+)  Adjuvant Therapy Status: No Adjuvant Therapy Received Yet or Changing Initial   Adjuvant Regimen due to Tolerance  Has this patient completed genomic testing<= No - Did Not Order Test   Intent of Therapy:  Curative Intent, Discussed with Patient

## 2023-06-21 NOTE — PROGRESS NOTES
FOLLOW UP TELEMEDICINE VISIT    Subjective:      Patient ID: Dov Hernandez is a 42 y.o. female.  MRN: [id number]  : 1980    History of Present Illness:   HPI   Dov Hernandez is a 42 y.o. female who is referred for right breast IDC.        She had a normal screening mammogram in . This year, she underwent a screening mammogram in March that showed right breast calcifications, measured to be 39 mm on diagnostic mammogram.   She underwent a right breast biopsy that showed IDC, 2 mm, ER 30%, WI 30%, Her 2 karrie negative, Grade 1, low Ki 67 at 5% with associated DCIS.      Interim history:  She has had a breast MRI that showed a 2 cm mass. We have also discussed her case at our multi d breast tumor board.   She underwent b/l mastectomy with ROSITA flap reconstruction and right SLNB. Tumor size was 2.2 cm, 1/ SLN was positive for metastatic carcinoma.     Case discussed at tumor board. Pros and cons of further ALND vs XRT discussed. She has a close follow up with Dr. Pickard and has seen Dr. Ken, has declined ALND and will proceed with XRT.     Oncotype has been sent, is pending. Recovering well from surgery. Anxiety is well controlled. Presents with her  today to discuss adjuvant options.      Oncology History:  Oncology History   Malignant neoplasm of overlapping sites of right breast in female, estrogen receptor positive   2023 Biopsy    Breast, right, biopsy:   Invasive ductal carcinoma   Tumor size: 2 mm in this material   Overall Grade: grade 1     2023 Breast Tumor Markers    Estrogen: Positive (weak intensity 30%)  Progesterone: Positive (weak intensity 30%)  HER2: Negative   Ki67: 5%     2023 Genetic Testing    Negative, VUS x 1(SDHA)     2023 Imaging Significant Findings    MRI revealed 2 cm mass correlating with biopsy proven malignancy      2023 Initial Diagnosis    Malignant neoplasm of overlapping sites of right breast in female, estrogen receptor  positive     4/26/2023 Cancer Staged    Staging form: Breast, AJCC 8th Edition  - Clinical stage from 4/26/2023: Stage IB (cT2, cN0, cM0, G1, ER+, HI+, HER2: Equivocal)     5/2/2023 Tumor Conference       The team agreed to proceed with upfront surgery for further sampling of the tumor     5/24/2023 Breast Surgery    Bilateral mastectomy with ROSITA flap reconstruction and right SLNB     6/6/2023 Tumor Conference     There team discussed further axillary dissection versus proceeding with XRT. If ALND and no further positive nodes then no XRT would be recommended. If there are additional nodes, then she would be recommended for XRT.   Chemotherapy would be most likely recommended as well as adjuvant endocrine therapy   Shameka trial?        6/14/2023 Cancer Staged    Staging form: Breast, AJCC 8th Edition  - Pathologic stage from 6/14/2023: Stage IB (pT2, pN1(sn), cM0, G2, ER+, HI+, HER2-)        Past medical, surgical, family, and social history were reviewed today and there are no changes of note unless mentioned in HPI.   MEDS and ALLERGIES were reviewed with patient and meds reconciled.     History:  Past Medical History:   Diagnosis Date    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    OCP & aziza from derm, Doxy caused yeast    Heartburn 12/17/2021    Strawberry wai 1980    back of neck    Strawberry wai 1981    under nose      Past Surgical History:   Procedure Laterality Date    BILATERAL MASTECTOMY Bilateral 5/24/2023    Procedure: MASTECTOMY, BILATERAL;  Surgeon: Jenna Pickard MD;  Location: [facility];  Service: General;  Laterality: Bilateral;  2.5 HOURS / EMAIL SENT 5-9 @ 11:39 LK    COLPOSCOPY      INJECTION FOR SENTINEL NODE IDENTIFICATION Right 5/24/2023    Procedure: INJECTION, FOR SENTINEL NODE IDENTIFICATION;  Surgeon: Jenna Pickard MD;  Location: [facility];  Service: General;  Laterality: Right;    RECONSTRUCTION OF BREAST WITH DEEP INFERIOR EPIGASTRIC ARTERY  (ROSITA) FREE FLAP Bilateral  "2023    Procedure: RECONSTRUCTION, BREAST, USING ROSITA FREE FLAP / BILATERAL DEEP INFERIOR EPIGASTRIC PERFORATORS FLAPS;  Surgeon: Mikey Roche MD;  Location: [facility];  Service: Plastics;  Laterality: Bilateral;    SENTINEL LYMPH NODE BIOPSY Right 2023    Procedure: BIOPSY, LYMPH NODE, SENTINEL;  Surgeon: Jenna Pickard MD;  Location: [facility];  Service: General;  Laterality: Right;    WISDOM TOOTH EXTRACTION       Family History   Problem Relation Age of Onset    Hyperlipidemia Mother     Kidney cancer Father 58    Cancer Brother 22        Parotid Gland cancer    Birth marks Child 0        strawberry wai(s)    Birth marks Child 0        strawberry wai(s)    Café-au-lait spots Maternal Uncle         "a small patch on his neck"    Café-au-lait spots Other         "a spot on his arm and partial torso"    Other Other 2        tested negative for macrocephaly    Cervical cancer Other     Throat cancer Other     Pancreatic cancer Other         1 second cousin ()    Other Other         brain tumors (several 2nd cousins)    Breast cancer Neg Hx     Colon cancer Neg Hx     Ovarian cancer Neg Hx     Melanoma Neg Hx       Social History     Tobacco Use    Smoking status: Never    Smokeless tobacco: Never   Substance and Sexual Activity    Alcohol use: Yes     Comment: social    Drug use: No    Sexual activity: Yes     Partners: Male     Birth control/protection: None        ROS:  Review of Systems   Constitutional:  Negative for fever, malaise/fatigue and weight loss.   HENT:  Negative for congestion, hearing loss, nosebleeds and sore throat.    Eyes:  Negative for double vision and photophobia.   Respiratory:  Negative for cough, hemoptysis, sputum production, shortness of breath and wheezing.    Cardiovascular:  Negative for chest pain, palpitations, orthopnea and leg swelling.   Gastrointestinal:  Negative for abdominal pain, blood in stool, constipation, diarrhea, heartburn, nausea and vomiting. " "  Genitourinary:  Negative for dysuria, hematuria and urgency.   Musculoskeletal:  Negative for back pain, joint pain and myalgias.   Skin:  Negative for itching and rash.   Neurological:  Negative for dizziness, tingling, seizures, weakness and headaches.   Endo/Heme/Allergies:  Negative for polydipsia. Does not bruise/bleed easily.   Psychiatric/Behavioral:  Negative for depression and memory loss. The patient is nervous/anxious. The patient does not have insomnia.      Objective:     Vitals:    06/21/23 0818   BP: 131/82   Resp: 14   Temp: 96.4 °F (35.8 °C)   SpO2: 100%   Height: 5' 1" (1.549 m)   PainSc: 0-No pain     Wt Readings from Last 10 Encounters:   06/15/23 66.7 kg (147 lb)   05/25/23 68 kg (150 lb)   05/15/23 68 kg (150 lb)   04/25/23 69.4 kg (153 lb)   04/25/23 69.6 kg (153 lb 7 oz)   03/21/23 69.3 kg (152 lb 12.5 oz)   12/22/22 69.7 kg (153 lb 10.6 oz)   12/17/21 67.4 kg (148 lb 9.4 oz)   11/02/21 67.5 kg (148 lb 14.7 oz)   12/15/20 67.5 kg (148 lb 13 oz)       Physical Examination:   Constitutional: she is alert, pleasant, and does not appear to be in any physical distress   HENT: Mouth/Throat:  Tongue is midline without evidence of glossitis  Eyes: No obvious jaundice or conjunctivitis.  EOM are normal.   Pulmonary/Chest: No stridor noted. No excess chest muscle movement.  Neurological: she is alert and oriented to person, place, and time. No cranial nerve deficit.  Skin:  No rash noted. No erythema.   Psychiatric: she has a normal mood and affect. Speech and memory normal.     Diagnostic Tests:  Significant Imaging: I have reviewed and interpreted all pertinent imaging results/findings.    Laboratory Data:  All pertinent labs have been reviewed.    Labs:   Lab Results   Component Value Date    WBC 8.71 05/25/2023    RBC 3.28 (L) 05/25/2023    HGB 10.9 (L) 05/25/2023    HCT 31.5 (L) 05/25/2023    MCV 96 05/25/2023     05/25/2023     05/25/2023     05/25/2023    K 3.7 05/25/2023 "    BUN 4 (L) 05/25/2023    CREATININE 0.7 05/25/2023    AST 17 04/25/2023    ALT 19 04/25/2023    BILITOT 0.5 04/25/2023     Assessment/Plan:   Malignant neoplasm of overlapping sites of right breast in female, estrogen receptor positive  cT2cN0, G1, ER/NV + 30%, Her 2 karrie negative Ki 67 5%   pT2 pN1a     Genetics VUS     We discussed that she has node positive disease on path.She is agreeable to proceed with XRT in the adjuvant setting. Dr. Ken note was appreciated.      As for adjuvant therapy, given she is premenopausal and node +, adjuvant chemotherapy is appropriate based on RxPonder trial results showing 5 year IDFS of 5%. We discussed that at least some of the chemotherapy benefit may be in part due to ovarian function suppression but she may be more likely to benefit given low-moderate ER positive disease.   Oncotype may give prognostic information, not available and expected to be back in one week.     I have recommended adjuvant chemotherapy with Docetaxel and cytoxan q 3 weeks x 4, via PIV.   She prefers an anthracycline sparing regimen and pros and cons were discussed at length.   Following chemotherapy, she will be offered XRT and then endocrine therapy with OFS.     I will have her see Dr. Osman to discuss oncotype results as I am out of town. I have discussed the plan with her. Patient wants to go over her options and will sign consents with Dr. Osman when she sees her.     I will then see her just prior to initiating cycle 1. She is interested in Digni Cap , message sent to the navigator.  Staging PET to obtain baseline ordered, to be done just before starting chemotherapy.      Anxiety  Feels that Effexor is helping. She also uses ativan infrequently.        I spent 49 min on this encounter.     ECOG SCORE           Discussion:   No follow-ups on file.    Plan was discussed with the patient at length, and she verbalized understanding. Dov was given an opportunity to ask questions that were  answered to her satisfaction, and she was advised to call in the interval if any problems or questions arise.  Discussed COVID-19 and social distancing in great detail, avoid all non-essential visits out of the home if possible and avoid sick contacts.     Electronically signed by Jessica Griffith MD     Route Chart for Scheduling    Med Onc Chart Routing  Urgent    Follow up with physician . Dr. Osman 7/3 or 7/5 to disuscss onoctype and finalize chemo plan .  7/12 with me 8:20   Follow up with IDA    Infusion scheduling note   7/13 TC cycle 1   Injection scheduling note 7/14 neulasta   Labs    Imaging PET scan   7/11   Pharmacy appointment    Other referrals

## 2023-06-22 ENCOUNTER — PATIENT MESSAGE (OUTPATIENT)
Dept: HEMATOLOGY/ONCOLOGY | Facility: CLINIC | Age: 43
End: 2023-06-22
Payer: COMMERCIAL

## 2023-06-22 LAB
FINAL PATHOLOGIC DIAGNOSIS: NORMAL
GROSS: NORMAL
Lab: NORMAL
SUPPLEMENTAL DIAGNOSIS: NORMAL

## 2023-06-26 NOTE — PROGRESS NOTES
[facility]   Physical Therapy Treatment Note      Name: Dov Tejeda Mary  Clinic Number: [id number]    Therapy Diagnosis:   Encounter Diagnoses   Name Primary?    Stiffness of left shoulder joint Yes    Stiffness of right shoulder joint     Weakness of left upper extremity     Weakness of right upper extremity     At risk for lymphedema     Abnormal posture      Physician: Jenna Pickard MD    Visit Date: 6/27/2023    Physician Orders: PT Eval and Treat   Medical Diagnosis from Referral: C50.811,Z17.0 (ICD-10-CM) - Malignant neoplasm of overlapping sites of right breast in female, estrogen receptor positive  Evaluation Date: 06/19/2023  Authorization Period Expiration: 12/31/2023  Plan of Care Expiration: 08/18/2023  Progress Note Due: 07/19/2023  Visit # / Visits Authorized: 1 / 20 (plus eval)  FOTO: 1/3     Precautions: standard, cancer, anxiety, latex allergy     Time In: 10:00 AM  Time Out: 10:55 AM  Total Billable Time: 55 minutes      Subjective     Patient reports: ongoing tightness through L chest / shoulder. Patient notes improving swelling through L chest wall with gradually improving LUE activity levels    She was compliant with home exercise program.    Response to Previous Treatment: no adverse events  Functional Change: able to bathe independently    Pain: 0/10  Location: N/A     Fatigue: mild    Diagnosis: Stage IB (cT2, cN0, cM0, G1, ER+, VA+, HER2: Equivocal) IDC with associated DCIS of the RIGHT breast.    Treatment:   Chemotherapy: planning adjuvant chemo (lasting approx 1 year)  Radiation: planning XRT following adjuvant chemo  Hormone Therapy: TBD pending genetics testing    Cancer-Related Surgery and Date: BILATERAL mastectomy with SLNB (1x) per Dr. Pickard and concurrent BILATERAL ROSITA flap reconstruction per Adal Roche and Lynda on 05/24/2023.      Objective      Objective Measures updated at progress report unless specified.     Treatment     Dov received the  treatments listed below:      Therapeutic exercises to increase cardiovascular endurance, flexibility, range of motion, and flexibility for 25 minutes:     Seated Exercises:  - Pulleys (flex, scap)    2 min each  - Upper trapezius stretch, iglesia   30 sec x 1 reps  - Levator scap stretch, iglesia   30 sec x 1 reps    Mat Exercises:  - Butterfly stretch (forehead)   1 set x 10 reps  - LTR c shoulder abd stretch (LUE)  10 sec x 10 reps  - PROM L shoulder ER   1 set x 10 reps     Standing Exercises:  - Doorway pec stretch    30 sec x 3 reps      Manual therapy techniques to decrease pain as well as to increase soft tissue mobility, joint mobility, mobility and pliability, and function for 15 minutes:     - Manual anterior chest attachment stretch  - Manual anterior chest layer mobilization  - Manual axillary stretch      Neuromuscular re-education activities to improve kinesthetic sense, proprioception, postural awareness, and scapular stabilization for 15 minutes:      Seated Exercises:  - Bilateral shoulder ER (red)   2 set x 10 reps  - Bilateral shoulder horizontal abd (red) 2 set x 10 reps    Standing Exercises:  - Shoulder rows (red)    3 set x 10 reps  - Shoulder extensions (red)   3 set x 10 reps      Patient Education and Home Exercises       Education Provided Regarding:   - Role of physical therapy in multi-disciplinary team  - Goals for physical therapy, progress towards goals  - Physical therapy plan of care, scheduling  - Energy conservation techniques  - Exercise technique, home exercise program    Written Home Exercises Provided: Patient instructed to cont prior HEP. Exercises were reviewed and Dov was able to demonstrate them prior to the end of the session. Dov demonstrated good  understanding of the education provided. See EMR under Patient Instructions for exercises provided during therapy sessions    Assessment     Patient is responding well to physical therapy. Patient able to perform new exercises  and exercise progressions without increase in symptoms prior to leaving the clinic. Improved scapular stabilization demonstrated with added resistance band exercises. Improved soft tissue mobility with manual techniques with noted approx 20 degree improvement in L shoulder abduction with LTR / abduction test at end of session. Improved flexibility with added cervical stretches. Provided red theraband. Will progress as tolerated.    Dov is progressing well towards her goals.   Patient prognosis is Good.     Patient will continue to benefit from skilled outpatient physical therapy to address the deficits listed in the problem list box on initial evaluation, provide patient / family education, and to maximize patient's level of independence in the home and community environment.     Patient's spiritual, cultural, and educational needs considered, and patient agreeable to plan of care and goals.     Anticipated barriers to physical therapy: none anticipated    GOALS:   Short Term Goals: 4 Weeks  Patient will demonstrate 100% understanding of lymphedema risk reduction practices, including self-monitoring for lymphedema (progressing, not met).  Patient will demonstrate independence with established home exercise program for improved strength, functional mobility, range of motion, posture, and endurance (progressing, not met).   Patient will increase BILATERAL shoulder FLEXION active range of motion to 155 degrees for improved independence with functional reaching, carrying, pushing, and pulling tasks (progressing, not met).   Patient will increase BILATERAL shoulder ABDUCTION active range of motion to 150 degrees for improved independence with functional reaching, carrying, pushing, and pulling tasks (progressing, not met).   Patient will increase gross BILATERAL upper extremity musculature to 4+/5 for improved independence with functional reaching, carrying, pushing, and pulling activities of daily living (progressing,  not met).      Long Term Goals: 8 Weeks   Patient will be independent with updated home exercise program for improved functional mobility, posture, strength, and endurance (progressing, not met).  Patient will increase BILATERAL shoulder FLEXION active range of motion to 180 degrees for improved independence with functional reaching, carrying, pushing, and pulling tasks (progressing, not met).   Patient will increase BILATERAL shoulder ABDUCTION active range of motion to 180 degrees for improved independence with functional reaching, carrying, pushing, and pulling tasks (progressing, not met).   Patient will increase gross BILATERAL upper extremity musculature to 5/5 for improved independence with functional reaching, carrying, pushing, and pulling activities of daily living (progressing, not met).   Patient will report decrease in overall worst pain to 2/10 at discharge for improved tolerance to functional reaching, carrying, pushing, and pulling activities of daily living (progressing, not met).  Patient will report compliance with walking program 5x/week for 10 minutes / day to improve overall cardiovascular function and decrease cancer-related fatigue at discharge (progressing, not met).      Plan     Plan of Care Certification: 6/19/2023 to 08/18/2023.    Outpatient Physical Therapy 2 times weekly for 8 weeks to include the following interventions: Gait Training, Manual Therapy, Neuromuscular Re-ed, Patient Education, Self Care, Therapeutic Activities, Therapeutic Exercise, and IASTM.     Ce Vieira, PT

## 2023-06-27 ENCOUNTER — CLINICAL SUPPORT (OUTPATIENT)
Dept: REHABILITATION | Facility: HOSPITAL | Age: 43
End: 2023-06-27
Payer: COMMERCIAL

## 2023-06-27 DIAGNOSIS — M25.612 STIFFNESS OF LEFT SHOULDER JOINT: Primary | ICD-10-CM

## 2023-06-27 DIAGNOSIS — R29.3 ABNORMAL POSTURE: ICD-10-CM

## 2023-06-27 DIAGNOSIS — R29.898 WEAKNESS OF LEFT UPPER EXTREMITY: ICD-10-CM

## 2023-06-27 DIAGNOSIS — M25.611 STIFFNESS OF RIGHT SHOULDER JOINT: ICD-10-CM

## 2023-06-27 DIAGNOSIS — Z91.89 AT RISK FOR LYMPHEDEMA: ICD-10-CM

## 2023-06-27 DIAGNOSIS — R29.898 WEAKNESS OF RIGHT UPPER EXTREMITY: ICD-10-CM

## 2023-06-27 PROCEDURE — 97140 MANUAL THERAPY 1/> REGIONS: CPT

## 2023-06-27 PROCEDURE — 97112 NEUROMUSCULAR REEDUCATION: CPT

## 2023-06-27 PROCEDURE — 97110 THERAPEUTIC EXERCISES: CPT

## 2023-06-30 ENCOUNTER — TELEPHONE (OUTPATIENT)
Dept: HEMATOLOGY/ONCOLOGY | Facility: CLINIC | Age: 43
End: 2023-06-30
Payer: COMMERCIAL

## 2023-06-30 NOTE — TELEPHONE ENCOUNTER
Outgoing call to patient regarding message below. Informed patient our pre services nurses will handle auth for treatment through insurance. No further issues at this time.     ----- Message from [place]ana Chevalier sent at 6/30/2023  9:18 AM CDT -----  Regarding: pt advice  Pt wnting to spk with Dr. Griffith's office re a RX her insurance co is not approving and request for internal and physician's review. Pt callback @ [phone].     RX for udenyca, pt says notice from [employer] says not a med necessity. Pt uses portal.

## 2023-07-03 ENCOUNTER — TELEPHONE (OUTPATIENT)
Dept: HEMATOLOGY/ONCOLOGY | Facility: CLINIC | Age: 43
End: 2023-07-03
Payer: COMMERCIAL

## 2023-07-03 NOTE — PROGRESS NOTES
[facility]   Physical Therapy Treatment Note      Name: Dov Tejeda Mary  Clinic Number: [id number]    Therapy Diagnosis:   Encounter Diagnoses   Name Primary?    Stiffness of left shoulder joint Yes    Stiffness of right shoulder joint     Weakness of left upper extremity     Weakness of right upper extremity     At risk for lymphedema     Abnormal posture      Physician: Jenna Pickard MD    Visit Date: 7/6/2023    Physician Orders: PT Eval and Treat   Medical Diagnosis from Referral: C50.811,Z17.0 (ICD-10-CM) - Malignant neoplasm of overlapping sites of right breast in female, estrogen receptor positive  Evaluation Date: 06/19/2023  Authorization Period Expiration: 12/31/2023  Plan of Care Expiration: 08/18/2023  Progress Note Due: 07/19/2023  Visit # / Visits Authorized: 2 / 20 (plus eval)  FOTO: 1/3     Precautions: standard, cancer, anxiety, latex allergy     Time In: 10:55 AM  Time Out: 11:50 AM  Total Billable Time: 55 minutes      Subjective     Patient reports: no new complaints. Tightness improving through L breast and armpit but continued tightness through abdomen. Patient to be released from plastic surgery next week but still wearing abdominal binder. Chemotherapy to begin next Thursday, planning 4 treatments q3 weeks.    She was compliant with home exercise program.    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    Treatment:   Chemotherapy: planning adjuvant chemo (lasting approx 1 year)  Radiation: planning XRT following adjuvant chemo  Hormone Therapy: TBD pending genetics testing    Cancer-Related Surgery and Date: BILATERAL mastectomy with SLNB (1x) per Dr. Pickard and concurrent BILATERAL ROSITA flap reconstruction per Adal Roche and Lynda on  05/24/2023.      Objective      Objective Measures updated at progress report unless specified.     Treatment     Dov received the treatments listed below:      Therapeutic exercises to increase cardiovascular endurance, flexibility, range of motion for 20 minutes:     Seated Exercises:  - Pulleys (flex, scap)    2 min each  - Thoracic ext over horizontal 1/2 foam 10 sec x 10 reps    Mat Exercises:  - Butterfly stretch (forehead)   1 min x 2 reps  - LTR c shoulder abd stretch (LUE)  10 sec x 10 reps  - Open book stretch    10 sec x 10 reps  - PROM L shoulder ER, IR   1 set x 10 reps      Manual therapy techniques to decrease pain as well as to increase soft tissue mobility, joint mobility, mobility and pliability, and function for 25 minutes:     - Manual anterior chest attachment stretch  - Manual anterior chest layer mobilization  - Manual axillary stretch      Neuromuscular re-education activities to improve kinesthetic sense, proprioception, postural awareness, and scapular stabilization for 10 minutes:      Seated Exercises:  - Bilateral shoulder ER (red)   2 set x 10 reps  - Bilateral shoulder horizontal abd (red) 2 set x 10 reps    Standing Exercises: emphasis on maintaining abdominal brace  - Shoulder rows (red)    3 set x 10 reps  - Shoulder extensions (red)   3 set x 10 reps    Mat Exercises:  - Abdominal bracing    5 sec x 3 min      Patient Education and Home Exercises       Education Provided Regarding:   - Role of physical therapy in multi-disciplinary team  - Goals for physical therapy, progress towards goals  - Physical therapy plan of care, scheduling  - Energy conservation techniques  - Exercise technique, home exercise program    Written Home Exercises Provided: yes. Exercises were reviewed and Dov was able to demonstrate them prior to the end of the session. Dov demonstrated good  understanding of the education provided. See EMR under Patient Instructions for exercises provided during  therapy sessions    Assessment     Patient is responding well to physical therapy. Patient able to perform new exercises and exercise progressions without increase in symptoms prior to leaving the clinic. Improved flexibility with added seated thoracic extension and open book stretches. Verbal, tactile cues required for proper transverse abdominis activation during abdominal bracing (good carryover). Will progress as tolerated.    Dov is progressing well towards her goals.   Patient prognosis is Good.     Patient will continue to benefit from skilled outpatient physical therapy to address the deficits listed in the problem list box on initial evaluation, provide patient / family education, and to maximize patient's level of independence in the home and community environment.     Patient's spiritual, cultural, and educational needs considered, and patient agreeable to plan of care and goals.     Anticipated barriers to physical therapy: none anticipated    GOALS:   Short Term Goals: 4 Weeks  Patient will demonstrate 100% understanding of lymphedema risk reduction practices, including self-monitoring for lymphedema (progressing, not met).  Patient will demonstrate independence with established home exercise program for improved strength, functional mobility, range of motion, posture, and endurance (progressing, not met).   Patient will increase BILATERAL shoulder FLEXION active range of motion to 155 degrees for improved independence with functional reaching, carrying, pushing, and pulling tasks (progressing, not met).   Patient will increase BILATERAL shoulder ABDUCTION active range of motion to 150 degrees for improved independence with functional reaching, carrying, pushing, and pulling tasks (progressing, not met).   Patient will increase gross BILATERAL upper extremity musculature to 4+/5 for improved independence with functional reaching, carrying, pushing, and pulling activities of daily living (progressing,  not met).      Long Term Goals: 8 Weeks   Patient will be independent with updated home exercise program for improved functional mobility, posture, strength, and endurance (progressing, not met).  Patient will increase BILATERAL shoulder FLEXION active range of motion to 180 degrees for improved independence with functional reaching, carrying, pushing, and pulling tasks (progressing, not met).   Patient will increase BILATERAL shoulder ABDUCTION active range of motion to 180 degrees for improved independence with functional reaching, carrying, pushing, and pulling tasks (progressing, not met).   Patient will increase gross BILATERAL upper extremity musculature to 5/5 for improved independence with functional reaching, carrying, pushing, and pulling activities of daily living (progressing, not met).   Patient will report decrease in overall worst pain to 2/10 at discharge for improved tolerance to functional reaching, carrying, pushing, and pulling activities of daily living (progressing, not met).  Patient will report compliance with walking program 5x/week for 10 minutes / day to improve overall cardiovascular function and decrease cancer-related fatigue at discharge (progressing, not met).      Plan     Plan of Care Certification: 06/19/2023 to 08/18/2023    Outpatient Physical Therapy 2 times weekly for 8 weeks to include the following interventions: Gait Training, Manual Therapy, Neuromuscular Re-ed, Patient Education, Self Care, Therapeutic Activities, Therapeutic Exercise, and IASTM.     Ce Vieira, PT

## 2023-07-06 ENCOUNTER — CLINICAL SUPPORT (OUTPATIENT)
Dept: REHABILITATION | Facility: HOSPITAL | Age: 43
End: 2023-07-06
Payer: COMMERCIAL

## 2023-07-06 DIAGNOSIS — R29.898 WEAKNESS OF LEFT UPPER EXTREMITY: ICD-10-CM

## 2023-07-06 DIAGNOSIS — Z91.89 AT RISK FOR LYMPHEDEMA: ICD-10-CM

## 2023-07-06 DIAGNOSIS — M25.612 STIFFNESS OF LEFT SHOULDER JOINT: Primary | ICD-10-CM

## 2023-07-06 DIAGNOSIS — R29.898 WEAKNESS OF RIGHT UPPER EXTREMITY: ICD-10-CM

## 2023-07-06 DIAGNOSIS — M25.611 STIFFNESS OF RIGHT SHOULDER JOINT: ICD-10-CM

## 2023-07-06 DIAGNOSIS — R29.3 ABNORMAL POSTURE: ICD-10-CM

## 2023-07-06 PROCEDURE — 97140 MANUAL THERAPY 1/> REGIONS: CPT

## 2023-07-06 PROCEDURE — 97112 NEUROMUSCULAR REEDUCATION: CPT

## 2023-07-06 PROCEDURE — 97110 THERAPEUTIC EXERCISES: CPT

## 2023-07-07 ENCOUNTER — OFFICE VISIT (OUTPATIENT)
Dept: HEMATOLOGY/ONCOLOGY | Facility: CLINIC | Age: 43
End: 2023-07-07
Payer: COMMERCIAL

## 2023-07-07 ENCOUNTER — PATIENT MESSAGE (OUTPATIENT)
Dept: HEMATOLOGY/ONCOLOGY | Facility: CLINIC | Age: 43
End: 2023-07-07
Payer: COMMERCIAL

## 2023-07-07 ENCOUNTER — PATIENT MESSAGE (OUTPATIENT)
Dept: HEMATOLOGY/ONCOLOGY | Facility: CLINIC | Age: 43
End: 2023-07-07

## 2023-07-07 ENCOUNTER — TELEPHONE (OUTPATIENT)
Dept: HEMATOLOGY/ONCOLOGY | Facility: CLINIC | Age: 43
End: 2023-07-07
Payer: COMMERCIAL

## 2023-07-07 VITALS
WEIGHT: 146.38 LBS | HEART RATE: 73 BPM | HEIGHT: 61 IN | DIASTOLIC BLOOD PRESSURE: 86 MMHG | TEMPERATURE: 99 F | OXYGEN SATURATION: 99 % | BODY MASS INDEX: 27.64 KG/M2 | SYSTOLIC BLOOD PRESSURE: 136 MMHG | RESPIRATION RATE: 18 BRPM

## 2023-07-07 DIAGNOSIS — C50.911 INVASIVE DUCTAL CARCINOMA OF BREAST, RIGHT: Primary | ICD-10-CM

## 2023-07-07 PROCEDURE — 3075F PR MOST RECENT SYSTOLIC BLOOD PRESS GE 130-139MM HG: ICD-10-PCS | Mod: CPTII,S$GLB,, | Performed by: INTERNAL MEDICINE

## 2023-07-07 PROCEDURE — 3079F PR MOST RECENT DIASTOLIC BLOOD PRESSURE 80-89 MM HG: ICD-10-PCS | Mod: CPTII,S$GLB,, | Performed by: INTERNAL MEDICINE

## 2023-07-07 PROCEDURE — 99215 PR OFFICE/OUTPT VISIT, EST, LEVL V, 40-54 MIN: ICD-10-PCS | Mod: S$GLB,,, | Performed by: INTERNAL MEDICINE

## 2023-07-07 PROCEDURE — 1159F MED LIST DOCD IN RCRD: CPT | Mod: CPTII,S$GLB,, | Performed by: INTERNAL MEDICINE

## 2023-07-07 PROCEDURE — 99999 PR PBB SHADOW E&M-EST. PATIENT-LVL III: CPT | Mod: PBBFAC,,, | Performed by: INTERNAL MEDICINE

## 2023-07-07 PROCEDURE — 1159F PR MEDICATION LIST DOCUMENTED IN MEDICAL RECORD: ICD-10-PCS | Mod: CPTII,S$GLB,, | Performed by: INTERNAL MEDICINE

## 2023-07-07 PROCEDURE — 99999 PR PBB SHADOW E&M-EST. PATIENT-LVL III: ICD-10-PCS | Mod: PBBFAC,,, | Performed by: INTERNAL MEDICINE

## 2023-07-07 PROCEDURE — 3008F PR BODY MASS INDEX (BMI) DOCUMENTED: ICD-10-PCS | Mod: CPTII,S$GLB,, | Performed by: INTERNAL MEDICINE

## 2023-07-07 PROCEDURE — 99215 OFFICE O/P EST HI 40 MIN: CPT | Mod: S$GLB,,, | Performed by: INTERNAL MEDICINE

## 2023-07-07 PROCEDURE — 3075F SYST BP GE 130 - 139MM HG: CPT | Mod: CPTII,S$GLB,, | Performed by: INTERNAL MEDICINE

## 2023-07-07 PROCEDURE — 3079F DIAST BP 80-89 MM HG: CPT | Mod: CPTII,S$GLB,, | Performed by: INTERNAL MEDICINE

## 2023-07-07 PROCEDURE — 3008F BODY MASS INDEX DOCD: CPT | Mod: CPTII,S$GLB,, | Performed by: INTERNAL MEDICINE

## 2023-07-07 NOTE — TELEPHONE ENCOUNTER
"----- Message from Regino Niño sent at 7/7/2023 11:49 AM CDT -----  Consult/Advisory:          Name Of Caller: Self      Contact Preference?: [phone]       Provider Name: Nacho      Does patient feel the need to be seen today? No      What is the nature of the call?: Inquiring about receiving a booster injection before starting chemo          Additional Notes:  "Thank you for all that you do for our patients"      "

## 2023-07-08 NOTE — PROGRESS NOTES
FOLLOW UP VISIT    Subjective:      Patient ID: Dov Hernandez is a 42 y.o. female.  MRN: [id number]  : 1980    History of Present Illness:   HPI   Dov Hernandez is a 42 y.o. female presents for right breast IDC.      She had a normal screening mammogram in . This year, she underwent a screening mammogram in 2023 that showed right breast calcifications, measured to be 39 mm on diagnostic mammogram.   She underwent a right breast biopsy that showed IDC, 2 mm, ER 30%, WA 30%, Her 2 karrie negative, Grade 1, low Ki 67 at 5% with associated DCIS.      Interim history:  She has had a breast MRI that showed a 2 cm mass. We have also discussed her case at our multi d breast tumor board.   She underwent b/l mastectomy with ROSITA flap reconstruction and right SLNB. Tumor size was 2.2 cm, / SLN was positive for metastatic carcinoma.     Case discussed at tumor board. Pros and cons of further ALND vs XRT discussed. She has a close follow up with Dr. Pickard and has seen Dr. Ken, has declined ALND and will proceed with XRT.     Oncotype returned with RS 29; RR 23%. Recovering well from surgery. Anxiety is well controlled. Presents with her mother today to discuss adjuvant chemotherapy with Taxotere/ Cytoxan. She is currently scheduled to start treatment next week.      Oncology History:  Oncology History   Malignant neoplasm of overlapping sites of right breast in female, estrogen receptor positive   2023 Biopsy    Breast, right, biopsy:   Invasive ductal carcinoma   Tumor size: 2 mm in this material   Overall Grade: grade 1     2023 Breast Tumor Markers    Estrogen: Positive (weak intensity 30%)  Progesterone: Positive (weak intensity 30%)  HER2: Negative   Ki67: 5%     2023 Genetic Testing    Negative, VUS x 1(SDHA)     2023 Imaging Significant Findings    MRI revealed 2 cm mass correlating with biopsy proven malignancy      2023 Initial Diagnosis    Malignant neoplasm of  overlapping sites of right breast in female, estrogen receptor positive     4/26/2023 Cancer Staged    Staging form: Breast, AJCC 8th Edition  - Clinical stage from 4/26/2023: Stage IB (cT2, cN0, cM0, G1, ER+, WI+, HER2: Equivocal)     5/2/2023 Tumor Conference       The team agreed to proceed with upfront surgery for further sampling of the tumor     5/24/2023 Breast Surgery    Bilateral mastectomy with ROSITA flap reconstruction and right SLNB     6/6/2023 Tumor Conference     There team discussed further axillary dissection versus proceeding with XRT. If ALND and no further positive nodes then no XRT would be recommended. If there are additional nodes, then she would be recommended for XRT.   Chemotherapy would be most likely recommended as well as adjuvant endocrine therapy   Shameka trial?        6/14/2023 Cancer Staged    Staging form: Breast, AJCC 8th Edition  - Pathologic stage from 6/14/2023: Stage IB (pT2, pN1(sn), cM0, G2, ER+, WI+, HER2-)     7/14/2023 -  Chemotherapy    Treatment Summary   Plan Name: OP BREAST TC - DOCETAXEL CYCLOPHOSPHAMIDE Q3W  Treatment Goal: Curative  Status: Active  Start Date: 7/14/2023 (Planned)  End Date: 9/16/2023 (Planned)  Provider: Jessica Griffith MD  Chemotherapy: cycloPHOSphamide 600 mg/m2 = 1,020 mg in sodium chloride 0.9% 255.1 mL chemo infusion, 600 mg/m2, Intravenous, Clinic/HOD 1 time, 0 of 4 cycles  DOCEtaxel (TAXOTERE) 75 mg/m2 = 128 mg in sodium chloride 0.9% 256.4 mL chemo infusion, 75 mg/m2, Intravenous, Clinic/HOD 1 time, 0 of 4 cycles        Past medical, surgical, family, and social history were reviewed today and there are no changes of note unless mentioned in HPI.   MEDS and ALLERGIES were reviewed with patient and meds reconciled.     History:  Past Medical History:   Diagnosis Date    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    OCP & aziza from derm, Doxy caused yeast    Heartburn 12/17/2021    Strawberry wai 1980    back of neck    Strawberry wai 1980  "   under nose      Past Surgical History:   Procedure Laterality Date    BILATERAL MASTECTOMY Bilateral 2023    Procedure: MASTECTOMY, BILATERAL;  Surgeon: Jenna Pickard MD;  Location: [facility];  Service: General;  Laterality: Bilateral;  2.5 HOURS / EMAIL SENT  @ 11:39 LK    COLPOSCOPY      INJECTION FOR SENTINEL NODE IDENTIFICATION Right 2023    Procedure: INJECTION, FOR SENTINEL NODE IDENTIFICATION;  Surgeon: Jenna Pickard MD;  Location: [facility];  Service: General;  Laterality: Right;    RECONSTRUCTION OF BREAST WITH DEEP INFERIOR EPIGASTRIC ARTERY  (ROSITA) FREE FLAP Bilateral 2023    Procedure: RECONSTRUCTION, BREAST, USING ROSITA FREE FLAP / BILATERAL DEEP INFERIOR EPIGASTRIC PERFORATORS FLAPS;  Surgeon: Mikey Roche MD;  Location: [facility];  Service: Plastics;  Laterality: Bilateral;    SENTINEL LYMPH NODE BIOPSY Right 2023    Procedure: BIOPSY, LYMPH NODE, SENTINEL;  Surgeon: Jenna Pickard MD;  Location: [facility];  Service: General;  Laterality: Right;    WISDOM TOOTH EXTRACTION       Family History   Problem Relation Age of Onset    Hyperlipidemia Mother     Kidney cancer Father 58    Cancer Brother 22        Parotid Gland cancer    Birth marks Child 0        strawberry wai(s)    Birth marks Child 0        strawberry wai(s)    Café-au-lait spots Maternal Uncle         "a small patch on his neck"    Café-au-lait spots Other         "a spot on his arm and partial torso"    Other Other 2        tested negative for macrocephaly    Cervical cancer Other     Throat cancer Other     Pancreatic cancer Other         1 second cousin ()    Other Other         brain tumors (several 2nd cousins)    Breast cancer Neg Hx     Colon cancer Neg Hx     Ovarian cancer Neg Hx     Melanoma Neg Hx       Social History     Tobacco Use    Smoking status: Never    Smokeless tobacco: Never   Substance and Sexual Activity    Alcohol use: Yes     Comment: social    Drug use: No    Sexual activity: " "Yes     Partners: Male     Birth control/protection: None        ROS:  Review of Systems   Constitutional:  Negative for fever, malaise/fatigue and weight loss.   HENT:  Negative for congestion, hearing loss, nosebleeds and sore throat.    Eyes:  Negative for double vision and photophobia.   Respiratory:  Negative for cough, hemoptysis, sputum production, shortness of breath and wheezing.    Cardiovascular:  Negative for chest pain, palpitations, orthopnea and leg swelling.   Gastrointestinal:  Negative for abdominal pain, blood in stool, constipation, diarrhea, heartburn, nausea and vomiting.   Genitourinary:  Negative for dysuria, hematuria and urgency.   Musculoskeletal:  Negative for back pain, joint pain and myalgias.   Skin:  Negative for itching and rash.   Neurological:  Negative for dizziness, tingling, seizures, weakness and headaches.   Endo/Heme/Allergies:  Negative for polydipsia. Does not bruise/bleed easily.   Psychiatric/Behavioral:  Negative for depression and memory loss. The patient is nervous/anxious. The patient does not have insomnia.      Objective:     Vitals:    07/07/23 1055   BP: 136/86   Pulse: 73   Resp: 18   Temp: 99.3 °F (37.4 °C)   TempSrc: Oral   SpO2: 99%   Weight: 66.4 kg (146 lb 6.2 oz)   Height: 5' 1" (1.549 m)   PainSc: 0-No pain     Wt Readings from Last 10 Encounters:   07/07/23 66.4 kg (146 lb 6.2 oz)   06/15/23 66.7 kg (147 lb)   05/25/23 68 kg (150 lb)   05/15/23 68 kg (150 lb)   04/25/23 69.4 kg (153 lb)   04/25/23 69.6 kg (153 lb 7 oz)   03/21/23 69.3 kg (152 lb 12.5 oz)   12/22/22 69.7 kg (153 lb 10.6 oz)   12/17/21 67.4 kg (148 lb 9.4 oz)   11/02/21 67.5 kg (148 lb 14.7 oz)       Physical Examination:   Constitutional: she is alert, pleasant, and does not appear to be in any physical distress   HENT: Mouth/Throat:  Tongue is midline without evidence of glossitis  Eyes: No obvious jaundice or conjunctivitis.  EOM are normal.   Pulmonary/Chest: No stridor noted. No excess " chest muscle movement.  Neurological: she is alert and oriented to person, place, and time. No cranial nerve deficit.  Skin:  No rash noted. No erythema.   Psychiatric: she has a normal mood and affect. Speech and memory normal.     Diagnostic Tests:  Significant Imaging: I have reviewed and interpreted all pertinent imaging results/findings.    Laboratory Data:  All pertinent labs have been reviewed.    Labs:   Lab Results   Component Value Date    WBC 8.71 05/25/2023    RBC 3.28 (L) 05/25/2023    HGB 10.9 (L) 05/25/2023    HCT 31.5 (L) 05/25/2023    MCV 96 05/25/2023     05/25/2023     05/25/2023     05/25/2023    K 3.7 05/25/2023    BUN 4 (L) 05/25/2023    CREATININE 0.7 05/25/2023    AST 17 04/25/2023    ALT 19 04/25/2023    BILITOT 0.5 04/25/2023     Assessment/Plan:   Malignant neoplasm of overlapping sites of right breast in female, estrogen receptor positive  cT2cN0, G1, ER/NH + 30%, Her 2 karrie negative Ki 67 5%   pT2 pN1a     Genetics VUS     We discussed that she has node positive disease on path.  Reviewed RS and oncotype. Recommend chemotherapy with Taxotere/ Cytoxan  Reviewed risks and side effects. Consent signed 7/7/23    Following chemotherapy, she will be offered XRT and then endocrine therapy with [facility].     Follow up with Dr Griffith next week     Anxiety  Feels that Effexor is helping. She also uses ativan infrequently.       ECOG SCORE           Discussion:   No follow-ups on file.    Plan was discussed with the patient at length, and she verbalized understanding. Dov was given an opportunity to ask questions that were answered to her satisfaction, and she was advised to call in the interval if any problems or questions arise.  Discussed COVID-19 and social distancing in great detail, avoid all non-essential visits out of the home if possible and avoid sick contacts.     Electronically signed by Lidia Osman MD     MDM includes  :    - Acute or chronic illness or injury that  poses a threat to life or bodily function  - Review of prior external notes from unique source  - Independent review and explanation of 3= results from unique tests  - Extensive discussion of treatment and management  - Drug therapy requiring intensive monitoring for toxicity      Route Chart for Scheduling    Med Onc Chart Routing      Follow up with physician . Follow up with Dr Griffith next week   Follow up with IDA    Infusion scheduling note    Injection scheduling note    Labs    Imaging    Pharmacy appointment    Other referrals            Treatment Plan Information   OP BREAST TC - DOCETAXEL CYCLOPHOSPHAMIDE Q3W   Jessica Griffith MD   Upcoming Treatment Dates - OP BREAST TC - DOCETAXEL CYCLOPHOSPHAMIDE Q3W    7/14/2023       Pre-Medications       palonosetron (ALOXI) 0.25 mg with Dexamethasone (DECADRON) 10 mg in NS 50 mL IVPB       aprepitant (CINVANTI) injection 130 mg       Chemotherapy       DOCEtaxel (TAXOTERE) 75 mg/m2 = 128 mg in sodium chloride 0.9% 256.4 mL chemo infusion       cycloPHOSphamide 600 mg/m2 = 1,020 mg in sodium chloride 0.9% 255.1 mL chemo infusion       Antiemetics       prochlorperazine injection Soln 10 mg  7/15/2023       Growth Factor       pegfilgrastim-jmdb (FULPHILA) injection 6 mg  8/4/2023       Pre-Medications       aprepitant (CINVANTI) injection 130 mg       palonosetron (ALOXI) 0.25 mg with Dexamethasone (DECADRON) 10 mg in NS 50 mL IVPB       Chemotherapy       DOCEtaxel (TAXOTERE) 75 mg/m2 = 128 mg in sodium chloride 0.9% 256.4 mL chemo infusion       cycloPHOSphamide 600 mg/m2 = 1,020 mg in sodium chloride 0.9% 255.1 mL chemo infusion       Antiemetics       prochlorperazine injection Soln 10 mg  8/5/2023       Growth Factor       pegfilgrastim-jmdb (FULPHILA) injection 6 mg

## 2023-07-11 ENCOUNTER — PATIENT MESSAGE (OUTPATIENT)
Dept: HEMATOLOGY/ONCOLOGY | Facility: CLINIC | Age: 43
End: 2023-07-11
Payer: COMMERCIAL

## 2023-07-11 ENCOUNTER — TELEPHONE (OUTPATIENT)
Dept: HEMATOLOGY/ONCOLOGY | Facility: CLINIC | Age: 43
End: 2023-07-11
Payer: COMMERCIAL

## 2023-07-11 ENCOUNTER — HOSPITAL ENCOUNTER (OUTPATIENT)
Dept: RADIOLOGY | Facility: HOSPITAL | Age: 43
Discharge: HOME | End: 2023-07-11
Attending: INTERNAL MEDICINE
Payer: COMMERCIAL

## 2023-07-11 DIAGNOSIS — Z17.0 MALIGNANT NEOPLASM OF OVERLAPPING SITES OF RIGHT BREAST IN FEMALE, ESTROGEN RECEPTOR POSITIVE: ICD-10-CM

## 2023-07-11 DIAGNOSIS — C50.811 MALIGNANT NEOPLASM OF OVERLAPPING SITES OF RIGHT BREAST IN FEMALE, ESTROGEN RECEPTOR POSITIVE: ICD-10-CM

## 2023-07-11 DIAGNOSIS — C50.911 INVASIVE DUCTAL CARCINOMA OF BREAST, RIGHT: Primary | ICD-10-CM

## 2023-07-11 LAB — POCT GLUCOSE: 95 MG/DL (ref 70–110)

## 2023-07-11 PROCEDURE — 78815 PET IMAGE W/CT SKULL-THIGH: CPT | Mod: TC

## 2023-07-11 PROCEDURE — 78815 NM PET CT ROUTINE: ICD-10-PCS | Mod: 26,PI,, | Performed by: STUDENT IN AN ORGANIZED HEALTH CARE EDUCATION/TRAINING PROGRAM

## 2023-07-11 PROCEDURE — 78815 PET IMAGE W/CT SKULL-THIGH: CPT | Mod: 26,PI,, | Performed by: STUDENT IN AN ORGANIZED HEALTH CARE EDUCATION/TRAINING PROGRAM

## 2023-07-11 NOTE — PROGRESS NOTES
FOLLOW UP VISIT    Subjective:      Patient ID: Dov Hernandez is a 42 y.o. female.  MRN: [id number]  : 1980    History of Present Illness:   HPI   Dov Hernandez is a 42 y.o. female presents for right breast IDC.  she is here for chemo education today.  She is feeling generally well.      Per Dr. Griffith's note:   She had a normal screening mammogram in . This year, she underwent a screening mammogram in 2023 that showed right breast calcifications, measured to be 39 mm on diagnostic mammogram.   She underwent a right breast biopsy that showed IDC, 2 mm, ER 30%, WV 30%, Her 2 karrie negative, Grade 1, low Ki 67 at 5% with associated DCIS.      Interim history:  She has had a breast MRI that showed a 2 cm mass. We have also discussed her case at our multi d breast tumor board.   She underwent b/l mastectomy with ROSITA flap reconstruction and right SLNB. Tumor size was 2.2 cm, 1/ SLN was positive for metastatic carcinoma.     Case discussed at tumor board. Pros and cons of further ALND vs XRT discussed. She has a close follow up with Dr. Pickard and has seen Dr. Ken, has declined ALND and will proceed with XRT.     Oncotype returned with RS 29; RR 23%. Recovering well from surgery. Anxiety is well controlled. Presents with her mother today to discuss adjuvant chemotherapy with Taxotere/ Cytoxan. She is currently scheduled to start treatment next week.      Oncology History:  Oncology History   Malignant neoplasm of overlapping sites of right breast in female, estrogen receptor positive   2023 Biopsy    Breast, right, biopsy:   Invasive ductal carcinoma   Tumor size: 2 mm in this material   Overall Grade: grade 1     2023 Breast Tumor Markers    Estrogen: Positive (weak intensity 30%)  Progesterone: Positive (weak intensity 30%)  HER2: Negative   Ki67: 5%     2023 Genetic Testing    Negative, VUS x 1(SDHA)     2023 Imaging Significant Findings    MRI revealed 2 cm mass  correlating with biopsy proven malignancy      4/26/2023 Initial Diagnosis    Malignant neoplasm of overlapping sites of right breast in female, estrogen receptor positive     4/26/2023 Cancer Staged    Staging form: Breast, AJCC 8th Edition  - Clinical stage from 4/26/2023: Stage IB (cT2, cN0, cM0, G1, ER+, NM+, HER2: Equivocal)     5/2/2023 Tumor Conference       The team agreed to proceed with upfront surgery for further sampling of the tumor     5/24/2023 Breast Surgery    Bilateral mastectomy with ROSITA flap reconstruction and right SLNB     6/6/2023 Tumor Conference     There team discussed further axillary dissection versus proceeding with XRT. If ALND and no further positive nodes then no XRT would be recommended. If there are additional nodes, then she would be recommended for XRT.   Chemotherapy would be most likely recommended as well as adjuvant endocrine therapy   Shameka trial?        6/14/2023 Cancer Staged    Staging form: Breast, AJCC 8th Edition  - Pathologic stage from 6/14/2023: Stage IB (pT2, pN1(sn), cM0, G2, ER+, NM+, HER2-)     7/14/2023 -  Chemotherapy    Treatment Summary   Plan Name: OP BREAST TC - DOCETAXEL CYCLOPHOSPHAMIDE Q3W  Treatment Goal: Curative  Status: Active  Start Date: 7/14/2023 (Planned)  End Date: 9/16/2023 (Planned)  Provider: Jessica Griffith MD  Chemotherapy: cycloPHOSphamide 600 mg/m2 = 1,020 mg in sodium chloride 0.9% 255.1 mL chemo infusion, 600 mg/m2, Intravenous, Clinic/HOD 1 time, 0 of 4 cycles  DOCEtaxel (TAXOTERE) 75 mg/m2 = 128 mg in sodium chloride 0.9% 256.4 mL chemo infusion, 75 mg/m2, Intravenous, Clinic/HOD 1 time, 0 of 4 cycles        Past medical, surgical, family, and social history were reviewed today and there are no changes of note unless mentioned in HPI.   MEDS and ALLERGIES were reviewed with patient and meds reconciled.     History:  Past Medical History:   Diagnosis Date    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    OCP & aziza from derm, Doxy caused yeast  "   Heartburn 2021    Strawberry wai 1981    back of neck    Strawberry wai 1981    under nose      Past Surgical History:   Procedure Laterality Date    BILATERAL MASTECTOMY Bilateral 2023    Procedure: MASTECTOMY, BILATERAL;  Surgeon: Jenna Pickard MD;  Location: [facility];  Service: General;  Laterality: Bilateral;  2.5 HOURS / EMAIL SENT  @ 11:39 LK    COLPOSCOPY      INJECTION FOR SENTINEL NODE IDENTIFICATION Right 2023    Procedure: INJECTION, FOR SENTINEL NODE IDENTIFICATION;  Surgeon: Jenna Pickard MD;  Location: [facility];  Service: General;  Laterality: Right;    RECONSTRUCTION OF BREAST WITH DEEP INFERIOR EPIGASTRIC ARTERY  (ROSITA) FREE FLAP Bilateral 2023    Procedure: RECONSTRUCTION, BREAST, USING ROSITA FREE FLAP / BILATERAL DEEP INFERIOR EPIGASTRIC PERFORATORS FLAPS;  Surgeon: Mikey Roche MD;  Location: [facility];  Service: Plastics;  Laterality: Bilateral;    SENTINEL LYMPH NODE BIOPSY Right 2023    Procedure: BIOPSY, LYMPH NODE, SENTINEL;  Surgeon: Jenna Pickard MD;  Location: [facility];  Service: General;  Laterality: Right;    WISDOM TOOTH EXTRACTION       Family History   Problem Relation Age of Onset    Hyperlipidemia Mother     Kidney cancer Father 58    Cancer Brother 22        Parotid Gland cancer    Birth marks Child 0        strawberry wai(s)    Birth marks Child 0        strawberry wai(s)    Café-au-lait spots Maternal Uncle         "a small patch on his neck"    Café-au-lait spots Other         "a spot on his arm and partial torso"    Other Other 2        tested negative for macrocephaly    Cervical cancer Other     Throat cancer Other     Pancreatic cancer Other         1 second cousin ()    Other Other         brain tumors (several 2nd cousins)    Breast cancer Neg Hx     Colon cancer Neg Hx     Ovarian cancer Neg Hx     Melanoma Neg Hx       Social History     Tobacco Use    Smoking status: Never    Smokeless tobacco: Never " "  Substance and Sexual Activity    Alcohol use: Yes     Comment: social    Drug use: No    Sexual activity: Yes     Partners: Male     Birth control/protection: None        ROS:  Review of Systems   Constitutional:  Negative for fever, malaise/fatigue and weight loss.   HENT:  Negative for congestion, hearing loss, nosebleeds and sore throat.    Eyes:  Negative for double vision and photophobia.   Respiratory:  Negative for cough, hemoptysis, sputum production, shortness of breath and wheezing.    Cardiovascular:  Negative for chest pain, palpitations, orthopnea and leg swelling.   Gastrointestinal:  Negative for abdominal pain, blood in stool, constipation, diarrhea, heartburn, nausea and vomiting.   Genitourinary:  Negative for dysuria, hematuria and urgency.   Musculoskeletal:  Negative for back pain, joint pain and myalgias.   Skin:  Negative for itching and rash.   Neurological:  Negative for dizziness, tingling, seizures, weakness and headaches.   Endo/Heme/Allergies:  Negative for polydipsia. Does not bruise/bleed easily.   Psychiatric/Behavioral:  Negative for depression and memory loss. The patient is not nervous/anxious and does not have insomnia.      Objective:     Vitals:    07/12/23 0819   BP: 131/84   Pulse: 85   Resp: 18   Temp: 98.1 °F (36.7 °C)   TempSrc: Oral   SpO2: 99%   Weight: 67.1 kg (147 lb 14.9 oz)   Height: 5' 1" (1.549 m)   PainSc: 0-No pain       Wt Readings from Last 10 Encounters:   07/12/23 67.1 kg (147 lb 14.9 oz)   07/07/23 66.4 kg (146 lb 6.2 oz)   06/15/23 66.7 kg (147 lb)   05/25/23 68 kg (150 lb)   05/15/23 68 kg (150 lb)   04/25/23 69.4 kg (153 lb)   04/25/23 69.6 kg (153 lb 7 oz)   03/21/23 69.3 kg (152 lb 12.5 oz)   12/22/22 69.7 kg (153 lb 10.6 oz)   12/17/21 67.4 kg (148 lb 9.4 oz)       Physical Examination:   Physical Exam  Vitals reviewed.   Constitutional:       General: She is not in acute distress.     Appearance: Normal appearance. She is not ill-appearing.   HENT: "      Head: Normocephalic and atraumatic.   Pulmonary:      Effort: Pulmonary effort is normal. No respiratory distress.   Musculoskeletal:         General: Normal range of motion.      Cervical back: Normal range of motion.   Neurological:      Mental Status: She is alert and oriented to person, place, and time.   Psychiatric:         Mood and Affect: Mood normal.         Behavior: Behavior normal.         Thought Content: Thought content normal.        Diagnostic Tests:  Significant Imaging: I have reviewed and interpreted all pertinent imaging results/findings.    Laboratory Data:  All pertinent labs have been reviewed.    Labs:   Lab Results   Component Value Date    WBC 8.71 05/25/2023    RBC 3.28 (L) 05/25/2023    HGB 10.9 (L) 05/25/2023    HCT 31.5 (L) 05/25/2023    MCV 96 05/25/2023     05/25/2023     05/25/2023     05/25/2023    K 3.7 05/25/2023    BUN 4 (L) 05/25/2023    CREATININE 0.7 05/25/2023    AST 17 04/25/2023    ALT 19 04/25/2023    BILITOT 0.5 04/25/2023     Assessment/Plan:   Malignant neoplasm of overlapping sites of right breast in female, estrogen receptor positive  cT2cN0, G1, ER/OK + 30%, Her 2 karrie negative Ki 67 5%   pT2 pN1a     Genetics VUS   PET from 7/11/23 shows no hypermetabolic lesions worrisome for residual/recurrent disease or metastasis.    We discussed that she has node positive disease on path.  Reviewed RS and oncotype. Recommend chemotherapy with Taxotere/ Cytoxan  Reviewed risks and side effects. Consent signed 7/7/23    Following chemotherapy, she will be offered XRT and then endocrine therapy with OFS.     Dov Hernandez was consented for chemotherapy/immunotherapy to treat breast cancer. Dov Hernandez was consented by Dr Osman to receive docetaxel/cyclphosphamide.     2. Patient was education on what to expect when receiving chemotherapy including: checking in, receiving an ID band, 1 guest allowed in the infusion suite during infusion, can  alternate people as well, pole going with you once you are hooked up, warm blankets are available, you may bring lunch and snacks, minimal snacks are available, take medications as regularly unless told otherwise, warm blankets, will be available. Education about what to expect during their chemo cycle and how often their regimen is given.     3. An extensive discussion was had which included a thorough discussion of the risk and benefits of treatment and alternatives.  Risks, including but not limited to, possible hair loss, bone marrow damage (anemia, thrombocytopenia, immune suppression, neutropenia), damage to body organs (brain, heart, liver, kidney, lungs, nervous system, skin, and others), allergic reactions, sterility, nausea/vomiting, constipation/diarrhea, sores in the mouth, secondary cancers, local damage at possible injection sites, and rarely death were all discussed. Specific side effects pertaining to their chemotherapy/immunotherapy medications were discussed as well.The patient agrees with the plan, and all questions and their support system's questions have been answered to their satisfaction. Contraindications and potential side effects discussed as listed in micromedx.     4. Patient was given binder which includes: contact information for the [facility], an immunotherapy side effect guide (if applicable to patient), resources including, but not limited to: women's wellness, acupuncture, physical therapy, , urgent care within oncology and financial assistance.     5. Patient was educated on when to call (and given the numbers to call and knows to message via [facility] if possible) or notify the provider including, but not limited to:   Persistent Nausea and/or Vomiting  Dehydration  Persistent Diarrhea  Fever of 100.4 > 1 hour in duration or any isolated fever > 101   Rash (while on active chemotherapy or immunotherapy)   Severe pain or new onset pain not controlled by  current medication regimen  Or any other symptom you feel is related to your current hematology or oncology treatment    6. Patient was provided with additional resources that [facility] offers including, but not limited to: financial counseling, , psychologist, palliative care, support groups, transportation, dietician, rehab services, women's wellness and urgent care visits within the oncology department.     7. Patient was offered and signed up for chemo care companion. Educated on daily vital signs and daily questionnaire.     8. Patient has an established PCP    9. Patient was offered a virtual visit or a phone call 1 week post their Cycle 1 Day 1 chemotherapy and agreed or declined.     10. Advance Care Planning     Date: 07/12/2023    Living Will  During this visit, I engaged the patient  in the voluntary advance care planning process.  The patient and I reviewed the role for advance directives and their purpose in directing future healthcare if the patient's unable to speak for him/herself.  She was given the living will and will bring home to discuss with family and maurice out.  I spent a total of 5 minutes engaging the patient in this advance care planning discussion.       Power of   I initiated the process of voluntary advance care planning today and explained the importance of this process to the patient.  I introduced the concept of advance directives to the patient, as well. The HCPOA handout was given to patient today.  She will go home and further discuss with family         Patient will Proceed with cycle 1 day 1 of TC. Patient will be seen ~1 week for a post chemo visit with IDA.     Anxiety  Feels that Effexor is helping. She also uses ativan infrequently.       ECOG SCORE    0 - Fully active-able to carry on all pre-disease performance without restriction       Discussion:   No follow-ups on file.    Plan was discussed with the patient at length, and she verbalized understanding.  Dov was given an opportunity to ask questions that were answered to her satisfaction, and she was advised to call in the interval if any problems or questions arise.  Discussed COVID-19 and social distancing in great detail, avoid all non-essential visits out of the home if possible and avoid sick contacts.     Electronically signed by Lalitha Zamarripa NP       Route Chart for Scheduling    Med Onc Chart Routing      Follow up with physician 3 weeks.   Follow up with IDA 6 weeks.   Infusion scheduling note   every 3 weeks x4, first infusion scheduled for 7/13/23   Injection scheduling note neulasta every 3 weeks on Day 2 of each cycle   Labs CBC, CMP and B HCG   Scheduling:  Preferred lab:  Lab interval:  cbc, cmp, hcg every 3 weeks one day prior to infusion at [facility]   Imaging    Pharmacy appointment    Other referrals            Treatment Plan Information   OP BREAST TC - DOCETAXEL CYCLOPHOSPHAMIDE Q3W   Jessica Griffith MD   Upcoming Treatment Dates - OP BREAST TC - DOCETAXEL CYCLOPHOSPHAMIDE Q3W    7/14/2023       Pre-Medications       palonosetron (ALOXI) 0.25 mg with Dexamethasone (DECADRON) 10 mg in NS 50 mL IVPB       aprepitant (CINVANTI) injection 130 mg       Chemotherapy       DOCEtaxel (TAXOTERE) 75 mg/m2 = 128 mg in sodium chloride 0.9% 256.4 mL chemo infusion       cycloPHOSphamide 600 mg/m2 = 1,020 mg in sodium chloride 0.9% 255.1 mL chemo infusion       Antiemetics       prochlorperazine injection Soln 10 mg  7/15/2023       Growth Factor       pegfilgrastim-jmdb (FULPHILA) injection 6 mg  8/4/2023       Pre-Medications       aprepitant (CINVANTI) injection 130 mg       palonosetron (ALOXI) 0.25 mg with Dexamethasone (DECADRON) 10 mg in NS 50 mL IVPB       Chemotherapy       DOCEtaxel (TAXOTERE) 75 mg/m2 = 128 mg in sodium chloride 0.9% 256.4 mL chemo infusion       cycloPHOSphamide 600 mg/m2 = 1,020 mg in sodium chloride 0.9% 255.1 mL chemo infusion       Antiemetics        prochlorperazine injection Soln 10 mg  8/5/2023       Growth Factor       pegfilgrastim-jmdb (FULPHILA) injection 6 mg      Total time of this visit, including time spent face to face with patient and/or via video/audio, and also in preparing for today's visit for MDM and documentation. (Medical Decision Making, including consideration of possible diagnoses, management options, complex medical record review, review of diagnostic tests and information, consideration and discussion of significant complications based on comorbidities, and discussion with providers involved with the care of the patient) is 60 minutes. Greater than 50% was spent face to face with the patient counseling and coordinating care.    Lalitha Zamarripa, NAHED

## 2023-07-11 NOTE — NURSING
Patient phoned regarding Integrative resources, therapies and supportive care. Gynecologic support, SMV's and ancillary services reviewed in length. Coping with Cancer series discussed. Symptoms and concerns treated within each program outlined. Patient desires to proceed with SMV 7/18/23. Acupuncture appeal discussed with patient, cancer care advocate for her insurance will be phoned per patient and my contact information can be given for collaborative benefit. Patient appreciative. Desires dietitian referral for overall gut health during chemotherapy and risk reduction, referral placed per protocol, CR[facility] Meol.

## 2023-07-12 ENCOUNTER — CLINICAL SUPPORT (OUTPATIENT)
Dept: HEMATOLOGY/ONCOLOGY | Facility: CLINIC | Age: 43
End: 2023-07-12
Payer: COMMERCIAL

## 2023-07-12 ENCOUNTER — PATIENT MESSAGE (OUTPATIENT)
Dept: HEMATOLOGY/ONCOLOGY | Facility: CLINIC | Age: 43
End: 2023-07-12

## 2023-07-12 ENCOUNTER — OFFICE VISIT (OUTPATIENT)
Dept: HEMATOLOGY/ONCOLOGY | Facility: CLINIC | Age: 43
End: 2023-07-12
Payer: COMMERCIAL

## 2023-07-12 ENCOUNTER — CLINICAL SUPPORT (OUTPATIENT)
Dept: REHABILITATION | Facility: HOSPITAL | Age: 43
End: 2023-07-12
Payer: COMMERCIAL

## 2023-07-12 ENCOUNTER — LAB VISIT (OUTPATIENT)
Dept: LAB | Facility: HOSPITAL | Age: 43
End: 2023-07-12
Attending: INTERNAL MEDICINE
Payer: COMMERCIAL

## 2023-07-12 VITALS
TEMPERATURE: 98 F | RESPIRATION RATE: 18 BRPM | SYSTOLIC BLOOD PRESSURE: 131 MMHG | HEART RATE: 85 BPM | BODY MASS INDEX: 27.93 KG/M2 | HEIGHT: 61 IN | WEIGHT: 147.94 LBS | DIASTOLIC BLOOD PRESSURE: 84 MMHG | OXYGEN SATURATION: 99 %

## 2023-07-12 VITALS — WEIGHT: 147.94 LBS | HEIGHT: 61 IN | BODY MASS INDEX: 27.93 KG/M2

## 2023-07-12 DIAGNOSIS — C50.911 INVASIVE DUCTAL CARCINOMA OF BREAST, RIGHT: ICD-10-CM

## 2023-07-12 DIAGNOSIS — Z17.0 MALIGNANT NEOPLASM OF OVERLAPPING SITES OF RIGHT BREAST IN FEMALE, ESTROGEN RECEPTOR POSITIVE: ICD-10-CM

## 2023-07-12 DIAGNOSIS — C50.811 MALIGNANT NEOPLASM OF OVERLAPPING SITES OF RIGHT BREAST IN FEMALE, ESTROGEN RECEPTOR POSITIVE: Primary | ICD-10-CM

## 2023-07-12 DIAGNOSIS — Z17.0 MALIGNANT NEOPLASM OF OVERLAPPING SITES OF RIGHT BREAST IN FEMALE, ESTROGEN RECEPTOR POSITIVE: Primary | ICD-10-CM

## 2023-07-12 DIAGNOSIS — C50.811 MALIGNANT NEOPLASM OF OVERLAPPING SITES OF RIGHT BREAST IN FEMALE, ESTROGEN RECEPTOR POSITIVE: ICD-10-CM

## 2023-07-12 DIAGNOSIS — Z71.3 NUTRITIONAL COUNSELING: Primary | ICD-10-CM

## 2023-07-12 DIAGNOSIS — C77.9 SECONDARY ADENOCARCINOMA OF LYMPH NODE: ICD-10-CM

## 2023-07-12 DIAGNOSIS — F41.9 ANXIETY: ICD-10-CM

## 2023-07-12 DIAGNOSIS — R06.6 HICCUPS: Primary | ICD-10-CM

## 2023-07-12 LAB
ALBUMIN SERPL BCP-MCNC: 3.9 G/DL (ref 3.5–5.2)
ALP SERPL-CCNC: 57 U/L (ref 55–135)
ALT SERPL W/O P-5'-P-CCNC: 75 U/L (ref 10–44)
ANION GAP SERPL CALC-SCNC: 10 MMOL/L (ref 8–16)
AST SERPL-CCNC: 47 U/L (ref 10–40)
BASOPHILS # BLD AUTO: 0.03 K/UL (ref 0–0.2)
BASOPHILS NFR BLD: 0.6 % (ref 0–1.9)
BILIRUB SERPL-MCNC: 0.4 MG/DL (ref 0.1–1)
BUN SERPL-MCNC: 9 MG/DL (ref 6–20)
CALCIUM SERPL-MCNC: 9.7 MG/DL (ref 8.7–10.5)
CHLORIDE SERPL-SCNC: 104 MMOL/L (ref 95–110)
CO2 SERPL-SCNC: 27 MMOL/L (ref 23–29)
CREAT SERPL-MCNC: 0.7 MG/DL (ref 0.5–1.4)
DIFFERENTIAL METHOD: ABNORMAL
EOSINOPHIL # BLD AUTO: 0.1 K/UL (ref 0–0.5)
EOSINOPHIL NFR BLD: 2.3 % (ref 0–8)
ERYTHROCYTE [DISTWIDTH] IN BLOOD BY AUTOMATED COUNT: 11.9 % (ref 11.5–14.5)
EST. GFR  (NO RACE VARIABLE): >60 ML/MIN/1.73 M^2
GLUCOSE SERPL-MCNC: 92 MG/DL (ref 70–110)
HCG INTACT+B SERPL-ACNC: <2.4 MIU/ML
HCG INTACT+B SERPL-ACNC: <2.4 MIU/ML
HCT VFR BLD AUTO: 39.2 % (ref 37–48.5)
HGB BLD-MCNC: 13 G/DL (ref 12–16)
IMM GRANULOCYTES # BLD AUTO: 0.02 K/UL (ref 0–0.04)
IMM GRANULOCYTES NFR BLD AUTO: 0.4 % (ref 0–0.5)
LYMPHOCYTES # BLD AUTO: 1.7 K/UL (ref 1–4.8)
LYMPHOCYTES NFR BLD: 32.7 % (ref 18–48)
MCH RBC QN AUTO: 31.2 PG (ref 27–31)
MCHC RBC AUTO-ENTMCNC: 33.2 G/DL (ref 32–36)
MCV RBC AUTO: 94 FL (ref 82–98)
MONOCYTES # BLD AUTO: 0.7 K/UL (ref 0.3–1)
MONOCYTES NFR BLD: 13.5 % (ref 4–15)
NEUTROPHILS # BLD AUTO: 2.6 K/UL (ref 1.8–7.7)
NEUTROPHILS NFR BLD: 50.5 % (ref 38–73)
NRBC BLD-RTO: 0 /100 WBC
PLATELET # BLD AUTO: 256 K/UL (ref 150–450)
PMV BLD AUTO: 9.7 FL (ref 9.2–12.9)
POTASSIUM SERPL-SCNC: 4.4 MMOL/L (ref 3.5–5.1)
PROT SERPL-MCNC: 7.1 G/DL (ref 6–8.4)
RBC # BLD AUTO: 4.17 M/UL (ref 4–5.4)
SODIUM SERPL-SCNC: 141 MMOL/L (ref 136–145)
WBC # BLD AUTO: 5.2 K/UL (ref 3.9–12.7)

## 2023-07-12 PROCEDURE — 84702 CHORIONIC GONADOTROPIN TEST: CPT | Performed by: INTERNAL MEDICINE

## 2023-07-12 PROCEDURE — 99499 UNLISTED E&M SERVICE: CPT | Mod: S$GLB,,, | Performed by: NURSE PRACTITIONER

## 2023-07-12 PROCEDURE — 1159F PR MEDICATION LIST DOCUMENTED IN MEDICAL RECORD: ICD-10-PCS | Mod: CPTII,S$GLB,, | Performed by: INTERNAL MEDICINE

## 2023-07-12 PROCEDURE — 36415 COLL VENOUS BLD VENIPUNCTURE: CPT | Performed by: INTERNAL MEDICINE

## 2023-07-12 PROCEDURE — 99999 PR PBB SHADOW E&M-EST. PATIENT-LVL IV: ICD-10-PCS | Mod: PBBFAC,,, | Performed by: NURSE PRACTITIONER

## 2023-07-12 PROCEDURE — 99215 PR OFFICE/OUTPT VISIT, EST, LEVL V, 40-54 MIN: ICD-10-PCS | Mod: S$GLB,,, | Performed by: INTERNAL MEDICINE

## 2023-07-12 PROCEDURE — 99999 PR PBB SHADOW E&M-EST. PATIENT-LVL II: CPT | Mod: PBBFAC,,, | Performed by: INTERNAL MEDICINE

## 2023-07-12 PROCEDURE — 99999 PR PBB SHADOW E&M-EST. PATIENT-LVL IV: CPT | Mod: PBBFAC,,, | Performed by: NURSE PRACTITIONER

## 2023-07-12 PROCEDURE — 97802 MEDICAL NUTRITION INDIV IN: CPT | Mod: 95,,, | Performed by: DIETITIAN, REGISTERED

## 2023-07-12 PROCEDURE — 80053 COMPREHEN METABOLIC PANEL: CPT | Performed by: INTERNAL MEDICINE

## 2023-07-12 PROCEDURE — 99215 OFFICE O/P EST HI 40 MIN: CPT | Mod: S$GLB,,, | Performed by: INTERNAL MEDICINE

## 2023-07-12 PROCEDURE — 1160F RVW MEDS BY RX/DR IN RCRD: CPT | Mod: CPTII,S$GLB,, | Performed by: INTERNAL MEDICINE

## 2023-07-12 PROCEDURE — 1160F PR REVIEW ALL MEDS BY PRESCRIBER/CLIN PHARMACIST DOCUMENTED: ICD-10-PCS | Mod: CPTII,S$GLB,, | Performed by: INTERNAL MEDICINE

## 2023-07-12 PROCEDURE — 97802 PR MED NUTR THER, 1ST, INDIV, EA 15 MIN: ICD-10-PCS | Mod: 95,,, | Performed by: DIETITIAN, REGISTERED

## 2023-07-12 PROCEDURE — 97811 ACUP 1/> W/O ESTIM EA ADD 15: CPT | Performed by: ACUPUNCTURIST

## 2023-07-12 PROCEDURE — 99499 NO LOS: ICD-10-PCS | Mod: S$GLB,,, | Performed by: NURSE PRACTITIONER

## 2023-07-12 PROCEDURE — 97810 ACUP 1/> WO ESTIM 1ST 15 MIN: CPT | Performed by: ACUPUNCTURIST

## 2023-07-12 PROCEDURE — 99999 PR PBB SHADOW E&M-EST. PATIENT-LVL II: ICD-10-PCS | Mod: PBBFAC,,, | Performed by: INTERNAL MEDICINE

## 2023-07-12 PROCEDURE — 1159F MED LIST DOCD IN RCRD: CPT | Mod: CPTII,S$GLB,, | Performed by: INTERNAL MEDICINE

## 2023-07-12 PROCEDURE — 85025 COMPLETE CBC W/AUTO DIFF WBC: CPT | Performed by: INTERNAL MEDICINE

## 2023-07-12 RX ORDER — EPINEPHRINE 0.3 MG/.3ML
0.3 INJECTION SUBCUTANEOUS ONCE AS NEEDED
Status: CANCELLED | OUTPATIENT
Start: 2023-07-14

## 2023-07-12 RX ORDER — OLANZAPINE 5 MG/1
5 TABLET ORAL NIGHTLY
Qty: 4 TABLET | Refills: 3 | Status: SHIPPED | OUTPATIENT
Start: 2023-07-13 | End: 2023-07-17 | Stop reason: SDUPTHER

## 2023-07-12 RX ORDER — DEXAMETHASONE 4 MG/1
8 TABLET ORAL SEE ADMIN INSTRUCTIONS
Qty: 12 TABLET | Refills: 3 | Status: SHIPPED | OUTPATIENT
Start: 2023-07-13 | End: 2023-07-17 | Stop reason: SDUPTHER

## 2023-07-12 RX ORDER — ONDANSETRON 8 MG/1
8 TABLET, ORALLY DISINTEGRATING ORAL EVERY 6 HOURS PRN
Qty: 30 TABLET | Refills: 2 | Status: SHIPPED | OUTPATIENT
Start: 2023-07-12 | End: 2023-07-17 | Stop reason: SDUPTHER

## 2023-07-12 RX ORDER — DIPHENHYDRAMINE HYDROCHLORIDE 50 MG/ML
50 INJECTION INTRAMUSCULAR; INTRAVENOUS ONCE AS NEEDED
Status: CANCELLED | OUTPATIENT
Start: 2023-07-14

## 2023-07-12 RX ORDER — PROCHLORPERAZINE MALEATE 10 MG
10 TABLET ORAL EVERY 6 HOURS PRN
Qty: 30 TABLET | Refills: 3 | Status: SHIPPED | OUTPATIENT
Start: 2023-07-13 | End: 2023-07-17 | Stop reason: SDUPTHER

## 2023-07-12 RX ORDER — HEPARIN 100 UNIT/ML
500 SYRINGE INTRAVENOUS
Status: CANCELLED | OUTPATIENT
Start: 2023-07-14

## 2023-07-12 RX ORDER — PROCHLORPERAZINE EDISYLATE 5 MG/ML
10 INJECTION INTRAMUSCULAR; INTRAVENOUS ONCE AS NEEDED
Status: CANCELLED
Start: 2023-07-14

## 2023-07-12 RX ORDER — SODIUM CHLORIDE 0.9 % (FLUSH) 0.9 %
10 SYRINGE (ML) INJECTION
Status: CANCELLED | OUTPATIENT
Start: 2023-07-14

## 2023-07-12 NOTE — PROGRESS NOTES
FOLLOW UP  VISIT    Subjective:      Patient ID: Dov Hernandez is a 42 y.o. female.  MRN: [id number]  : 1980    History of Present Illness:   HPI   Dov Hernandez is a 42 y.o. female who is referred for right breast IDC.        She had a normal screening mammogram in . This year, she underwent a screening mammogram in March that showed right breast calcifications, measured to be 39 mm on diagnostic mammogram.   She underwent a right breast biopsy that showed IDC, 2 mm, ER 30%, MD 30%, Her 2 karrie negative, Grade 1, low Ki 67 at 5% with associated DCIS.      Interim history:  She has had a breast MRI that showed a 2 cm mass. We have also discussed her case at our multi d breast tumor board.   She underwent b/l mastectomy with ROSITA flap reconstruction and right SLNB. Tumor size was 2.2 cm, / SLN was positive for metastatic carcinoma.     Case discussed at tumor board. Pros and cons of further ALND vs XRT discussed. She has a close follow up with Dr. Pickard and has seen Dr. Ken, has declined ALND and will proceed with XRT.     Oncotype returned with RS 29; RR 23%. Adjuvant chemotherapy is recommended. She is here to obtain clearance for cycle 1.     Baseline PET reviewed with patient and her mother, no residual or metastatic disease noted.        Oncology History:  Oncology History   Malignant neoplasm of overlapping sites of right breast in female, estrogen receptor positive   2023 Biopsy    Breast, right, biopsy:   Invasive ductal carcinoma   Tumor size: 2 mm in this material   Overall Grade: grade 1     2023 Breast Tumor Markers    Estrogen: Positive (weak intensity 30%)  Progesterone: Positive (weak intensity 30%)  HER2: Negative   Ki67: 5%     2023 Genetic Testing    Negative, VUS x 1(SDHA)     2023 Imaging Significant Findings    MRI revealed 2 cm mass correlating with biopsy proven malignancy      2023 Initial Diagnosis    Malignant neoplasm of overlapping sites  of right breast in female, estrogen receptor positive     4/26/2023 Cancer Staged    Staging form: Breast, AJCC 8th Edition  - Clinical stage from 4/26/2023: Stage IB (cT2, cN0, cM0, G1, ER+, MD+, HER2: Equivocal)     5/2/2023 Tumor Conference       The team agreed to proceed with upfront surgery for further sampling of the tumor     5/24/2023 Breast Surgery    Bilateral mastectomy with ROSITA flap reconstruction and right SLNB     6/6/2023 Tumor Conference     There team discussed further axillary dissection versus proceeding with XRT. If ALND and no further positive nodes then no XRT would be recommended. If there are additional nodes, then she would be recommended for XRT.   Chemotherapy would be most likely recommended as well as adjuvant endocrine therapy   Shameka trial?        6/14/2023 Cancer Staged    Staging form: Breast, AJCC 8th Edition  - Pathologic stage from 6/14/2023: Stage IB (pT2, pN1(sn), cM0, G2, ER+, MD+, HER2-)     7/14/2023 -  Chemotherapy    Treatment Summary   Plan Name: OP BREAST TC - DOCETAXEL CYCLOPHOSPHAMIDE Q3W  Treatment Goal: Curative  Status: Active  Start Date: 7/14/2023 (Planned)  End Date: 9/16/2023 (Planned)  Provider: Jessica Griffith MD  Chemotherapy: cycloPHOSphamide 600 mg/m2 = 1,020 mg in sodium chloride 0.9% 255.1 mL chemo infusion, 600 mg/m2, Intravenous, Clinic/HOD 1 time, 0 of 4 cycles  DOCEtaxel (TAXOTERE) 75 mg/m2 = 128 mg in sodium chloride 0.9% 256.4 mL chemo infusion, 75 mg/m2, Intravenous, Clinic/HOD 1 time, 0 of 4 cycles        Past medical, surgical, family, and social history were reviewed today and there are no changes of note unless mentioned in HPI.   MEDS and ALLERGIES were reviewed with patient and meds reconciled.     History:  Past Medical History:   Diagnosis Date    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    OCP & aziza from derm, Doxy caused yeast    Heartburn 12/17/2021    Strawberry wai 1981    back of neck    Strawberry wai 1981    under nose     "  Past Surgical History:   Procedure Laterality Date    BILATERAL MASTECTOMY Bilateral 2023    Procedure: MASTECTOMY, BILATERAL;  Surgeon: Jenna Pickard MD;  Location: [facility];  Service: General;  Laterality: Bilateral;  2.5 HOURS / EMAIL SENT  @ 11:39 LK    COLPOSCOPY      INJECTION FOR SENTINEL NODE IDENTIFICATION Right 2023    Procedure: INJECTION, FOR SENTINEL NODE IDENTIFICATION;  Surgeon: Jenna Pickard MD;  Location: [facility];  Service: General;  Laterality: Right;    RECONSTRUCTION OF BREAST WITH DEEP INFERIOR EPIGASTRIC ARTERY  (ROSITA) FREE FLAP Bilateral 2023    Procedure: RECONSTRUCTION, BREAST, USING ROSITA FREE FLAP / BILATERAL DEEP INFERIOR EPIGASTRIC PERFORATORS FLAPS;  Surgeon: Mikey Roche MD;  Location: [facility];  Service: Plastics;  Laterality: Bilateral;    SENTINEL LYMPH NODE BIOPSY Right 2023    Procedure: BIOPSY, LYMPH NODE, SENTINEL;  Surgeon: Jenna Pickard MD;  Location: [facility];  Service: General;  Laterality: Right;    WISDOM TOOTH EXTRACTION       Family History   Problem Relation Age of Onset    Hyperlipidemia Mother     Kidney cancer Father 58    Cancer Brother 22        Parotid Gland cancer    Birth marks Child 0        strawberry wai(s)    Birth marks Child 0        strawberry wai(s)    Café-au-lait spots Maternal Uncle         "a small patch on his neck"    Café-au-lait spots Other         "a spot on his arm and partial torso"    Other Other 2        tested negative for macrocephaly    Cervical cancer Other     Throat cancer Other     Pancreatic cancer Other         1 second cousin ()    Other Other         brain tumors (several 2nd cousins)    Breast cancer Neg Hx     Colon cancer Neg Hx     Ovarian cancer Neg Hx     Melanoma Neg Hx       Social History     Tobacco Use    Smoking status: Never    Smokeless tobacco: Never   Substance and Sexual Activity    Alcohol use: Yes     Comment: social    Drug use: No    Sexual activity: Yes     " Partners: Male     Birth control/protection: None        ROS:  Review of Systems   Constitutional:  Negative for fever, malaise/fatigue and weight loss.   HENT:  Negative for congestion, hearing loss, nosebleeds and sore throat.    Eyes:  Negative for double vision and photophobia.   Respiratory:  Negative for cough, hemoptysis, sputum production, shortness of breath and wheezing.    Cardiovascular:  Negative for chest pain, palpitations, orthopnea and leg swelling.   Gastrointestinal:  Negative for abdominal pain, blood in stool, constipation, diarrhea, heartburn, nausea and vomiting.   Genitourinary:  Negative for dysuria, hematuria and urgency.   Musculoskeletal:  Negative for back pain, joint pain and myalgias.   Skin:  Negative for itching and rash.   Neurological:  Negative for dizziness, tingling, seizures, weakness and headaches.   Endo/Heme/Allergies:  Negative for polydipsia. Does not bruise/bleed easily.   Psychiatric/Behavioral:  Negative for depression and memory loss. The patient is nervous/anxious. The patient does not have insomnia.      Objective:     There were no vitals filed for this visit.    Wt Readings from Last 10 Encounters:   07/12/23 67.1 kg (147 lb 14.9 oz)   07/07/23 66.4 kg (146 lb 6.2 oz)   06/15/23 66.7 kg (147 lb)   05/25/23 68 kg (150 lb)   05/15/23 68 kg (150 lb)   04/25/23 69.4 kg (153 lb)   04/25/23 69.6 kg (153 lb 7 oz)   03/21/23 69.3 kg (152 lb 12.5 oz)   12/22/22 69.7 kg (153 lb 10.6 oz)   12/17/21 67.4 kg (148 lb 9.4 oz)       Physical Examination:   Constitutional: she is alert, pleasant, and does not appear to be in any physical distress   HENT: Mouth/Throat:  Tongue is midline without evidence of glossitis  Eyes: No obvious jaundice or conjunctivitis.  EOM are normal.   Pulmonary/Chest: No stridor noted. No excess chest muscle movement.  Neurological: she is alert and oriented to person, place, and time. No cranial nerve deficit.  Skin:  No rash noted. No erythema.    Psychiatric: she has a normal mood and affect. Speech and memory normal.     Diagnostic Tests:  Significant Imaging: I have reviewed and interpreted all pertinent imaging results/findings.    Laboratory Data:  All pertinent labs have been reviewed.    Labs:   Lab Results   Component Value Date    WBC 5.20 07/12/2023    RBC 4.17 07/12/2023    HGB 13.0 07/12/2023    HCT 39.2 07/12/2023    MCV 94 07/12/2023     07/12/2023    GLU 92 07/12/2023     07/12/2023    K 4.4 07/12/2023    BUN 9 07/12/2023    CREATININE 0.7 07/12/2023    AST 47 (H) 07/12/2023    ALT 75 (H) 07/12/2023    BILITOT 0.4 07/12/2023     Assessment/Plan:   Malignant neoplasm of overlapping sites of right breast in female, estrogen receptor positive  cT2cN0, G1, ER/NJ + 30%, Her 2 karrie negative Ki 67 5%   pT2 pN1a     Genetics VUS     We discussed that she has node positive disease on path.She is agreeable to proceed with XRT in the adjuvant setting. Dr. Ken note was appreciated.      As for adjuvant therapy, given she is premenopausal and node +, has High risk Oncotype, adjuvant chemotherapy is appropriate. See prior notes for discussion. Baseline PET scan is reassuring. Sub cm pulmonary nodules noted, repeat scan as clinically indicated.     I have recommended adjuvant chemotherapy with Docetaxel and cytoxan q 3 weeks x 4, via PIV.   Consents signed last week.   Following chemotherapy, she will be offered XRT and then endocrine therapy with OFS.     Check labs today, will complete chemo class today. She does have cool cap. Supportive care discussed at length, anti emetics sent. Monitor closely and if stable, will return in 3 weeks, prior to cycle 2.     Transaminitis   Isolated elevated of ALT, minimal elevation of AST noted.   She denies OTC medications, excessive etoh or Tylenol use. Confirmed with repeat, will dose reduce docetaxel by 20% and monitor closely. No liver parenchymal abnormalities on PET.     Anxiety  Improving, on  Effexor. Continue to monitor.        I spent 66 min on this encounter.     ECOG SCORE           Discussion:   No follow-ups on file.    Plan was discussed with the patient at length, and she verbalized understanding. Dov was given an opportunity to ask questions that were answered to her satisfaction, and she was advised to call in the interval if any problems or questions arise.  Discussed COVID-19 and social distancing in great detail, avoid all non-essential visits out of the home if possible and avoid sick contacts.     Electronically signed by Jessica Griffith MD     Route Chart for Scheduling    Med Onc Chart Routing  Urgent    Follow up with physician . 8/2,8/23,9/13   Follow up with IDA    Infusion scheduling note   8/3,8/24,9/14   Injection scheduling note 8/4,8/25,9/15   Labs CBC and CMP   Scheduling:  Preferred lab:  Lab interval:  hcg, 8/2,8/23,9/13   Imaging    Pharmacy appointment    Other referrals            Treatment Plan Information   OP BREAST TC - DOCETAXEL CYCLOPHOSPHAMIDE Q3W   Jessica Griffith MD   Upcoming Treatment Dates - OP BREAST TC - DOCETAXEL CYCLOPHOSPHAMIDE Q3W    7/14/2023       Pre-Medications       palonosetron (ALOXI) 0.25 mg with Dexamethasone (DECADRON) 10 mg in NS 50 mL IVPB       aprepitant (CINVANTI) injection 130 mg       Chemotherapy       DOCEtaxel (TAXOTERE) 75 mg/m2 = 128 mg in sodium chloride 0.9% 256.4 mL chemo infusion       cycloPHOSphamide 600 mg/m2 = 1,020 mg in sodium chloride 0.9% 255.1 mL chemo infusion       Antiemetics       prochlorperazine injection Soln 10 mg  7/15/2023       Growth Factor       pegfilgrastim-jmdb (FULPHILA) injection 6 mg  8/4/2023       Pre-Medications       aprepitant (CINVANTI) injection 130 mg       palonosetron (ALOXI) 0.25 mg with Dexamethasone (DECADRON) 10 mg in NS 50 mL IVPB       Chemotherapy       DOCEtaxel (TAXOTERE) 75 mg/m2 = 128 mg in sodium chloride 0.9% 256.4 mL chemo infusion       cycloPHOSphamide 600 mg/m2 = 1,020 mg  in sodium chloride 0.9% 255.1 mL chemo infusion       Antiemetics       prochlorperazine injection Soln 10 mg  8/5/2023       Growth Factor       pegfilgrastim-jmdb (FULPHILA) injection 6 mg

## 2023-07-12 NOTE — PROGRESS NOTES
The patient location is: [place]   The chief complaint leading to consultation is: newly diagnoses breast cancer     Visit type: audiovisual    Face to Face time with patient: 25 minutes   40 minutes of total time spent on the encounter, which includes face to face time and non-face to face time preparing to see the patient (eg, review of tests), Obtaining and/or reviewing separately obtained history, Documenting clinical information in the electronic or other health record, Independently interpreting results (not separately reported) and communicating results to the patient/family/caregiver, or Care coordination (not separately reported).     Each patient to whom he or she provides medical services by telemedicine is:  (1) informed of the relationship between the physician and patient and the respective role of any other health care provider with respect to management of the patient; and (2) notified that he or she may decline to receive medical services by telemedicine and may withdraw from such care at any time.    Oncology Nutrition Assessment for Medical Nutrition Therapy  Initial Visit    Dov Nikhil Hernandez   1980    Referring Provider:  Ramona Urbano, *      Reason for Visit: Pt in for education and nutrition counseling     PMHx:   Past Medical History:   Diagnosis Date    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    OCP & aziza from derm, Doxy caused yeast    Heartburn 12/17/2021    Strawberry wai 1981    back of neck    Strawberry wai 1981    under nose       Nutrition Assessment    This is a 42 y.o.female with newly diagnosed breast cancer s/p bilateral mastectomy. Now planned for adjuvant chemotherapy with Taxotere/Cytoxan then radiation. Referred to nutrition through integrative department for overall gut health during chemotherapy and risk reduction.   She would like to know what to eat during treatment to help provide energy, balance her gut, and provide overall well being. She  "currently eats well and is not picky about fruits or vegetables. She drinks a lot of water and occasional iced tea. No sweetened beverages.     Weight:67.1 kg (147 lb 14.9 oz)  Height:5' 1" (1.549 m)  BMI:Body mass index is 27.95 kg/m².   IBW: Ideal body weight: 47.8 kg (105 lb 6.1 oz)  Adjusted ideal body weight: 55.5 kg (122 lb 6.4 oz)    Allergies: Egg derived and Latex, natural rubber    Current Medications:    Current Outpatient Medications:     [START ON 7/13/2023] dexAMETHasone (DECADRON) 4 MG Tab, Take 2 tablets (8 mg total) by mouth As instructed. take 2 tablets (8 mg) by mouth twice daily on the day before chemotherapy and the day after chemotherapy (day 2) then 2 tablets (8 mg) once daily on days 3 and 4 following chemotherapy (will receive IV dexamethasone on day of chemotherapy), Disp: 12 tablet, Rfl: 3    duke's soln (benadryl 30 mL, mylanta 30 mL, LIDOcaine 30 mL, nystatin 30 mL) 120mL, Take 10 mLs by mouth 4 (four) times daily., Disp: 120 mL, Rfl: 6    LORazepam (ATIVAN) 0.5 MG tablet, Take 1 tablet (0.5 mg total) by mouth every 12 (twelve) hours as needed for Anxiety. (Patient not taking: Reported on 6/21/2023), Disp: 30 tablet, Rfl: 0    [START ON 7/13/2023] OLANZapine (ZYPREXA) 5 MG tablet, Take 1 tablet (5 mg total) by mouth every evening. days 1-4 of each chemotherapy cycle., Disp: 4 tablet, Rfl: 3    ondansetron (ZOFRAN-ODT) 8 MG TbDL, Take 1 tablet (8 mg total) by mouth every 6 (six) hours as needed., Disp: 30 tablet, Rfl: 2    [START ON 7/13/2023] prochlorperazine (COMPAZINE) 10 MG tablet, Take 1 tablet (10 mg total) by mouth every 6 (six) hours as needed for Nausea., Disp: 30 tablet, Rfl: 3    venlafaxine (EFFEXOR XR) 37.5 MG 24 hr capsule, Take 1 capsule (37.5 mg total) by mouth once daily., Disp: 30 capsule, Rfl: 2    Vitamins/Supplements: probiotics, multi ([employer]), caltrate      Labs: Reviewed from 5/25    Nutrition Diagnosis    Problem: nutrition related knowledge deficit "   Etiology (related to): lack of prior need for nutrition education  Signs/Symptoms (as evidenced by):  new cancer diagnosis about to start chemotherapy     Nutrition Intervention    Nutrition Prescription   1650 Kcals (25kcal/kg)  67-80 g protein (1-1.2g/kg)   2000 mL fluid (30mL/kg)    Recommendations:  High protein plant based diet   Discussed plant based protein sources if she can't tolerate meat and seafood during treatment   Prebiotic foods like bananas, onions, garlic, asparagus  Probiotic foods like yogurt, kefir, sour dough bread  Ok to continue current supplements   Cold foods and malcom if experiencing nausea     Materials Provided/Reviewed   Recipes, websites for additional meal ideas   Food safety     Nutrition Monitoring and Evaluation    Monitor: energy intake, diet tolerance , and diet education needs     Goals: maintain healthy, high quality diet during treatment     Follow up  encouraged to follow up through portal with questions/concerns and we'll schedule additional visits PRN    Communication to referring provider/care team: note available in chart     Counseling time: 30 Minutes    Eliane Mendoza, MPH, RD, , LDN, FAND   [phone]

## 2023-07-12 NOTE — PROGRESS NOTES
"  Acupuncture Evaluation Note     Name: Dov Tejeda Mary  Clinic Number: [id number]    Traditional Chinese Medicine (TCM) Diagnosis: Qi Stagnation and Blood Deficiency  Medical Diagnosis:   Encounter Diagnosis   Name Primary?    Invasive ductal carcinoma of breast, right         Evaluation Date: 7/12/2023    Visit #/Visits authorized: 1/12     Precautions: cancer    Subjective     Chief Concern: Patient is beginning chemotherapy treatment tomorrow. Acupuncture for CIPN, CINV and any other possible side effects from chemo and radiation treatment.     Medical necessity is demonstrated by the following IMPAIRMENTS: Medical Necessity: Cancer related pain                HEENT:  no complaints    Chest:  no complaints    Digestion:     Diet: in general, a "healthy" diet     Fluids: coffee 1 /day, is drinking plenty of fluids, social drinker  Taste/Appetite: good, normal    Symptoms: none    Sleep: sleep is good     Energy Levels:  good    Psychological Symptoms:  no complaints     Other Symptoms: n/a    GYN Symptoms: LMP 5/28/2023 and not since. Hx regular, no pain, no clots.     Objective     Observation:     Tongue:      Body:  scalloped   Color:   dusky with red   Coating:  thin,  and white,    Pulse:        wiry and slippery       New Findings:      Treatment     Treatment Principles:  Move qi, nourish qi and blood    Acupuncture points used:      Bilateral points: LI11, LI4, ST36, SP9, SP6, LV3  Unilateral points: SP4, KD6, LU7, PC6, BL60, GB41  Auricular Treatment:      Needles In: 18  Needles Out: 18  Needles W/O STIM placed: 2:45  Needles W/O STIM removed: 3:20      Other Traditional Chinese Medicine Modalities -     Assessment     After treatment, patient felt good     Patient prognosis is Good.     Patient will continue to benefit from acupuncture treatment to address the deficits listed in the problem list box on initial evaluation, provide patient family education and to maximize pt's level of independence in " the home and community environment.     Patient's spiritual, cultural and educational needs considered and pt agreeable to plan of care and goals.     Anticipated barriers to treatment: none    Plan     Recommend 1 /week for 6 sessions then re-assess.      Education:  Patient is aware of cumulative benefit of acupuncture

## 2023-07-13 ENCOUNTER — PATIENT MESSAGE (OUTPATIENT)
Dept: HEMATOLOGY/ONCOLOGY | Facility: CLINIC | Age: 43
End: 2023-07-13
Payer: COMMERCIAL

## 2023-07-13 ENCOUNTER — INFUSION (OUTPATIENT)
Dept: INFUSION THERAPY | Facility: HOSPITAL | Age: 43
End: 2023-07-13
Payer: COMMERCIAL

## 2023-07-13 VITALS
WEIGHT: 147.69 LBS | OXYGEN SATURATION: 100 % | RESPIRATION RATE: 18 BRPM | HEART RATE: 74 BPM | BODY MASS INDEX: 27.91 KG/M2 | DIASTOLIC BLOOD PRESSURE: 72 MMHG | SYSTOLIC BLOOD PRESSURE: 132 MMHG | TEMPERATURE: 98 F

## 2023-07-13 DIAGNOSIS — Z17.0 MALIGNANT NEOPLASM OF OVERLAPPING SITES OF RIGHT BREAST IN FEMALE, ESTROGEN RECEPTOR POSITIVE: Primary | ICD-10-CM

## 2023-07-13 DIAGNOSIS — C50.811 MALIGNANT NEOPLASM OF OVERLAPPING SITES OF RIGHT BREAST IN FEMALE, ESTROGEN RECEPTOR POSITIVE: Primary | ICD-10-CM

## 2023-07-13 PROCEDURE — 96367 TX/PROPH/DG ADDL SEQ IV INF: CPT

## 2023-07-13 PROCEDURE — 96375 TX/PRO/DX INJ NEW DRUG ADDON: CPT

## 2023-07-13 PROCEDURE — 96417 CHEMO IV INFUS EACH ADDL SEQ: CPT

## 2023-07-13 PROCEDURE — 25000003 PHARM REV CODE 250: Performed by: INTERNAL MEDICINE

## 2023-07-13 PROCEDURE — 63600175 PHARM REV CODE 636 W HCPCS: Performed by: INTERNAL MEDICINE

## 2023-07-13 PROCEDURE — 96413 CHEMO IV INFUSION 1 HR: CPT

## 2023-07-13 RX ORDER — PROCHLORPERAZINE EDISYLATE 5 MG/ML
10 INJECTION INTRAMUSCULAR; INTRAVENOUS ONCE AS NEEDED
Status: DISCONTINUED | OUTPATIENT
Start: 2023-07-13 | End: 2023-07-13 | Stop reason: HOSPADM

## 2023-07-13 RX ORDER — HEPARIN 100 UNIT/ML
500 SYRINGE INTRAVENOUS
Status: DISCONTINUED | OUTPATIENT
Start: 2023-07-13 | End: 2023-07-13 | Stop reason: HOSPADM

## 2023-07-13 RX ORDER — DIPHENHYDRAMINE HYDROCHLORIDE 50 MG/ML
50 INJECTION INTRAMUSCULAR; INTRAVENOUS ONCE AS NEEDED
Status: COMPLETED | OUTPATIENT
Start: 2023-07-13 | End: 2023-07-13

## 2023-07-13 RX ORDER — SODIUM CHLORIDE 0.9 % (FLUSH) 0.9 %
10 SYRINGE (ML) INJECTION
Status: DISCONTINUED | OUTPATIENT
Start: 2023-07-13 | End: 2023-07-13 | Stop reason: HOSPADM

## 2023-07-13 RX ORDER — EPINEPHRINE 0.3 MG/.3ML
0.3 INJECTION SUBCUTANEOUS ONCE AS NEEDED
Status: DISCONTINUED | OUTPATIENT
Start: 2023-07-13 | End: 2023-07-13 | Stop reason: HOSPADM

## 2023-07-13 RX ORDER — FAMOTIDINE 10 MG/ML
20 INJECTION INTRAVENOUS
Status: COMPLETED | OUTPATIENT
Start: 2023-07-13 | End: 2023-07-13

## 2023-07-13 RX ADMIN — APREPITANT 130 MG: 130 INJECTION, EMULSION INTRAVENOUS at 11:07

## 2023-07-13 RX ADMIN — SODIUM CHLORIDE: 9 INJECTION, SOLUTION INTRAVENOUS at 11:07

## 2023-07-13 RX ADMIN — CYCLOPHOSPHAMIDE 1000 MG: 200 INJECTION, SOLUTION INTRAVENOUS at 02:07

## 2023-07-13 RX ADMIN — HYDROCORTISONE SODIUM SUCCINATE 100 MG: 100 INJECTION, POWDER, FOR SOLUTION INTRAMUSCULAR; INTRAVENOUS at 12:07

## 2023-07-13 RX ADMIN — FAMOTIDINE 20 MG: 10 INJECTION INTRAVENOUS at 12:07

## 2023-07-13 RX ADMIN — DOCETAXEL ANHYDROUS 100 MG: 10 INJECTION, SOLUTION INTRAVENOUS at 12:07

## 2023-07-13 RX ADMIN — DIPHENHYDRAMINE HYDROCHLORIDE 25 MG: 50 INJECTION, SOLUTION INTRAMUSCULAR; INTRAVENOUS at 12:07

## 2023-07-13 RX ADMIN — DEXAMETHASONE SODIUM PHOSPHATE 0.25 MG: 4 INJECTION, SOLUTION INTRA-ARTICULAR; INTRALESIONAL; INTRAMUSCULAR; INTRAVENOUS; SOFT TISSUE at 11:07

## 2023-07-13 NOTE — PLAN OF CARE
1711 pt tolerated taxotere (after restart see notes), and cytoxan and cool cap without issue, pt to rtc 7/13/23 for Neulatsa , no distress noted upon d/c to home with mother

## 2023-07-13 NOTE — NURSING
1206 3 minutes into Taxotere, pt with sudden back pain, flushing and pt states seeing stars, taxotere stopped, pt given solucortef 100 mg, Benadryl 25 mg IVP BP= 141/79 HR=86 , Dr. Griffith notified, continue to monitor    1210 dr griffith aware, pt feeling better, flushing has subsided, no more back pain XR=884/80 HR=88, will give pepcid 20 mg IV per dr griffith    1220 pepcid 20 mg IV given, will monitor, spoke to Dr. Griffith will rechallenge at half the rate when pt recovers     1252 restart Taxotere at 148 ml/hr will monitor    1320 Taxotere to full rate, pt tolerating well, /66 HR=77 R=18, continue to monitor

## 2023-07-13 NOTE — PLAN OF CARE
1030 pt here for D1C1 T/C infusion, pt to wear cooling cap, labs, hx, meds, allergies reviewed, pt with no new complaints at this time, reclined in chair, continue to monitor

## 2023-07-14 ENCOUNTER — PATIENT MESSAGE (OUTPATIENT)
Dept: HEMATOLOGY/ONCOLOGY | Facility: CLINIC | Age: 43
End: 2023-07-14
Payer: COMMERCIAL

## 2023-07-14 ENCOUNTER — INFUSION (OUTPATIENT)
Dept: INFUSION THERAPY | Facility: HOSPITAL | Age: 43
End: 2023-07-14
Payer: COMMERCIAL

## 2023-07-14 DIAGNOSIS — C50.811 MALIGNANT NEOPLASM OF OVERLAPPING SITES OF RIGHT BREAST IN FEMALE, ESTROGEN RECEPTOR POSITIVE: Primary | ICD-10-CM

## 2023-07-14 DIAGNOSIS — Z17.0 MALIGNANT NEOPLASM OF OVERLAPPING SITES OF RIGHT BREAST IN FEMALE, ESTROGEN RECEPTOR POSITIVE: Primary | ICD-10-CM

## 2023-07-14 PROCEDURE — 96372 THER/PROPH/DIAG INJ SC/IM: CPT

## 2023-07-14 PROCEDURE — 63600175 PHARM REV CODE 636 W HCPCS: Mod: JZ,JG | Performed by: INTERNAL MEDICINE

## 2023-07-14 RX ORDER — BACLOFEN 10 MG/1
10 TABLET ORAL 3 TIMES DAILY PRN
Qty: 30 TABLET | Refills: 1 | Status: SHIPPED | OUTPATIENT
Start: 2023-07-14 | End: 2023-07-17 | Stop reason: SDUPTHER

## 2023-07-14 RX ADMIN — PEGFILGRASTIM 6 MG: 6 INJECTION SUBCUTANEOUS at 12:07

## 2023-07-17 DIAGNOSIS — R06.6 HICCUPS: ICD-10-CM

## 2023-07-17 DIAGNOSIS — C50.811 MALIGNANT NEOPLASM OF OVERLAPPING SITES OF RIGHT BREAST IN FEMALE, ESTROGEN RECEPTOR POSITIVE: ICD-10-CM

## 2023-07-17 DIAGNOSIS — Z17.0 MALIGNANT NEOPLASM OF OVERLAPPING SITES OF RIGHT BREAST IN FEMALE, ESTROGEN RECEPTOR POSITIVE: ICD-10-CM

## 2023-07-17 RX ORDER — OLANZAPINE 5 MG/1
5 TABLET ORAL NIGHTLY
Qty: 4 TABLET | Refills: 3 | Status: SHIPPED | OUTPATIENT
Start: 2023-07-17 | End: 2023-09-01 | Stop reason: SDUPTHER

## 2023-07-17 RX ORDER — PROCHLORPERAZINE MALEATE 10 MG
10 TABLET ORAL EVERY 6 HOURS PRN
Qty: 30 TABLET | Refills: 3 | Status: SHIPPED | OUTPATIENT
Start: 2023-07-17 | End: 2024-03-18

## 2023-07-17 RX ORDER — BACLOFEN 10 MG/1
10 TABLET ORAL 3 TIMES DAILY PRN
Qty: 30 TABLET | Refills: 1 | Status: SHIPPED | OUTPATIENT
Start: 2023-07-17 | End: 2024-03-18

## 2023-07-17 RX ORDER — ONDANSETRON 8 MG/1
8 TABLET, ORALLY DISINTEGRATING ORAL EVERY 6 HOURS PRN
Qty: 30 TABLET | Refills: 2 | Status: SHIPPED | OUTPATIENT
Start: 2023-07-17 | End: 2024-03-18

## 2023-07-17 RX ORDER — DEXAMETHASONE 4 MG/1
8 TABLET ORAL SEE ADMIN INSTRUCTIONS
Qty: 12 TABLET | Refills: 3 | Status: SHIPPED | OUTPATIENT
Start: 2023-07-17 | End: 2023-08-02 | Stop reason: SDUPTHER

## 2023-07-18 ENCOUNTER — PATIENT MESSAGE (OUTPATIENT)
Dept: HEMATOLOGY/ONCOLOGY | Facility: CLINIC | Age: 43
End: 2023-07-18

## 2023-07-19 ENCOUNTER — PATIENT MESSAGE (OUTPATIENT)
Dept: HEMATOLOGY/ONCOLOGY | Facility: CLINIC | Age: 43
End: 2023-07-19
Payer: COMMERCIAL

## 2023-07-19 ENCOUNTER — PATIENT MESSAGE (OUTPATIENT)
Dept: ADMINISTRATIVE | Facility: OTHER | Age: 43
End: 2023-07-19
Payer: COMMERCIAL

## 2023-07-20 NOTE — TELEPHONE ENCOUNTER
The post op sensation can come and go, may or may not be related to Neulasta. Let us know if it is persistent or bothersome and we can have her come in to see either Lalitha or Dr. Pickard's NP.

## 2023-07-20 NOTE — PROGRESS NOTES
[facility]   Physical Therapy Treatment Note      Name: Dov Tejeda Mary  Clinic Number: [id number]    Therapy Diagnosis:   Encounter Diagnoses   Name Primary?    Stiffness of left shoulder joint Yes    Stiffness of right shoulder joint     Weakness of left upper extremity     Weakness of right upper extremity     At risk for lymphedema     Abnormal posture      Physician: Jenna Pickard MD    Visit Date: 7/24/2023    Physician Orders: PT Eval and Treat   Medical Diagnosis from Referral: C50.811,Z17.0 (ICD-10-CM) - Malignant neoplasm of overlapping sites of right breast in female, estrogen receptor positive  Evaluation Date: 06/19/2023  Authorization Period Expiration: 12/31/2023  Plan of Care Expiration: 08/18/2023  Progress Note Due: 08/17/2023  Visit # / Visits Authorized: 3 / 20 (plus eval)  FOTO: 2/3     Precautions: standard, cancer, anxiety, latex allergy     Time In: 1:20 PM (late arrival secondary to limited parking)  Time Out: 1:59 PM  Total Billable Time: 39 minutes      Subjective     Patient reports: experienced allergic reaction, fatigue, and joint aches / pains following chemo x2 weeks ago. Patient receiving chemo q3 weeks with next infusion 08/03/2023. Continued tightness through L breast, especially noted when bending forward to  objects from the ground.    She was compliant with home exercise program.    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    Treatment:   Chemotherapy: planning adjuvant chemo (lasting approx 1 year)  Radiation: planning XRT following adjuvant chemo  Hormone Therapy: TBD pending genetics testing    Cancer-Related Surgery and Date: BILATERAL mastectomy with SLNB (1x)  "per Dr. Pickard and concurrent BILATERAL ROSITA flap reconstruction per Adal Roche and Lynda on 05/24/2023.      Objective      Objective Measures updated at progress report unless specified.     Upper Extremity Strength:    (L) UE (R) UE   Shoulder Flexion 4+/5 4+/5   Shoulder Abduction 5/5 5/5   Shoulder IR 4+/5 4+/5   Shoulder ER 4+/5 4+/5   Elbow Flexion 5/5 5/5   Elbow Extension 4+/5 4+/5    57.3 lbs 58.8 lbs         Circumferential Measurements: for early detection of lymphedema  LANDMARK LEFT UE RIGHT UE DIFFERENCE   E + 8" 36 cm 36 cm 0 cm   E + 6" 33.5 cm 31.5 cm 2 cm*   E + 4" 29 cm 27.5 cm 1.5 cm   E + 2" 26 cm 25 cm 1 cm   Elbow 24.5 cm 24.5 cm 0 cm   W+ 8" 24.5 cm 24.5 cm 0 cm   W +  6" 24.5 cm 25 cm 0.5 cm   W + 4" 21 cm 23 cm 2 cm   Wrist 17 cm 16 cm 1 cm   DPC 18.5 cm 19 cm 0.5 cm   IP Thumb 6 cm 6 cm 0 cm   *Denotes LUE larger than RUE    Limitation / Restriction for FOTO Shoulder Survey    Therapist reviewed FOTO scores for Dov Tejeda Mary on 7/24/2023.   FOTO documents entered into [employer] - see Media section.    Limitation Score: 33%         Treatment     Dov received the treatments listed below:      Physical performance testing as part of reassessment for 30 minutes:    - FOTO reassessment  - Bilateral upper extremity circumferential measurements  -  dynamometer strength testing  - Bilateral upper extremity manual muscle testing      Therapeutic exercises to increase cardiovascular endurance, flexibility, range of motion for 0 minutes:     Seated Exercises:  - Pulleys (flex, scap)    2 min each  - Thoracic ext over horizontal 1/2 foam 10 sec x 10 reps    Mat Exercises:  - Butterfly stretch (forehead)   1 min x 2 reps  - LTR c shoulder abd stretch (LUE)  10 sec x 10 reps  - Open book stretch    10 sec x 10 reps  - PROM L shoulder ER, IR   1 set x 10 reps      Manual therapy techniques to decrease pain as well as to increase soft tissue mobility, joint mobility, mobility and pliability, " and function for 20 minutes:     - Manual anterior chest attachment stretch (L only)  - Manual anterior chest layer mobilization (L only)  - Manual axillary stretch (LUE only)      Neuromuscular re-education activities to improve kinesthetic sense, proprioception, postural awareness, and scapular stabilization for 0 minutes:      Seated Exercises:  - Bilateral shoulder ER (red)   2 set x 10 reps  - Bilateral shoulder horizontal abd (red) 2 set x 10 reps    Standing Exercises: emphasis on maintaining abdominal brace  - Shoulder rows (red)    3 set x 10 reps  - Shoulder extensions (red)   3 set x 10 reps    Mat Exercises:  - Abdominal bracing    5 sec x 3 min      Patient Education and Home Exercises       Education Provided Regarding:   - Role of physical therapy in multi-disciplinary team  - Goals for physical therapy, progress towards goals  - Physical therapy plan of care, scheduling  - Energy conservation techniques  - Exercise technique, home exercise program    Written Home Exercises Provided: Patient instructed to cont prior HEP. Exercises were reviewed and Dov was able to demonstrate them prior to the end of the session. Dov demonstrated good  understanding of the education provided. See EMR under Patient Instructions for exercises provided during therapy sessions    Assessment     Patient is responding well to physical therapy. Patient able to perform new exercises and exercise progressions without increase in symptoms prior to leaving the clinic. Held exercises secondary to late arrival (attributed to limited parking). Improved soft tissue mobility of left lateral chest wall following manual techniques. Patient demonstrates overall improved bilateral upper extremity strength via manual muscle testing and  dynamometer strength testing. Patient exhibited 2 cm difference (RUE larger than LUE) at 4 inches above the elbow, but no conclusive evidence of RUE lymphedema swelling per today's circumferential  measurements. Patient would continue to benefit from skilled physical therapy services to further improve bilateral upper extremity active range of motion, strength, soft tissue mobility, and lifting / carrying / pushing / pulling / reaching tolerance for return to prior level of function. Resume exercises next visit and progress as tolerated.    Dov is progressing well towards her goals.   Patient prognosis is Good.     Patient will continue to benefit from skilled outpatient physical therapy to address the deficits listed in the problem list box on initial evaluation, provide patient / family education, and to maximize patient's level of independence in the home and community environment.     Patient's spiritual, cultural, and educational needs considered, and patient agreeable to plan of care and goals.     Anticipated barriers to physical therapy: none anticipated    GOALS:   Short Term Goals: 4 Weeks  Patient will demonstrate 100% understanding of lymphedema risk reduction practices, including self-monitoring for lymphedema (met, 07/24/2032).  Patient will demonstrate independence with established home exercise program for improved strength, functional mobility, range of motion, posture, and endurance (met, 07/24/2023).   Patient will increase BILATERAL shoulder FLEXION active range of motion to 155 degrees for improved independence with functional reaching, carrying, pushing, and pulling tasks (progressing, not met).   Patient will increase BILATERAL shoulder ABDUCTION active range of motion to 150 degrees for improved independence with functional reaching, carrying, pushing, and pulling tasks (progressing, not met).   Patient will increase gross BILATERAL upper extremity musculature to 4+/5 for improved independence with functional reaching, carrying, pushing, and pulling activities of daily living (met, 07/24/2023).      Long Term Goals: 8 Weeks   Patient will be independent with updated home exercise  program for improved functional mobility, posture, strength, and endurance (progressing, not met).  Patient will increase BILATERAL shoulder FLEXION active range of motion to 180 degrees for improved independence with functional reaching, carrying, pushing, and pulling tasks (progressing, not met).   Patient will increase BILATERAL shoulder ABDUCTION active range of motion to 180 degrees for improved independence with functional reaching, carrying, pushing, and pulling tasks (progressing, not met).   Patient will increase gross BILATERAL upper extremity musculature to 5/5 for improved independence with functional reaching, carrying, pushing, and pulling activities of daily living (progressing, not met).   Patient will report decrease in overall worst pain to 2/10 at discharge for improved tolerance to functional reaching, carrying, pushing, and pulling activities of daily living (progressing, not met).  Patient will report compliance with walking program 5x/week for 10 minutes / day to improve overall cardiovascular function and decrease cancer-related fatigue at discharge (progressing, not met).      Plan     Plan of Care Certification: 06/19/2023 to 08/18/2023    Outpatient Physical Therapy 2 times weekly for 8 weeks to include the following interventions: Gait Training, Manual Therapy, Neuromuscular Re-ed, Patient Education, Self Care, Therapeutic Activities, Therapeutic Exercise, and IASTM.     Ce Vieira, PT

## 2023-07-24 ENCOUNTER — CLINICAL SUPPORT (OUTPATIENT)
Dept: REHABILITATION | Facility: HOSPITAL | Age: 43
End: 2023-07-24
Payer: COMMERCIAL

## 2023-07-24 DIAGNOSIS — M25.611 STIFFNESS OF RIGHT SHOULDER JOINT: ICD-10-CM

## 2023-07-24 DIAGNOSIS — C50.911 INVASIVE DUCTAL CARCINOMA OF BREAST, RIGHT: Primary | ICD-10-CM

## 2023-07-24 DIAGNOSIS — R29.898 WEAKNESS OF LEFT UPPER EXTREMITY: ICD-10-CM

## 2023-07-24 DIAGNOSIS — R29.898 WEAKNESS OF RIGHT UPPER EXTREMITY: ICD-10-CM

## 2023-07-24 DIAGNOSIS — R29.3 ABNORMAL POSTURE: ICD-10-CM

## 2023-07-24 DIAGNOSIS — Z91.89 AT RISK FOR LYMPHEDEMA: ICD-10-CM

## 2023-07-24 DIAGNOSIS — M25.612 STIFFNESS OF LEFT SHOULDER JOINT: Primary | ICD-10-CM

## 2023-07-24 PROCEDURE — 97750 PHYSICAL PERFORMANCE TEST: CPT

## 2023-07-24 PROCEDURE — 97140 MANUAL THERAPY 1/> REGIONS: CPT

## 2023-07-24 PROCEDURE — 97813 ACUP 1/> W/ESTIM 1ST 15 MIN: CPT | Performed by: ACUPUNCTURIST

## 2023-07-24 PROCEDURE — 97814 ACUP 1/> W/ESTIM EA ADDL 15: CPT | Performed by: ACUPUNCTURIST

## 2023-07-24 NOTE — PROGRESS NOTES
Acupuncture Evaluation Note     Name: Dov Tejeda Mary  Clinic Number: [id number]    Traditional Chinese Medicine (TCM) Diagnosis: Qi Stagnation and Blood Deficiency  Medical Diagnosis: No diagnosis found.     Evaluation Date: 7/24/2023    Visit #/Visits authorized: 2/12     Precautions: cancer    Subjective     Chief Concern: Arthralgia - chemo induced joint pain - lateral hip pain extending to knees as well as shoulder and chest tightness following mastectomy surgery.     Medical necessity is demonstrated by the following IMPAIRMENTS: Medical Necessity: Cancer related pain and Decreased quality of life              Aggravating Factors:  movement   Relieving Factors:  rest    Symptom Description:     Quality:  Aching and Tight  Severity:  5  Frequency:  every day    Treatment     Treatment Principles:  Move qi, nourish blood, stop pain    Acupuncture points used:      Bilateral points: GB31, GB34, ST36, SP9, SP6, LV3, LI4, LI11, KD27  Unilateral points: CV17, LU7, PC6, KD6, SP4, GB41, HT7  Auricular Treatment:      Needles In: 25  Needles Out: 25  [place] W/ STIM placed: 2:30  Needles W/ STIM removed: 2:55        Assessment     After treatment, patient felt good     Patient prognosis is Good.     Patient will continue to benefit from acupuncture treatment to address the deficits listed in the problem list box on initial evaluation, provide patient family education and to maximize pt's level of independence in the home and community environment.     Patient's spiritual, cultural and educational needs considered and pt agreeable to plan of care and goals.     Anticipated barriers to treatment: none    Plan     Recommend 1 /week for 4 sessions then re-assess.      Education:  Patient is aware of cumulative benefit of acupuncture

## 2023-07-31 ENCOUNTER — CLINICAL SUPPORT (OUTPATIENT)
Dept: REHABILITATION | Facility: HOSPITAL | Age: 43
End: 2023-07-31
Payer: COMMERCIAL

## 2023-07-31 ENCOUNTER — PATIENT MESSAGE (OUTPATIENT)
Dept: HEMATOLOGY/ONCOLOGY | Facility: CLINIC | Age: 43
End: 2023-07-31
Payer: COMMERCIAL

## 2023-07-31 DIAGNOSIS — C50.911 INVASIVE DUCTAL CARCINOMA OF BREAST, RIGHT: Primary | ICD-10-CM

## 2023-07-31 PROCEDURE — 97814 ACUP 1/> W/ESTIM EA ADDL 15: CPT | Performed by: ACUPUNCTURIST

## 2023-07-31 PROCEDURE — 97813 ACUP 1/> W/ESTIM 1ST 15 MIN: CPT | Performed by: ACUPUNCTURIST

## 2023-07-31 NOTE — PROGRESS NOTES
Acupuncture Evaluation Note     Name: Dov Tejeda Mary  Clinic Number: [id number]    Traditional Chinese Medicine (TCM) Diagnosis: Qi Stagnation and Blood Deficiency  Medical Diagnosis: No diagnosis found.     Evaluation Date: 7/31/2023    Visit #/Visits authorized: 3/12     Precautions: Standard and cancer    Subjective     Chief Concern: Tightness in left breast at surgical incision site. Hot flashes 2-3x/day, sometimes with sweating.     Medical necessity is demonstrated by the following IMPAIRMENTS: Medical Necessity: Cancer related pain              Aggravating Factors:  movement   Relieving Factors:  rest    Symptom Description:     Quality:  Aching and Tight  Severity:  5  Frequency:  every day    Treatment     Treatment Principles:  Move qi, tonify blood, stop pain    Acupuncture points used:      Bilateral points: ST36, SP6, SP9, LV3, KD6, LU7, LI11, LI4  Unilateral points: BL60, GB41, SP4, PC6, HT5  Auricular Treatment:  [facility] [place]    Needles In: 23  Needles Out: 23  [place] W/ STIM placed: 2:25  Needles W/ STIM removed: 2:50        Assessment     After treatment, patient felt less joint pain overall      Patient prognosis is Good.     Patient will continue to benefit from acupuncture treatment to address the deficits listed in the problem list box on initial evaluation, provide patient family education and to maximize pt's level of independence in the home and community environment.     Patient's spiritual, cultural and educational needs considered and pt agreeable to plan of care and goals.     Anticipated barriers to treatment: none    Plan     Recommend 1 /week for 3 sessions then re-assess.      Education:  Patient is aware of cumulative benefit of acupuncture

## 2023-08-01 ENCOUNTER — LAB VISIT (OUTPATIENT)
Dept: LAB | Facility: HOSPITAL | Age: 43
End: 2023-08-01
Attending: NURSE PRACTITIONER
Payer: COMMERCIAL

## 2023-08-01 DIAGNOSIS — Z17.0 MALIGNANT NEOPLASM OF OVERLAPPING SITES OF RIGHT BREAST IN FEMALE, ESTROGEN RECEPTOR POSITIVE: ICD-10-CM

## 2023-08-01 DIAGNOSIS — C50.811 MALIGNANT NEOPLASM OF OVERLAPPING SITES OF RIGHT BREAST IN FEMALE, ESTROGEN RECEPTOR POSITIVE: ICD-10-CM

## 2023-08-01 LAB
ALBUMIN SERPL BCP-MCNC: 3.7 G/DL (ref 3.5–5.2)
ALP SERPL-CCNC: 61 U/L (ref 55–135)
ALT SERPL W/O P-5'-P-CCNC: 43 U/L (ref 10–44)
ANION GAP SERPL CALC-SCNC: 9 MMOL/L (ref 8–16)
AST SERPL-CCNC: 35 U/L (ref 10–40)
BILIRUB SERPL-MCNC: 0.4 MG/DL (ref 0.1–1)
BUN SERPL-MCNC: 10 MG/DL (ref 6–20)
CALCIUM SERPL-MCNC: 9.5 MG/DL (ref 8.7–10.5)
CHLORIDE SERPL-SCNC: 105 MMOL/L (ref 95–110)
CO2 SERPL-SCNC: 24 MMOL/L (ref 23–29)
CREAT SERPL-MCNC: 0.7 MG/DL (ref 0.5–1.4)
ERYTHROCYTE [DISTWIDTH] IN BLOOD BY AUTOMATED COUNT: 13.2 % (ref 11.5–14.5)
EST. GFR  (NO RACE VARIABLE): >60 ML/MIN/1.73 M^2
GLUCOSE SERPL-MCNC: 89 MG/DL (ref 70–110)
HCG INTACT+B SERPL-ACNC: <2.4 MIU/ML
HCT VFR BLD AUTO: 39.7 % (ref 37–48.5)
HGB BLD-MCNC: 13 G/DL (ref 12–16)
IMM GRANULOCYTES # BLD AUTO: 0.05 K/UL (ref 0–0.04)
MCH RBC QN AUTO: 31.2 PG (ref 27–31)
MCHC RBC AUTO-ENTMCNC: 32.7 G/DL (ref 32–36)
MCV RBC AUTO: 95 FL (ref 82–98)
NEUTROPHILS # BLD AUTO: 3.4 K/UL (ref 1.8–7.7)
PLATELET # BLD AUTO: 293 K/UL (ref 150–450)
PMV BLD AUTO: 9.9 FL (ref 9.2–12.9)
POTASSIUM SERPL-SCNC: 4.4 MMOL/L (ref 3.5–5.1)
PROT SERPL-MCNC: 6.8 G/DL (ref 6–8.4)
RBC # BLD AUTO: 4.17 M/UL (ref 4–5.4)
SODIUM SERPL-SCNC: 138 MMOL/L (ref 136–145)
WBC # BLD AUTO: 5.83 K/UL (ref 3.9–12.7)

## 2023-08-01 PROCEDURE — 36415 COLL VENOUS BLD VENIPUNCTURE: CPT | Mod: PO | Performed by: NURSE PRACTITIONER

## 2023-08-01 PROCEDURE — 84702 CHORIONIC GONADOTROPIN TEST: CPT | Performed by: NURSE PRACTITIONER

## 2023-08-01 PROCEDURE — 85027 COMPLETE CBC AUTOMATED: CPT | Performed by: NURSE PRACTITIONER

## 2023-08-01 PROCEDURE — 80053 COMPREHEN METABOLIC PANEL: CPT | Performed by: NURSE PRACTITIONER

## 2023-08-02 ENCOUNTER — OFFICE VISIT (OUTPATIENT)
Dept: HEMATOLOGY/ONCOLOGY | Facility: CLINIC | Age: 43
End: 2023-08-02
Payer: COMMERCIAL

## 2023-08-02 VITALS
DIASTOLIC BLOOD PRESSURE: 85 MMHG | BODY MASS INDEX: 28.64 KG/M2 | RESPIRATION RATE: 18 BRPM | WEIGHT: 151.69 LBS | HEART RATE: 81 BPM | OXYGEN SATURATION: 99 % | TEMPERATURE: 98 F | HEIGHT: 61 IN | SYSTOLIC BLOOD PRESSURE: 131 MMHG

## 2023-08-02 DIAGNOSIS — C50.811 MALIGNANT NEOPLASM OF OVERLAPPING SITES OF RIGHT BREAST IN FEMALE, ESTROGEN RECEPTOR POSITIVE: ICD-10-CM

## 2023-08-02 DIAGNOSIS — Z17.0 MALIGNANT NEOPLASM OF OVERLAPPING SITES OF RIGHT BREAST IN FEMALE, ESTROGEN RECEPTOR POSITIVE: ICD-10-CM

## 2023-08-02 PROCEDURE — 1159F MED LIST DOCD IN RCRD: CPT | Mod: CPTII,S$GLB,, | Performed by: INTERNAL MEDICINE

## 2023-08-02 PROCEDURE — 99999 PR PBB SHADOW E&M-EST. PATIENT-LVL IV: ICD-10-PCS | Mod: PBBFAC,,, | Performed by: INTERNAL MEDICINE

## 2023-08-02 PROCEDURE — 3008F BODY MASS INDEX DOCD: CPT | Mod: CPTII,S$GLB,, | Performed by: INTERNAL MEDICINE

## 2023-08-02 PROCEDURE — 3008F PR BODY MASS INDEX (BMI) DOCUMENTED: ICD-10-PCS | Mod: CPTII,S$GLB,, | Performed by: INTERNAL MEDICINE

## 2023-08-02 PROCEDURE — 3075F PR MOST RECENT SYSTOLIC BLOOD PRESS GE 130-139MM HG: ICD-10-PCS | Mod: CPTII,S$GLB,, | Performed by: INTERNAL MEDICINE

## 2023-08-02 PROCEDURE — 3079F DIAST BP 80-89 MM HG: CPT | Mod: CPTII,S$GLB,, | Performed by: INTERNAL MEDICINE

## 2023-08-02 PROCEDURE — 3079F PR MOST RECENT DIASTOLIC BLOOD PRESSURE 80-89 MM HG: ICD-10-PCS | Mod: CPTII,S$GLB,, | Performed by: INTERNAL MEDICINE

## 2023-08-02 PROCEDURE — 3075F SYST BP GE 130 - 139MM HG: CPT | Mod: CPTII,S$GLB,, | Performed by: INTERNAL MEDICINE

## 2023-08-02 PROCEDURE — 1159F PR MEDICATION LIST DOCUMENTED IN MEDICAL RECORD: ICD-10-PCS | Mod: CPTII,S$GLB,, | Performed by: INTERNAL MEDICINE

## 2023-08-02 PROCEDURE — 99999 PR PBB SHADOW E&M-EST. PATIENT-LVL IV: CPT | Mod: PBBFAC,,, | Performed by: INTERNAL MEDICINE

## 2023-08-02 PROCEDURE — 99215 PR OFFICE/OUTPT VISIT, EST, LEVL V, 40-54 MIN: ICD-10-PCS | Mod: S$GLB,,, | Performed by: INTERNAL MEDICINE

## 2023-08-02 PROCEDURE — 1160F RVW MEDS BY RX/DR IN RCRD: CPT | Mod: CPTII,S$GLB,, | Performed by: INTERNAL MEDICINE

## 2023-08-02 PROCEDURE — 1160F PR REVIEW ALL MEDS BY PRESCRIBER/CLIN PHARMACIST DOCUMENTED: ICD-10-PCS | Mod: CPTII,S$GLB,, | Performed by: INTERNAL MEDICINE

## 2023-08-02 PROCEDURE — 99215 OFFICE O/P EST HI 40 MIN: CPT | Mod: S$GLB,,, | Performed by: INTERNAL MEDICINE

## 2023-08-02 RX ORDER — FAMOTIDINE 10 MG/ML
20 INJECTION INTRAVENOUS
Status: CANCELLED
Start: 2023-08-04

## 2023-08-02 RX ORDER — DEXAMETHASONE SODIUM PHOSPHATE 4 MG/ML
4 INJECTION, SOLUTION INTRA-ARTICULAR; INTRALESIONAL; INTRAMUSCULAR; INTRAVENOUS; SOFT TISSUE
Status: CANCELLED
Start: 2023-08-04

## 2023-08-02 RX ORDER — PROCHLORPERAZINE EDISYLATE 5 MG/ML
10 INJECTION INTRAMUSCULAR; INTRAVENOUS ONCE AS NEEDED
Status: CANCELLED
Start: 2023-08-04

## 2023-08-02 RX ORDER — DEXAMETHASONE 4 MG/1
8 TABLET ORAL SEE ADMIN INSTRUCTIONS
Qty: 30 TABLET | Refills: 3 | Status: SHIPPED | OUTPATIENT
Start: 2023-08-02 | End: 2023-09-20

## 2023-08-02 RX ORDER — DEXAMETHASONE SODIUM PHOSPHATE 4 MG/ML
10 INJECTION, SOLUTION INTRA-ARTICULAR; INTRALESIONAL; INTRAMUSCULAR; INTRAVENOUS; SOFT TISSUE
Status: CANCELLED
Start: 2023-08-04

## 2023-08-02 RX ORDER — SODIUM CHLORIDE 0.9 % (FLUSH) 0.9 %
10 SYRINGE (ML) INJECTION
Status: CANCELLED | OUTPATIENT
Start: 2023-08-04

## 2023-08-02 RX ORDER — HEPARIN 100 UNIT/ML
500 SYRINGE INTRAVENOUS
Status: CANCELLED | OUTPATIENT
Start: 2023-08-04

## 2023-08-02 RX ORDER — DIPHENHYDRAMINE HYDROCHLORIDE 50 MG/ML
50 INJECTION INTRAMUSCULAR; INTRAVENOUS ONCE AS NEEDED
Status: CANCELLED | OUTPATIENT
Start: 2023-08-04

## 2023-08-02 RX ORDER — EPINEPHRINE 0.3 MG/.3ML
0.3 INJECTION SUBCUTANEOUS ONCE AS NEEDED
Status: CANCELLED | OUTPATIENT
Start: 2023-08-04

## 2023-08-02 NOTE — PROGRESS NOTES
FOLLOW UP  VISIT    Subjective:      Patient ID: Dov Hernandez is a 42 y.o. female.  MRN: [id number]  : 1980    History of Present Illness:   HPI   Dov Hernandez is a 42 y.o. female who is referred for right breast IDC.        She had a normal screening mammogram in . This year, she underwent a screening mammogram in March that showed right breast calcifications, measured to be 39 mm on diagnostic mammogram.   She underwent a right breast biopsy that showed IDC, 2 mm, ER 30%, OH 30%, Her 2 karrie negative, Grade 1, low Ki 67 at 5% with associated DCIS.      She has had a breast MRI that showed a 2 cm mass. We have also discussed her case at our multi d breast tumor board.   She underwent b/l mastectomy with ROSITA flap reconstruction and right SLNB. Tumor size was 2.2 cm, 1/ SLN was positive for metastatic carcinoma.     Case discussed at tumor board. Pros and cons of further ALND vs XRT discussed. She has a close follow up with Dr. Pickard and has seen Dr. Ken, has declined ALND and will proceed with XRT.     Oncotype returned with RS 29; RR 23%. Adjuvant chemotherapy is recommended. She is here to obtain clearance for cycle 1.     Baseline PET reviewed with patient and her mother, no residual or metastatic disease noted.     Interim history:  S/p cycle 1 of TC on 23.   Had an infusion reaction immediately after cycle 1, face was flushed and she had back pain, infusion was stopped, treated with solucortef 100 mg, Benadryl 25 mg IVP BP= 141/79 HR=86 , then given Pepcid 20 mg and  restarted after 30 minutes without any further issues     Developed a rash on her chest and arms 3 days later and then spread to upper thighs and responded to benadryl and dexamethasone.     The right chest incision was hurting for a few days and she had joint pains around the same time.     Two weeks after chemo, got facial acne. Saw her dermatologist, started on Clindamycin gel and retinol.     Was constipated and  then had diarrhea, controlled with medication. No nausea.     Was tired for 2 weeks, then feels much better this week.        Oncology History:  Oncology History   Malignant neoplasm of overlapping sites of right breast in female, estrogen receptor positive   4/13/2023 Biopsy    Breast, right, biopsy:   Invasive ductal carcinoma   Tumor size: 2 mm in this material   Overall Grade: grade 1     4/13/2023 Breast Tumor Markers    Estrogen: Positive (weak intensity 30%)  Progesterone: Positive (weak intensity 30%)  HER2: Negative   Ki67: 5%     4/25/2023 Genetic Testing    Negative, VUS x 1(SDHA)     4/25/2023 Imaging Significant Findings    MRI revealed 2 cm mass correlating with biopsy proven malignancy      4/26/2023 Initial Diagnosis    Malignant neoplasm of overlapping sites of right breast in female, estrogen receptor positive     4/26/2023 Cancer Staged    Staging form: Breast, AJCC 8th Edition  - Clinical stage from 4/26/2023: Stage IB (cT2, cN0, cM0, G1, ER+, NV+, HER2: Equivocal)     5/2/2023 Tumor Conference       The team agreed to proceed with upfront surgery for further sampling of the tumor     5/24/2023 Breast Surgery    Bilateral mastectomy with ROSITA flap reconstruction and right SLNB     6/6/2023 Tumor Conference     There team discussed further axillary dissection versus proceeding with XRT. If ALND and no further positive nodes then no XRT would be recommended. If there are additional nodes, then she would be recommended for XRT.   Chemotherapy would be most likely recommended as well as adjuvant endocrine therapy   Shameka trial?        6/14/2023 Cancer Staged    Staging form: Breast, AJCC 8th Edition  - Pathologic stage from 6/14/2023: Stage IB (pT2, pN1(sn), cM0, G2, ER+, NV+, HER2-)     7/13/2023 -  Chemotherapy    Treatment Summary   Plan Name: OP BREAST TC - DOCETAXEL CYCLOPHOSPHAMIDE Q3W  Treatment Goal: Curative  Status: Active  Start Date: 7/13/2023  End Date: 9/16/2023 (Planned)  Provider:  Jessica Griffith MD  Chemotherapy: cycloPHOSphamide 1,000 mg in sodium chloride 0.9% 290 mL chemo infusion, 1,020 mg, Intravenous, Clinic/HOD 1 time, 1 of 4 cycles  Administration: 1,000 mg (7/13/2023)  DOCEtaxel (TAXOTERE) 100 mg in sodium chloride 0.9% 295 mL chemo infusion, 102 mg (100 % of original dose 60 mg/m2), Intravenous, Clinic/HOD 1 time, 1 of 4 cycles  Dose modification: 60 mg/m2 (original dose 60 mg/m2, Cycle 1, Reason: Treatment parameters not met)  Administration: 100 mg (7/13/2023)        Past medical, surgical, family, and social history were reviewed today and there are no changes of note unless mentioned in HPI.   MEDS and ALLERGIES were reviewed with patient and meds reconciled.     History:  Past Medical History:   Diagnosis Date    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    OCP & aziza from derm, Doxy caused yeast    Heartburn 12/17/2021    Strawberry wai 1980    back of neck    Strawberry wai 1981    under nose      Past Surgical History:   Procedure Laterality Date    BILATERAL MASTECTOMY Bilateral 5/24/2023    Procedure: MASTECTOMY, BILATERAL;  Surgeon: Jenna Pickard MD;  Location: [facility];  Service: General;  Laterality: Bilateral;  2.5 HOURS / EMAIL SENT 5-9 @ 11:39 LK    COLPOSCOPY      INJECTION FOR SENTINEL NODE IDENTIFICATION Right 5/24/2023    Procedure: INJECTION, FOR SENTINEL NODE IDENTIFICATION;  Surgeon: Jenna Pickard MD;  Location: [facility];  Service: General;  Laterality: Right;    RECONSTRUCTION OF BREAST WITH DEEP INFERIOR EPIGASTRIC ARTERY  (ROSITA) FREE FLAP Bilateral 5/24/2023    Procedure: RECONSTRUCTION, BREAST, USING ROSITA FREE FLAP / BILATERAL DEEP INFERIOR EPIGASTRIC PERFORATORS FLAPS;  Surgeon: Mikey Roche MD;  Location: [facility];  Service: Plastics;  Laterality: Bilateral;    SENTINEL LYMPH NODE BIOPSY Right 5/24/2023    Procedure: BIOPSY, LYMPH NODE, SENTINEL;  Surgeon: Jenna Pickard MD;  Location: [facility];  Service: General;  Laterality: Right;     "WISDOM TOOTH EXTRACTION       Family History   Problem Relation Age of Onset    Hyperlipidemia Mother     Kidney cancer Father 58    Cancer Brother 22        Parotid Gland cancer    Birth marks Child 0        strawberry wai(s)    Birth marks Child 0        strawberry wai(s)    Café-au-lait spots Maternal Uncle         "a small patch on his neck"    Café-au-lait spots Other         "a spot on his arm and partial torso"    Other Other 2        tested negative for macrocephaly    Cervical cancer Other     Throat cancer Other     Pancreatic cancer Other         1 second cousin ()    Other Other         brain tumors (several 2nd cousins)    Breast cancer Neg Hx     Colon cancer Neg Hx     Ovarian cancer Neg Hx     Melanoma Neg Hx       Social History     Tobacco Use    Smoking status: Never    Smokeless tobacco: Never   Substance and Sexual Activity    Alcohol use: Yes     Comment: social    Drug use: No    Sexual activity: Yes     Partners: Male     Birth control/protection: None        ROS:  Review of Systems   Constitutional:  Positive for malaise/fatigue. Negative for fever and weight loss.   HENT:  Negative for congestion, hearing loss, nosebleeds and sore throat.    Eyes:  Negative for double vision and photophobia.   Respiratory:  Negative for cough, hemoptysis, sputum production, shortness of breath and wheezing.    Cardiovascular:  Negative for chest pain, palpitations, orthopnea and leg swelling.   Gastrointestinal:  Positive for heartburn. Negative for abdominal pain, blood in stool, constipation, diarrhea, nausea and vomiting.        Hiccups   Genitourinary:  Negative for dysuria, hematuria and urgency.   Musculoskeletal:  Positive for joint pain. Negative for back pain and myalgias.   Skin:  Negative for itching and rash.   Neurological:  Negative for dizziness, tingling, seizures, weakness and headaches.   Endo/Heme/Allergies:  Negative for polydipsia. Does not bruise/bleed easily. " "  Psychiatric/Behavioral:  Negative for depression and memory loss. The patient is nervous/anxious. The patient does not have insomnia.        Objective:     Vitals:    08/02/23 0854   BP: 131/85   Pulse: 81   Resp: 18   Temp: 98.1 °F (36.7 °C)   TempSrc: Oral   SpO2: 99%   Weight: 68.8 kg (151 lb 10.8 oz)   Height: 5' 1" (1.549 m)   PainSc: 0-No pain       Wt Readings from Last 10 Encounters:   08/02/23 68.8 kg (151 lb 10.8 oz)   07/13/23 67 kg (147 lb 11.3 oz)   07/12/23 67.1 kg (147 lb 14.9 oz)   07/12/23 67.1 kg (147 lb 14.9 oz)   07/07/23 66.4 kg (146 lb 6.2 oz)   06/15/23 66.7 kg (147 lb)   05/25/23 68 kg (150 lb)   05/15/23 68 kg (150 lb)   04/25/23 69.4 kg (153 lb)   04/25/23 69.6 kg (153 lb 7 oz)       Physical Exam  Vitals and nursing note reviewed.   Constitutional:       General: She is not in acute distress.     Appearance: She is not diaphoretic.   HENT:      Head: Normocephalic.      Mouth/Throat:      Pharynx: No oropharyngeal exudate.   Eyes:      General: No scleral icterus.     Conjunctiva/sclera: Conjunctivae normal.   Neck:      Thyroid: No thyromegaly.   Cardiovascular:      Rate and Rhythm: Normal rate and regular rhythm.      Heart sounds: Normal heart sounds. No murmur heard.  Pulmonary:      Effort: Pulmonary effort is normal. No respiratory distress.      Breath sounds: No stridor. No wheezing or rales.   Chest:      Chest wall: No tenderness.   Abdominal:      General: Bowel sounds are normal. There is no distension.      Palpations: Abdomen is soft. There is no mass.      Tenderness: There is no abdominal tenderness. There is no rebound.   Musculoskeletal:         General: No tenderness or deformity. Normal range of motion.      Cervical back: Neck supple.   Lymphadenopathy:      Cervical: No cervical adenopathy.   Skin:     General: Skin is warm and dry.      Findings: No erythema or rash.   Neurological:      Mental Status: She is alert and oriented to person, place, and time.      " Cranial Nerves: No cranial nerve deficit.      Coordination: Coordination normal.      Gait: Gait is intact.   Psychiatric:         Mood and Affect: Affect normal.         Cognition and Memory: Memory normal.         Judgment: Judgment normal.          Diagnostic Tests:  Significant Imaging: I have reviewed and interpreted all pertinent imaging results/findings.    Laboratory Data:  All pertinent labs have been reviewed.    Labs:   Lab Results   Component Value Date    WBC 5.83 08/01/2023    RBC 4.17 08/01/2023    HGB 13.0 08/01/2023    HCT 39.7 08/01/2023    MCV 95 08/01/2023     08/01/2023    GLU 89 08/01/2023     08/01/2023    K 4.4 08/01/2023    BUN 10 08/01/2023    CREATININE 0.7 08/01/2023    AST 35 08/01/2023    ALT 43 08/01/2023    BILITOT 0.4 08/01/2023     Assessment/Plan:   Malignant neoplasm of overlapping sites of right breast in female, estrogen receptor positive  cT2cN0, G1, ER/[place] + 30%, Her 2 karrie negative Ki 67 5%   pT2 pN1a     Genetics VUS     We discussed that she has node positive disease on path.She is agreeable to proceed with XRT in the adjuvant setting. Dr. Ken note was appreciated.      As for adjuvant therapy, given she is premenopausal and node +, has High risk Oncotype, adjuvant chemotherapy is appropriate. See prior notes for discussion. Baseline PET scan is reassuring. Sub cm pulmonary nodules noted, repeat scan as clinically indicated.     I have recommended adjuvant chemotherapy with Docetaxel and cytoxan q 3 weeks x 4, via PIV.   Cleared to receive cycle 2, continue supportive care.   Following chemotherapy, she will be offered XRT and then endocrine therapy with OFS.     Transaminitis   Isolated elevated of ALT, minimal elevation of AST noted.   She denies OTC medications, excessive etoh or Tylenol use. Confirmed with repeat, dose reduced docetaxel by 20% after cycle 1. No liver parenchymal abnormalities on PET.   Follow up LFTs are normal. Will give full dose  today.     Anxiety  Improving, on Effexor. Continue to monitor.     Infusion reaction   Noted, increased dexamethasone to 20 mg and added Famotidine with this cycle.     Rash  Likely secondary to docetaxel. Continue dexamethasone D1-3 and then prn for rash.     ECOG SCORE           Discussion:   No follow-ups on file.    Plan was discussed with the patient at length, and she verbalized understanding. Dov was given an opportunity to ask questions that were answered to her satisfaction, and she was advised to call in the interval if any problems or questions arise.  Discussed COVID-19 and social distancing in great detail, avoid all non-essential visits out of the home if possible and avoid sick contacts.     Electronically signed by Jessica Griffith MD     Route Chart for Scheduling    Med Onc Chart Routing      Follow up with physician . Scheduled   Follow up with IDA    Infusion scheduling note    Injection scheduling note    Labs    Imaging    Pharmacy appointment    Other referrals              Treatment Plan Information   OP BREAST TC - DOCETAXEL CYCLOPHOSPHAMIDE Q3W   Jessica Griffith MD   Upcoming Treatment Dates - OP BREAST TC - DOCETAXEL CYCLOPHOSPHAMIDE Q3W    8/4/2023       Pre-Medications       aprepitant (CINVANTI) injection 130 mg       palonosetron (ALOXI) 0.25 mg with Dexamethasone (DECADRON) 10 mg in NS 50 mL IVPB       Chemotherapy       DOCEtaxel (TAXOTERE) 75 mg/m2 = 128 mg in sodium chloride 0.9% 256.4 mL chemo infusion       cycloPHOSphamide 600 mg/m2 = 1,020 mg in sodium chloride 0.9% 255.1 mL chemo infusion       Antiemetics       prochlorperazine injection Soln 10 mg  8/5/2023       Growth Factor       pegfilgrastim-jmdb (FULPHILA) injection 6 mg  8/25/2023       Pre-Medications       aprepitant (CINVANTI) injection 130 mg       palonosetron (ALOXI) 0.25 mg with Dexamethasone (DECADRON) 10 mg in NS 50 mL IVPB       Chemotherapy       DOCEtaxel (TAXOTERE) 75 mg/m2 = 128 mg in sodium chloride  0.9% 256.4 mL chemo infusion       cycloPHOSphamide 600 mg/m2 = 1,020 mg in sodium chloride 0.9% 255.1 mL chemo infusion       Antiemetics       prochlorperazine injection Soln 10 mg  8/26/2023       Growth Factor       pegfilgrastim-jmdb (FULPHILA) injection 6 mg      MDM includes  :    - Acute or chronic illness or injury that poses a threat to life or bodily function  - Independent review and explanation of 3+ results from unique tests  - Discussion of management and ordering 3+ unique tests  - Extensive discussion of treatment and management  - Prescription drug management  - Drug therapy requiring intensive monitoring for toxicity

## 2023-08-03 ENCOUNTER — INFUSION (OUTPATIENT)
Dept: INFUSION THERAPY | Facility: HOSPITAL | Age: 43
End: 2023-08-03
Payer: COMMERCIAL

## 2023-08-03 VITALS
DIASTOLIC BLOOD PRESSURE: 73 MMHG | TEMPERATURE: 98 F | OXYGEN SATURATION: 100 % | SYSTOLIC BLOOD PRESSURE: 118 MMHG | RESPIRATION RATE: 18 BRPM | HEART RATE: 73 BPM

## 2023-08-03 DIAGNOSIS — Z17.0 MALIGNANT NEOPLASM OF OVERLAPPING SITES OF RIGHT BREAST IN FEMALE, ESTROGEN RECEPTOR POSITIVE: Primary | ICD-10-CM

## 2023-08-03 DIAGNOSIS — C50.811 MALIGNANT NEOPLASM OF OVERLAPPING SITES OF RIGHT BREAST IN FEMALE, ESTROGEN RECEPTOR POSITIVE: Primary | ICD-10-CM

## 2023-08-03 PROCEDURE — 96417 CHEMO IV INFUS EACH ADDL SEQ: CPT

## 2023-08-03 PROCEDURE — 96413 CHEMO IV INFUSION 1 HR: CPT

## 2023-08-03 PROCEDURE — 96367 TX/PROPH/DG ADDL SEQ IV INF: CPT

## 2023-08-03 PROCEDURE — 96375 TX/PRO/DX INJ NEW DRUG ADDON: CPT

## 2023-08-03 PROCEDURE — 63600175 PHARM REV CODE 636 W HCPCS: Performed by: INTERNAL MEDICINE

## 2023-08-03 PROCEDURE — 25000003 PHARM REV CODE 250: Performed by: INTERNAL MEDICINE

## 2023-08-03 RX ORDER — FAMOTIDINE 10 MG/ML
20 INJECTION INTRAVENOUS
Status: COMPLETED | OUTPATIENT
Start: 2023-08-03 | End: 2023-08-03

## 2023-08-03 RX ORDER — SODIUM CHLORIDE 0.9 % (FLUSH) 0.9 %
10 SYRINGE (ML) INJECTION
Status: DISCONTINUED | OUTPATIENT
Start: 2023-08-03 | End: 2023-08-03 | Stop reason: HOSPADM

## 2023-08-03 RX ORDER — PROCHLORPERAZINE EDISYLATE 5 MG/ML
10 INJECTION INTRAMUSCULAR; INTRAVENOUS ONCE AS NEEDED
Status: DISCONTINUED | OUTPATIENT
Start: 2023-08-03 | End: 2023-08-03 | Stop reason: HOSPADM

## 2023-08-03 RX ORDER — EPINEPHRINE 0.3 MG/.3ML
0.3 INJECTION SUBCUTANEOUS ONCE AS NEEDED
Status: DISCONTINUED | OUTPATIENT
Start: 2023-08-03 | End: 2023-08-03 | Stop reason: HOSPADM

## 2023-08-03 RX ORDER — DEXAMETHASONE SODIUM PHOSPHATE 4 MG/ML
10 INJECTION, SOLUTION INTRA-ARTICULAR; INTRALESIONAL; INTRAMUSCULAR; INTRAVENOUS; SOFT TISSUE
Status: DISCONTINUED | OUTPATIENT
Start: 2023-08-03 | End: 2023-08-03

## 2023-08-03 RX ORDER — DIPHENHYDRAMINE HYDROCHLORIDE 50 MG/ML
50 INJECTION INTRAMUSCULAR; INTRAVENOUS ONCE AS NEEDED
Status: DISCONTINUED | OUTPATIENT
Start: 2023-08-03 | End: 2023-08-03 | Stop reason: HOSPADM

## 2023-08-03 RX ORDER — HEPARIN 100 UNIT/ML
500 SYRINGE INTRAVENOUS
Status: DISCONTINUED | OUTPATIENT
Start: 2023-08-03 | End: 2023-08-03 | Stop reason: HOSPADM

## 2023-08-03 RX ADMIN — FAMOTIDINE 20 MG: 10 INJECTION INTRAVENOUS at 10:08

## 2023-08-03 RX ADMIN — SODIUM CHLORIDE: 9 INJECTION, SOLUTION INTRAVENOUS at 09:08

## 2023-08-03 RX ADMIN — DIPHENHYDRAMINE HYDROCHLORIDE 50 MG: 50 INJECTION, SOLUTION INTRAMUSCULAR; INTRAVENOUS at 10:08

## 2023-08-03 RX ADMIN — DOCETAXEL 130 MG: 10 INJECTION, SOLUTION INTRAVENOUS at 11:08

## 2023-08-03 RX ADMIN — CYCLOPHOSPHAMIDE 1000 MG: 200 INJECTION, SOLUTION INTRAVENOUS at 12:08

## 2023-08-03 RX ADMIN — APREPITANT 130 MG: 130 INJECTION, EMULSION INTRAVENOUS at 10:08

## 2023-08-03 RX ADMIN — DEXAMETHASONE SODIUM PHOSPHATE 0.25 MG: 4 INJECTION, SOLUTION INTRA-ARTICULAR; INTRALESIONAL; INTRAMUSCULAR; INTRAVENOUS; SOFT TISSUE at 10:08

## 2023-08-03 NOTE — PLAN OF CARE
1515-Pt tolerated Taxotere and Cytoxan infusions and cool cap with 2hr post cool well, no complications or side effects, POC and meds discussed with pt, pt aware of upcoming appts, pt knows to call MD with any questions or concerns. Pt ambulated off unit, no distress noted.

## 2023-08-04 ENCOUNTER — INFUSION (OUTPATIENT)
Dept: INFUSION THERAPY | Facility: HOSPITAL | Age: 43
End: 2023-08-04
Payer: COMMERCIAL

## 2023-08-04 DIAGNOSIS — Z17.0 MALIGNANT NEOPLASM OF OVERLAPPING SITES OF RIGHT BREAST IN FEMALE, ESTROGEN RECEPTOR POSITIVE: Primary | ICD-10-CM

## 2023-08-04 DIAGNOSIS — C50.811 MALIGNANT NEOPLASM OF OVERLAPPING SITES OF RIGHT BREAST IN FEMALE, ESTROGEN RECEPTOR POSITIVE: Primary | ICD-10-CM

## 2023-08-04 PROCEDURE — 63600175 PHARM REV CODE 636 W HCPCS: Mod: JZ,JG | Performed by: INTERNAL MEDICINE

## 2023-08-04 PROCEDURE — 96372 THER/PROPH/DIAG INJ SC/IM: CPT

## 2023-08-04 RX ADMIN — PEGFILGRASTIM 6 MG: 6 INJECTION SUBCUTANEOUS at 09:08

## 2023-08-07 DIAGNOSIS — F41.9 ANXIETY: ICD-10-CM

## 2023-08-07 RX ORDER — VENLAFAXINE HYDROCHLORIDE 37.5 MG/1
37.5 CAPSULE, EXTENDED RELEASE ORAL DAILY
Qty: 30 CAPSULE | Refills: 2 | Status: SHIPPED | OUTPATIENT
Start: 2023-08-07 | End: 2023-08-11

## 2023-08-11 ENCOUNTER — PATIENT MESSAGE (OUTPATIENT)
Dept: HEMATOLOGY/ONCOLOGY | Facility: CLINIC | Age: 43
End: 2023-08-11
Payer: COMMERCIAL

## 2023-08-11 DIAGNOSIS — F41.9 ANXIETY: ICD-10-CM

## 2023-08-11 DIAGNOSIS — R23.2 HOT FLASHES: Primary | ICD-10-CM

## 2023-08-11 RX ORDER — VENLAFAXINE HYDROCHLORIDE 75 MG/1
75 CAPSULE, EXTENDED RELEASE ORAL DAILY
Qty: 30 CAPSULE | Refills: 2 | Status: SHIPPED | OUTPATIENT
Start: 2023-08-11 | End: 2023-09-19 | Stop reason: SDUPTHER

## 2023-08-16 ENCOUNTER — PATIENT MESSAGE (OUTPATIENT)
Dept: HEMATOLOGY/ONCOLOGY | Facility: CLINIC | Age: 43
End: 2023-08-16
Payer: COMMERCIAL

## 2023-08-16 ENCOUNTER — TELEPHONE (OUTPATIENT)
Dept: HEMATOLOGY/ONCOLOGY | Facility: CLINIC | Age: 43
End: 2023-08-16
Payer: COMMERCIAL

## 2023-08-16 NOTE — TELEPHONE ENCOUNTER
----- Message from Mj Richter RN sent at 8/16/2023 10:01 AM CDT -----  Regarding: FW: Patient advice  Contact: Pt [phone]    ----- Message -----  From: Awa Moody  Sent: 8/16/2023   9:58 AM CDT  To: Nacho Lopez ([facility]) Staff  Subject: Patient advice                                             Name of Caller:   Dov Hernandez     Contact Preference:  [phone]     Nature of Call: Patient would like a call back to discuss right ear pain is clogged  thinks it may fluids would like to discuss

## 2023-08-17 ENCOUNTER — TELEPHONE (OUTPATIENT)
Dept: HEMATOLOGY/ONCOLOGY | Facility: CLINIC | Age: 43
End: 2023-08-17
Payer: COMMERCIAL

## 2023-08-17 NOTE — TELEPHONE ENCOUNTER
"----- Message from Maite Avendano sent at 8/17/2023 10:05 AM CDT -----  Regarding: Pt advice  Contact: Pt     Pt requesting call back in regards to upcoming appts.   Please call and adv @       Confirmed contact below:   Contact Name: Dov Tejeda Mary  Phone Number: [phone]               Additional Notes:  "Thank you for all that you do for our patients"                                           "

## 2023-08-21 ENCOUNTER — TELEPHONE (OUTPATIENT)
Dept: HEMATOLOGY/ONCOLOGY | Facility: CLINIC | Age: 43
End: 2023-08-21
Payer: COMMERCIAL

## 2023-08-21 ENCOUNTER — CLINICAL SUPPORT (OUTPATIENT)
Dept: REHABILITATION | Facility: HOSPITAL | Age: 43
End: 2023-08-21
Payer: COMMERCIAL

## 2023-08-21 DIAGNOSIS — C50.911 INVASIVE DUCTAL CARCINOMA OF BREAST, RIGHT: Primary | ICD-10-CM

## 2023-08-21 PROCEDURE — 97813 ACUP 1/> W/ESTIM 1ST 15 MIN: CPT | Performed by: ACUPUNCTURIST

## 2023-08-21 PROCEDURE — 97814 ACUP 1/> W/ESTIM EA ADDL 15: CPT | Performed by: ACUPUNCTURIST

## 2023-08-21 NOTE — PROGRESS NOTES
Acupuncture Evaluation Note     Name: Dov Tejeda Mary  Clinic Number: [id number]    Traditional Chinese Medicine (TCM) Diagnosis: Qi Stagnation, Blood Deficiency, and Damp  Medical Diagnosis: No diagnosis found.     Evaluation Date: 8/21/2023    Visit #/Visits authorized: 4/12    Precautions: Standard and cancer    Subjective     Chief Concern: Woke with blocked hearing in right ear 5 days ago- has fluid, beginning of ear infection, no pain but hearing still muffled. Left heel bone pain near BL63. Fatigue. Hot flashes increasing frequency, 3-4x/day and night sweats 1x/night.     Medical necessity is demonstrated by the following IMPAIRMENTS: Medical Necessity: Cancer related pain and Decreased quality of life              Aggravating Factors:  movement and stress   Relieving Factors:  rest    Symptom Description:     Quality:  Aching and Tight  Severity:  5  Frequency:  every day    Treatment     Treatment Principles:  Harmonize qi, nourish blood, transform dampness and phelgm, stop pain    Acupuncture points used:      Bilateral points: KD6, LU7, TW5, GB41, LV2, SP6, ST36, SP9, LI4   Unilateral points: SI19, HT5, TW3  Auricular Treatment:      Needles In: 21  Needles Out: 21  [place] W/ STIM placed: 9:40  Needles W/ STIM removed: 10:20        Assessment     After treatment, patient felt tightness in breast at surgery site is improved     Patient prognosis is Good.     Patient will continue to benefit from acupuncture treatment to address the deficits listed in the problem list box on initial evaluation, provide patient family education and to maximize pt's level of independence in the home and community environment.     Patient's spiritual, cultural and educational needs considered and pt agreeable to plan of care and goals.     Anticipated barriers to treatment: none    Plan     Recommend 1 /week for 2 sessions then re-assess.      Education:  Patient is aware of cumulative benefit of acupuncture

## 2023-08-21 NOTE — TELEPHONE ENCOUNTER
----- Message from Isela Odom sent at 8/21/2023  2:56 PM CDT -----  Regarding: Injection Appt Time Change  Contact: Pt  Pt is requesting a callback regarding injection appt scheduled 8/25/23. Pt would like to know if the time can be changed to 12:00 noon. Please adv pt before change              Confirmed contact below:   Contact Name:Dov Mary  Phone Number: [phone]

## 2023-08-22 ENCOUNTER — OFFICE VISIT (OUTPATIENT)
Dept: HEMATOLOGY/ONCOLOGY | Facility: CLINIC | Age: 43
End: 2023-08-22
Payer: COMMERCIAL

## 2023-08-22 ENCOUNTER — LAB VISIT (OUTPATIENT)
Dept: LAB | Facility: HOSPITAL | Age: 43
End: 2023-08-22
Payer: COMMERCIAL

## 2023-08-22 VITALS
WEIGHT: 156.94 LBS | RESPIRATION RATE: 18 BRPM | OXYGEN SATURATION: 98 % | BODY MASS INDEX: 29.63 KG/M2 | TEMPERATURE: 98 F | SYSTOLIC BLOOD PRESSURE: 127 MMHG | HEART RATE: 91 BPM | HEIGHT: 61 IN | DIASTOLIC BLOOD PRESSURE: 81 MMHG

## 2023-08-22 DIAGNOSIS — Z17.0 MALIGNANT NEOPLASM OF OVERLAPPING SITES OF RIGHT BREAST IN FEMALE, ESTROGEN RECEPTOR POSITIVE: ICD-10-CM

## 2023-08-22 DIAGNOSIS — F41.9 ANXIETY: ICD-10-CM

## 2023-08-22 DIAGNOSIS — Z17.0 MALIGNANT NEOPLASM OF OVERLAPPING SITES OF RIGHT BREAST IN FEMALE, ESTROGEN RECEPTOR POSITIVE: Primary | ICD-10-CM

## 2023-08-22 DIAGNOSIS — C77.9 SECONDARY ADENOCARCINOMA OF LYMPH NODE: ICD-10-CM

## 2023-08-22 DIAGNOSIS — C50.811 MALIGNANT NEOPLASM OF OVERLAPPING SITES OF RIGHT BREAST IN FEMALE, ESTROGEN RECEPTOR POSITIVE: Primary | ICD-10-CM

## 2023-08-22 DIAGNOSIS — C50.811 MALIGNANT NEOPLASM OF OVERLAPPING SITES OF RIGHT BREAST IN FEMALE, ESTROGEN RECEPTOR POSITIVE: ICD-10-CM

## 2023-08-22 DIAGNOSIS — R23.2 HOT FLASHES: ICD-10-CM

## 2023-08-22 LAB
ALBUMIN SERPL BCP-MCNC: 3.4 G/DL (ref 3.5–5.2)
ALP SERPL-CCNC: 63 U/L (ref 55–135)
ALT SERPL W/O P-5'-P-CCNC: 28 U/L (ref 10–44)
ANION GAP SERPL CALC-SCNC: 9 MMOL/L (ref 8–16)
AST SERPL-CCNC: 22 U/L (ref 10–40)
BILIRUB SERPL-MCNC: 0.3 MG/DL (ref 0.1–1)
BUN SERPL-MCNC: 8 MG/DL (ref 6–20)
CALCIUM SERPL-MCNC: 9.5 MG/DL (ref 8.7–10.5)
CHLORIDE SERPL-SCNC: 106 MMOL/L (ref 95–110)
CO2 SERPL-SCNC: 27 MMOL/L (ref 23–29)
CREAT SERPL-MCNC: 0.7 MG/DL (ref 0.5–1.4)
ERYTHROCYTE [DISTWIDTH] IN BLOOD BY AUTOMATED COUNT: 14.1 % (ref 11.5–14.5)
EST. GFR  (NO RACE VARIABLE): >60 ML/MIN/1.73 M^2
GLUCOSE SERPL-MCNC: 86 MG/DL (ref 70–110)
HCG INTACT+B SERPL-ACNC: <2.4 MIU/ML
HCT VFR BLD AUTO: 36.4 % (ref 37–48.5)
HGB BLD-MCNC: 12.4 G/DL (ref 12–16)
IMM GRANULOCYTES # BLD AUTO: 0.04 K/UL (ref 0–0.04)
MCH RBC QN AUTO: 32 PG (ref 27–31)
MCHC RBC AUTO-ENTMCNC: 34.1 G/DL (ref 32–36)
MCV RBC AUTO: 94 FL (ref 82–98)
NEUTROPHILS # BLD AUTO: 3.8 K/UL (ref 1.8–7.7)
PLATELET # BLD AUTO: 296 K/UL (ref 150–450)
PMV BLD AUTO: 9.4 FL (ref 9.2–12.9)
POTASSIUM SERPL-SCNC: 4.4 MMOL/L (ref 3.5–5.1)
PROT SERPL-MCNC: 6.5 G/DL (ref 6–8.4)
RBC # BLD AUTO: 3.88 M/UL (ref 4–5.4)
SODIUM SERPL-SCNC: 142 MMOL/L (ref 136–145)
WBC # BLD AUTO: 5.84 K/UL (ref 3.9–12.7)

## 2023-08-22 PROCEDURE — 80053 COMPREHEN METABOLIC PANEL: CPT | Performed by: NURSE PRACTITIONER

## 2023-08-22 PROCEDURE — 99215 PR OFFICE/OUTPT VISIT, EST, LEVL V, 40-54 MIN: ICD-10-PCS | Mod: S$GLB,,, | Performed by: INTERNAL MEDICINE

## 2023-08-22 PROCEDURE — 99999 PR PBB SHADOW E&M-EST. PATIENT-LVL IV: ICD-10-PCS | Mod: PBBFAC,,, | Performed by: INTERNAL MEDICINE

## 2023-08-22 PROCEDURE — 3008F BODY MASS INDEX DOCD: CPT | Mod: CPTII,S$GLB,, | Performed by: INTERNAL MEDICINE

## 2023-08-22 PROCEDURE — 99999 PR PBB SHADOW E&M-EST. PATIENT-LVL IV: CPT | Mod: PBBFAC,,, | Performed by: INTERNAL MEDICINE

## 2023-08-22 PROCEDURE — 3079F PR MOST RECENT DIASTOLIC BLOOD PRESSURE 80-89 MM HG: ICD-10-PCS | Mod: CPTII,S$GLB,, | Performed by: INTERNAL MEDICINE

## 2023-08-22 PROCEDURE — 3008F PR BODY MASS INDEX (BMI) DOCUMENTED: ICD-10-PCS | Mod: CPTII,S$GLB,, | Performed by: INTERNAL MEDICINE

## 2023-08-22 PROCEDURE — 3074F SYST BP LT 130 MM HG: CPT | Mod: CPTII,S$GLB,, | Performed by: INTERNAL MEDICINE

## 2023-08-22 PROCEDURE — 3074F PR MOST RECENT SYSTOLIC BLOOD PRESSURE < 130 MM HG: ICD-10-PCS | Mod: CPTII,S$GLB,, | Performed by: INTERNAL MEDICINE

## 2023-08-22 PROCEDURE — 84702 CHORIONIC GONADOTROPIN TEST: CPT | Performed by: NURSE PRACTITIONER

## 2023-08-22 PROCEDURE — 36415 COLL VENOUS BLD VENIPUNCTURE: CPT | Performed by: NURSE PRACTITIONER

## 2023-08-22 PROCEDURE — 99215 OFFICE O/P EST HI 40 MIN: CPT | Mod: S$GLB,,, | Performed by: INTERNAL MEDICINE

## 2023-08-22 PROCEDURE — 85027 COMPLETE CBC AUTOMATED: CPT | Performed by: NURSE PRACTITIONER

## 2023-08-22 PROCEDURE — 3079F DIAST BP 80-89 MM HG: CPT | Mod: CPTII,S$GLB,, | Performed by: INTERNAL MEDICINE

## 2023-08-22 RX ORDER — EPINEPHRINE 0.3 MG/.3ML
0.3 INJECTION SUBCUTANEOUS ONCE AS NEEDED
Status: CANCELLED | OUTPATIENT
Start: 2023-08-25

## 2023-08-22 RX ORDER — DEXAMETHASONE SODIUM PHOSPHATE 4 MG/ML
10 INJECTION, SOLUTION INTRA-ARTICULAR; INTRALESIONAL; INTRAMUSCULAR; INTRAVENOUS; SOFT TISSUE
Status: CANCELLED
Start: 2023-08-25

## 2023-08-22 RX ORDER — DIPHENHYDRAMINE HYDROCHLORIDE 50 MG/ML
50 INJECTION INTRAMUSCULAR; INTRAVENOUS ONCE AS NEEDED
Status: CANCELLED | OUTPATIENT
Start: 2023-08-25

## 2023-08-22 RX ORDER — FAMOTIDINE 10 MG/ML
20 INJECTION INTRAVENOUS
Status: CANCELLED
Start: 2023-08-25

## 2023-08-22 RX ORDER — PROCHLORPERAZINE EDISYLATE 5 MG/ML
10 INJECTION INTRAMUSCULAR; INTRAVENOUS ONCE AS NEEDED
Status: CANCELLED
Start: 2023-08-25

## 2023-08-22 RX ORDER — SODIUM CHLORIDE 0.9 % (FLUSH) 0.9 %
10 SYRINGE (ML) INJECTION
Status: CANCELLED | OUTPATIENT
Start: 2023-08-25

## 2023-08-22 RX ORDER — HEPARIN 100 UNIT/ML
500 SYRINGE INTRAVENOUS
Status: CANCELLED | OUTPATIENT
Start: 2023-08-25

## 2023-08-22 NOTE — PROGRESS NOTES
FOLLOW UP  VISIT    Subjective:      Patient ID: Dov Hernandez is a 42 y.o. female.  MRN: [id number]  : 1980    History of Present Illness:   HPI   Dov Hernandez is a 42 y.o. female who is referred for right breast IDC.        She had a normal screening mammogram in . This year, she underwent a screening mammogram in March that showed right breast calcifications, measured to be 39 mm on diagnostic mammogram.   She underwent a right breast biopsy that showed IDC, 2 mm, ER 30%, OR 30%, Her 2 karrie negative, Grade 1, low Ki 67 at 5% with associated DCIS.      She has had a breast MRI that showed a 2 cm mass. We have also discussed her case at our multi d breast tumor board.   She underwent b/l mastectomy with ROSITA flap reconstruction and right SLNB. Tumor size was 2.2 cm, 1/ SLN was positive for metastatic carcinoma.     Case discussed at tumor board. Pros and cons of further ALND vs XRT discussed. She has a close follow up with Dr. Pickard and has seen Dr. Kne, has declined ALND and will proceed with XRT.     Oncotype returned with RS 29; RR 23%. Adjuvant chemotherapy is recommended. She is here to obtain clearance for cycle 1.     Baseline PET reviewed with patient and her mother, no residual or metastatic disease noted.     S/p cycle 1 of TC on 23.   Had an infusion reaction immediately after cycle 1, face was flushed and she had back pain, infusion was stopped, treated with solucortef 100 mg, Benadryl 25 mg IVP BP= 141/79 HR=86 , then given Pepcid 20 mg and  restarted after 30 minutes without any further issues     Developed a rash on her chest and arms 3 days later and then spread to upper thighs and responded to benadryl and dexamethasone.     Interim history:  S/p cycle 2. Here to obtain clearance for cycle 3.     Felt right ear fullness last week, cannot hear much. Saw ENT, had an inner ear tube, still clogged. Putting antibiotic ear drops, not much improvement yet and continued to  have discharge from the ear. Will see ENT tomorrow.     Otherwise tolerated chemo well other than fatigue and agitation, mostly during the first week.     Cystic acne is improving on topical creams, is seeing her dermatologist.     No n/v,d/c.     Had worsening hot flashes and Lalitha increased her Effexor dose to 75 mg po daily, it seems to be helping.        Oncology History:  Oncology History   Malignant neoplasm of overlapping sites of right breast in female, estrogen receptor positive   4/13/2023 Biopsy    Breast, right, biopsy:   Invasive ductal carcinoma   Tumor size: 2 mm in this material   Overall Grade: grade 1     4/13/2023 Breast Tumor Markers    Estrogen: Positive (weak intensity 30%)  Progesterone: Positive (weak intensity 30%)  HER2: Negative   Ki67: 5%     4/25/2023 Genetic Testing    Negative, VUS x 1(SDHA)     4/25/2023 Imaging Significant Findings    MRI revealed 2 cm mass correlating with biopsy proven malignancy      4/26/2023 Initial Diagnosis    Malignant neoplasm of overlapping sites of right breast in female, estrogen receptor positive     4/26/2023 Cancer Staged    Staging form: Breast, AJCC 8th Edition  - Clinical stage from 4/26/2023: Stage IB (cT2, cN0, cM0, G1, ER+, WV+, HER2: Equivocal)     5/2/2023 Tumor Conference       The team agreed to proceed with upfront surgery for further sampling of the tumor     5/24/2023 Breast Surgery    Bilateral mastectomy with ROSITA flap reconstruction and right SLNB     6/6/2023 Tumor Conference     There team discussed further axillary dissection versus proceeding with XRT. If ALND and no further positive nodes then no XRT would be recommended. If there are additional nodes, then she would be recommended for XRT.   Chemotherapy would be most likely recommended as well as adjuvant endocrine therapy   Shameka trial?        6/14/2023 Cancer Staged    Staging form: Breast, AJCC 8th Edition  - Pathologic stage from 6/14/2023: Stage IB (pT2, pN1(sn), cM0,  G2, ER+, NM+, HER2-)     7/13/2023 -  Chemotherapy    Treatment Summary   Plan Name: OP BREAST TC - DOCETAXEL CYCLOPHOSPHAMIDE Q3W  Treatment Goal: Curative  Status: Active  Start Date: 7/13/2023  End Date: 9/16/2023 (Planned)  Provider: Jessica Griffith MD  Chemotherapy: cycloPHOSphamide 1,000 mg in sodium chloride 0.9% 290 mL chemo infusion, 1,020 mg, Intravenous, Clinic/HOD 1 time, 2 of 4 cycles  Administration: 1,000 mg (7/13/2023), 1,000 mg (8/3/2023)  DOCEtaxel (TAXOTERE) 100 mg in sodium chloride 0.9% 295 mL chemo infusion, 102 mg (100 % of original dose 60 mg/m2), Intravenous, Clinic/HOD 1 time, 2 of 4 cycles  Dose modification: 60 mg/m2 (original dose 60 mg/m2, Cycle 1, Reason: Treatment parameters not met)  Administration: 100 mg (7/13/2023), 130 mg (8/3/2023)        Past medical, surgical, family, and social history were reviewed today and there are no changes of note unless mentioned in HPI.   MEDS and ALLERGIES were reviewed with patient and meds reconciled.     History:  Past Medical History:   Diagnosis Date    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    OCP & aziza from derm, Doxy caused yeast    Heartburn 12/17/2021    Strawberry wai 1980    back of neck    Strawberry wai 1981    under nose      Past Surgical History:   Procedure Laterality Date    BILATERAL MASTECTOMY Bilateral 5/24/2023    Procedure: MASTECTOMY, BILATERAL;  Surgeon: Jenna Pickard MD;  Location: [facility];  Service: General;  Laterality: Bilateral;  2.5 HOURS / EMAIL SENT 5-9 @ 11:39 LK    COLPOSCOPY      INJECTION FOR SENTINEL NODE IDENTIFICATION Right 5/24/2023    Procedure: INJECTION, FOR SENTINEL NODE IDENTIFICATION;  Surgeon: Jenna Pickard MD;  Location: [facility];  Service: General;  Laterality: Right;    RECONSTRUCTION OF BREAST WITH DEEP INFERIOR EPIGASTRIC ARTERY  (ROSITA) FREE FLAP Bilateral 5/24/2023    Procedure: RECONSTRUCTION, BREAST, USING ROSITA FREE FLAP / BILATERAL DEEP INFERIOR EPIGASTRIC PERFORATORS  "FLAPS;  Surgeon: Mikey Roche MD;  Location: [facility];  Service: Plastics;  Laterality: Bilateral;    SENTINEL LYMPH NODE BIOPSY Right 2023    Procedure: BIOPSY, LYMPH NODE, SENTINEL;  Surgeon: Jenna Pickard MD;  Location: [facility];  Service: General;  Laterality: Right;    WISDOM TOOTH EXTRACTION       Family History   Problem Relation Age of Onset    Hyperlipidemia Mother     Kidney cancer Father 58    Cancer Brother 22        Parotid Gland cancer    Birth marks Child 0        strawberry wai(s)    Birth marks Child 0        strawberry wai(s)    Café-au-lait spots Maternal Uncle         "a small patch on his neck"    Café-au-lait spots Other         "a spot on his arm and partial torso"    Other Other 2        tested negative for macrocephaly    Cervical cancer Other     Throat cancer Other     Pancreatic cancer Other         1 second cousin ()    Other Other         brain tumors (several 2nd cousins)    Breast cancer Neg Hx     Colon cancer Neg Hx     Ovarian cancer Neg Hx     Melanoma Neg Hx       Social History     Tobacco Use    Smoking status: Never    Smokeless tobacco: Never   Substance and Sexual Activity    Alcohol use: Yes     Comment: social    Drug use: No    Sexual activity: Yes     Partners: Male     Birth control/protection: None        ROS:  Review of Systems   Constitutional:  Positive for malaise/fatigue. Negative for fever and weight loss.   HENT:  Negative for congestion, hearing loss, nosebleeds and sore throat.    Eyes:  Negative for double vision and photophobia.   Respiratory:  Negative for cough, hemoptysis, sputum production, shortness of breath and wheezing.    Cardiovascular:  Negative for chest pain, palpitations, orthopnea and leg swelling.   Gastrointestinal:  Positive for heartburn. Negative for abdominal pain, blood in stool, constipation, diarrhea, nausea and vomiting.        Hiccups   Genitourinary:  Negative for dysuria, hematuria and urgency.   Musculoskeletal:  " "Positive for joint pain. Negative for back pain and myalgias.   Skin:  Negative for itching and rash.   Neurological:  Negative for dizziness, tingling, seizures, weakness and headaches.   Endo/Heme/Allergies:  Negative for polydipsia. Does not bruise/bleed easily.   Psychiatric/Behavioral:  Negative for depression and memory loss. The patient is nervous/anxious. The patient does not have insomnia.        Objective:     Vitals:    08/22/23 1042   BP: 127/81   Pulse: 91   Resp: 18   Temp: 98 °F (36.7 °C)   TempSrc: Oral   SpO2: 98%   Weight: 71.2 kg (156 lb 15.5 oz)   Height: 5' 1" (1.549 m)   PainSc: 0-No pain         Wt Readings from Last 10 Encounters:   08/22/23 71.2 kg (156 lb 15.5 oz)   08/02/23 68.8 kg (151 lb 10.8 oz)   07/13/23 67 kg (147 lb 11.3 oz)   07/12/23 67.1 kg (147 lb 14.9 oz)   07/12/23 67.1 kg (147 lb 14.9 oz)   07/07/23 66.4 kg (146 lb 6.2 oz)   06/15/23 66.7 kg (147 lb)   05/25/23 68 kg (150 lb)   05/15/23 68 kg (150 lb)   04/25/23 69.4 kg (153 lb)       Physical Exam  Vitals and nursing note reviewed.   Constitutional:       General: She is not in acute distress.     Appearance: She is not diaphoretic.   HENT:      Head: Normocephalic.      Mouth/Throat:      Pharynx: No oropharyngeal exudate.   Eyes:      General: No scleral icterus.     Conjunctiva/sclera: Conjunctivae normal.   Neck:      Thyroid: No thyromegaly.   Cardiovascular:      Rate and Rhythm: Normal rate and regular rhythm.      Heart sounds: Normal heart sounds. No murmur heard.  Pulmonary:      Effort: Pulmonary effort is normal. No respiratory distress.      Breath sounds: No stridor. No wheezing or rales.   Chest:      Chest wall: No tenderness.   Abdominal:      General: Bowel sounds are normal. There is no distension.      Palpations: Abdomen is soft. There is no mass.      Tenderness: There is no abdominal tenderness. There is no rebound.   Musculoskeletal:         General: No tenderness or deformity. Normal range of motion. "      Cervical back: Neck supple.   Lymphadenopathy:      Cervical: No cervical adenopathy.   Skin:     General: Skin is warm and dry.      Findings: No erythema or rash.   Neurological:      Mental Status: She is alert and oriented to person, place, and time.      Cranial Nerves: No cranial nerve deficit.      Coordination: Coordination normal.      Gait: Gait is intact.   Psychiatric:         Mood and Affect: Affect normal.         Cognition and Memory: Memory normal.         Judgment: Judgment normal.          Diagnostic Tests:  Significant Imaging: I have reviewed and interpreted all pertinent imaging results/findings.    Laboratory Data:  All pertinent labs have been reviewed.    Labs:   Lab Results   Component Value Date    WBC 5.84 08/22/2023    RBC 3.88 (L) 08/22/2023    HGB 12.4 08/22/2023    HCT 36.4 (L) 08/22/2023    MCV 94 08/22/2023     08/22/2023    GLU 86 08/22/2023     08/22/2023    K 4.4 08/22/2023    BUN 8 08/22/2023    CREATININE 0.7 08/22/2023    AST 22 08/22/2023    ALT 28 08/22/2023    BILITOT 0.3 08/22/2023     Assessment/Plan:   Malignant neoplasm of overlapping sites of right breast in female, estrogen receptor positive  cT2cN0, G1, ER/NJ + 30%, Her 2 karrie negative Ki 67 5%   pT2 pN1a     Genetics VUS     We discussed that she has node positive disease on path.She is agreeable to proceed with XRT in the adjuvant setting. Dr. Ken note was appreciated.      As for adjuvant therapy, given she is premenopausal and node +, has High risk Oncotype, adjuvant chemotherapy is appropriate. See prior notes for discussion. Baseline PET scan is reassuring. Sub cm pulmonary nodules noted, repeat scan as clinically indicated.     I have recommended adjuvant chemotherapy with Docetaxel and cytoxan q 3 weeks x 4, via PIV.   Cleared to receive cycle 3, continue supportive care.   Following chemotherapy, she will be offered XRT and then endocrine therapy with OFS.     Transaminitis   Isolated  elevated of ALT, minimal elevation of AST noted.   Confirmed with repeat, dose reduced docetaxel by 20% after cycle 1. No liver parenchymal abnormalities on PET.   Follow up LFTs are normal. Received full dose for C2 and now C3.     Anxiety  Improving, on Effexor. Continue to monitor.     Infusion reaction   Noted, increased dexamethasone to 20 mg and added Famotidine with this cycle.     Rash  Likely secondary to docetaxel. Continue dexamethasone D1-3 and then prn for rash.     Ear fullness  Will see ENT. Ok to use antibiotics if needed.   ECOG SCORE           Discussion:   No follow-ups on file.    Plan was discussed with the patient at length, and she verbalized understanding. Dov was given an opportunity to ask questions that were answered to her satisfaction, and she was advised to call in the interval if any problems or questions arise.  Discussed COVID-19 and social distancing in great detail, avoid all non-essential visits out of the home if possible and avoid sick contacts.     Electronically signed by Jessica Griffith MD     Route Chart for Scheduling    Med Onc Chart Routing      Follow up with physician . Scheduled   Follow up with IDA    Infusion scheduling note    Injection scheduling note    Labs    Imaging    Pharmacy appointment    Other referrals              Treatment Plan Information   OP BREAST TC - DOCETAXEL CYCLOPHOSPHAMIDE Q3W   Jessica Griffith MD   Upcoming Treatment Dates - OP BREAST TC - DOCETAXEL CYCLOPHOSPHAMIDE Q3W    8/25/2023       Pre-Medications       aprepitant (CINVANTI) injection 130 mg       palonosetron (ALOXI) 0.25 mg with Dexamethasone (DECADRON) 10 mg in NS 50 mL IVPB       famotidine (PF) injection 20 mg       dexAMETHasone injection 10 mg       Chemotherapy       DOCEtaxel (TAXOTERE) 75 mg/m2 = 128 mg in sodium chloride 0.9% 256.4 mL chemo infusion       cycloPHOSphamide 600 mg/m2 = 1,020 mg in sodium chloride 0.9% 255.1 mL chemo infusion       Antiemetics        prochlorperazine injection Soln 10 mg  8/26/2023       Growth Factor       pegfilgrastim-jmdb (FULPHILA) injection 6 mg  9/15/2023       Pre-Medications       aprepitant (CINVANTI) injection 130 mg       palonosetron (ALOXI) 0.25 mg with Dexamethasone (DECADRON) 10 mg in NS 50 mL IVPB       famotidine (PF) injection 20 mg       dexAMETHasone injection 10 mg       Chemotherapy       DOCEtaxel (TAXOTERE) 75 mg/m2 = 128 mg in sodium chloride 0.9% 256.4 mL chemo infusion       cycloPHOSphamide 600 mg/m2 = 1,020 mg in sodium chloride 0.9% 255.1 mL chemo infusion       Antiemetics       prochlorperazine injection Soln 10 mg  9/16/2023       Growth Factor       pegfilgrastim-jmdb (FULPHILA) injection 6 mg      MDM includes  :    - Acute or chronic illness or injury that poses a threat to life or bodily function  - Independent review and explanation of 3+ results from unique tests  - Discussion of management and ordering 3+ unique tests  - Extensive discussion of treatment and management  - Prescription drug management  - Drug therapy requiring intensive monitoring for toxicity

## 2023-08-24 ENCOUNTER — INFUSION (OUTPATIENT)
Dept: INFUSION THERAPY | Facility: HOSPITAL | Age: 43
End: 2023-08-24
Payer: COMMERCIAL

## 2023-08-24 VITALS
WEIGHT: 156.94 LBS | SYSTOLIC BLOOD PRESSURE: 128 MMHG | RESPIRATION RATE: 18 BRPM | BODY MASS INDEX: 29.66 KG/M2 | TEMPERATURE: 98 F | HEART RATE: 88 BPM | DIASTOLIC BLOOD PRESSURE: 74 MMHG

## 2023-08-24 DIAGNOSIS — C50.811 MALIGNANT NEOPLASM OF OVERLAPPING SITES OF RIGHT BREAST IN FEMALE, ESTROGEN RECEPTOR POSITIVE: Primary | ICD-10-CM

## 2023-08-24 DIAGNOSIS — Z17.0 MALIGNANT NEOPLASM OF OVERLAPPING SITES OF RIGHT BREAST IN FEMALE, ESTROGEN RECEPTOR POSITIVE: Primary | ICD-10-CM

## 2023-08-24 PROCEDURE — 96413 CHEMO IV INFUSION 1 HR: CPT

## 2023-08-24 PROCEDURE — 25000003 PHARM REV CODE 250: Performed by: INTERNAL MEDICINE

## 2023-08-24 PROCEDURE — 63600175 PHARM REV CODE 636 W HCPCS: Mod: JG | Performed by: INTERNAL MEDICINE

## 2023-08-24 PROCEDURE — 96417 CHEMO IV INFUS EACH ADDL SEQ: CPT

## 2023-08-24 PROCEDURE — 96367 TX/PROPH/DG ADDL SEQ IV INF: CPT

## 2023-08-24 PROCEDURE — 96375 TX/PRO/DX INJ NEW DRUG ADDON: CPT

## 2023-08-24 RX ORDER — SODIUM CHLORIDE 0.9 % (FLUSH) 0.9 %
10 SYRINGE (ML) INJECTION
Status: DISCONTINUED | OUTPATIENT
Start: 2023-08-24 | End: 2023-08-24 | Stop reason: HOSPADM

## 2023-08-24 RX ORDER — DIPHENHYDRAMINE HYDROCHLORIDE 50 MG/ML
50 INJECTION INTRAMUSCULAR; INTRAVENOUS ONCE AS NEEDED
Status: DISCONTINUED | OUTPATIENT
Start: 2023-08-24 | End: 2023-08-24 | Stop reason: HOSPADM

## 2023-08-24 RX ORDER — DEXAMETHASONE SODIUM PHOSPHATE 4 MG/ML
10 INJECTION, SOLUTION INTRA-ARTICULAR; INTRALESIONAL; INTRAMUSCULAR; INTRAVENOUS; SOFT TISSUE
Status: DISCONTINUED | OUTPATIENT
Start: 2023-08-24 | End: 2023-08-24 | Stop reason: SDUPTHER

## 2023-08-24 RX ORDER — EPINEPHRINE 0.3 MG/.3ML
0.3 INJECTION SUBCUTANEOUS ONCE AS NEEDED
Status: DISCONTINUED | OUTPATIENT
Start: 2023-08-24 | End: 2023-08-24 | Stop reason: HOSPADM

## 2023-08-24 RX ORDER — FAMOTIDINE 10 MG/ML
20 INJECTION INTRAVENOUS
Status: COMPLETED | OUTPATIENT
Start: 2023-08-24 | End: 2023-08-24

## 2023-08-24 RX ORDER — PROCHLORPERAZINE EDISYLATE 5 MG/ML
10 INJECTION INTRAMUSCULAR; INTRAVENOUS ONCE AS NEEDED
Status: DISCONTINUED | OUTPATIENT
Start: 2023-08-24 | End: 2023-08-24 | Stop reason: HOSPADM

## 2023-08-24 RX ORDER — HEPARIN 100 UNIT/ML
500 SYRINGE INTRAVENOUS
Status: DISCONTINUED | OUTPATIENT
Start: 2023-08-24 | End: 2023-08-24 | Stop reason: HOSPADM

## 2023-08-24 RX ADMIN — APREPITANT 130 MG: 130 INJECTION, EMULSION INTRAVENOUS at 10:08

## 2023-08-24 RX ADMIN — FAMOTIDINE 20 MG: 10 INJECTION INTRAVENOUS at 10:08

## 2023-08-24 RX ADMIN — SODIUM CHLORIDE: 9 INJECTION, SOLUTION INTRAVENOUS at 09:08

## 2023-08-24 RX ADMIN — DEXAMETHASONE SODIUM PHOSPHATE 0.25 MG: 4 INJECTION, SOLUTION INTRA-ARTICULAR; INTRALESIONAL; INTRAMUSCULAR; INTRAVENOUS; SOFT TISSUE at 10:08

## 2023-08-24 RX ADMIN — DOCETAXEL ANHYDROUS 130 MG: 10 INJECTION, SOLUTION INTRAVENOUS at 10:08

## 2023-08-24 RX ADMIN — CYCLOPHOSPHAMIDE 1000 MG: 200 INJECTION, SOLUTION INTRAVENOUS at 11:08

## 2023-08-24 NOTE — PLAN OF CARE
0930 pt here for T/C infusion D1C3, labs, hx, meds, allergies reviewed, pt with no new complaints at this time, pt to wear cooling cap, pt reclined in chair, continue to monitor

## 2023-08-24 NOTE — PLAN OF CARE
1445 pt tolerated T/C infusion with cool cap without issue, pt to rtc tomorrow for fulphila injection, pt aware, no distress noted upon d/c to home

## 2023-08-25 ENCOUNTER — PATIENT MESSAGE (OUTPATIENT)
Dept: HEMATOLOGY/ONCOLOGY | Facility: CLINIC | Age: 43
End: 2023-08-25
Payer: COMMERCIAL

## 2023-08-25 ENCOUNTER — INFUSION (OUTPATIENT)
Dept: INFUSION THERAPY | Facility: HOSPITAL | Age: 43
End: 2023-08-25
Payer: COMMERCIAL

## 2023-08-25 DIAGNOSIS — C50.811 MALIGNANT NEOPLASM OF OVERLAPPING SITES OF RIGHT BREAST IN FEMALE, ESTROGEN RECEPTOR POSITIVE: Primary | ICD-10-CM

## 2023-08-25 DIAGNOSIS — Z17.0 MALIGNANT NEOPLASM OF OVERLAPPING SITES OF RIGHT BREAST IN FEMALE, ESTROGEN RECEPTOR POSITIVE: Primary | ICD-10-CM

## 2023-08-25 PROCEDURE — 96372 THER/PROPH/DIAG INJ SC/IM: CPT

## 2023-08-25 PROCEDURE — 63600175 PHARM REV CODE 636 W HCPCS: Mod: JZ,JG | Performed by: INTERNAL MEDICINE

## 2023-08-25 RX ADMIN — PEGFILGRASTIM 6 MG: 6 INJECTION SUBCUTANEOUS at 09:08

## 2023-08-25 NOTE — NURSING
Pt here D2 Fulphilia. Administered injection in back of left arm. No questions or concerns. Pt ambulated out of unit unassisted.

## 2023-09-01 DIAGNOSIS — Z17.0 MALIGNANT NEOPLASM OF OVERLAPPING SITES OF RIGHT BREAST IN FEMALE, ESTROGEN RECEPTOR POSITIVE: ICD-10-CM

## 2023-09-01 DIAGNOSIS — C50.811 MALIGNANT NEOPLASM OF OVERLAPPING SITES OF RIGHT BREAST IN FEMALE, ESTROGEN RECEPTOR POSITIVE: ICD-10-CM

## 2023-09-01 RX ORDER — OLANZAPINE 5 MG/1
5 TABLET ORAL NIGHTLY
Qty: 4 TABLET | Refills: 3 | Status: SHIPPED | OUTPATIENT
Start: 2023-09-01 | End: 2023-09-20

## 2023-09-11 ENCOUNTER — CLINICAL SUPPORT (OUTPATIENT)
Dept: REHABILITATION | Facility: HOSPITAL | Age: 43
End: 2023-09-11
Payer: COMMERCIAL

## 2023-09-11 DIAGNOSIS — C50.911 INVASIVE DUCTAL CARCINOMA OF BREAST, RIGHT: Primary | ICD-10-CM

## 2023-09-11 PROCEDURE — 97814 ACUP 1/> W/ESTIM EA ADDL 15: CPT | Performed by: ACUPUNCTURIST

## 2023-09-11 PROCEDURE — 97813 ACUP 1/> W/ESTIM 1ST 15 MIN: CPT | Performed by: ACUPUNCTURIST

## 2023-09-11 NOTE — PROGRESS NOTES
Acupuncture Evaluation Note     Name: Dov Tejeda Mary  Clinic Number: [id number]    Traditional Chinese Medicine (TCM) Diagnosis: Qi Stagnation, Qi Deficiency, and Blood Deficiency  Medical Diagnosis: No diagnosis found.     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     Medical necessity is demonstrated by the following IMPAIRMENTS: Medical Necessity: Cancer related pain, Decreased mobility limits day to day activities, social, and emergent situations, and Nausea and Vomiting              Aggravating Factors:  movement and stress   Relieving Factors:  rest    Symptom Description:     Quality:  Tingling and Numb  Severity:  5  Frequency:  every day    Treatment     Treatment Principles:  Move qi, nourish qi and blood, stop pain     Acupuncture points used:  BA TRESSA    Bilateral points: LV3, LI11, SP6, SP9, ST36  Unilateral points: SP4, PC6, LU7, KD6, LV8, GB34, ling-gu/da-inocencio, LI4, HT7   Auricular Treatment:  george men and [place]    Needles In: 26  Needles Out: 26  [place] W/ STIM placed: 11:20  Needles W/ STIM removed: 11:50        Assessment     After treatment, patient felt improvement in bone and joint pain      Patient prognosis is Good.     Patient will continue to benefit from acupuncture treatment to address the deficits listed in the problem list box on initial evaluation, provide patient family education and to maximize pt's level of independence in the home and community environment.     Patient's spiritual, cultural and educational needs considered and pt agreeable to plan of care and goals.     Anticipated barriers to treatment: none    Plan     Recommend 1 /week for 1 sessions then re-assess.      Education:  Patient is aware of cumulative benefit of acupuncture

## 2023-09-12 ENCOUNTER — LAB VISIT (OUTPATIENT)
Dept: LAB | Facility: HOSPITAL | Age: 43
End: 2023-09-12
Attending: NURSE PRACTITIONER
Payer: COMMERCIAL

## 2023-09-12 DIAGNOSIS — C50.811 MALIGNANT NEOPLASM OF OVERLAPPING SITES OF RIGHT BREAST IN FEMALE, ESTROGEN RECEPTOR POSITIVE: ICD-10-CM

## 2023-09-12 DIAGNOSIS — Z17.0 MALIGNANT NEOPLASM OF OVERLAPPING SITES OF RIGHT BREAST IN FEMALE, ESTROGEN RECEPTOR POSITIVE: ICD-10-CM

## 2023-09-12 LAB
ALBUMIN SERPL BCP-MCNC: 3.5 G/DL (ref 3.5–5.2)
ALP SERPL-CCNC: 60 U/L (ref 55–135)
ALT SERPL W/O P-5'-P-CCNC: 30 U/L (ref 10–44)
ANION GAP SERPL CALC-SCNC: 9 MMOL/L (ref 8–16)
AST SERPL-CCNC: 25 U/L (ref 10–40)
BILIRUB SERPL-MCNC: 0.3 MG/DL (ref 0.1–1)
BUN SERPL-MCNC: 10 MG/DL (ref 6–20)
CALCIUM SERPL-MCNC: 9.1 MG/DL (ref 8.7–10.5)
CHLORIDE SERPL-SCNC: 108 MMOL/L (ref 95–110)
CO2 SERPL-SCNC: 25 MMOL/L (ref 23–29)
CREAT SERPL-MCNC: 0.8 MG/DL (ref 0.5–1.4)
ERYTHROCYTE [DISTWIDTH] IN BLOOD BY AUTOMATED COUNT: 15.8 % (ref 11.5–14.5)
EST. GFR  (NO RACE VARIABLE): >60 ML/MIN/1.73 M^2
GLUCOSE SERPL-MCNC: 122 MG/DL (ref 70–110)
HCG INTACT+B SERPL-ACNC: <2.4 MIU/ML
HCT VFR BLD AUTO: 33.9 % (ref 37–48.5)
HGB BLD-MCNC: 11.2 G/DL (ref 12–16)
IMM GRANULOCYTES # BLD AUTO: 0.08 K/UL (ref 0–0.04)
MCH RBC QN AUTO: 32.3 PG (ref 27–31)
MCHC RBC AUTO-ENTMCNC: 33 G/DL (ref 32–36)
MCV RBC AUTO: 98 FL (ref 82–98)
NEUTROPHILS # BLD AUTO: 3.1 K/UL (ref 1.8–7.7)
PLATELET # BLD AUTO: 255 K/UL (ref 150–450)
PMV BLD AUTO: 9.6 FL (ref 9.2–12.9)
POTASSIUM SERPL-SCNC: 4.5 MMOL/L (ref 3.5–5.1)
PROT SERPL-MCNC: 6.4 G/DL (ref 6–8.4)
RBC # BLD AUTO: 3.47 M/UL (ref 4–5.4)
SODIUM SERPL-SCNC: 142 MMOL/L (ref 136–145)
WBC # BLD AUTO: 4.66 K/UL (ref 3.9–12.7)

## 2023-09-12 PROCEDURE — 80053 COMPREHEN METABOLIC PANEL: CPT | Performed by: NURSE PRACTITIONER

## 2023-09-12 PROCEDURE — 36415 COLL VENOUS BLD VENIPUNCTURE: CPT | Mod: PO | Performed by: NURSE PRACTITIONER

## 2023-09-12 PROCEDURE — 85027 COMPLETE CBC AUTOMATED: CPT | Performed by: NURSE PRACTITIONER

## 2023-09-12 PROCEDURE — 84702 CHORIONIC GONADOTROPIN TEST: CPT | Performed by: NURSE PRACTITIONER

## 2023-09-12 NOTE — PROGRESS NOTES
FOLLOW UP  VISIT    Subjective:      Patient ID: Dov Hernandez is a 42 y.o. female.  MRN: [id number]  : 1980    History of Present Illness:   HPI   Dov Hernandez is a 42 y.o. female who is referred for right breast IDC.     Per Dr. Griffith's note:   She had a normal screening mammogram in . This year, she underwent a screening mammogram in March that showed right breast calcifications, measured to be 39 mm on diagnostic mammogram.   She underwent a right breast biopsy that showed IDC, 2 mm, ER 30%, LA 30%, Her 2 karrie negative, Grade 1, low Ki 67 at 5% with associated DCIS.      She has had a breast MRI that showed a 2 cm mass. We have also discussed her case at our multi d breast tumor board.   She underwent b/l mastectomy with ROSITA flap reconstruction and right SLNB. Tumor size was 2.2 cm, 1/ SLN was positive for metastatic carcinoma.     Case discussed at tumor board. Pros and cons of further ALND vs XRT discussed. She has a close follow up with Dr. Pickard and has seen Dr. Ken, has declined ALND and will proceed with XRT.     Oncotype returned with RS 29; RR 23%. Adjuvant chemotherapy is recommended. She is here to obtain clearance for cycle 1.     Baseline PET reviewed with patient and her mother, no residual or metastatic disease noted.     S/p cycle 1 of TC on 23.   Had an infusion reaction immediately after cycle 1, face was flushed and she had back pain, infusion was stopped, treated with solucortef 100 mg, Benadryl 25 mg IVP BP= 141/79 HR=86 , then given Pepcid 20 mg and  restarted after 30 minutes without any further issues     Developed a rash on her chest and arms 3 days later and then spread to upper thighs and responded to benadryl and dexamethasone.     Interim history:  S/p cycle 3. Here to obtain clearance for cycle 4.     Saw ENT for right ear fullness.  Did not mention today as bothering her    Otherwise tolerated chemo well other than fatigue and increasing agitation,  mostly during the first week.     Cystic acne improves and then returns the week after chemo, is seeing her dermatologist.     No vomiting, diarrhea, constipation.  Had one day of nausea that was relieved with prn meds, earing and drinking well, weight stable    Losing a little more hair, using dignicap    No fevers.  Hot flashes getting a little worse.  Comes on intermittently, effexor 75 mg does help some    Oncology History:  Oncology History   Malignant neoplasm of overlapping sites of right breast in female, estrogen receptor positive   4/13/2023 Biopsy    Breast, right, biopsy:   Invasive ductal carcinoma   Tumor size: 2 mm in this material   Overall Grade: grade 1     4/13/2023 Breast Tumor Markers    Estrogen: Positive (weak intensity 30%)  Progesterone: Positive (weak intensity 30%)  HER2: Negative   Ki67: 5%     4/25/2023 Genetic Testing    Negative, VUS x 1(SDHA)     4/25/2023 Imaging Significant Findings    MRI revealed 2 cm mass correlating with biopsy proven malignancy      4/26/2023 Initial Diagnosis    Malignant neoplasm of overlapping sites of right breast in female, estrogen receptor positive     4/26/2023 Cancer Staged    Staging form: Breast, AJCC 8th Edition  - Clinical stage from 4/26/2023: Stage IB (cT2, cN0, cM0, G1, ER+, VT+, HER2: Equivocal)     5/2/2023 Tumor Conference       The team agreed to proceed with upfront surgery for further sampling of the tumor     5/24/2023 Breast Surgery    Bilateral mastectomy with ROSITA flap reconstruction and right SLNB     6/6/2023 Tumor Conference     There team discussed further axillary dissection versus proceeding with XRT. If ALND and no further positive nodes then no XRT would be recommended. If there are additional nodes, then she would be recommended for XRT.   Chemotherapy would be most likely recommended as well as adjuvant endocrine therapy   Shameka trial?        6/14/2023 Cancer Staged    Staging form: Breast, AJCC 8th Edition  - Pathologic  stage from 6/14/2023: Stage IB (pT2, pN1(sn), cM0, G2, ER+, AK+, HER2-)     7/13/2023 -  Chemotherapy    Treatment Summary   Plan Name: OP BREAST TC - DOCETAXEL CYCLOPHOSPHAMIDE Q3W  Treatment Goal: Curative  Status: Active  Start Date: 7/13/2023  End Date: 9/16/2023 (Planned)  Provider: Jessica Griffith MD  Chemotherapy: cycloPHOSphamide 1,000 mg in sodium chloride 0.9% 290 mL chemo infusion, 1,020 mg, Intravenous, Clinic/HOD 1 time, 3 of 4 cycles  Administration: 1,000 mg (7/13/2023), 1,000 mg (8/3/2023), 1,000 mg (8/24/2023)  DOCEtaxel (TAXOTERE) 100 mg in sodium chloride 0.9% 295 mL chemo infusion, 102 mg (100 % of original dose 60 mg/m2), Intravenous, Clinic/HOD 1 time, 3 of 4 cycles  Dose modification: 60 mg/m2 (original dose 60 mg/m2, Cycle 1, Reason: Treatment parameters not met)  Administration: 100 mg (7/13/2023), 130 mg (8/3/2023), 130 mg (8/24/2023)        Past medical, surgical, family, and social history were reviewed today and there are no changes of note unless mentioned in HPI.   MEDS and ALLERGIES were reviewed with patient and meds reconciled.     History:  Past Medical History:   Diagnosis Date    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    OCP & aziza from derm, Doxy caused yeast    Heartburn 12/17/2021    Strawberry wai 1980    back of neck    Strawberry wai 1981    under nose      Past Surgical History:   Procedure Laterality Date    BILATERAL MASTECTOMY Bilateral 5/24/2023    Procedure: MASTECTOMY, BILATERAL;  Surgeon: Jenna Pickard MD;  Location: [facility];  Service: General;  Laterality: Bilateral;  2.5 HOURS / EMAIL SENT 5-9 @ 11:39 LK    COLPOSCOPY      INJECTION FOR SENTINEL NODE IDENTIFICATION Right 5/24/2023    Procedure: INJECTION, FOR SENTINEL NODE IDENTIFICATION;  Surgeon: Jenna Pickard MD;  Location: [facility] OR;  Service: General;  Laterality: Right;    RECONSTRUCTION OF BREAST WITH DEEP INFERIOR EPIGASTRIC ARTERY  (ROSITA) FREE FLAP Bilateral 5/24/2023    Procedure:  "RECONSTRUCTION, BREAST, USING ROSITA FREE FLAP / BILATERAL DEEP INFERIOR EPIGASTRIC PERFORATORS FLAPS;  Surgeon: Mikey Roche MD;  Location: [facility] OR;  Service: Plastics;  Laterality: Bilateral;    SENTINEL LYMPH NODE BIOPSY Right 2023    Procedure: BIOPSY, LYMPH NODE, SENTINEL;  Surgeon: Jenna Pickard MD;  Location: [facility] OR;  Service: General;  Laterality: Right;    WISDOM TOOTH EXTRACTION       Family History   Problem Relation Age of Onset    Hyperlipidemia Mother     Kidney cancer Father 58    Cancer Brother 22        Parotid Gland cancer    Birth marks Child 0        strawberry wai(s)    Birth marks Child 0        strawberry wai(s)    Café-au-lait spots Maternal Uncle         "a small patch on his neck"    Café-au-lait spots Other         "a spot on his arm and partial torso"    Other Other 2        tested negative for macrocephaly    Cervical cancer Other     Throat cancer Other     Pancreatic cancer Other         1 second cousin ()    Other Other         brain tumors (several 2nd cousins)    Breast cancer Neg Hx     Colon cancer Neg Hx     Ovarian cancer Neg Hx     Melanoma Neg Hx       Social History     Tobacco Use    Smoking status: Never    Smokeless tobacco: Never   Substance and Sexual Activity    Alcohol use: Yes     Comment: social    Drug use: No    Sexual activity: Yes     Partners: Male     Birth control/protection: None        ROS:  Review of Systems   Constitutional:  Positive for malaise/fatigue. Negative for fever and weight loss.   HENT:  Negative for congestion, hearing loss, nosebleeds and sore throat.    Eyes:  Negative for double vision and photophobia.   Respiratory:  Negative for cough, hemoptysis, sputum production, shortness of breath and wheezing.    Cardiovascular:  Negative for chest pain, palpitations, orthopnea and leg swelling.   Gastrointestinal:  Positive for heartburn and nausea. Negative for abdominal pain, blood in stool, constipation, diarrhea and vomiting.     " "   Hiccups   Genitourinary:  Negative for dysuria, hematuria and urgency.   Musculoskeletal:  Positive for joint pain. Negative for back pain and myalgias.   Skin:  Negative for itching and rash.   Neurological:  Negative for dizziness, tingling, seizures, weakness and headaches.   Endo/Heme/Allergies:  Negative for polydipsia. Does not bruise/bleed easily.        Hot flashes   Psychiatric/Behavioral:  Negative for depression and memory loss. The patient is nervous/anxious. The patient does not have insomnia.        Objective:     Vitals:    09/13/23 0905 09/13/23 1004   BP: (!) 146/81 138/79   Pulse: 88    Temp: 98.3 °F (36.8 °C)    SpO2: 100%    Weight: 73 kg (160 lb 15 oz)    Height: 5' 1" (1.549 m)        Wt Readings from Last 10 Encounters:   09/13/23 73 kg (160 lb 15 oz)   08/24/23 71.2 kg (156 lb 15.5 oz)   08/22/23 71.2 kg (156 lb 15.5 oz)   08/02/23 68.8 kg (151 lb 10.8 oz)   07/13/23 67 kg (147 lb 11.3 oz)   07/12/23 67.1 kg (147 lb 14.9 oz)   07/12/23 67.1 kg (147 lb 14.9 oz)   07/07/23 66.4 kg (146 lb 6.2 oz)   06/15/23 66.7 kg (147 lb)   05/25/23 68 kg (150 lb)       Physical Exam  Vitals and nursing note reviewed.   Constitutional:       General: She is not in acute distress.     Appearance: She is not diaphoretic.   HENT:      Head: Normocephalic.      Mouth/Throat:      Pharynx: No oropharyngeal exudate.   Eyes:      General: No scleral icterus.     Conjunctiva/sclera: Conjunctivae normal.   Neck:      Thyroid: No thyromegaly.   Cardiovascular:      Rate and Rhythm: Normal rate and regular rhythm.      Heart sounds: Normal heart sounds. No murmur heard.  Pulmonary:      Effort: Pulmonary effort is normal. No respiratory distress.      Breath sounds: No stridor. No wheezing or rales.   Chest:      Chest wall: No tenderness.      Comments: Breast exam deferred today  Abdominal:      General: Bowel sounds are normal. There is no distension.      Palpations: Abdomen is soft. There is no mass.      " Tenderness: There is no abdominal tenderness. There is no rebound.   Musculoskeletal:         General: No tenderness or deformity. Normal range of motion.      Cervical back: Neck supple.   Lymphadenopathy:      Cervical: No cervical adenopathy.   Skin:     General: Skin is warm and dry.      Findings: No erythema or rash.   Neurological:      Mental Status: She is alert and oriented to person, place, and time.      Cranial Nerves: No cranial nerve deficit.      Coordination: Coordination normal.      Gait: Gait is intact.   Psychiatric:         Mood and Affect: Affect normal.         Cognition and Memory: Memory normal.         Judgment: Judgment normal.          Diagnostic Tests:  Significant Imaging: I have reviewed and interpreted all pertinent imaging results/findings.    Laboratory Data:  All pertinent labs have been reviewed.    Labs:   Lab Results   Component Value Date    WBC 4.66 09/12/2023    RBC 3.47 (L) 09/12/2023    HGB 11.2 (L) 09/12/2023    HCT 33.9 (L) 09/12/2023    MCV 98 09/12/2023     09/12/2023     (H) 09/12/2023     09/12/2023    K 4.5 09/12/2023    BUN 10 09/12/2023    CREATININE 0.8 09/12/2023    AST 25 09/12/2023    ALT 30 09/12/2023    BILITOT 0.3 09/12/2023     Assessment/Plan:   Malignant neoplasm of overlapping sites of right breast in female, estrogen receptor positive  cT2cN0, G1, ER/DC + 30%, Her 2 karrie negative Ki 67 5%   pT2 pN1a     Genetics VUS     We discussed that she has node positive disease on path.She is agreeable to proceed with XRT in the adjuvant setting. Dr. Ken note was appreciated.      As for adjuvant therapy, given she is premenopausal and node +, has High risk Oncotype, adjuvant chemotherapy is appropriate. See prior notes for discussion. Baseline PET scan is reassuring. Sub cm pulmonary nodules noted, repeat scan as clinically indicated.     I have recommended adjuvant chemotherapy with Docetaxel and cytoxan q 3 weeks x 4, via PIV.   Labs  reviewed today and acceptable, Cleared to receive cycle 4, continue supportive care.   Following chemotherapy, she will be offered XRT and then endocrine therapy with OFS.   Has appointment 9/20 with Dr. Ken  Will have her follow up in about 3 weeks with Dr. Griffith to further discuss endocrine therapy    Transaminitis   Isolated elevated of ALT, minimal elevation of AST noted.   Confirmed with repeat, dose reduced docetaxel by 20% after cycle 1. No liver parenchymal abnormalities on PET.   Follow up LFTs are normal. Received full dose for C2, C3 and now C4     Anxiety  More agitation noted after last cycle,   on Effexor (can increase once more, but pt will hold off for now)  Has some ativan at home, ok to take as needed    Infusion reaction   Noted, increased dexamethasone to 20 mg and added Famotidine with this cycle.     Rash  Likely secondary to docetaxel. Continue dexamethasone D1-3 and then prn for rash.     Ear fullness  No complaint of this today  Has been following with ent  Ok to use antibiotics if needed.     ECOG SCORE    1 - Restricted in strenuous activity-ambulatory and able to carry out work of a light nature       Discussion:   No follow-ups on file.    Plan was discussed with the patient at length, and she verbalized understanding. Dov was given an opportunity to ask questions that were answered to her satisfaction, and she was advised to call in the interval if any problems or questions arise.  Discussed COVID-19 and social distancing in great detail, avoid all non-essential visits out of the home if possible and avoid sick contacts.     Electronically signed by Lalitha Zamarripa NP     Route Chart for Scheduling    Med Onc Chart Routing      Follow up with physician 3 weeks.   Follow up with IDA    Infusion scheduling note    Injection scheduling note    Labs CBC and CMP   Scheduling:  Preferred lab:  Lab interval:  in 3 weeks, prior to visit with Dr. Griffith   Imaging    Pharmacy  appointment    Other referrals            Treatment Plan Information   OP BREAST TC - DOCETAXEL CYCLOPHOSPHAMIDE Q3W   Jessica Griffith MD   Upcoming Treatment Dates - OP BREAST TC - DOCETAXEL CYCLOPHOSPHAMIDE Q3W    9/15/2023       Pre-Medications       aprepitant (CINVANTI) injection 130 mg       palonosetron (ALOXI) 0.25 mg with Dexamethasone (DECADRON) 10 mg in NS 50 mL IVPB       famotidine (PF) injection 20 mg       dexAMETHasone injection 10 mg       Chemotherapy       DOCEtaxel (TAXOTERE) 75 mg/m2 = 128 mg in sodium chloride 0.9% 256.4 mL chemo infusion       cycloPHOSphamide 600 mg/m2 = 1,020 mg in sodium chloride 0.9% 255.1 mL chemo infusion       Antiemetics       prochlorperazine injection Soln 10 mg  9/16/2023       Growth Factor       pegfilgrastim-jmdb (FULPHILA) injection 6 mg    MDM includes  :    - Acute or chronic illness or injury that poses a threat to life or bodily function  - Independent review and explanation of 3+ results from unique tests  - Discussion of management and ordering 3+ unique tests  - Extensive discussion of treatment and management  - Prescription drug management  - Drug therapy requiring intensive monitoring for toxicity     Lalitha Zamarripa, NP-C

## 2023-09-13 ENCOUNTER — PATIENT MESSAGE (OUTPATIENT)
Dept: HEMATOLOGY/ONCOLOGY | Facility: CLINIC | Age: 43
End: 2023-09-13

## 2023-09-13 ENCOUNTER — OFFICE VISIT (OUTPATIENT)
Dept: HEMATOLOGY/ONCOLOGY | Facility: CLINIC | Age: 43
End: 2023-09-13
Payer: COMMERCIAL

## 2023-09-13 VITALS
WEIGHT: 160.94 LBS | OXYGEN SATURATION: 100 % | HEIGHT: 61 IN | BODY MASS INDEX: 30.39 KG/M2 | SYSTOLIC BLOOD PRESSURE: 138 MMHG | TEMPERATURE: 98 F | DIASTOLIC BLOOD PRESSURE: 79 MMHG | HEART RATE: 88 BPM

## 2023-09-13 DIAGNOSIS — R21 RASH: ICD-10-CM

## 2023-09-13 DIAGNOSIS — Z17.0 MALIGNANT NEOPLASM OF OVERLAPPING SITES OF RIGHT BREAST IN FEMALE, ESTROGEN RECEPTOR POSITIVE: Primary | ICD-10-CM

## 2023-09-13 DIAGNOSIS — F41.9 ANXIETY: ICD-10-CM

## 2023-09-13 DIAGNOSIS — C50.811 MALIGNANT NEOPLASM OF OVERLAPPING SITES OF RIGHT BREAST IN FEMALE, ESTROGEN RECEPTOR POSITIVE: Primary | ICD-10-CM

## 2023-09-13 DIAGNOSIS — R74.01 TRANSAMINITIS: ICD-10-CM

## 2023-09-13 DIAGNOSIS — T80.90XS INFUSION REACTION, SEQUELA: ICD-10-CM

## 2023-09-13 DIAGNOSIS — H93.8X9 SENSATION OF FULLNESS IN EAR, UNSPECIFIED LATERALITY: ICD-10-CM

## 2023-09-13 DIAGNOSIS — C77.9 SECONDARY ADENOCARCINOMA OF LYMPH NODE: ICD-10-CM

## 2023-09-13 PROCEDURE — 1160F PR REVIEW ALL MEDS BY PRESCRIBER/CLIN PHARMACIST DOCUMENTED: ICD-10-PCS | Mod: CPTII,S$GLB,, | Performed by: NURSE PRACTITIONER

## 2023-09-13 PROCEDURE — 1160F RVW MEDS BY RX/DR IN RCRD: CPT | Mod: CPTII,S$GLB,, | Performed by: NURSE PRACTITIONER

## 2023-09-13 PROCEDURE — 99215 OFFICE O/P EST HI 40 MIN: CPT | Mod: S$GLB,,, | Performed by: NURSE PRACTITIONER

## 2023-09-13 PROCEDURE — 3078F PR MOST RECENT DIASTOLIC BLOOD PRESSURE < 80 MM HG: ICD-10-PCS | Mod: CPTII,S$GLB,, | Performed by: NURSE PRACTITIONER

## 2023-09-13 PROCEDURE — 99999 PR PBB SHADOW E&M-EST. PATIENT-LVL IV: ICD-10-PCS | Mod: PBBFAC,,, | Performed by: NURSE PRACTITIONER

## 2023-09-13 PROCEDURE — 3075F PR MOST RECENT SYSTOLIC BLOOD PRESS GE 130-139MM HG: ICD-10-PCS | Mod: CPTII,S$GLB,, | Performed by: NURSE PRACTITIONER

## 2023-09-13 PROCEDURE — 3008F PR BODY MASS INDEX (BMI) DOCUMENTED: ICD-10-PCS | Mod: CPTII,S$GLB,, | Performed by: NURSE PRACTITIONER

## 2023-09-13 PROCEDURE — 3075F SYST BP GE 130 - 139MM HG: CPT | Mod: CPTII,S$GLB,, | Performed by: NURSE PRACTITIONER

## 2023-09-13 PROCEDURE — 99999 PR PBB SHADOW E&M-EST. PATIENT-LVL IV: CPT | Mod: PBBFAC,,, | Performed by: NURSE PRACTITIONER

## 2023-09-13 PROCEDURE — 1159F MED LIST DOCD IN RCRD: CPT | Mod: CPTII,S$GLB,, | Performed by: NURSE PRACTITIONER

## 2023-09-13 PROCEDURE — 1159F PR MEDICATION LIST DOCUMENTED IN MEDICAL RECORD: ICD-10-PCS | Mod: CPTII,S$GLB,, | Performed by: NURSE PRACTITIONER

## 2023-09-13 PROCEDURE — 3008F BODY MASS INDEX DOCD: CPT | Mod: CPTII,S$GLB,, | Performed by: NURSE PRACTITIONER

## 2023-09-13 PROCEDURE — 99215 PR OFFICE/OUTPT VISIT, EST, LEVL V, 40-54 MIN: ICD-10-PCS | Mod: S$GLB,,, | Performed by: NURSE PRACTITIONER

## 2023-09-13 PROCEDURE — 3078F DIAST BP <80 MM HG: CPT | Mod: CPTII,S$GLB,, | Performed by: NURSE PRACTITIONER

## 2023-09-13 RX ORDER — HEPARIN 100 UNIT/ML
500 SYRINGE INTRAVENOUS
Status: CANCELLED | OUTPATIENT
Start: 2023-09-13

## 2023-09-13 RX ORDER — PROCHLORPERAZINE EDISYLATE 5 MG/ML
10 INJECTION INTRAMUSCULAR; INTRAVENOUS ONCE AS NEEDED
Status: CANCELLED
Start: 2023-09-13

## 2023-09-13 RX ORDER — FAMOTIDINE 10 MG/ML
20 INJECTION INTRAVENOUS
Status: CANCELLED
Start: 2023-09-13

## 2023-09-13 RX ORDER — SODIUM CHLORIDE 0.9 % (FLUSH) 0.9 %
10 SYRINGE (ML) INJECTION
Status: CANCELLED | OUTPATIENT
Start: 2023-09-13

## 2023-09-13 RX ORDER — DIPHENHYDRAMINE HYDROCHLORIDE 50 MG/ML
50 INJECTION INTRAMUSCULAR; INTRAVENOUS ONCE AS NEEDED
Status: CANCELLED | OUTPATIENT
Start: 2023-09-13

## 2023-09-13 RX ORDER — DEXAMETHASONE SODIUM PHOSPHATE 4 MG/ML
10 INJECTION, SOLUTION INTRA-ARTICULAR; INTRALESIONAL; INTRAMUSCULAR; INTRAVENOUS; SOFT TISSUE
Status: CANCELLED
Start: 2023-09-13

## 2023-09-13 RX ORDER — EPINEPHRINE 0.3 MG/.3ML
0.3 INJECTION SUBCUTANEOUS ONCE AS NEEDED
Status: CANCELLED | OUTPATIENT
Start: 2023-09-13

## 2023-09-14 ENCOUNTER — INFUSION (OUTPATIENT)
Dept: INFUSION THERAPY | Facility: HOSPITAL | Age: 43
End: 2023-09-14
Payer: COMMERCIAL

## 2023-09-14 VITALS
DIASTOLIC BLOOD PRESSURE: 85 MMHG | RESPIRATION RATE: 18 BRPM | HEART RATE: 71 BPM | OXYGEN SATURATION: 99 % | SYSTOLIC BLOOD PRESSURE: 140 MMHG

## 2023-09-14 DIAGNOSIS — C50.811 MALIGNANT NEOPLASM OF OVERLAPPING SITES OF RIGHT BREAST IN FEMALE, ESTROGEN RECEPTOR POSITIVE: Primary | ICD-10-CM

## 2023-09-14 DIAGNOSIS — Z17.0 MALIGNANT NEOPLASM OF OVERLAPPING SITES OF RIGHT BREAST IN FEMALE, ESTROGEN RECEPTOR POSITIVE: Primary | ICD-10-CM

## 2023-09-14 PROCEDURE — 25000003 PHARM REV CODE 250: Performed by: NURSE PRACTITIONER

## 2023-09-14 PROCEDURE — 63600175 PHARM REV CODE 636 W HCPCS: Performed by: NURSE PRACTITIONER

## 2023-09-14 PROCEDURE — 96367 TX/PROPH/DG ADDL SEQ IV INF: CPT

## 2023-09-14 PROCEDURE — 96417 CHEMO IV INFUS EACH ADDL SEQ: CPT

## 2023-09-14 PROCEDURE — 96375 TX/PRO/DX INJ NEW DRUG ADDON: CPT

## 2023-09-14 PROCEDURE — 96413 CHEMO IV INFUSION 1 HR: CPT

## 2023-09-14 RX ORDER — EPINEPHRINE 0.3 MG/.3ML
0.3 INJECTION SUBCUTANEOUS ONCE AS NEEDED
Status: DISCONTINUED | OUTPATIENT
Start: 2023-09-14 | End: 2023-09-14 | Stop reason: HOSPADM

## 2023-09-14 RX ORDER — HEPARIN 100 UNIT/ML
500 SYRINGE INTRAVENOUS
Status: DISCONTINUED | OUTPATIENT
Start: 2023-09-14 | End: 2023-09-14 | Stop reason: HOSPADM

## 2023-09-14 RX ORDER — DIPHENHYDRAMINE HYDROCHLORIDE 50 MG/ML
50 INJECTION INTRAMUSCULAR; INTRAVENOUS ONCE AS NEEDED
Status: DISCONTINUED | OUTPATIENT
Start: 2023-09-14 | End: 2023-09-14 | Stop reason: HOSPADM

## 2023-09-14 RX ORDER — DEXAMETHASONE SODIUM PHOSPHATE 4 MG/ML
10 INJECTION, SOLUTION INTRA-ARTICULAR; INTRALESIONAL; INTRAMUSCULAR; INTRAVENOUS; SOFT TISSUE
Status: DISCONTINUED | OUTPATIENT
Start: 2023-09-14 | End: 2023-09-14

## 2023-09-14 RX ORDER — PROCHLORPERAZINE EDISYLATE 5 MG/ML
10 INJECTION INTRAMUSCULAR; INTRAVENOUS ONCE AS NEEDED
Status: DISCONTINUED | OUTPATIENT
Start: 2023-09-14 | End: 2023-09-14 | Stop reason: HOSPADM

## 2023-09-14 RX ORDER — FAMOTIDINE 10 MG/ML
20 INJECTION INTRAVENOUS
Status: COMPLETED | OUTPATIENT
Start: 2023-09-14 | End: 2023-09-14

## 2023-09-14 RX ORDER — SODIUM CHLORIDE 0.9 % (FLUSH) 0.9 %
10 SYRINGE (ML) INJECTION
Status: DISCONTINUED | OUTPATIENT
Start: 2023-09-14 | End: 2023-09-14 | Stop reason: HOSPADM

## 2023-09-14 RX ADMIN — CYCLOPHOSPHAMIDE 1000 MG: 200 INJECTION, SOLUTION INTRAVENOUS at 12:09

## 2023-09-14 RX ADMIN — DEXAMETHASONE SODIUM PHOSPHATE 0.25 MG: 4 INJECTION, SOLUTION INTRA-ARTICULAR; INTRALESIONAL; INTRAMUSCULAR; INTRAVENOUS; SOFT TISSUE at 10:09

## 2023-09-14 RX ADMIN — DOCETAXEL 130 MG: 10 INJECTION, SOLUTION INTRAVENOUS at 11:09

## 2023-09-14 RX ADMIN — APREPITANT 130 MG: 130 INJECTION, EMULSION INTRAVENOUS at 10:09

## 2023-09-14 RX ADMIN — FAMOTIDINE 20 MG: 10 INJECTION INTRAVENOUS at 10:09

## 2023-09-14 NOTE — PLAN OF CARE
1530 Pt tolerated Taxotere/Cytoxan infusion well today, no complaints or complications,. VSS through duration of treatment. Pt tolerated cool cap as well. Pt aware to call provider with any questions or concerns and is aware of upcoming appts. Pt ambulatory from clinic with steady gait, no distress noted.

## 2023-09-15 ENCOUNTER — INFUSION (OUTPATIENT)
Dept: INFUSION THERAPY | Facility: HOSPITAL | Age: 43
End: 2023-09-15
Payer: COMMERCIAL

## 2023-09-15 DIAGNOSIS — Z17.0 MALIGNANT NEOPLASM OF OVERLAPPING SITES OF RIGHT BREAST IN FEMALE, ESTROGEN RECEPTOR POSITIVE: Primary | ICD-10-CM

## 2023-09-15 DIAGNOSIS — C50.811 MALIGNANT NEOPLASM OF OVERLAPPING SITES OF RIGHT BREAST IN FEMALE, ESTROGEN RECEPTOR POSITIVE: Primary | ICD-10-CM

## 2023-09-15 PROCEDURE — 63600175 PHARM REV CODE 636 W HCPCS: Mod: JZ,JG | Performed by: NURSE PRACTITIONER

## 2023-09-15 PROCEDURE — 96372 THER/PROPH/DIAG INJ SC/IM: CPT

## 2023-09-15 RX ADMIN — PEGFILGRASTIM 6 MG: 6 INJECTION SUBCUTANEOUS at 09:09

## 2023-09-15 NOTE — NURSING
Pt here for Fulphilia injection. Administered injection in back of left arm. No questions or concerns. Pt ambulated out of unit unassisted.

## 2023-09-19 DIAGNOSIS — R23.2 HOT FLASHES: ICD-10-CM

## 2023-09-19 DIAGNOSIS — F41.9 ANXIETY: ICD-10-CM

## 2023-09-20 ENCOUNTER — HOSPITAL ENCOUNTER (OUTPATIENT)
Dept: RADIATION THERAPY | Facility: HOSPITAL | Age: 43
Discharge: HOME | End: 2023-09-20
Attending: RADIOLOGY
Payer: COMMERCIAL

## 2023-09-20 ENCOUNTER — OFFICE VISIT (OUTPATIENT)
Dept: RADIATION ONCOLOGY | Facility: CLINIC | Age: 43
End: 2023-09-20
Payer: COMMERCIAL

## 2023-09-20 VITALS
DIASTOLIC BLOOD PRESSURE: 87 MMHG | HEIGHT: 61 IN | BODY MASS INDEX: 30.6 KG/M2 | RESPIRATION RATE: 16 BRPM | HEART RATE: 92 BPM | WEIGHT: 162.06 LBS | SYSTOLIC BLOOD PRESSURE: 145 MMHG

## 2023-09-20 DIAGNOSIS — C50.811 MALIGNANT NEOPLASM OF OVERLAPPING SITES OF RIGHT BREAST IN FEMALE, ESTROGEN RECEPTOR POSITIVE: Primary | ICD-10-CM

## 2023-09-20 DIAGNOSIS — Z17.0 MALIGNANT NEOPLASM OF OVERLAPPING SITES OF RIGHT BREAST IN FEMALE, ESTROGEN RECEPTOR POSITIVE: Primary | ICD-10-CM

## 2023-09-20 LAB
B-HCG UR QL: NEGATIVE
CTP QC/QA: YES

## 2023-09-20 PROCEDURE — 77290 THER RAD SIMULAJ FIELD CPLX: CPT | Mod: 26,,, | Performed by: RADIOLOGY

## 2023-09-20 PROCEDURE — 77332 RADIATION TREATMENT AID(S): CPT | Mod: 26,,, | Performed by: RADIOLOGY

## 2023-09-20 PROCEDURE — 81025 URINE PREGNANCY TEST: CPT | Performed by: RADIOLOGY

## 2023-09-20 PROCEDURE — 99499 NO LOS: ICD-10-PCS | Mod: S$GLB,,, | Performed by: RADIOLOGY

## 2023-09-20 PROCEDURE — 77290 THER RAD SIMULAJ FIELD CPLX: CPT | Mod: TC | Performed by: RADIOLOGY

## 2023-09-20 PROCEDURE — 77332 RADIATION TREATMENT AID(S): CPT | Mod: TC | Performed by: RADIOLOGY

## 2023-09-20 PROCEDURE — 77014 HC CT GUIDANCE RADIATION THERAPY FLDS PLACEMENT: CPT | Mod: TC | Performed by: RADIOLOGY

## 2023-09-20 PROCEDURE — 99499 UNLISTED E&M SERVICE: CPT | Mod: S$GLB,,, | Performed by: RADIOLOGY

## 2023-09-20 PROCEDURE — 77290 PR  SET RADN THERAPY FIELD COMPLEX: ICD-10-PCS | Mod: 26,,, | Performed by: RADIOLOGY

## 2023-09-20 PROCEDURE — 77263 THER RADIOLOGY TX PLNG CPLX: CPT | Mod: ,,, | Performed by: RADIOLOGY

## 2023-09-20 PROCEDURE — 99999 PR PBB SHADOW E&M-EST. PATIENT-LVL IV: CPT | Mod: PBBFAC,,, | Performed by: RADIOLOGY

## 2023-09-20 PROCEDURE — 99999 PR PBB SHADOW E&M-EST. PATIENT-LVL IV: ICD-10-PCS | Mod: PBBFAC,,, | Performed by: RADIOLOGY

## 2023-09-20 PROCEDURE — 77332 PR  RADN TREATMENT AID(S) SIMPLE: ICD-10-PCS | Mod: 26,,, | Performed by: RADIOLOGY

## 2023-09-20 PROCEDURE — 77263 PR  RADIATION THERAPY PLAN COMPLEX: ICD-10-PCS | Mod: ,,, | Performed by: RADIOLOGY

## 2023-09-20 RX ORDER — FLUCONAZOLE 150 MG/1
TABLET ORAL
COMMUNITY
Start: 2023-09-13 | End: 2024-03-18

## 2023-09-20 RX ORDER — VENLAFAXINE HYDROCHLORIDE 75 MG/1
75 CAPSULE, EXTENDED RELEASE ORAL DAILY
Qty: 30 CAPSULE | Refills: 2 | Status: SHIPPED | OUTPATIENT
Start: 2023-09-20 | End: 2024-02-19 | Stop reason: SDUPTHER

## 2023-09-20 RX ORDER — CLINDAMYCIN PHOSPHATE 11.9 MG/ML
SOLUTION TOPICAL
COMMUNITY
Start: 2023-07-25 | End: 2024-03-18

## 2023-09-20 RX ORDER — MUPIROCIN 20 MG/G
OINTMENT TOPICAL 2 TIMES DAILY
COMMUNITY
Start: 2023-06-30 | End: 2024-03-18

## 2023-09-20 RX ORDER — SODIUM SULFACETAMIDE AND SULFUR 80; 40 MG/ML; MG/ML
SOLUTION TOPICAL
COMMUNITY
Start: 2023-08-15 | End: 2024-03-18

## 2023-09-20 RX ORDER — CIPROFLOXACIN AND DEXAMETHASONE 3; 1 MG/ML; MG/ML
SUSPENSION/ DROPS AURICULAR (OTIC)
COMMUNITY
Start: 2023-08-26 | End: 2024-03-18

## 2023-09-20 RX ORDER — OXYCODONE AND ACETAMINOPHEN 7.5; 325 MG/1; MG/1
TABLET ORAL
COMMUNITY
Start: 2023-05-20 | End: 2024-03-18

## 2023-09-20 RX ORDER — CEFADROXIL 500 MG/1
CAPSULE ORAL
COMMUNITY
Start: 2023-05-20 | End: 2024-03-18

## 2023-09-20 RX ORDER — DOXYCYCLINE 100 MG/1
100 CAPSULE ORAL 2 TIMES DAILY
COMMUNITY
Start: 2023-09-13 | End: 2024-03-18

## 2023-09-20 RX ORDER — FLUTICASONE PROPIONATE 50 MCG
SPRAY, SUSPENSION (ML) NASAL
COMMUNITY
Start: 2023-08-16 | End: 2024-03-18

## 2023-09-20 RX ORDER — DIAZEPAM 5 MG/1
TABLET ORAL
COMMUNITY
Start: 2023-05-20 | End: 2024-03-18

## 2023-09-20 NOTE — PROGRESS NOTES
[facility] - Radiation Oncology     Patient ID: Dov Hernandez is a 42 y.o. female.    Chief Complaint: Breast Cancer (Discuss xrt-rt breast)    Mrs. Hernandez has a history of Stage IB (T2, N1, M0, G2, ER/NC weakly +, Her-2 -) carcinoma of the Rt. breast.  She presented after diagnostic MMG on 3/28/23 confirmed a 3.9 cm area of linear calcification in the Rt. breast at the middle depth position.  Core biopsies on 4/13/23 revealed invasive ductal carcinoma, histologic grade 3, nuclear grade 1, mitotic index 1.  30% of the cells were weakly ER and NC positive, Her - 2 was negative, Ki-67 was < 5%.  MRI revealed a 2 x 1.4 x 1.3 cm irregularly shaped mass with irregular margins in the Rt. breast at the 6 o'clock position.  On 5/24/23 the patient underwent bilateral nipple sparing mastectomy with immediate ROSITA flap reconstruction.  Pathology revealed a 2.2 cm focus of invasive ductal carcinoma histologic grade 3, nuclear grade 2, mitotic index 2 with associated DCIS. EIC was negative. The closest margin was anterior at 3 mm, all other margins were > 1 cm.  Lymphovascular invasion was present.  One sentinel node was positive for a 6 mm focus of metastatic carcinoma.  There was no extranodal extension. Oncotype was 29.  The patient has completed 4 cycles of chemotherapy with taxotere and cytoxan.  She presents for further discussion of radiotherapy.        Review of Systems   Constitutional:  Positive for fatigue. Negative for activity change, appetite change, chills, diaphoresis and fever.   Respiratory:  Negative for cough and shortness of breath.    Cardiovascular:  Negative for chest pain, palpitations and leg swelling.   Integumentary:  Negative for breast mass, breast discharge and breast tenderness.   Breast: Negative for mass and tenderness    Physical Exam  Constitutional:       General: She is not in acute distress.     Appearance: Normal appearance.   Pulmonary:      Effort:  Pulmonary effort is normal. No respiratory distress.   Chest:   Breasts:     Right: Normal. No swelling, bleeding, mass or skin change.   Lymphadenopathy:      Upper Body:      Right upper body: No supraclavicular or axillary adenopathy.   Neurological:      Mental Status: She is alert.          Assessment and Plan     1. Malignant neoplasm of overlapping sites of right breast in female, estrogen receptor positive      Completed adjuvant chemotherapy last week.  Discussed the rational for adjuvant radiotherapy.  Reviewed the procedures, risks and benefits of therapy.  Discussed the acute and long term side effects. Consent form signed. Plan simulation today with treatment to begin in 3 weeks.

## 2023-09-25 ENCOUNTER — CLINICAL SUPPORT (OUTPATIENT)
Dept: REHABILITATION | Facility: HOSPITAL | Age: 43
End: 2023-09-25
Payer: COMMERCIAL

## 2023-09-25 DIAGNOSIS — C50.911 INVASIVE DUCTAL CARCINOMA OF BREAST, RIGHT: Primary | ICD-10-CM

## 2023-09-25 PROCEDURE — 97813 ACUP 1/> W/ESTIM 1ST 15 MIN: CPT | Performed by: ACUPUNCTURIST

## 2023-09-25 PROCEDURE — 97814 ACUP 1/> W/ESTIM EA ADDL 15: CPT | Performed by: ACUPUNCTURIST

## 2023-09-25 NOTE — PROGRESS NOTES
Acupuncture Evaluation Note     Name: Dov Tejeda Mary  Clinic Number: [id number]    Traditional Chinese Medicine (TCM) Diagnosis: Qi Stagnation, Blood Deficiency, and Damp  Medical Diagnosis: No diagnosis found.     Evaluation Date: 9/25/2023    Visit #/Visits authorized: 6/12     Precautions: Standard and cancer    Subjective     Chief Concern: Blockage in right ear, no pain, dx as pulsatile tinnitus, patient hears heartbeat in ear with exertion. Fatigue, anxiety, brain fog, joint pain - hips, bone pain, hot flashes 3-4x/day    Medical necessity is demonstrated by the following IMPAIRMENTS: Medical Necessity: Cancer related pain and Decreased quality of life              Aggravating Factors:  movement and stress   Relieving Factors:  rest    Symptom Description:     Quality:  Tingling and Numb  Severity:  5  Frequency:  every day    Treatment     Treatment Principles:  Harmonize qi, move qi, nourish blood, transform dampness    Acupuncture points used:      Bilateral points: LV3+, SP6-, SP9, GB34, GB41, TW5, LI11, LI4  Unilateral points: SI19  Auricular Treatment:      Needles In: 17  Needles Out: 17  [place] W/ STIM placed: 10:50  Needles W/ STIM removed: 11:20        Assessment     After treatment, patient felt neuropathy sensation improved after last treatment     Patient prognosis is Good.     Patient will continue to benefit from acupuncture treatment to address the deficits listed in the problem list box on initial evaluation, provide patient family education and to maximize pt's level of independence in the home and community environment.     Patient's spiritual, cultural and educational needs considered and pt agreeable to plan of care and goals.     Anticipated barriers to treatment: none    Plan     Recommend 1 /week for 6 sessions then re-assess.      Education:  Patient is aware of cumulative benefit of acupuncture

## 2023-10-02 ENCOUNTER — HOSPITAL ENCOUNTER (OUTPATIENT)
Dept: RADIATION THERAPY | Facility: HOSPITAL | Age: 43
Discharge: HOME | End: 2023-10-02
Attending: RADIOLOGY
Payer: COMMERCIAL

## 2023-10-10 ENCOUNTER — LAB VISIT (OUTPATIENT)
Dept: LAB | Facility: HOSPITAL | Age: 43
End: 2023-10-10
Attending: NURSE PRACTITIONER
Payer: COMMERCIAL

## 2023-10-10 DIAGNOSIS — C50.811 MALIGNANT NEOPLASM OF OVERLAPPING SITES OF RIGHT BREAST IN FEMALE, ESTROGEN RECEPTOR POSITIVE: ICD-10-CM

## 2023-10-10 DIAGNOSIS — Z17.0 MALIGNANT NEOPLASM OF OVERLAPPING SITES OF RIGHT BREAST IN FEMALE, ESTROGEN RECEPTOR POSITIVE: ICD-10-CM

## 2023-10-10 LAB
ALBUMIN SERPL BCP-MCNC: 3.7 G/DL (ref 3.5–5.2)
ALP SERPL-CCNC: 57 U/L (ref 55–135)
ALT SERPL W/O P-5'-P-CCNC: 25 U/L (ref 10–44)
ANION GAP SERPL CALC-SCNC: 10 MMOL/L (ref 8–16)
AST SERPL-CCNC: 22 U/L (ref 10–40)
BILIRUB SERPL-MCNC: 0.4 MG/DL (ref 0.1–1)
BUN SERPL-MCNC: 11 MG/DL (ref 6–20)
CALCIUM SERPL-MCNC: 9.1 MG/DL (ref 8.7–10.5)
CHLORIDE SERPL-SCNC: 107 MMOL/L (ref 95–110)
CO2 SERPL-SCNC: 25 MMOL/L (ref 23–29)
CREAT SERPL-MCNC: 0.7 MG/DL (ref 0.5–1.4)
ERYTHROCYTE [DISTWIDTH] IN BLOOD BY AUTOMATED COUNT: 15.7 % (ref 11.5–14.5)
EST. GFR  (NO RACE VARIABLE): >60 ML/MIN/1.73 M^2
GLUCOSE SERPL-MCNC: 85 MG/DL (ref 70–110)
HCT VFR BLD AUTO: 35.3 % (ref 37–48.5)
HGB BLD-MCNC: 11.3 G/DL (ref 12–16)
IMM GRANULOCYTES # BLD AUTO: 0 K/UL (ref 0–0.04)
MCH RBC QN AUTO: 32.2 PG (ref 27–31)
MCHC RBC AUTO-ENTMCNC: 32 G/DL (ref 32–36)
MCV RBC AUTO: 101 FL (ref 82–98)
NEUTROPHILS # BLD AUTO: 2.1 K/UL (ref 1.8–7.7)
PLATELET # BLD AUTO: 266 K/UL (ref 150–450)
PMV BLD AUTO: 9.8 FL (ref 9.2–12.9)
POTASSIUM SERPL-SCNC: 4.5 MMOL/L (ref 3.5–5.1)
PROT SERPL-MCNC: 6.4 G/DL (ref 6–8.4)
RBC # BLD AUTO: 3.51 M/UL (ref 4–5.4)
SODIUM SERPL-SCNC: 142 MMOL/L (ref 136–145)
WBC # BLD AUTO: 3.35 K/UL (ref 3.9–12.7)

## 2023-10-10 PROCEDURE — 36415 COLL VENOUS BLD VENIPUNCTURE: CPT | Mod: PO | Performed by: NURSE PRACTITIONER

## 2023-10-10 PROCEDURE — 80053 COMPREHEN METABOLIC PANEL: CPT | Performed by: NURSE PRACTITIONER

## 2023-10-10 PROCEDURE — 85027 COMPLETE CBC AUTOMATED: CPT | Performed by: NURSE PRACTITIONER

## 2023-10-11 ENCOUNTER — PATIENT MESSAGE (OUTPATIENT)
Dept: HEMATOLOGY/ONCOLOGY | Facility: CLINIC | Age: 43
End: 2023-10-11

## 2023-10-11 ENCOUNTER — PATIENT MESSAGE (OUTPATIENT)
Dept: REHABILITATION | Facility: HOSPITAL | Age: 43
End: 2023-10-11
Payer: COMMERCIAL

## 2023-10-11 ENCOUNTER — OFFICE VISIT (OUTPATIENT)
Dept: HEMATOLOGY/ONCOLOGY | Facility: CLINIC | Age: 43
End: 2023-10-11
Payer: COMMERCIAL

## 2023-10-11 VITALS
WEIGHT: 160.94 LBS | DIASTOLIC BLOOD PRESSURE: 72 MMHG | BODY MASS INDEX: 30.39 KG/M2 | OXYGEN SATURATION: 99 % | SYSTOLIC BLOOD PRESSURE: 123 MMHG | HEIGHT: 61 IN | RESPIRATION RATE: 18 BRPM | HEART RATE: 104 BPM | TEMPERATURE: 98 F

## 2023-10-11 DIAGNOSIS — Z17.0 MALIGNANT NEOPLASM OF OVERLAPPING SITES OF RIGHT BREAST IN FEMALE, ESTROGEN RECEPTOR POSITIVE: Primary | ICD-10-CM

## 2023-10-11 DIAGNOSIS — C77.9 SECONDARY ADENOCARCINOMA OF LYMPH NODE: ICD-10-CM

## 2023-10-11 DIAGNOSIS — C50.811 MALIGNANT NEOPLASM OF OVERLAPPING SITES OF RIGHT BREAST IN FEMALE, ESTROGEN RECEPTOR POSITIVE: Primary | ICD-10-CM

## 2023-10-11 PROCEDURE — 99215 OFFICE O/P EST HI 40 MIN: CPT | Mod: S$GLB,,, | Performed by: INTERNAL MEDICINE

## 2023-10-11 PROCEDURE — 3074F PR MOST RECENT SYSTOLIC BLOOD PRESSURE < 130 MM HG: ICD-10-PCS | Mod: CPTII,S$GLB,, | Performed by: INTERNAL MEDICINE

## 2023-10-11 PROCEDURE — 3074F SYST BP LT 130 MM HG: CPT | Mod: CPTII,S$GLB,, | Performed by: INTERNAL MEDICINE

## 2023-10-11 PROCEDURE — 3008F PR BODY MASS INDEX (BMI) DOCUMENTED: ICD-10-PCS | Mod: CPTII,S$GLB,, | Performed by: INTERNAL MEDICINE

## 2023-10-11 PROCEDURE — 99999 PR PBB SHADOW E&M-EST. PATIENT-LVL III: ICD-10-PCS | Mod: PBBFAC,,, | Performed by: INTERNAL MEDICINE

## 2023-10-11 PROCEDURE — 99215 PR OFFICE/OUTPT VISIT, EST, LEVL V, 40-54 MIN: ICD-10-PCS | Mod: S$GLB,,, | Performed by: INTERNAL MEDICINE

## 2023-10-11 PROCEDURE — 3078F PR MOST RECENT DIASTOLIC BLOOD PRESSURE < 80 MM HG: ICD-10-PCS | Mod: CPTII,S$GLB,, | Performed by: INTERNAL MEDICINE

## 2023-10-11 PROCEDURE — 99999 PR PBB SHADOW E&M-EST. PATIENT-LVL III: CPT | Mod: PBBFAC,,, | Performed by: INTERNAL MEDICINE

## 2023-10-11 PROCEDURE — 3008F BODY MASS INDEX DOCD: CPT | Mod: CPTII,S$GLB,, | Performed by: INTERNAL MEDICINE

## 2023-10-11 PROCEDURE — 3078F DIAST BP <80 MM HG: CPT | Mod: CPTII,S$GLB,, | Performed by: INTERNAL MEDICINE

## 2023-10-11 NOTE — PROGRESS NOTES
FOLLOW UP  VISIT    Subjective:      Patient ID: Dov Hernandez is a 42 y.o. female.  MRN: [id number]  : 1980    History of Present Illness:   HPI   Dov Hernandez is a 42 y.o. female who is referred for right breast IDC.     She had a normal screening mammogram in . This year, she underwent a screening mammogram in March that showed right breast calcifications, measured to be 39 mm on diagnostic mammogram.   She underwent a right breast biopsy that showed IDC, 2 mm, ER 30%, OK 30%, Her 2 karrie negative, Grade 1, low Ki 67 at 5% with associated DCIS.      She has had a breast MRI that showed a 2 cm mass. We have also discussed her case at our multi d breast tumor board.   She underwent b/l mastectomy with ROSITA flap reconstruction and right SLNB. Tumor size was 2.2 cm, 1/ SLN was positive for metastatic carcinoma.     Case discussed at tumor board. Pros and cons of further ALND vs XRT discussed. She has a close follow up with Dr. Pickard and has seen Dr. Ken, has declined ALND and will proceed with XRT.     Oncotype returned with RS 29; RR 23%. Adjuvant chemotherapy is recommended.      Baseline PET reviewed, no residual or metastatic disease noted.     S/p cycle 1 of TC on 23.   Had an infusion reaction immediately after cycle 1, face was flushed and she had back pain, infusion was stopped, treated with solucortef 100 mg, Benadryl 25 mg IVP BP= 141/79 HR=86 , then given Pepcid 20 mg and  restarted after 30 minutes without any further issues     Developed a rash on her chest and arms 3 days later and then spread to upper thighs and responded to benadryl and dexamethasone.     Interim history:  S/p cycle 4 on .  Had some fatigue with this cycle and has noticed more hair loss. Fatigue is improving.    RT is starting on 10/16, plan for 28 fractions noted.      Saw ENT, was diagnosed with a ruptured ear drum, hoping it heals on its own, otherwise will need surgery.     Stable anxiety, on  effexor 75 mg .     Oncology History:  Oncology History   Malignant neoplasm of overlapping sites of right breast in female, estrogen receptor positive   4/13/2023 Biopsy    Breast, right, biopsy:   Invasive ductal carcinoma   Tumor size: 2 mm in this material   Overall Grade: grade 1     4/13/2023 Breast Tumor Markers    Estrogen: Positive (weak intensity 30%)  Progesterone: Positive (weak intensity 30%)  HER2: Negative   Ki67: 5%     4/25/2023 Genetic Testing    Negative, VUS x 1(SDHA)     4/25/2023 Imaging Significant Findings    MRI revealed 2 cm mass correlating with biopsy proven malignancy      4/26/2023 Initial Diagnosis    Malignant neoplasm of overlapping sites of right breast in female, estrogen receptor positive     4/26/2023 Cancer Staged    Staging form: Breast, AJCC 8th Edition  - Clinical stage from 4/26/2023: Stage IB (cT2, cN0, cM0, G1, ER+, OK+, HER2: Equivocal)     5/2/2023 Tumor Conference       The team agreed to proceed with upfront surgery for further sampling of the tumor     5/24/2023 Breast Surgery    Bilateral mastectomy with ROSITA flap reconstruction and right SLNB     6/6/2023 Tumor Conference     There team discussed further axillary dissection versus proceeding with XRT. If ALND and no further positive nodes then no XRT would be recommended. If there are additional nodes, then she would be recommended for XRT.   Chemotherapy would be most likely recommended as well as adjuvant endocrine therapy   Shameka trial?        6/14/2023 Cancer Staged    Staging form: Breast, AJCC 8th Edition  - Pathologic stage from 6/14/2023: Stage IB (pT2, pN1(sn), cM0, G2, ER+, OK+, HER2-)     7/13/2023 -  Chemotherapy    Treatment Summary   Plan Name: OP BREAST TC - DOCETAXEL CYCLOPHOSPHAMIDE Q3W  Treatment Goal: Curative  Status: Active  Start Date: 7/13/2023  End Date: 9/15/2023  Provider: Jessica Griffith MD  Chemotherapy: cycloPHOSphamide 1,000 mg in sodium chloride 0.9% 290 mL chemo infusion, 1,020 mg,  Intravenous, Clinic/HOD 1 time, 4 of 4 cycles  Administration: 1,000 mg (7/13/2023), 1,000 mg (8/3/2023), 1,000 mg (8/24/2023), 1,000 mg (9/14/2023)  DOCEtaxel (TAXOTERE) 100 mg in sodium chloride 0.9% 295 mL chemo infusion, 102 mg (100 % of original dose 60 mg/m2), Intravenous, Clinic/HOD 1 time, 4 of 4 cycles  Dose modification: 60 mg/m2 (original dose 60 mg/m2, Cycle 1, Reason: Treatment parameters not met)  Administration: 100 mg (7/13/2023), 130 mg (8/3/2023), 130 mg (8/24/2023), 130 mg (9/14/2023)        Past medical, surgical, family, and social history were reviewed today and there are no changes of note unless mentioned in HPI.   MEDS and ALLERGIES were reviewed with patient and meds reconciled.     History:  Past Medical History:   Diagnosis Date    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    OCP & aziza from derm, Doxy caused yeast    Heartburn 12/17/2021    Strawberry wai 1980    back of neck    Strawberry wai 1981    under nose      Past Surgical History:   Procedure Laterality Date    BILATERAL MASTECTOMY Bilateral 5/24/2023    Procedure: MASTECTOMY, BILATERAL;  Surgeon: Jenna Pickard MD;  Location: [facility];  Service: General;  Laterality: Bilateral;  2.5 HOURS / EMAIL SENT 5-9 @ 11:39 LK    COLPOSCOPY      INJECTION FOR SENTINEL NODE IDENTIFICATION Right 5/24/2023    Procedure: INJECTION, FOR SENTINEL NODE IDENTIFICATION;  Surgeon: Jenna Pickard MD;  Location: [facility];  Service: General;  Laterality: Right;    RECONSTRUCTION OF BREAST WITH DEEP INFERIOR EPIGASTRIC ARTERY  (ROSITA) FREE FLAP Bilateral 5/24/2023    Procedure: RECONSTRUCTION, BREAST, USING ROSITA FREE FLAP / BILATERAL DEEP INFERIOR EPIGASTRIC PERFORATORS FLAPS;  Surgeon: Mikey Roche MD;  Location: [facility];  Service: Plastics;  Laterality: Bilateral;    SENTINEL LYMPH NODE BIOPSY Right 5/24/2023    Procedure: BIOPSY, LYMPH NODE, SENTINEL;  Surgeon: Jenna Pickard MD;  Location: [facility];  Service: General;  Laterality: Right;  "   WISDOM TOOTH EXTRACTION       Family History   Problem Relation Age of Onset    Hyperlipidemia Mother     Kidney cancer Father 58    Cancer Brother 22        Parotid Gland cancer    Birth marks Child 0        strawberry wai(s)    Birth marks Child 0        strawberry wai(s)    Café-au-lait spots Maternal Uncle         "a small patch on his neck"    Café-au-lait spots Other         "a spot on his arm and partial torso"    Other Other 2        tested negative for macrocephaly    Cervical cancer Other     Throat cancer Other     Pancreatic cancer Other         1 second cousin ()    Other Other         brain tumors (several 2nd cousins)    Breast cancer Neg Hx     Colon cancer Neg Hx     Ovarian cancer Neg Hx     Melanoma Neg Hx       Social History     Tobacco Use    Smoking status: Never    Smokeless tobacco: Never   Substance and Sexual Activity    Alcohol use: Yes     Comment: social    Drug use: No    Sexual activity: Yes     Partners: Male     Birth control/protection: None        ROS:  Review of Systems   Constitutional:  Positive for malaise/fatigue. Negative for fever and weight loss.   HENT:  Negative for congestion, hearing loss, nosebleeds and sore throat.    Eyes:  Negative for double vision and photophobia.   Respiratory:  Negative for cough, hemoptysis, sputum production, shortness of breath and wheezing.    Cardiovascular:  Negative for chest pain, palpitations, orthopnea and leg swelling.   Gastrointestinal:  Negative for abdominal pain, blood in stool, constipation, diarrhea, heartburn, nausea and vomiting.   Genitourinary:  Negative for dysuria, hematuria and urgency.   Musculoskeletal:  Negative for back pain, joint pain and myalgias.   Skin:  Negative for itching and rash.   Neurological:  Negative for dizziness, tingling, seizures, weakness and headaches.   Endo/Heme/Allergies:  Negative for polydipsia. Does not bruise/bleed easily.        Hot flashes   Psychiatric/Behavioral:  " "Negative for depression and memory loss. The patient is nervous/anxious (improving). The patient does not have insomnia.        Objective:     Vitals:    10/11/23 0822   BP: 123/72   Pulse: 104   Resp: 18   Temp: 98.4 °F (36.9 °C)   TempSrc: Oral   SpO2: 99%   Weight: 73 kg (160 lb 15 oz)   Height: 5' 1" (1.549 m)   PainSc: 0-No pain       Wt Readings from Last 10 Encounters:   10/11/23 73 kg (160 lb 15 oz)   09/20/23 73.5 kg (162 lb 0.6 oz)   09/13/23 73 kg (160 lb 15 oz)   08/24/23 71.2 kg (156 lb 15.5 oz)   08/22/23 71.2 kg (156 lb 15.5 oz)   08/02/23 68.8 kg (151 lb 10.8 oz)   07/13/23 67 kg (147 lb 11.3 oz)   07/12/23 67.1 kg (147 lb 14.9 oz)   07/12/23 67.1 kg (147 lb 14.9 oz)   07/07/23 66.4 kg (146 lb 6.2 oz)       Physical Exam  Vitals and nursing note reviewed.   Constitutional:       General: She is not in acute distress.     Appearance: She is not diaphoretic.   HENT:      Head: Normocephalic.   Eyes:      General: No scleral icterus.     Conjunctiva/sclera: Conjunctivae normal.   Neck:      Thyroid: No thyromegaly.   Cardiovascular:      Rate and Rhythm: Normal rate.   Pulmonary:      Effort: Pulmonary effort is normal.   Chest:      Comments: Breast exam deferred today  Musculoskeletal:         General: Normal range of motion.      Cervical back: Neck supple.   Skin:     General: Skin is warm and dry.      Findings: No erythema or rash.   Neurological:      Mental Status: She is alert and oriented to person, place, and time.      Gait: Gait is intact.   Psychiatric:         Mood and Affect: Affect normal.         Cognition and Memory: Memory normal.         Judgment: Judgment normal.          Diagnostic Tests:  Significant Imaging: I have reviewed and interpreted all pertinent imaging results/findings.    Laboratory Data:  All pertinent labs have been reviewed.    Labs:   Lab Results   Component Value Date    WBC 3.35 (L) 10/10/2023    RBC 3.51 (L) 10/10/2023    HGB 11.3 (L) 10/10/2023    HCT 35.3 (L) " 10/10/2023     (H) 10/10/2023     10/10/2023    GLU 85 10/10/2023     10/10/2023    K 4.5 10/10/2023    BUN 11 10/10/2023    CREATININE 0.7 10/10/2023    AST 22 10/10/2023    ALT 25 10/10/2023    BILITOT 0.4 10/10/2023     Assessment/Plan:   Malignant neoplasm of overlapping sites of right breast in female, estrogen receptor positive  cT2cN0, G1, ER/DE + 30%, Her 2 karrie negative Ki 67 5%   pT2 pN1a     Genetics VUS   Pre menopausal     We discussed that she has node positive disease on path.She is agreeable to proceed with XRT in the adjuvant setting. Dr. Ken note was appreciated.      As for adjuvant therapy, given she is premenopausal and node +, has High risk Oncotype, adjuvant chemotherapy is appropriate. See prior notes for discussion. Baseline PET scan is reassuring. Sub cm pulmonary nodules noted, repeat scan as clinically indicated.     I have recommended adjuvant chemotherapy with Docetaxel and cytoxan q 3 weeks x 4, via PIV.   Following chemotherapy, she will be offered XRT and then endocrine therapy with OFS. Can start Zoladex once approved. I  will see her after completion of RT to initiate AI.     We discussed the role of adjuvant endocrine therapy to reduce to risk of recurrence by 50% in the next 10 years. She is a candidate for AI and OFS.     Anxiety  Continue Effexor.   Has some ativan at home, ok to take as needed    Leukopenia   Anemia   Post chemo, continue to monitor.     Rash  Hair loss  Chemo related, will see dermatology.      Ear fullness  Has a ruptured tympanic membrane, will follow up with ENT, currently being managed conservatively.     I spent 40 min on this encounter.     ECOG SCORE           Discussion:   No follow-ups on file.    Plan was discussed with the patient at length, and she verbalized understanding. Dov was given an opportunity to ask questions that were answered to her satisfaction, and she was advised to call in the interval if any problems or  questions arise.  Discussed COVID-19 and social distancing in great detail, avoid all non-essential visits out of the home if possible and avoid sick contacts.     Electronically signed by Jessica Griffith MD     Route Chart for Scheduling    Med Onc Chart Routing      Follow up with physician . 12/12 with labs   Follow up with IDA    Infusion scheduling note    Injection scheduling note zoladex start once have auth and thenq28 days   Labs CBC and CMP   Scheduling:  Preferred lab:  Lab interval:     Imaging    Pharmacy appointment    Other referrals                Treatment Plan Information   OP BREAST TC - DOCETAXEL CYCLOPHOSPHAMIDE Q3W   Jessica Griffith MD   Upcoming Treatment Dates - OP BREAST TC - DOCETAXEL CYCLOPHOSPHAMIDE Q3W    No upcoming days in selected categories.    MDM includes  :    - Acute or chronic illness or injury that poses a threat to life or bodily function  - Independent review and explanation of 3+ results from unique tests  - Discussion of management and ordering 3+ unique tests  - Extensive discussion of treatment and management  - Prescription drug management  - Drug therapy requiring intensive monitoring for toxicity

## 2023-10-12 PROCEDURE — 77295 3-D RADIOTHERAPY PLAN: CPT | Mod: TC | Performed by: RADIOLOGY

## 2023-10-12 PROCEDURE — 77300 RADIATION THERAPY DOSE PLAN: CPT | Mod: TC | Performed by: RADIOLOGY

## 2023-10-12 PROCEDURE — 77334 PR  RADN TREATMENT AID(S) COMPLX: ICD-10-PCS | Mod: 26,,, | Performed by: RADIOLOGY

## 2023-10-12 PROCEDURE — 77300 RADIATION THERAPY DOSE PLAN: CPT | Mod: 26,,, | Performed by: RADIOLOGY

## 2023-10-12 PROCEDURE — 77295 PR 3D RADIOTHERAPY PLAN: ICD-10-PCS | Mod: 26,,, | Performed by: RADIOLOGY

## 2023-10-12 PROCEDURE — 77334 RADIATION TREATMENT AID(S): CPT | Mod: 26,,, | Performed by: RADIOLOGY

## 2023-10-12 PROCEDURE — 77295 3-D RADIOTHERAPY PLAN: CPT | Mod: 26,,, | Performed by: RADIOLOGY

## 2023-10-12 PROCEDURE — 77300 PR RADIATION THERAPY,DOSIMETRY PLAN: ICD-10-PCS | Mod: 26,,, | Performed by: RADIOLOGY

## 2023-10-12 PROCEDURE — 77334 RADIATION TREATMENT AID(S): CPT | Mod: TC | Performed by: RADIOLOGY

## 2023-10-17 PROCEDURE — 77412 RADIATION TX DELIVERY LVL 3: CPT | Performed by: RADIOLOGY

## 2023-10-17 PROCEDURE — 77427 RADIATION TX MANAGEMENT X5: CPT | Mod: ,,, | Performed by: RADIOLOGY

## 2023-10-17 PROCEDURE — 77427 PR CHG RADIATION,MANGEMENT,5 TX'S: ICD-10-PCS | Mod: ,,, | Performed by: RADIOLOGY

## 2023-10-17 PROCEDURE — 77387 GUIDANCE FOR RADJ TX DLVR: CPT | Mod: TC | Performed by: RADIOLOGY

## 2023-10-17 PROCEDURE — 77387 PR GUIDANCE FOR RADIATION TREATMENT DELIVERY: ICD-10-PCS | Mod: 26,,, | Performed by: RADIOLOGY

## 2023-10-17 PROCEDURE — 77417 THER RADIOLOGY PORT IMAGE(S): CPT | Performed by: RADIOLOGY

## 2023-10-17 PROCEDURE — 77387 GUIDANCE FOR RADJ TX DLVR: CPT | Mod: 26,,, | Performed by: RADIOLOGY

## 2023-10-18 ENCOUNTER — DOCUMENTATION ONLY (OUTPATIENT)
Dept: RADIATION ONCOLOGY | Facility: CLINIC | Age: 43
End: 2023-10-18
Payer: COMMERCIAL

## 2023-10-18 ENCOUNTER — PATIENT MESSAGE (OUTPATIENT)
Dept: HEMATOLOGY/ONCOLOGY | Facility: CLINIC | Age: 43
End: 2023-10-18
Payer: COMMERCIAL

## 2023-10-18 PROCEDURE — 77387 GUIDANCE FOR RADJ TX DLVR: CPT | Mod: TC | Performed by: RADIOLOGY

## 2023-10-18 PROCEDURE — 77387 PR GUIDANCE FOR RADIATION TREATMENT DELIVERY: ICD-10-PCS | Mod: 26,,, | Performed by: RADIOLOGY

## 2023-10-18 PROCEDURE — 77412 RADIATION TX DELIVERY LVL 3: CPT | Performed by: RADIOLOGY

## 2023-10-18 PROCEDURE — 77387 GUIDANCE FOR RADJ TX DLVR: CPT | Mod: 26,,, | Performed by: RADIOLOGY

## 2023-10-19 PROCEDURE — 77387 GUIDANCE FOR RADJ TX DLVR: CPT | Mod: 26,,, | Performed by: RADIOLOGY

## 2023-10-19 PROCEDURE — 77412 RADIATION TX DELIVERY LVL 3: CPT | Performed by: RADIOLOGY

## 2023-10-19 PROCEDURE — 77387 PR GUIDANCE FOR RADIATION TREATMENT DELIVERY: ICD-10-PCS | Mod: 26,,, | Performed by: RADIOLOGY

## 2023-10-19 PROCEDURE — 77387 GUIDANCE FOR RADJ TX DLVR: CPT | Mod: TC | Performed by: RADIOLOGY

## 2023-10-20 PROCEDURE — 77387 GUIDANCE FOR RADJ TX DLVR: CPT | Mod: 26,,, | Performed by: RADIOLOGY

## 2023-10-20 PROCEDURE — 77412 RADIATION TX DELIVERY LVL 3: CPT | Performed by: RADIOLOGY

## 2023-10-20 PROCEDURE — 77387 GUIDANCE FOR RADJ TX DLVR: CPT | Mod: TC | Performed by: RADIOLOGY

## 2023-10-20 PROCEDURE — 77387 PR GUIDANCE FOR RADIATION TREATMENT DELIVERY: ICD-10-PCS | Mod: 26,,, | Performed by: RADIOLOGY

## 2023-10-23 PROCEDURE — 77387 GUIDANCE FOR RADJ TX DLVR: CPT | Mod: 26,,, | Performed by: RADIOLOGY

## 2023-10-23 PROCEDURE — 77387 GUIDANCE FOR RADJ TX DLVR: CPT | Mod: TC | Performed by: RADIOLOGY

## 2023-10-23 PROCEDURE — 77387 PR GUIDANCE FOR RADIATION TREATMENT DELIVERY: ICD-10-PCS | Mod: 26,,, | Performed by: RADIOLOGY

## 2023-10-23 PROCEDURE — 77412 RADIATION TX DELIVERY LVL 3: CPT | Performed by: RADIOLOGY

## 2023-10-23 PROCEDURE — 77336 RADIATION PHYSICS CONSULT: CPT | Performed by: RADIOLOGY

## 2023-10-24 PROCEDURE — 77387 GUIDANCE FOR RADJ TX DLVR: CPT | Mod: TC | Performed by: RADIOLOGY

## 2023-10-24 PROCEDURE — 77412 RADIATION TX DELIVERY LVL 3: CPT | Performed by: RADIOLOGY

## 2023-10-24 PROCEDURE — 77427 RADIATION TX MANAGEMENT X5: CPT | Mod: ,,, | Performed by: RADIOLOGY

## 2023-10-24 PROCEDURE — 77387 GUIDANCE FOR RADJ TX DLVR: CPT | Mod: 26,,, | Performed by: RADIOLOGY

## 2023-10-24 PROCEDURE — 77427 PR CHG RADIATION,MANGEMENT,5 TX'S: ICD-10-PCS | Mod: ,,, | Performed by: RADIOLOGY

## 2023-10-24 PROCEDURE — 77387 PR GUIDANCE FOR RADIATION TREATMENT DELIVERY: ICD-10-PCS | Mod: 26,,, | Performed by: RADIOLOGY

## 2023-10-24 PROCEDURE — 77417 THER RADIOLOGY PORT IMAGE(S): CPT | Performed by: RADIOLOGY

## 2023-10-25 ENCOUNTER — DOCUMENTATION ONLY (OUTPATIENT)
Dept: RADIATION ONCOLOGY | Facility: CLINIC | Age: 43
End: 2023-10-25
Payer: COMMERCIAL

## 2023-10-25 ENCOUNTER — PATIENT MESSAGE (OUTPATIENT)
Dept: REHABILITATION | Facility: HOSPITAL | Age: 43
End: 2023-10-25

## 2023-10-25 PROCEDURE — 77387 GUIDANCE FOR RADJ TX DLVR: CPT | Mod: 26,,, | Performed by: RADIOLOGY

## 2023-10-25 PROCEDURE — 77387 GUIDANCE FOR RADJ TX DLVR: CPT | Mod: TC | Performed by: RADIOLOGY

## 2023-10-25 PROCEDURE — 77412 RADIATION TX DELIVERY LVL 3: CPT | Performed by: RADIOLOGY

## 2023-10-25 PROCEDURE — 77387 PR GUIDANCE FOR RADIATION TREATMENT DELIVERY: ICD-10-PCS | Mod: 26,,, | Performed by: RADIOLOGY

## 2023-10-25 NOTE — PLAN OF CARE
Day 7 of outpatient radiation to the right breast. No skin reaction noted. Using miaderm twice a day.

## 2023-10-26 PROCEDURE — 77412 RADIATION TX DELIVERY LVL 3: CPT | Performed by: RADIOLOGY

## 2023-10-26 PROCEDURE — 77387 GUIDANCE FOR RADJ TX DLVR: CPT | Mod: TC | Performed by: RADIOLOGY

## 2023-10-26 PROCEDURE — 77387 GUIDANCE FOR RADJ TX DLVR: CPT | Mod: 26,,, | Performed by: RADIOLOGY

## 2023-10-26 PROCEDURE — 77387 PR GUIDANCE FOR RADIATION TREATMENT DELIVERY: ICD-10-PCS | Mod: 26,,, | Performed by: RADIOLOGY

## 2023-10-27 PROCEDURE — 77412 RADIATION TX DELIVERY LVL 3: CPT | Performed by: RADIOLOGY

## 2023-10-27 PROCEDURE — 77387 GUIDANCE FOR RADJ TX DLVR: CPT | Mod: TC | Performed by: RADIOLOGY

## 2023-10-27 PROCEDURE — 77387 PR GUIDANCE FOR RADIATION TREATMENT DELIVERY: ICD-10-PCS | Mod: 26,,, | Performed by: RADIOLOGY

## 2023-10-27 PROCEDURE — 77387 GUIDANCE FOR RADJ TX DLVR: CPT | Mod: 26,,, | Performed by: RADIOLOGY

## 2023-10-30 ENCOUNTER — PATIENT MESSAGE (OUTPATIENT)
Dept: HEMATOLOGY/ONCOLOGY | Facility: CLINIC | Age: 43
End: 2023-10-30
Payer: COMMERCIAL

## 2023-10-30 PROCEDURE — 77412 RADIATION TX DELIVERY LVL 3: CPT | Performed by: RADIOLOGY

## 2023-10-30 PROCEDURE — 77387 GUIDANCE FOR RADJ TX DLVR: CPT | Mod: 26,,, | Performed by: RADIOLOGY

## 2023-10-30 PROCEDURE — 77336 RADIATION PHYSICS CONSULT: CPT | Performed by: RADIOLOGY

## 2023-10-30 PROCEDURE — 77387 PR GUIDANCE FOR RADIATION TREATMENT DELIVERY: ICD-10-PCS | Mod: 26,,, | Performed by: RADIOLOGY

## 2023-10-30 PROCEDURE — 77387 GUIDANCE FOR RADJ TX DLVR: CPT | Mod: TC | Performed by: RADIOLOGY

## 2023-10-31 PROCEDURE — 77412 RADIATION TX DELIVERY LVL 3: CPT | Performed by: RADIOLOGY

## 2023-10-31 PROCEDURE — 77417 THER RADIOLOGY PORT IMAGE(S): CPT | Performed by: RADIOLOGY

## 2023-10-31 PROCEDURE — 77427 PR CHG RADIATION,MANGEMENT,5 TX'S: ICD-10-PCS | Mod: ,,, | Performed by: RADIOLOGY

## 2023-10-31 PROCEDURE — 77387 GUIDANCE FOR RADJ TX DLVR: CPT | Mod: TC | Performed by: RADIOLOGY

## 2023-10-31 PROCEDURE — 77427 RADIATION TX MANAGEMENT X5: CPT | Mod: ,,, | Performed by: RADIOLOGY

## 2023-10-31 PROCEDURE — 77387 GUIDANCE FOR RADJ TX DLVR: CPT | Mod: 26,,, | Performed by: RADIOLOGY

## 2023-10-31 PROCEDURE — 77387 PR GUIDANCE FOR RADIATION TREATMENT DELIVERY: ICD-10-PCS | Mod: 26,,, | Performed by: RADIOLOGY

## 2023-11-01 ENCOUNTER — HOSPITAL ENCOUNTER (OUTPATIENT)
Dept: RADIATION THERAPY | Facility: HOSPITAL | Age: 43
Discharge: HOME | End: 2023-11-01
Attending: RADIOLOGY
Payer: COMMERCIAL

## 2023-11-01 ENCOUNTER — DOCUMENTATION ONLY (OUTPATIENT)
Dept: RADIATION ONCOLOGY | Facility: CLINIC | Age: 43
End: 2023-11-01
Payer: COMMERCIAL

## 2023-11-01 PROCEDURE — 77387 GUIDANCE FOR RADJ TX DLVR: CPT | Mod: 26,,, | Performed by: RADIOLOGY

## 2023-11-01 PROCEDURE — 77387 PR GUIDANCE FOR RADIATION TREATMENT DELIVERY: ICD-10-PCS | Mod: 26,,, | Performed by: RADIOLOGY

## 2023-11-01 PROCEDURE — 77387 GUIDANCE FOR RADJ TX DLVR: CPT | Mod: TC | Performed by: RADIOLOGY

## 2023-11-01 PROCEDURE — 77412 RADIATION TX DELIVERY LVL 3: CPT | Performed by: RADIOLOGY

## 2023-11-01 NOTE — PLAN OF CARE
Day 12 of outpatient radiation to the right breast. Overall doing well. Slight erythema and mild tenderness in the rt axilla. Skin intact. Gel sheet applied to rt axilla. Using Miaderm twice a day.

## 2023-11-02 PROCEDURE — 77387 PR GUIDANCE FOR RADIATION TREATMENT DELIVERY: ICD-10-PCS | Mod: 26,,, | Performed by: RADIOLOGY

## 2023-11-02 PROCEDURE — 77412 RADIATION TX DELIVERY LVL 3: CPT | Performed by: RADIOLOGY

## 2023-11-02 PROCEDURE — 77387 GUIDANCE FOR RADJ TX DLVR: CPT | Mod: 26,,, | Performed by: RADIOLOGY

## 2023-11-02 PROCEDURE — 77387 GUIDANCE FOR RADJ TX DLVR: CPT | Mod: TC | Performed by: RADIOLOGY

## 2023-11-03 PROCEDURE — 77387 PR GUIDANCE FOR RADIATION TREATMENT DELIVERY: ICD-10-PCS | Mod: 26,,, | Performed by: RADIOLOGY

## 2023-11-03 PROCEDURE — 77412 RADIATION TX DELIVERY LVL 3: CPT | Performed by: RADIOLOGY

## 2023-11-03 PROCEDURE — 77387 GUIDANCE FOR RADJ TX DLVR: CPT | Mod: 26,,, | Performed by: RADIOLOGY

## 2023-11-03 PROCEDURE — 77387 GUIDANCE FOR RADJ TX DLVR: CPT | Mod: TC | Performed by: RADIOLOGY

## 2023-11-06 PROCEDURE — 77387 GUIDANCE FOR RADJ TX DLVR: CPT | Mod: TC | Performed by: RADIOLOGY

## 2023-11-06 PROCEDURE — 77387 PR GUIDANCE FOR RADIATION TREATMENT DELIVERY: ICD-10-PCS | Mod: 26,,, | Performed by: RADIOLOGY

## 2023-11-06 PROCEDURE — 77412 RADIATION TX DELIVERY LVL 3: CPT | Performed by: RADIOLOGY

## 2023-11-06 PROCEDURE — 77387 GUIDANCE FOR RADJ TX DLVR: CPT | Mod: 26,,, | Performed by: RADIOLOGY

## 2023-11-07 ENCOUNTER — PATIENT MESSAGE (OUTPATIENT)
Dept: RADIATION ONCOLOGY | Facility: CLINIC | Age: 43
End: 2023-11-07
Payer: COMMERCIAL

## 2023-11-07 PROCEDURE — 77387 GUIDANCE FOR RADJ TX DLVR: CPT | Mod: TC | Performed by: RADIOLOGY

## 2023-11-07 PROCEDURE — 77412 RADIATION TX DELIVERY LVL 3: CPT | Performed by: RADIOLOGY

## 2023-11-07 PROCEDURE — 77336 RADIATION PHYSICS CONSULT: CPT | Performed by: RADIOLOGY

## 2023-11-07 PROCEDURE — 77387 GUIDANCE FOR RADJ TX DLVR: CPT | Mod: 26,,, | Performed by: RADIOLOGY

## 2023-11-07 PROCEDURE — 77387 PR GUIDANCE FOR RADIATION TREATMENT DELIVERY: ICD-10-PCS | Mod: 26,,, | Performed by: RADIOLOGY

## 2023-11-10 ENCOUNTER — DOCUMENTATION ONLY (OUTPATIENT)
Dept: RADIATION ONCOLOGY | Facility: CLINIC | Age: 43
End: 2023-11-10
Payer: COMMERCIAL

## 2023-11-10 NOTE — PLAN OF CARE
Completed 16/16 of outpatient radiation to the right breast mound, Nov. 17. Tolerated therapy well. RTC in 2 months, unless symptoms warrant otherwise.

## 2023-11-15 ENCOUNTER — PATIENT MESSAGE (OUTPATIENT)
Dept: REHABILITATION | Facility: HOSPITAL | Age: 43
End: 2023-11-15
Payer: COMMERCIAL

## 2023-11-15 ENCOUNTER — PATIENT MESSAGE (OUTPATIENT)
Dept: HEMATOLOGY/ONCOLOGY | Facility: CLINIC | Age: 43
End: 2023-11-15
Payer: COMMERCIAL

## 2023-11-15 ENCOUNTER — INFUSION (OUTPATIENT)
Dept: INFUSION THERAPY | Facility: HOSPITAL | Age: 43
End: 2023-11-15
Payer: COMMERCIAL

## 2023-11-15 DIAGNOSIS — Z17.0 MALIGNANT NEOPLASM OF OVERLAPPING SITES OF RIGHT BREAST IN FEMALE, ESTROGEN RECEPTOR POSITIVE: Primary | ICD-10-CM

## 2023-11-15 DIAGNOSIS — C50.811 MALIGNANT NEOPLASM OF OVERLAPPING SITES OF RIGHT BREAST IN FEMALE, ESTROGEN RECEPTOR POSITIVE: Primary | ICD-10-CM

## 2023-11-15 PROCEDURE — 96402 CHEMO HORMON ANTINEOPL SQ/IM: CPT

## 2023-11-15 PROCEDURE — 63600175 PHARM REV CODE 636 W HCPCS: Mod: JZ,JG | Performed by: INTERNAL MEDICINE

## 2023-11-15 RX ORDER — LIDOCAINE AND PRILOCAINE 25; 25 MG/G; MG/G
CREAM TOPICAL
Qty: 30 G | Refills: 0 | Status: SHIPPED | OUTPATIENT
Start: 2023-11-15 | End: 2024-03-18

## 2023-11-15 RX ADMIN — GOSERELIN ACETATE 3.6 MG: 3.6 IMPLANT SUBCUTANEOUS at 01:11

## 2023-11-15 NOTE — NURSING
Pt here for zoladex injection.  Ice to abdomen prior to injection.  Pt denies any complaints.  Zoladex administered to right abdominal tissue.  Pt tolerated.  Ambulated out unassisted by self.

## 2023-11-22 ENCOUNTER — CLINICAL SUPPORT (OUTPATIENT)
Dept: REHABILITATION | Facility: HOSPITAL | Age: 43
End: 2023-11-22

## 2023-11-22 DIAGNOSIS — C50.811 MALIGNANT NEOPLASM OF OVERLAPPING SITES OF RIGHT BREAST IN FEMALE, ESTROGEN RECEPTOR POSITIVE: Primary | ICD-10-CM

## 2023-11-22 DIAGNOSIS — Z17.0 MALIGNANT NEOPLASM OF OVERLAPPING SITES OF RIGHT BREAST IN FEMALE, ESTROGEN RECEPTOR POSITIVE: Primary | ICD-10-CM

## 2023-11-22 PROCEDURE — 97813 ACUP 1/> W/ESTIM 1ST 15 MIN: CPT | Performed by: ACUPUNCTURIST

## 2023-11-22 PROCEDURE — 97814 ACUP 1/> W/ESTIM EA ADDL 15: CPT | Performed by: ACUPUNCTURIST

## 2023-11-22 NOTE — PROGRESS NOTES
Acupuncture Evaluation Note     Name: Dov Tejeda Mary  Clinic Number: [id number]    Traditional Chinese Medicine (TCM) Diagnosis: Qi Stagnation, Blood Deficiency, and Damp  Medical Diagnosis:   Encounter Diagnosis   Name Primary?    Malignant neoplasm of overlapping sites of right breast in female, estrogen receptor positive Yes        Evaluation Date: 11/22/2023    Visit #/Visits authorized: 7/12     Precautions: Standard and cancer    Subjective     Chief Concern: slight fatigue lingering post radiation, neuropathy and general pain symptoms in R arm. Continue with care as needed    Medical necessity is demonstrated by the following IMPAIRMENTS: Medical Necessity: Cancer related pain and Decreased quality of life              Aggravating Factors:  movement and stress   Relieving Factors:  rest    Symptom Description:     Quality:  Tingling and Numb  Severity:  5  Frequency:  every day    Treatment     Treatment Principles:  Harmonize qi, move qi, nourish blood, transform dampness    Acupuncture points used:      Bilateral points: LV3+, SP6-, SP9, GB34, GB41, TW5, LI11, LI4  Unilateral points: SI19  Auricular Treatment:      Needles In: 17  Needles Out: 17  [place] W/ STIM placed: 9:40 AM  [place] W/ STIM removed: 10:10 AM        Assessment     After treatment, patient felt neuropathy sensation improved after last treatment but still has some pain running down to thumb    Patient prognosis is Good.     Patient will continue to benefit from acupuncture treatment to address the deficits listed in the problem list box on initial evaluation, provide patient family education and to maximize pt's level of independence in the home and community environment.     Patient's spiritual, cultural and educational needs considered and pt agreeable to plan of care and goals.     Anticipated barriers to treatment: none    Plan     Recommend 1 /week for 5 sessions then re-assess.      Education:  Patient is aware of cumulative  benefit of acupuncture

## 2023-12-07 ENCOUNTER — CLINICAL SUPPORT (OUTPATIENT)
Dept: REHABILITATION | Facility: HOSPITAL | Age: 43
End: 2023-12-07

## 2023-12-07 DIAGNOSIS — C50.911 INVASIVE DUCTAL CARCINOMA OF BREAST, RIGHT: Primary | ICD-10-CM

## 2023-12-07 PROCEDURE — 97814 ACUP 1/> W/ESTIM EA ADDL 15: CPT | Performed by: ACUPUNCTURIST

## 2023-12-07 PROCEDURE — 97813 ACUP 1/> W/ESTIM 1ST 15 MIN: CPT | Performed by: ACUPUNCTURIST

## 2023-12-07 NOTE — PROGRESS NOTES
Acupuncture Evaluation Note     Name: Dov Tejeda Mary  Clinic Number: [id number]    Traditional Chinese Medicine (TCM) Diagnosis: Qi Stagnation, Blood Deficiency, and Damp  Medical Diagnosis:   No diagnosis found.       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, recent right ear surgery     Medical necessity is demonstrated by the following IMPAIRMENTS: Medical Necessity: Cancer related pain and Decreased quality of life              Aggravating Factors:  movement and stress   Relieving Factors:  rest    Symptom Description:     Quality:  Tingling and Numb  Severity:  5  Frequency:  every day    Treatment     Treatment Principles:  Harmonize qi, move qi, nourish blood, transform dampness    Acupuncture points used:    BA TRESSA   Bilateral points: LV3+, SP6-, SP9, GB34, LI11, LI4  Unilateral points: SP4, KD6, PC6, LU7  Auricular Treatment:      Needles In: 24  Needles Out: 24  [place] W/ STIM placed: 2:20 PM  Needles W/ STIM removed: 2:50 PM        Assessment     After treatment, patient felt arm pain improved after last treatment     Patient prognosis is Good.     Patient will continue to benefit from acupuncture treatment to address the deficits listed in the problem list box on initial evaluation, provide patient family education and to maximize pt's level of independence in the home and community environment.     Patient's spiritual, cultural and educational needs considered and pt agreeable to plan of care and goals.     Anticipated barriers to treatment: none    Plan     Recommend 1 /week for 4 sessions then re-assess.      Education:  Patient is aware of cumulative benefit of acupuncture

## 2023-12-13 ENCOUNTER — OFFICE VISIT (OUTPATIENT)
Dept: HEMATOLOGY/ONCOLOGY | Facility: CLINIC | Age: 43
End: 2023-12-13
Payer: COMMERCIAL

## 2023-12-13 ENCOUNTER — INFUSION (OUTPATIENT)
Dept: INFUSION THERAPY | Facility: HOSPITAL | Age: 43
End: 2023-12-13
Payer: COMMERCIAL

## 2023-12-13 ENCOUNTER — LAB VISIT (OUTPATIENT)
Dept: LAB | Facility: HOSPITAL | Age: 43
End: 2023-12-13
Attending: INTERNAL MEDICINE
Payer: COMMERCIAL

## 2023-12-13 VITALS
RESPIRATION RATE: 18 BRPM | HEIGHT: 61 IN | SYSTOLIC BLOOD PRESSURE: 130 MMHG | WEIGHT: 156.06 LBS | HEART RATE: 90 BPM | TEMPERATURE: 98 F | OXYGEN SATURATION: 98 % | DIASTOLIC BLOOD PRESSURE: 81 MMHG | BODY MASS INDEX: 29.47 KG/M2

## 2023-12-13 DIAGNOSIS — R74.01 TRANSAMINITIS: ICD-10-CM

## 2023-12-13 DIAGNOSIS — R23.2 HOT FLASHES: ICD-10-CM

## 2023-12-13 DIAGNOSIS — C50.811 MALIGNANT NEOPLASM OF OVERLAPPING SITES OF RIGHT BREAST IN FEMALE, ESTROGEN RECEPTOR POSITIVE: ICD-10-CM

## 2023-12-13 DIAGNOSIS — C50.811 MALIGNANT NEOPLASM OF OVERLAPPING SITES OF RIGHT BREAST IN FEMALE, ESTROGEN RECEPTOR POSITIVE: Primary | ICD-10-CM

## 2023-12-13 DIAGNOSIS — Z79.811 USE OF ANASTROZOLE: ICD-10-CM

## 2023-12-13 DIAGNOSIS — Z17.0 MALIGNANT NEOPLASM OF OVERLAPPING SITES OF RIGHT BREAST IN FEMALE, ESTROGEN RECEPTOR POSITIVE: Primary | ICD-10-CM

## 2023-12-13 DIAGNOSIS — C77.9 SECONDARY ADENOCARCINOMA OF LYMPH NODE: ICD-10-CM

## 2023-12-13 DIAGNOSIS — F41.9 ANXIETY: ICD-10-CM

## 2023-12-13 DIAGNOSIS — Z17.0 MALIGNANT NEOPLASM OF OVERLAPPING SITES OF RIGHT BREAST IN FEMALE, ESTROGEN RECEPTOR POSITIVE: ICD-10-CM

## 2023-12-13 LAB
ALBUMIN SERPL BCP-MCNC: 4 G/DL (ref 3.5–5.2)
ALP SERPL-CCNC: 72 U/L (ref 55–135)
ALT SERPL W/O P-5'-P-CCNC: 19 U/L (ref 10–44)
ANION GAP SERPL CALC-SCNC: 8 MMOL/L (ref 8–16)
AST SERPL-CCNC: 22 U/L (ref 10–40)
BASOPHILS # BLD AUTO: 0.03 K/UL (ref 0–0.2)
BASOPHILS NFR BLD: 0.6 % (ref 0–1.9)
BILIRUB SERPL-MCNC: 0.5 MG/DL (ref 0.1–1)
BUN SERPL-MCNC: 10 MG/DL (ref 6–20)
CALCIUM SERPL-MCNC: 9.7 MG/DL (ref 8.7–10.5)
CHLORIDE SERPL-SCNC: 106 MMOL/L (ref 95–110)
CO2 SERPL-SCNC: 27 MMOL/L (ref 23–29)
CREAT SERPL-MCNC: 0.8 MG/DL (ref 0.5–1.4)
DIFFERENTIAL METHOD: ABNORMAL
EOSINOPHIL # BLD AUTO: 0.1 K/UL (ref 0–0.5)
EOSINOPHIL NFR BLD: 1.1 % (ref 0–8)
ERYTHROCYTE [DISTWIDTH] IN BLOOD BY AUTOMATED COUNT: 11.6 % (ref 11.5–14.5)
EST. GFR  (NO RACE VARIABLE): >60 ML/MIN/1.73 M^2
GLUCOSE SERPL-MCNC: 120 MG/DL (ref 70–110)
HCT VFR BLD AUTO: 38.7 % (ref 37–48.5)
HGB BLD-MCNC: 14.2 G/DL (ref 12–16)
IMM GRANULOCYTES # BLD AUTO: 0.01 K/UL (ref 0–0.04)
IMM GRANULOCYTES NFR BLD AUTO: 0.2 % (ref 0–0.5)
LYMPHOCYTES # BLD AUTO: 1 K/UL (ref 1–4.8)
LYMPHOCYTES NFR BLD: 18.4 % (ref 18–48)
MCH RBC QN AUTO: 30.9 PG (ref 27–31)
MCHC RBC AUTO-ENTMCNC: 36.7 G/DL (ref 32–36)
MCV RBC AUTO: 84 FL (ref 82–98)
MONOCYTES # BLD AUTO: 0.4 K/UL (ref 0.3–1)
MONOCYTES NFR BLD: 8.1 % (ref 4–15)
NEUTROPHILS # BLD AUTO: 3.9 K/UL (ref 1.8–7.7)
NEUTROPHILS NFR BLD: 71.6 % (ref 38–73)
NRBC BLD-RTO: 0 /100 WBC
PLATELET # BLD AUTO: 226 K/UL (ref 150–450)
PMV BLD AUTO: 9.2 FL (ref 9.2–12.9)
POTASSIUM SERPL-SCNC: 4 MMOL/L (ref 3.5–5.1)
PROT SERPL-MCNC: 7.4 G/DL (ref 6–8.4)
RBC # BLD AUTO: 4.6 M/UL (ref 4–5.4)
SODIUM SERPL-SCNC: 141 MMOL/L (ref 136–145)
WBC # BLD AUTO: 5.44 K/UL (ref 3.9–12.7)

## 2023-12-13 PROCEDURE — 99215 PR OFFICE/OUTPT VISIT, EST, LEVL V, 40-54 MIN: ICD-10-PCS | Mod: S$GLB,,, | Performed by: INTERNAL MEDICINE

## 2023-12-13 PROCEDURE — 3075F PR MOST RECENT SYSTOLIC BLOOD PRESS GE 130-139MM HG: ICD-10-PCS | Mod: CPTII,S$GLB,, | Performed by: INTERNAL MEDICINE

## 2023-12-13 PROCEDURE — 1159F PR MEDICATION LIST DOCUMENTED IN MEDICAL RECORD: ICD-10-PCS | Mod: CPTII,S$GLB,, | Performed by: INTERNAL MEDICINE

## 2023-12-13 PROCEDURE — 3008F BODY MASS INDEX DOCD: CPT | Mod: CPTII,S$GLB,, | Performed by: INTERNAL MEDICINE

## 2023-12-13 PROCEDURE — 1159F MED LIST DOCD IN RCRD: CPT | Mod: CPTII,S$GLB,, | Performed by: INTERNAL MEDICINE

## 2023-12-13 PROCEDURE — 63600175 PHARM REV CODE 636 W HCPCS: Mod: JZ,JG | Performed by: INTERNAL MEDICINE

## 2023-12-13 PROCEDURE — 80053 COMPREHEN METABOLIC PANEL: CPT | Performed by: INTERNAL MEDICINE

## 2023-12-13 PROCEDURE — 99999 PR PBB SHADOW E&M-EST. PATIENT-LVL V: ICD-10-PCS | Mod: PBBFAC,,, | Performed by: INTERNAL MEDICINE

## 2023-12-13 PROCEDURE — 99999 PR PBB SHADOW E&M-EST. PATIENT-LVL V: CPT | Mod: PBBFAC,,, | Performed by: INTERNAL MEDICINE

## 2023-12-13 PROCEDURE — 3008F PR BODY MASS INDEX (BMI) DOCUMENTED: ICD-10-PCS | Mod: CPTII,S$GLB,, | Performed by: INTERNAL MEDICINE

## 2023-12-13 PROCEDURE — 96402 CHEMO HORMON ANTINEOPL SQ/IM: CPT

## 2023-12-13 PROCEDURE — 36415 COLL VENOUS BLD VENIPUNCTURE: CPT | Performed by: INTERNAL MEDICINE

## 2023-12-13 PROCEDURE — 85025 COMPLETE CBC W/AUTO DIFF WBC: CPT | Performed by: INTERNAL MEDICINE

## 2023-12-13 PROCEDURE — 99215 OFFICE O/P EST HI 40 MIN: CPT | Mod: S$GLB,,, | Performed by: INTERNAL MEDICINE

## 2023-12-13 PROCEDURE — 3079F DIAST BP 80-89 MM HG: CPT | Mod: CPTII,S$GLB,, | Performed by: INTERNAL MEDICINE

## 2023-12-13 PROCEDURE — 3079F PR MOST RECENT DIASTOLIC BLOOD PRESSURE 80-89 MM HG: ICD-10-PCS | Mod: CPTII,S$GLB,, | Performed by: INTERNAL MEDICINE

## 2023-12-13 PROCEDURE — 3075F SYST BP GE 130 - 139MM HG: CPT | Mod: CPTII,S$GLB,, | Performed by: INTERNAL MEDICINE

## 2023-12-13 RX ORDER — ANASTROZOLE 1 MG/1
1 TABLET ORAL DAILY
Qty: 30 TABLET | Refills: 2 | Status: SHIPPED | OUTPATIENT
Start: 2023-12-13 | End: 2024-04-01 | Stop reason: SDUPTHER

## 2023-12-13 RX ADMIN — GOSERELIN ACETATE 3.6 MG: 3.6 IMPLANT SUBCUTANEOUS at 03:12

## 2023-12-13 NOTE — PROGRESS NOTES
PROGRESS NOTE    Subjective:      Patient ID: Dov Hernandez is a 43 y.o. female.  MRN: [id number]  : 1980    History of Present Illness:   HPI   Dov Hernandez is a 42 y.o. female who is referred for right breast IDC.     She had a normal screening mammogram in . This year, she underwent a screening mammogram in March that showed right breast calcifications, measured to be 39 mm on diagnostic mammogram.   She underwent a right breast biopsy that showed IDC, 2 mm, ER 30%, AL 30%, Her 2 karrie negative, Grade 1, low Ki 67 at 5% with associated DCIS.      She has had a breast MRI that showed a 2 cm mass. We have also discussed her case at our multi d breast tumor board.   She underwent b/l mastectomy with ROSITA flap reconstruction and right SLNB. Tumor size was 2.2 cm, 1/ SLN was positive for metastatic carcinoma.     Case discussed at tumor board. Pros and cons of further ALND vs XRT discussed. She has a close follow up with Dr. Pickard and has seen Dr. Ken, has declined ALND and will proceed with XRT.     Oncotype returned with RS 29; RR 23%. Adjuvant chemotherapy is recommended.      Baseline PET reviewed, no residual or metastatic disease noted.     S/p cycle 1 of TC on 23.   Had an infusion reaction immediately after cycle 1, face was flushed and she had back pain, infusion was stopped, treated with solucortef 100 mg, Benadryl 25 mg IVP BP= 141/79 HR=86 , then given Pepcid 20 mg and  restarted after 30 minutes without any further issues     Developed a rash on her chest and arms 3 days later and then spread to upper thighs and responded to benadryl and dexamethasone.     Interim history:  S/p cycle 4 on .  RT started on 10/16.     Stable anxiety, on effexor 75 mg .     Had repair of the right ear drum. The recovery was hard in terms of nausea and vertigo for the first week and is now recovering well.   RT went well, has completed 16 fractions about 4 weeks ago.   Started Zoladex on  11/15, has intermittent hot flashes. LMP was in June.    Final reconstruction is planned early next year.     Oncology History:  Oncology History   Malignant neoplasm of overlapping sites of right breast in female, estrogen receptor positive   4/13/2023 Biopsy    Breast, right, biopsy:   Invasive ductal carcinoma   Tumor size: 2 mm in this material   Overall Grade: grade 1     4/13/2023 Breast Tumor Markers    Estrogen: Positive (weak intensity 30%)  Progesterone: Positive (weak intensity 30%)  HER2: Negative   Ki67: 5%     4/25/2023 Genetic Testing    Negative, VUS x 1(SDHA)     4/25/2023 Imaging Significant Findings    MRI revealed 2 cm mass correlating with biopsy proven malignancy      4/26/2023 Initial Diagnosis    Malignant neoplasm of overlapping sites of right breast in female, estrogen receptor positive     4/26/2023 Cancer Staged    Staging form: Breast, AJCC 8th Edition  - Clinical stage from 4/26/2023: Stage IB (cT2, cN0, cM0, G1, ER+, VA+, HER2: Equivocal)     5/2/2023 Tumor Conference       The team agreed to proceed with upfront surgery for further sampling of the tumor     5/24/2023 Breast Surgery    Bilateral mastectomy with ROSITA flap reconstruction and right SLNB     6/6/2023 Tumor Conference     There team discussed further axillary dissection versus proceeding with XRT. If ALND and no further positive nodes then no XRT would be recommended. If there are additional nodes, then she would be recommended for XRT.   Chemotherapy would be most likely recommended as well as adjuvant endocrine therapy   Shameka trial?        6/14/2023 Cancer Staged    Staging form: Breast, AJCC 8th Edition  - Pathologic stage from 6/14/2023: Stage IB (pT2, pN1(sn), cM0, G2, ER+, VA+, HER2-)     7/13/2023 -  Chemotherapy    Treatment Summary   Plan Name: OP BREAST TC - DOCETAXEL CYCLOPHOSPHAMIDE Q3W  Treatment Goal: Curative  Status: Active  Start Date: 7/13/2023  End Date: 9/15/2023  Provider: Jessica Griffith,  MD  Chemotherapy: cycloPHOSphamide 1,000 mg in sodium chloride 0.9% 290 mL chemo infusion, 1,020 mg, Intravenous, Clinic/HOD 1 time, 4 of 4 cycles  Administration: 1,000 mg (7/13/2023), 1,000 mg (8/3/2023), 1,000 mg (8/24/2023), 1,000 mg (9/14/2023)  DOCEtaxel (TAXOTERE) 100 mg in sodium chloride 0.9% 295 mL chemo infusion, 102 mg (100 % of original dose 60 mg/m2), Intravenous, Clinic/HOD 1 time, 4 of 4 cycles  Dose modification: 60 mg/m2 (original dose 60 mg/m2, Cycle 1, Reason: Treatment parameters not met)  Administration: 100 mg (7/13/2023), 130 mg (8/3/2023), 130 mg (8/24/2023), 130 mg (9/14/2023)     10/17/2023 - 11/7/2023 Radiation Therapy    Treating physician: Fran Ken  Total Dose: 42.4 Gy to Rt. breast and axilla   Fractions: 16 fractions at 2.65 Gy/fraction        Past medical, surgical, family, and social history were reviewed today and there are no changes of note unless mentioned in HPI.   MEDS and ALLERGIES were reviewed with patient and meds reconciled.     History:  Past Medical History:   Diagnosis Date    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    OCP & aziza from derm, Doxy caused yeast    Heartburn 12/17/2021    Strawberry wai 1980    back of neck    Strawberry wai 1981    under nose      Past Surgical History:   Procedure Laterality Date    BILATERAL MASTECTOMY Bilateral 5/24/2023    Procedure: MASTECTOMY, BILATERAL;  Surgeon: Jenna Pickard MD;  Location: [facility];  Service: General;  Laterality: Bilateral;  2.5 HOURS / EMAIL SENT 5-9 @ 11:39 LK    COLPOSCOPY      INJECTION FOR SENTINEL NODE IDENTIFICATION Right 5/24/2023    Procedure: INJECTION, FOR SENTINEL NODE IDENTIFICATION;  Surgeon: Jenna Pickard MD;  Location: [facility];  Service: General;  Laterality: Right;    RECONSTRUCTION OF BREAST WITH DEEP INFERIOR EPIGASTRIC ARTERY  (ROSITA) FREE FLAP Bilateral 5/24/2023    Procedure: RECONSTRUCTION, BREAST, USING ROSITA FREE FLAP / BILATERAL DEEP INFERIOR EPIGASTRIC PERFORATORS  "FLAPS;  Surgeon: Mikey Roche MD;  [facility];  Service: Plastics;  Laterality: Bilateral;    SENTINEL LYMPH NODE BIOPSY Right 2023    Procedure: BIOPSY, LYMPH NODE, SENTINEL;  Surgeon: Jenna Pickard MD;  Location[facility];  Service: General;  Laterality: Right;    WISDOM TOOTH EXTRACTION       Family History   Problem Relation Age of Onset    Hyperlipidemia Mother     Kidney cancer Father 58    Cancer Brother 22        Parotid Gland cancer    Birth marks Child 0        strawberry wai(s)    Birth marks Child 0        strawberry wai(s)    Café-au-lait spots Maternal Uncle         "a small patch on his neck"    Café-au-lait spots Other         "a spot on his arm and partial torso"    Other Other 2        tested negative for macrocephaly    Cervical cancer Other     Throat cancer Other     Pancreatic cancer Other         1 second cousin ()    Other Other         brain tumors (several 2nd cousins)    Breast cancer Neg Hx     Colon cancer Neg Hx     Ovarian cancer Neg Hx     Melanoma Neg Hx       Social History     Tobacco Use    Smoking status: Never    Smokeless tobacco: Never   Substance and Sexual Activity    Alcohol use: Yes     Comment: social    Drug use: No    Sexual activity: Yes     Partners: Male     Birth control/protection: None        ROS:  Review of Systems   Constitutional:  Positive for malaise/fatigue. Negative for fever and weight loss.   HENT:  Negative for congestion, hearing loss, nosebleeds and sore throat.    Eyes:  Negative for double vision and photophobia.   Respiratory:  Negative for cough, hemoptysis, sputum production, shortness of breath and wheezing.    Cardiovascular:  Negative for chest pain, palpitations, orthopnea and leg swelling.   Gastrointestinal:  Negative for abdominal pain, blood in stool, constipation, diarrhea, heartburn, nausea and vomiting.   Genitourinary:  Negative for dysuria, hematuria and urgency.   Musculoskeletal:  Negative for back pain, joint " "pain and myalgias.   Skin:  Negative for itching and rash.   Neurological:  Negative for dizziness, tingling, seizures, weakness and headaches.   Endo/Heme/Allergies:  Negative for polydipsia. Does not bruise/bleed easily.        Hot flashes   Psychiatric/Behavioral:  Negative for depression and memory loss. The patient is nervous/anxious (improving). The patient does not have insomnia.        Objective:     Vitals:    12/13/23 1441   BP: 130/81   Pulse: 90   Resp: 18   Temp: 97.9 °F (36.6 °C)   TempSrc: Oral   SpO2: 98%   Weight: 70.8 kg (156 lb 1.4 oz)   Height: 5' 1" (1.549 m)   PainSc: 0-No pain         Wt Readings from Last 10 Encounters:   12/13/23 70.8 kg (156 lb 1.4 oz)   10/11/23 73 kg (160 lb 15 oz)   09/20/23 73.5 kg (162 lb 0.6 oz)   09/13/23 73 kg (160 lb 15 oz)   08/24/23 71.2 kg (156 lb 15.5 oz)   08/22/23 71.2 kg (156 lb 15.5 oz)   08/02/23 68.8 kg (151 lb 10.8 oz)   07/13/23 67 kg (147 lb 11.3 oz)   07/12/23 67.1 kg (147 lb 14.9 oz)   07/12/23 67.1 kg (147 lb 14.9 oz)       Physical Exam  Vitals and nursing note reviewed.   Constitutional:       General: She is not in acute distress.     Appearance: She is not diaphoretic.   HENT:      Head: Normocephalic.   Eyes:      General: No scleral icterus.     Conjunctiva/sclera: Conjunctivae normal.   Neck:      Thyroid: No thyromegaly.   Cardiovascular:      Rate and Rhythm: Normal rate.   Pulmonary:      Effort: Pulmonary effort is normal.   Chest:      Comments: Breast exam deferred today  Musculoskeletal:         General: Normal range of motion.      Cervical back: Neck supple.   Skin:     General: Skin is warm and dry.      Findings: No erythema or rash.   Neurological:      Mental Status: She is alert and oriented to person, place, and time.      Gait: Gait is intact.   Psychiatric:         Mood and Affect: Affect normal.         Cognition and Memory: Memory normal.         Judgment: Judgment normal.          Diagnostic Tests:  Significant Imaging: I " have reviewed and interpreted all pertinent imaging results/findings.    Laboratory Data:  All pertinent labs have been reviewed.    Labs:   Lab Results   Component Value Date    WBC 5.44 12/13/2023    RBC 4.60 12/13/2023    HGB 14.2 12/13/2023    HCT 38.7 12/13/2023    MCV 84 12/13/2023     12/13/2023     (H) 12/13/2023     12/13/2023    K 4.0 12/13/2023    BUN 10 12/13/2023    CREATININE 0.8 12/13/2023    AST 22 12/13/2023    ALT 19 12/13/2023    BILITOT 0.5 12/13/2023     Assessment/Plan:   Malignant neoplasm of overlapping sites of right breast in female, estrogen receptor positive  cT2cN0, G1, ER 30%, SC + 30%, Her 2 karrie negative Ki 67 5%   pT2 pN1a     Genetics VUS Oncotype 29  Pre menopausal     We discussed that she has node positive disease on path.She is agreeable to completed XRT in the adjuvant setting.     As for adjuvant therapy, given she is premenopausal and node +, has High risk Oncotype, adjuvant chemotherapy is appropriate. See prior notes for discussion. Baseline PET scan is reassuring. Sub cm pulmonary nodules noted, repeat scan as clinically indicated.     I  recommended adjuvant chemotherapy with Docetaxel and cytoxan q 3 weeks x 4, via PIV.   She is s/p XRT.   We discussed OFS and AI, already OFS.     We discussed the role of adjuvant endocrine therapy to reduce to risk of recurrence by 50% in the next 10 years. She is a candidate for AI.  Side effects including fatigue, hot flashes, bone loss were discussed. Rare side effects including risk of thrombosis, heart disease, rash, mood changes were also discussed. She is agreeable.      Can start anastrozole at this time.     Anxiety  Continue Effexor.   Has some ativan at home, ok to take as needed          ECOG SCORE           Discussion:   No follow-ups on file.    Plan was discussed with the patient at length, and she verbalized understanding. Dov was given an opportunity to ask questions that were answered to her  satisfaction, and she was advised to call in the interval if any problems or questions arise.    Electronically signed by Jessica Griffith MD     Route Chart for Scheduling    Med Onc Chart Routing  Urgent    Follow up with physician . 3/6   Follow up with IDA    Infusion scheduling note    Injection scheduling note 1/10,2/7,3/6   Labs CBC and CMP   Scheduling:  Preferred lab:  Lab interval:  3/6   Imaging    Pharmacy appointment    Other referrals refer to Oncology Primary [facility] - schedule with Lalitha yi               Treatment Plan Information   OP BREAST TC - DOCETAXEL CYCLOPHOSPHAMIDE Q3W   Jessica Griffith MD   Upcoming Treatment Dates - OP BREAST TC - DOCETAXEL CYCLOPHOSPHAMIDE Q3W    No upcoming days in selected categories.    Supportive Plan Information  OP BREAST GOSERELIN Q4W   Jessica Griffith MD   Upcoming Treatment Dates - OP BREAST GOSERELIN Q4W    11/16/2023       Chemotherapy       goserelin (ZOLADEX) injection 3.6 mg  12/14/2023       Chemotherapy       goserelin (ZOLADEX) injection 3.6 mg  1/11/2024       Chemotherapy       goserelin (ZOLADEX) injection 3.6 mg  2/8/2024       Chemotherapy       goserelin (ZOLADEX) injection 3.6 mg    MDM includes  :    - Acute or chronic illness or injury that poses a threat to life or bodily function  - Independent review and explanation of 3+ results from unique tests  - Discussion of management and ordering 3+ unique tests  - Extensive discussion of treatment and management  - Prescription drug management  - Drug therapy requiring intensive monitoring for toxicity

## 2023-12-18 ENCOUNTER — HOSPITAL ENCOUNTER (OUTPATIENT)
Dept: RADIOLOGY | Facility: HOSPITAL | Age: 43
Discharge: HOME | End: 2023-12-18
Attending: INTERNAL MEDICINE
Payer: COMMERCIAL

## 2023-12-18 DIAGNOSIS — Z79.811 USE OF ANASTROZOLE: ICD-10-CM

## 2023-12-18 PROCEDURE — 77080 DXA BONE DENSITY AXIAL: CPT | Mod: 26,,, | Performed by: RADIOLOGY

## 2023-12-18 PROCEDURE — 77080 DXA BONE DENSITY AXIAL: CPT | Mod: TC

## 2023-12-18 PROCEDURE — 77080 DXA BONE DENSITY AXIAL SKELETON 1 OR MORE SITES: ICD-10-PCS | Mod: 26,,, | Performed by: RADIOLOGY

## 2023-12-27 ENCOUNTER — PATIENT MESSAGE (OUTPATIENT)
Dept: REHABILITATION | Facility: HOSPITAL | Age: 43
End: 2023-12-27
Payer: COMMERCIAL

## 2024-01-02 ENCOUNTER — OFFICE VISIT (OUTPATIENT)
Dept: SURGERY | Facility: CLINIC | Age: 44
End: 2024-01-02
Payer: COMMERCIAL

## 2024-01-02 ENCOUNTER — OFFICE VISIT (OUTPATIENT)
Dept: RADIATION ONCOLOGY | Facility: CLINIC | Age: 44
End: 2024-01-02
Payer: COMMERCIAL

## 2024-01-02 VITALS
SYSTOLIC BLOOD PRESSURE: 130 MMHG | WEIGHT: 156 LBS | BODY MASS INDEX: 29.47 KG/M2 | HEIGHT: 61 IN | HEART RATE: 90 BPM | BODY MASS INDEX: 29.45 KG/M2 | RESPIRATION RATE: 18 BRPM | WEIGHT: 156.06 LBS | OXYGEN SATURATION: 98 % | DIASTOLIC BLOOD PRESSURE: 81 MMHG | HEIGHT: 61 IN | TEMPERATURE: 98 F

## 2024-01-02 DIAGNOSIS — Z85.3 PERSONAL HISTORY OF BREAST CANCER: Primary | ICD-10-CM

## 2024-01-02 DIAGNOSIS — Z17.0 MALIGNANT NEOPLASM OF OVERLAPPING SITES OF RIGHT BREAST IN FEMALE, ESTROGEN RECEPTOR POSITIVE: ICD-10-CM

## 2024-01-02 DIAGNOSIS — C50.811 MALIGNANT NEOPLASM OF OVERLAPPING SITES OF RIGHT BREAST IN FEMALE, ESTROGEN RECEPTOR POSITIVE: ICD-10-CM

## 2024-01-02 DIAGNOSIS — C50.811 MALIGNANT NEOPLASM OF OVERLAPPING SITES OF RIGHT BREAST IN FEMALE, ESTROGEN RECEPTOR POSITIVE: Primary | ICD-10-CM

## 2024-01-02 DIAGNOSIS — Z12.39 ENCOUNTER FOR SCREENING BREAST EXAMINATION: ICD-10-CM

## 2024-01-02 DIAGNOSIS — Z17.0 MALIGNANT NEOPLASM OF OVERLAPPING SITES OF RIGHT BREAST IN FEMALE, ESTROGEN RECEPTOR POSITIVE: Primary | ICD-10-CM

## 2024-01-02 PROCEDURE — 3008F BODY MASS INDEX DOCD: CPT | Mod: CPTII,S$GLB,, | Performed by: SURGERY

## 2024-01-02 PROCEDURE — 3044F HG A1C LEVEL LT 7.0%: CPT | Mod: CPTII,S$GLB,, | Performed by: SURGERY

## 2024-01-02 PROCEDURE — 99999 PR PBB SHADOW E&M-EST. PATIENT-LVL III: CPT | Mod: PBBFAC,,, | Performed by: RADIOLOGY

## 2024-01-02 PROCEDURE — 99213 OFFICE O/P EST LOW 20 MIN: CPT | Mod: S$GLB,,, | Performed by: SURGERY

## 2024-01-02 PROCEDURE — 99024 POSTOP FOLLOW-UP VISIT: CPT | Mod: S$GLB,,, | Performed by: RADIOLOGY

## 2024-01-02 PROCEDURE — 99999 PR PBB SHADOW E&M-EST. PATIENT-LVL IV: CPT | Mod: PBBFAC,,, | Performed by: SURGERY

## 2024-01-02 NOTE — PROGRESS NOTES
[facility] - Radiation Oncology     Patient ID: Dov Hernandez is a 43 y.o. female.    Chief Complaint: Follow-up    This patient presents for her initial follow up visit.     Ms. Hernandez has a a history of Stage IB, (T2, pN1, M0, G2, HER2 Neg, ER / ME +) Rt breast cancer.  She was referred for further evaluation after diagnostic MMG on 3/28/23 confirmed a 3.9 cm area of linear calcification in the Rt. breast. Core biopsies on 4/13/23 revealed invasive ductal carcinoma, histologic grade 3, nuclear grade 1, mitotic index 1. 30% of the cells were weakly ER and ME positive, Her - 2 was negative, Ki-67 was < 5%. On 5/24/23 the patient underwent bilateral nipple sparing mastectomy with immediate ROSITA flap reconstruction. Pathology revealed a 2.2 cm focus of invasive ductal carcinoma histologic grade 3, nuclear grade 2, mitotic index 2 with associated DCIS. EIC was negative. The closest margin was anterior at 3 mm, all other margins were > 1 cm. Lymphovascular invasion was present. One sentinel node was positive for a 6 mm focus of metastatic carcinoma with no extranodal extension. She completed adjuvant chemotherapy followed by radiotherapy to the Rt. breast and axilla completed on 11/7/23.  Today the patient states she feels well.  Notes some tenderness of the breast.        Review of Systems   Constitutional:  Negative for activity change, appetite change, chills and fatigue.   Respiratory:  Negative for cough and shortness of breath.    Cardiovascular:  Negative for chest pain and palpitations.   Integumentary:  Positive for color change (mild tanning of the skin) and breast tenderness (mild intermittent). Negative for pallor, rash and breast mass.   Breast: Positive for tenderness (mild intermittent).Negative for mass    Physical Exam  Constitutional:       General: She is not in acute distress.     Appearance: Normal appearance.   Pulmonary:      Effort: Pulmonary effort is normal. No  respiratory distress.   Chest:   Breasts:     Right: No swelling, bleeding, inverted nipple or mass.       Lymphadenopathy:      Upper Body:      Right upper body: No supraclavicular or axillary adenopathy.   Neurological:      Mental Status: She is alert.            Assessment and Plan     1. Malignant neoplasm of overlapping sites of right breast in female, estrogen receptor positive  Recovered from the acute effects of radiotherapy.  She will continue follow up with breast surgery and medical oncology.  Plan follow up with us PRN

## 2024-01-02 NOTE — PROGRESS NOTES
[facility]  Department of Surgery    REFERRING PROVIDER: No referring provider defined for this encounter. Michaela Loyola MD  CHIEF COMPLAINT:   Chief Complaint   Patient presents with    Follow-up     6 mo f/u       DIAGNOSIS:   This is a 43 y.o. female with a history of stage pT2 N1a Mx grade 2 ER + AK + HER2 - IDC with DCIS of the right breast.     TREATMENT:   1.  Bilateral total mastectomy and right sentinel node biopsy with ROSITA flap recon ([place]) on 2023. JERMAINE Pickard M.D. Surgical Oncology. Final path revealed IDC (22 mm) with clear margins.  lymph node positive for metastatic carcinoma.   2. Oncotype 29; Chemotherapy, TC x 4 cycles completed 2023. GISEL Griffith M.D. Medical Oncology   3. Radiation therapy completed 2023. REN Ken M.D. Radiation Oncology   4. Adjuvant endocrine therapy (Anastrozole) started 2023, has been on OS. GISEL Griffith M.D. Medical Oncology     HISTORY OF PRESENT ILLNESS:   Dov Hernandez is a 43 y.o. female comes in for oncological follow up.  She denies change in her breast self-exam specifically denying new masses, skin or nipple changes, or nipple discharge. Past medical and surgical history is updated with new changes of fullness. There have been no changes to family history. The patient denies constitutional symptoms of night sweats, chills, weight loss, new headaches, visual changes, new back or bony pain, chest pain, or shortness of breath.        Review of Systems: See HPI/Interval History for other systems reviewed.     IMAGIN2023 NM PET CT ROUTINE     CLINICAL HISTORY:  Breast cancer, invasive, stage I/II/III, initial workup; Malignant neoplasm of overlapping sites of right female breast     TECHNIQUE:  10.8 mCi of F18-FDG was administered intravenously in the left antecubital fossa. After an approximately 60 min distribution time, PET/CT images were acquired from the skull base to mid thigh.  Transmission images were acquired to  correct for attenuation using a whole body low-dose CT scan without IV contrast with the arms positioned above the head. Glycemia at the time of injection was 95 mg/dL.     COMPARISON:  CTA 05/11/2023.     FINDINGS:  Quality of the study: Adequate.     In the head and neck, there are no hypermetabolic lesions worrisome for malignancy. There are no hypermetabolic mucosal lesions, and there are no pathologically enlarged or hypermetabolic lymph nodes.     In the chest, there are no hypermetabolic lesions worrisome for malignancy.  Few scattered subcentimeter pulmonary nodules, too small for PET characterization.  Postoperative changes of bilateral mastectomy and flap reconstruction.  There is mild surrounding tracer uptake most compatible with postoperative change (for example, fused image 96).  No suspicious hypermetabolic lymphadenopathy.     In the abdomen and pelvis, there is physiologic tracer distribution within the abdominal organs and excretion into the genitourinary system.     Additional postoperative change in the anterior abdominal wall with mild heterogeneous tracer uptake.     In the bones, there are no hypermetabolic lesions worrisome for malignancy.     In the extremities, there are no hypermetabolic lesions worrisome for malignancy.     Impression:     In this patient with breast cancer status post bilateral mastectomy and flap reconstruction, there are no hypermetabolic lesions worrisome for residual/recurrent disease or metastasis.        MEDICATIONS/ALLERGIES:     Current Outpatient Medications   Medication Sig    anastrozole (ARIMIDEX) 1 mg Tab Take 1 tablet (1 mg total) by mouth once daily.    baclofen (LIORESAL) 10 MG tablet Take 1 tablet (10 mg total) by mouth 3 (three) times daily as needed (hiccups).    cefadroxil (DURICEF) 500 MG Cap     ciprofloxacin-dexAMETHasone 0.3-0.1% (CIPRODEX) 0.3-0.1 % DrpS Place into both ears.    clindamycin (CLEOCIN T) 1 % external solution APPLY TO THE AFFECTED  "AREA(S) OF SCALP AFTER SHOWERING    diazePAM (VALIUM) 5 MG tablet     doxycycline (VIBRAMYCIN) 100 MG Cap Take 100 mg by mouth 2 (two) times daily.    fluconazole (DIFLUCAN) 150 MG Tab TAKE 1 TABLET BY MOUTH TODAY THEN TAKE 1 TABLET IN ONE WEEK    fluticasone propionate (FLONASE) 50 mcg/actuation nasal spray by Each Nostril route.    LIDOcaine-prilocaine (EMLA) cream Apply topically as needed.    mupirocin (BACTROBAN) 2 % ointment Apply topically 2 (two) times daily.    ondansetron (ZOFRAN-ODT) 8 MG TbDL Take 1 tablet (8 mg total) by mouth every 6 (six) hours as needed.    oxyCODONE-acetaminophen (PERCOCET) 7.5-325 mg per tablet     prochlorperazine (COMPAZINE) 10 MG tablet Take 1 tablet (10 mg total) by mouth every 6 (six) hours as needed.    sulfacetamide sodium-sulfur 8-4 % Susp Apply topically.    venlafaxine (EFFEXOR XR) 75 MG 24 hr capsule Take 1 capsule (75 mg total) by mouth once daily.     No current facility-administered medications for this visit.      Review of patient's allergies indicates:   Allergen Reactions    Latex, natural rubber Rash       PHYSICAL EXAM:   Ht 5' 1" (1.549 m)   Wt 70.8 kg (156 lb)   BMI 29.48 kg/m²     Physical Exam   HENT:   Head: Normocephalic and atraumatic.   Eyes: Conjunctivae are normal. No scleral icterus.   Cardiovascular:  Normal rate, regular rhythm and normal pulses.            Pulmonary/Chest: Effort normal and breath sounds normal. No accessory muscle usage or stridor. No respiratory distress. She has no wheezes. Right breast exhibits no inverted nipple, no mass, no nipple discharge and no skin change. Left breast exhibits no inverted nipple, no mass, no nipple discharge and no skin change.       Abdominal: Soft. Normal appearance. She exhibits no mass.   Musculoskeletal: No deformity. Lymphadenopathy:      Cervical: No cervical adenopathy.      Upper Body:      Right upper body: No supraclavicular or axillary adenopathy.      Left upper body: No supraclavicular or " axillary adenopathy.     Neurological: She is alert.   Skin: Skin is warm and dry.     Psychiatric: Mood and judgment normal.       ASSESSMENT:   This is a 43 y.o. female without evidence of recurrence by exam, history or imaging.       PLAN:   1. Continue to follow up with Dr. Griffith and Hem Onc team   2. Continue monthly self breast exams and call the clinic with any changes or problems.  3.  Mammogram not recommended in the setting of bilateral mastectomy   4. Return to clinic in 1 year .  5. WIll follow up with Dr. Roche -  for eval of seroma?.  6. PT and nutrition    The patient is in agreement with the plan. Questions were encouraged and answered to patient's satisfaction. Dov will call our office with any questions or concerns.     25 minutes were spent on this encounter, 15 of which was face to face counseling and 10 minutes were spent on chart review and coordination of care.

## 2024-01-04 ENCOUNTER — PATIENT MESSAGE (OUTPATIENT)
Dept: SURGERY | Facility: CLINIC | Age: 44
End: 2024-01-04
Payer: COMMERCIAL

## 2024-01-10 ENCOUNTER — INFUSION (OUTPATIENT)
Dept: INFUSION THERAPY | Facility: HOSPITAL | Age: 44
End: 2024-01-10
Payer: COMMERCIAL

## 2024-01-10 DIAGNOSIS — Z17.0 MALIGNANT NEOPLASM OF OVERLAPPING SITES OF RIGHT BREAST IN FEMALE, ESTROGEN RECEPTOR POSITIVE: Primary | ICD-10-CM

## 2024-01-10 DIAGNOSIS — C50.811 MALIGNANT NEOPLASM OF OVERLAPPING SITES OF RIGHT BREAST IN FEMALE, ESTROGEN RECEPTOR POSITIVE: Primary | ICD-10-CM

## 2024-01-10 PROCEDURE — 96402 CHEMO HORMON ANTINEOPL SQ/IM: CPT

## 2024-01-10 PROCEDURE — 63600175 PHARM REV CODE 636 W HCPCS: Mod: JZ,JG | Performed by: INTERNAL MEDICINE

## 2024-01-10 RX ADMIN — GOSERELIN ACETATE 3.6 MG: 3.6 IMPLANT SUBCUTANEOUS at 09:01

## 2024-01-11 ENCOUNTER — PATIENT MESSAGE (OUTPATIENT)
Dept: HEMATOLOGY/ONCOLOGY | Facility: CLINIC | Age: 44
End: 2024-01-11
Payer: COMMERCIAL

## 2024-01-22 ENCOUNTER — CLINICAL SUPPORT (OUTPATIENT)
Dept: REHABILITATION | Facility: HOSPITAL | Age: 44
End: 2024-01-22
Attending: SURGERY
Payer: COMMERCIAL

## 2024-01-22 DIAGNOSIS — C50.811 MALIGNANT NEOPLASM OF OVERLAPPING SITES OF RIGHT BREAST IN FEMALE, ESTROGEN RECEPTOR POSITIVE: ICD-10-CM

## 2024-01-22 DIAGNOSIS — R29.898 WEAKNESS OF BOTH UPPER EXTREMITIES: ICD-10-CM

## 2024-01-22 DIAGNOSIS — M62.89 MUSCLE TIGHTNESS: Primary | ICD-10-CM

## 2024-01-22 DIAGNOSIS — Z91.89 AT RISK FOR LYMPHEDEMA: ICD-10-CM

## 2024-01-22 DIAGNOSIS — Z17.0 MALIGNANT NEOPLASM OF OVERLAPPING SITES OF RIGHT BREAST IN FEMALE, ESTROGEN RECEPTOR POSITIVE: ICD-10-CM

## 2024-01-22 PROBLEM — R29.3 ABNORMAL POSTURE: Status: RESOLVED | Noted: 2023-06-19 | Resolved: 2024-01-22

## 2024-01-22 PROBLEM — M25.611 STIFFNESS OF RIGHT SHOULDER JOINT: Status: RESOLVED | Noted: 2023-06-19 | Resolved: 2024-01-22

## 2024-01-22 PROBLEM — M25.612 STIFFNESS OF LEFT SHOULDER JOINT: Status: RESOLVED | Noted: 2023-06-19 | Resolved: 2024-01-22

## 2024-01-22 PROCEDURE — 97162 PT EVAL MOD COMPLEX 30 MIN: CPT

## 2024-01-22 PROCEDURE — 97140 MANUAL THERAPY 1/> REGIONS: CPT

## 2024-01-22 NOTE — PLAN OF CARE
[facility]  Physical Therapy Initial Evaluation    Name: Dov Hernandez  Clinic Number: [id number]    Therapy Diagnosis:   Encounter Diagnoses   Name Primary?    Malignant neoplasm of overlapping sites of right breast in female, estrogen receptor positive     Muscle tightness Yes    At risk for lymphedema     Weakness of both upper extremities         Physician: Jenna Pickard MD    Physician Orders: PT Eval and Treat   Medical Diagnosis from Referral: C50.811,Z17.0 (ICD-10-CM) - Malignant neoplasm of overlapping sites of right breast in female, estrogen receptor positive   Evaluation Date: 1/22/2024  Authorization Period Expiration: 02/22/2024  Plan of Care Expiration: 3/22/2024  Progress Note Due: 2/19/2024  Visit # / Visits authorized: 1/ 1   FOTO: 1/3    Precautions:  standard, cancer, anxiety, latex allergy       Time In: 9:25 am  Time Out: 10:15 am  Total Appointment Time (timed & untimed codes): 50 minutes      History   History of Present Illness: Dov is a 43 y.o. female that presents to  [facility] at the [facility] clinic s/p B mastectomy with ROSITA reconstruction surgery.   Diagnosis:  stage pT2 N1a Mx grade 2 ER + IA + HER2 - IDC with DCIS of the right breast.    Chief complaint: her shoulder range of motion is fine, but she is having some hardness in her L breast. She still has some pulling in her R underarm area with reaching overhead. Due to the hardest in her L breast she has some difficulty with sleeping on her L side and some pulling in overhead movements.    Surgical History:  Dov Hernandez  has a past surgical history that includes Colposcopy; [place] tooth extraction; Bilateral mastectomy (Bilateral, 5/24/2023); [place] lymph node biopsy (Right, 5/24/2023); Injection for sentinel node identification (Right, 5/24/2023); and Reconstruction of breast with deep inferior epigastric artery  (ROSITA) free flap  (Bilateral, 5/24/2023).    Chemotherapy: TC x 4 cycles completed 9/14/2023   Radiation:  Radiation therapy completed 11/7/2023   Endocrine therapy: Adjuvant endocrine therapy (Anastrozole) started 12/2023     Past Medical History:   Diagnosis Date    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    OCP & aziza from derm, Doxy caused yeast    Heartburn 12/17/2021    Strawberry wai 1981    back of neck    Strawberry wai 1981    under nose          Medications:  Dov has a current medication list which includes the following prescription(s): anastrozole, baclofen, cefadroxil, ciprofloxacin-dexamethasone 0.3-0.1%, clindamycin, diazepam, doxycycline, fluconazole, fluticasone propionate, lidocaine-prilocaine, mupirocin, ondansetron, oxycodone-acetaminophen, prochlorperazine, sulfacetamide sodium-sulfur, and venlafaxine.    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   Best: 0/10  Worst: 0/10   Pain location: N/A    Objective   Mental status: alert & oriented x 3    Postural examination/scapular alignment: Rounded shoulder    Skin Integrity: not formally assessed  Scar Location: N/A  Appearance: N/A  Signs of Infection: N/A  Drainage: N/A  Color: N/A    Edema: mild  Location: L breast    Axillary Web Syndrome/Cording:   Location: none  Degree of Cording: N/A (mild, moderate etc...)   Number of cords present: N/A    Sensation: WNL         Range of Motion:       Shoulder Range of Motion:   Active /Passive ROM Right Left   Flexion 175* 170   Abduction 180* 170   Extension 65 65   IR/90deg 90 90   ER/90deg 95 95   *pulling pec/axilla     - LTR with shoulder abd: L 155 degrees, R 180 degrees    Strength: manual muscle test grades below   Upper Extremity Strength   (R) UE (L) UE   Shoulder flexion: 4-/5 4-/5   Shoulder Abduction: 5/5 5/5   Shoulder IR 5/5 5/5   Shoulder ER 4+/5 4+/5   Elbow flexion: 5/5 5/5   Elbow extension: 4/5 4/5*   Wrist flexion: 5/5 5/5   Wrist extension: 5/5 5/5    65.5 lbs 64.1 lbs   *pulling in breast    Measurements of BL UE's for early detection of Lymphedema:   See previous initial evaluation 6/19/23    Functional Mobility (Bed mobility, transfers)  Independent    ADL's (Activities of Daily Living):  Independent    Gait Assessment:   - Assistive device used: none  - Assistance: independent  - Distance: community distances     Patient Education Provided   - role of therapy in multi - disciplinary team, goals for therapy  - Pt was educated in lymphedema etiology and management plans.    - Pt was provided with written risk reductions and precautions for managing lymphedema.     Pt has no cultural, educational or language barriers to learning provided.  Treatment and Instruction of Home Exercise Program      Total Time Separate from Evaluation: 20 minutes    Dov received the following manual therapy techniques: Myofacial release and Soft tissue Mobilization were applied to the: R & L breast/chest for 15 minutes.   Acromial retraction  Layer mobilization  Pectoralis stretch  Pectoralis stretch with abduction  Axillary stretch  Lateral chest stretch  Gentle manual trigger point release in lats       Dov received the following self-care/home management techniques for 5 minutes, including:  -potential benefit of compression from Komprex II padding, and use of sports bra or supportive tank for added compression  - potential benefit of Komprex II  padding to left breast to manage swelling and skin tightness  Pt was provided with Komprex II padding that she can wear under her bra to cover left breast and lateral chest wall to provide gentle compression. Pt was advised to wear as tolerated, but she can wear day and night. Pt educated on how to wash.    Written Home Exercises Provided:  to be provided next visit .      Functional Limitations Reporting        Intake Outcome Measure for FOTO Shoulder Survey    Therapist reviewed FOTO scores for Dov Hernandez on 1/22/2024.   FOTO documents entered into [employer] - see Media section.    Intake Score: 91%             Assessment   This is a 43 y.o. female referred to outpatient physical therapy and presents with a medical diagnosis of right breast cancer and was seen today post-operatively to establish PT plan of care for impairments following surgery including: muscle tightness, weakness of both upper extremities, and at risk for lymphedema.     Pt instructed in use of Komprex II padding to assist with decreasing edema in her L breast.  Pt instructed to follow up with therapist if any concerns arise with program established. Pt will continue to benefit from skilled physical therapy to address the impairments stated in chart below, provide patient/family education and to maximize pt's level of independence in home and community environment     Pt prognosis is Good.    Anticipated barriers to physical therapy: none anticipated     Pt's spiritual, cultural and educational needs considered and pt agreeable to plan of care and goals as stated below:       Medical Necessity is demonstrated by the following:  History  Co-morbidities and personal factors that may impact the plan of care [] LOW: no personal factors / co-morbidities  [x] MODERATE: 1-2 personal factors / co-morbidities  [] HIGH: 3+ personal factors / co-morbidities    Moderate / High Support Documentation:   Co-morbidities affecting plan of care: anxiety,  history of cancer    Personal Factors:   no deficits     Examination  Body Structures and Functions, activity limitations and participation restrictions that may impact the plan of care [] LOW: addressing 1-2 elements  [] MODERATE: 3+ elements  [] HIGH: 4+ elements (please support below)    Moderate / High Support Documentation: decreased ROM, muscle tightness, muscle weakness, difficulty with overhead activities, sleeping comfortably     Clinical Presentation [] LOW: stable  [x] MODERATE: Evolving  [] HIGH: Unstable     Decision Making/ Complexity Score: moderate       Goals: Pt agrees with goals set    Short Term goals: 4 weeks  1. Patient will demonstrate 100% understanding of lymphedema risk reduction practices to include self monitoring for lymphedema. (progressing, not met)  2. Patient will demonstrate independence with Home Exercise program established. (progressing, not met)  3. Pt will increase AROM/PROM in shoulder abduction ROM to 175 degrees on left to improve functional reach, carry, push, pull pain free. (progressing, not met)  4. Pt will increase AROM/PROM in shoulder flexion to 175 degrees on left to improve functional reach, carry, push, pull pain free.(progressing, not met)    Long Term Goals: 8 weeks   1.  Pt will increase strength to 5/5 in gross UE musculature to improve tolerance to all functional activities pain free. (progressing, not met)  2. Pt will demonstrate full/maximized tissue mobility to increase ROM and promote healthy tissue to be pain free at discharge. (progressing, not met)  3. Pt will increase AROM/PROM in shoulder abduction ROM to 180 degrees on left to improve functional reach, carry, push, pull pain free. (progressing, not met)    Plan   Outpatient physical therapy 2x week for 8 weeks to include the following:   Manual Therapy, Neuromuscular Re-ed, Patient Education, Self Care, Therapeutic Activities, Therapeutic Exercise, and IASTM . Dry needling with manual therapy  techniques to decrease pain, inflammation and swelling, increase circulation and promote healing process.    Plan of care Certification Period: 1/22/2024 to 3/22/2024.    Therapist: Maddi Orta, PT  1/22/2024

## 2024-01-23 PROBLEM — Z91.89 AT RISK FOR LYMPHEDEMA: Status: ACTIVE | Noted: 2024-01-23

## 2024-01-23 PROBLEM — M62.89 MUSCLE TIGHTNESS: Status: ACTIVE | Noted: 2024-01-23

## 2024-01-23 PROBLEM — R29.898 WEAKNESS OF BOTH UPPER EXTREMITIES: Status: ACTIVE | Noted: 2024-01-23

## 2024-01-24 NOTE — PROGRESS NOTES
PROGRESS NOTE    Subjective:      Patient ID: Dov Hernandez is a 43 y.o. female.  MRN: [id number]  : 1980    History of Present Illness:   HPI   Dov Hernandez is a 42 y.o. female who is referred for right breast IDC.     She had a normal screening mammogram in . This year, she underwent a screening mammogram in March that showed right breast calcifications, measured to be 39 mm on diagnostic mammogram.   She underwent a right breast biopsy that showed IDC, 2 mm, ER 30%, RI 30%, Her 2 karrie negative, Grade 1, low Ki 67 at 5% with associated DCIS.      She has had a breast MRI that showed a 2 cm mass. We have also discussed her case at our multi d breast tumor board.   She underwent b/l mastectomy with ROSITA flap reconstruction and right SLNB. Tumor size was 2.2 cm, 1/ SLN was positive for metastatic carcinoma.     Case discussed at tumor board. Pros and cons of further ALND vs XRT discussed. She has a close follow up with Dr. Pickard and has seen Dr. Ken, has declined ALND and will proceed with XRT.     Oncotype returned with RS 29; RR 23%. Adjuvant chemotherapy is recommended.      Baseline PET reviewed, no residual or metastatic disease noted.     S/p cycle 1 of TC on 23.   Had an infusion reaction immediately after cycle 1, face was flushed and she had back pain, infusion was stopped, treated with solucortef 100 mg, Benadryl 25 mg IVP BP= 141/79 HR=86 , then given Pepcid 20 mg and  restarted after 30 minutes without any further issues     Developed a rash on her chest and arms 3 days later and then spread to upper thighs and responded to benadryl and dexamethasone.     Interim history:  S/p cycle 4 on .  RT started on 10/16.     Stable anxiety, on effexor 75 mg .     Had repair of the right ear drum. The recovery was hard in terms of nausea and vertigo for the first week and is now recovering well.   RT went well, has completed 16 fractions about 4 weeks ago.   Started Zoladex on  11/15, has intermittent hot flashes. LMP was in June.    Final reconstruction is planned early next year.     Oncology History:  Oncology History   Malignant neoplasm of overlapping sites of right breast in female, estrogen receptor positive   4/13/2023 Biopsy    Breast, right, biopsy:   Invasive ductal carcinoma   Tumor size: 2 mm in this material   Overall Grade: grade 1     4/13/2023 Breast Tumor Markers    Estrogen: Positive (weak intensity 30%)  Progesterone: Positive (weak intensity 30%)  HER2: Negative   Ki67: 5%     4/25/2023 Genetic Testing    Negative, VUS x 1(SDHA)     4/25/2023 Imaging Significant Findings    MRI revealed 2 cm mass correlating with biopsy proven malignancy      4/26/2023 Initial Diagnosis    Malignant neoplasm of overlapping sites of right breast in female, estrogen receptor positive     4/26/2023 Cancer Staged    Staging form: Breast, AJCC 8th Edition  - Clinical stage from 4/26/2023: Stage IB (cT2, cN0, cM0, G1, ER+, ME+, HER2: Equivocal)     5/2/2023 Tumor Conference       The team agreed to proceed with upfront surgery for further sampling of the tumor     5/24/2023 Breast Surgery    Bilateral mastectomy with ROSITA flap reconstruction and right SLNB     6/6/2023 Tumor Conference     There team discussed further axillary dissection versus proceeding with XRT. If ALND and no further positive nodes then no XRT would be recommended. If there are additional nodes, then she would be recommended for XRT.   Chemotherapy would be most likely recommended as well as adjuvant endocrine therapy   Shameka trial?        6/14/2023 Cancer Staged    Staging form: Breast, AJCC 8th Edition  - Pathologic stage from 6/14/2023: Stage IB (pT2, pN1(sn), cM0, G2, ER+, ME+, HER2-)     7/13/2023 -  Chemotherapy    Treatment Summary   Plan Name: OP BREAST TC - DOCETAXEL CYCLOPHOSPHAMIDE Q3W  Treatment Goal: Curative  Status: Active  Start Date: 7/13/2023  End Date: 9/15/2023  Provider: Jessica Griffith,  MD  Chemotherapy: cycloPHOSphamide 1,000 mg in sodium chloride 0.9% 290 mL chemo infusion, 1,020 mg, Intravenous, Clinic/HOD 1 time, 4 of 4 cycles  Administration: 1,000 mg (7/13/2023), 1,000 mg (8/3/2023), 1,000 mg (8/24/2023), 1,000 mg (9/14/2023)  DOCEtaxel (TAXOTERE) 100 mg in sodium chloride 0.9% 295 mL chemo infusion, 102 mg (100 % of original dose 60 mg/m2), Intravenous, Clinic/HOD 1 time, 4 of 4 cycles  Dose modification: 60 mg/m2 (original dose 60 mg/m2, Cycle 1, Reason: Treatment parameters not met)  Administration: 100 mg (7/13/2023), 130 mg (8/3/2023), 130 mg (8/24/2023), 130 mg (9/14/2023)     10/17/2023 - 11/7/2023 Radiation Therapy    Treating physician: Fran Ken  Total Dose: 42.4 Gy to Rt. breast and axilla   Fractions: 16 fractions at 2.65 Gy/fraction        Past medical, surgical, family, and social history were reviewed today and there are no changes of note unless mentioned in HPI.   MEDS and ALLERGIES were reviewed with patient and meds reconciled.     History:  Past Medical History:   Diagnosis Date    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    OCP & aziza from derm, Doxy caused yeast    Heartburn 12/17/2021    Strawberry wai 1981    back of neck    Strawberry wai 1981    under nose      Past Surgical History:   Procedure Laterality Date    BILATERAL MASTECTOMY Bilateral 5/24/2023    Procedure: MASTECTOMY, BILATERAL;  Surgeon: Jenna Pickard MD;  Location: [facility];  Service: General;  Laterality: Bilateral;  2.5 HOURS / EMAIL SENT 5-9 @ 11:39 LK    COLPOSCOPY      INJECTION FOR SENTINEL NODE IDENTIFICATION Right 5/24/2023    Procedure: INJECTION, FOR SENTINEL NODE IDENTIFICATION;  Surgeon: Jenna Pickard MD;  Location: [facility];  Service: General;  Laterality: Right;    RECONSTRUCTION OF BREAST WITH DEEP INFERIOR EPIGASTRIC ARTERY  (ROSITA) FREE FLAP Bilateral 5/24/2023    Procedure: RECONSTRUCTION, BREAST, USING ROSITA FREE FLAP / BILATERAL DEEP INFERIOR EPIGASTRIC PERFORATORS  "FLAPS;  Surgeon: Mikey Roche MD;  Location: [facility];  Service: Plastics;  Laterality: Bilateral;    SENTINEL LYMPH NODE BIOPSY Right 2023    Procedure: BIOPSY, LYMPH NODE, SENTINEL;  Surgeon: Jenna Pickard MD;  Location: [facility];  Service: General;  Laterality: Right;    WISDOM TOOTH EXTRACTION       Family History   Problem Relation Age of Onset    Hyperlipidemia Mother     Kidney cancer Father 58    Cancer Brother 22        Parotid Gland cancer    Birth marks Child 0        strawberry wai(s)    Birth marks Child 0        strawberry wai(s)    Café-au-lait spots Maternal Uncle         "a small patch on his neck"    Café-au-lait spots Other         "a spot on his arm and partial torso"    Other Other 2        tested negative for macrocephaly    Cervical cancer Other     Throat cancer Other     Pancreatic cancer Other         1 second cousin ()    Other Other         brain tumors (several 2nd cousins)    Breast cancer Neg Hx     Colon cancer Neg Hx     Ovarian cancer Neg Hx     Melanoma Neg Hx       Social History     Tobacco Use    Smoking status: Never    Smokeless tobacco: Never   Substance and Sexual Activity    Alcohol use: Yes     Comment: social    Drug use: No    Sexual activity: Yes     Partners: Male     Birth control/protection: None        ROS:  Review of Systems   Constitutional:  Positive for malaise/fatigue. Negative for fever and weight loss.   HENT:  Negative for congestion, hearing loss, nosebleeds and sore throat.    Eyes:  Negative for double vision and photophobia.   Respiratory:  Negative for cough, hemoptysis, sputum production, shortness of breath and wheezing.    Cardiovascular:  Negative for chest pain, palpitations, orthopnea and leg swelling.   Gastrointestinal:  Negative for abdominal pain, blood in stool, constipation, diarrhea, heartburn, nausea and vomiting.   Genitourinary:  Negative for dysuria, hematuria and urgency.   Musculoskeletal:  Negative for back pain, joint " pain and myalgias.   Skin:  Negative for itching and rash.   Neurological:  Negative for dizziness, tingling, seizures, weakness and headaches.   Endo/Heme/Allergies:  Negative for polydipsia. Does not bruise/bleed easily.        Hot flashes   Psychiatric/Behavioral:  Negative for depression and memory loss. The patient is nervous/anxious (improving). The patient does not have insomnia.        Objective:     There were no vitals filed for this visit.        Wt Readings from Last 10 Encounters:   01/02/24 70.8 kg (156 lb 1.4 oz)   01/02/24 70.8 kg (156 lb)   12/13/23 70.8 kg (156 lb 1.4 oz)   10/11/23 73 kg (160 lb 15 oz)   09/20/23 73.5 kg (162 lb 0.6 oz)   09/13/23 73 kg (160 lb 15 oz)   08/24/23 71.2 kg (156 lb 15.5 oz)   08/22/23 71.2 kg (156 lb 15.5 oz)   08/02/23 68.8 kg (151 lb 10.8 oz)   07/13/23 67 kg (147 lb 11.3 oz)       Physical Exam  Vitals and nursing note reviewed.   Constitutional:       General: She is not in acute distress.     Appearance: She is not diaphoretic.   HENT:      Head: Normocephalic.   Eyes:      General: No scleral icterus.     Conjunctiva/sclera: Conjunctivae normal.   Neck:      Thyroid: No thyromegaly.   Cardiovascular:      Rate and Rhythm: Normal rate.   Pulmonary:      Effort: Pulmonary effort is normal.   Chest:      Comments: Breast exam deferred today  Musculoskeletal:         General: Normal range of motion.      Cervical back: Neck supple.   Skin:     General: Skin is warm and dry.      Findings: No erythema or rash.   Neurological:      Mental Status: She is alert and oriented to person, place, and time.      Gait: Gait is intact.   Psychiatric:         Mood and Affect: Affect normal.         Cognition and Memory: Memory normal.         Judgment: Judgment normal.          Diagnostic Tests:  Significant Imaging: I have reviewed and interpreted all pertinent imaging results/findings.    Laboratory Data:  All pertinent labs have been reviewed.    Labs:   Lab Results    Component Value Date    WBC 5.44 12/13/2023    RBC 4.60 12/13/2023    HGB 14.2 12/13/2023    HCT 38.7 12/13/2023    MCV 84 12/13/2023     12/13/2023     (H) 12/13/2023     12/13/2023    K 4.0 12/13/2023    BUN 10 12/13/2023    CREATININE 0.8 12/13/2023    AST 22 12/13/2023    ALT 19 12/13/2023    BILITOT 0.5 12/13/2023     Assessment/Plan:   Malignant neoplasm of overlapping sites of right breast in female, estrogen receptor positive  cT2cN0, G1, ER 30%, IL + 30%, Her 2 karrie negative Ki 67 5%   pT2 pN1a     Genetics VUS Oncotype 29  Pre menopausal     We discussed that she has node positive disease on path.She is agreeable to completed XRT in the adjuvant setting.     As for adjuvant therapy, given she is premenopausal and node +, has High risk Oncotype, adjuvant chemotherapy is appropriate. See prior notes for discussion. Baseline PET scan is reassuring. Sub cm pulmonary nodules noted, repeat scan as clinically indicated.     I  recommended adjuvant chemotherapy with Docetaxel and cytoxan q 3 weeks x 4, via PIV.   She is s/p XRT.   We discussed OFS and AI, already OFS.     We discussed the role of adjuvant endocrine therapy to reduce to risk of recurrence by 50% in the next 10 years. She is a candidate for AI.  Side effects including fatigue, hot flashes, bone loss were discussed. Rare side effects including risk of thrombosis, heart disease, rash, mood changes were also discussed. She is agreeable.      Can start anastrozole at this time.     Anxiety  Continue Effexor.   Has some ativan at home, ok to take as needed          ECOG SCORE           Discussion:   No follow-ups on file.    Plan was discussed with the patient at length, and she verbalized understanding. Dov was given an opportunity to ask questions that were answered to her satisfaction, and she was advised to call in the interval if any problems or questions arise.    Electronically signed by Lalitha Zamarripa NP     Route Chart  for Scheduling  Med Onc Route Chart for Scheduling  Treatment Plan Information   OP BREAST TC - DOCETAXEL CYCLOPHOSPHAMIDE Q3W   Jessica Griffith MD   Upcoming Treatment Dates - OP BREAST TC - DOCETAXEL CYCLOPHOSPHAMIDE Q3W    No upcoming days in selected categories.    Supportive Plan Information  OP BREAST GOSERELIN Q4W   Jessica Griffith MD   Upcoming Treatment Dates - OP BREAST GOSERELIN Q4W    1/11/2024       Chemotherapy       goserelin (ZOLADEX) injection 3.6 mg  2/8/2024       Chemotherapy       goserelin (ZOLADEX) injection 3.6 mg  3/7/2024       Chemotherapy       goserelin (ZOLADEX) injection 3.6 mg    MDM includes  :    - Acute or chronic illness or injury that poses a threat to life or bodily function  - Independent review and explanation of 3+ results from unique tests  - Discussion of management and ordering 3+ unique tests  - Extensive discussion of treatment and management  - Prescription drug management  - Drug therapy requiring intensive monitoring for toxicity

## 2024-01-25 ENCOUNTER — OFFICE VISIT (OUTPATIENT)
Dept: HEMATOLOGY/ONCOLOGY | Facility: CLINIC | Age: 44
End: 2024-01-25
Payer: COMMERCIAL

## 2024-01-25 ENCOUNTER — TELEPHONE (OUTPATIENT)
Dept: HEMATOLOGY/ONCOLOGY | Facility: CLINIC | Age: 44
End: 2024-01-25

## 2024-01-25 VITALS
HEIGHT: 61 IN | BODY MASS INDEX: 29.92 KG/M2 | SYSTOLIC BLOOD PRESSURE: 129 MMHG | DIASTOLIC BLOOD PRESSURE: 79 MMHG | WEIGHT: 158.5 LBS | RESPIRATION RATE: 18 BRPM | TEMPERATURE: 99 F | OXYGEN SATURATION: 98 % | HEART RATE: 78 BPM

## 2024-01-25 DIAGNOSIS — F41.9 ANXIETY: ICD-10-CM

## 2024-01-25 DIAGNOSIS — Z80.51 FAMILY HISTORY OF KIDNEY CANCER: ICD-10-CM

## 2024-01-25 DIAGNOSIS — C77.9 SECONDARY ADENOCARCINOMA OF LYMPH NODE: ICD-10-CM

## 2024-01-25 DIAGNOSIS — Z00.00 ENCOUNTER FOR PREVENTATIVE ADULT HEALTH CARE EXAMINATION: ICD-10-CM

## 2024-01-25 DIAGNOSIS — E28.39 SUPPRESSION OF OVARIAN SECRETION: ICD-10-CM

## 2024-01-25 DIAGNOSIS — R60.0 PERIPHERAL EDEMA: ICD-10-CM

## 2024-01-25 DIAGNOSIS — R23.2 HOT FLASHES: ICD-10-CM

## 2024-01-25 DIAGNOSIS — Z79.811 AROMATASE INHIBITOR USE: ICD-10-CM

## 2024-01-25 DIAGNOSIS — C50.811 MALIGNANT NEOPLASM OF OVERLAPPING SITES OF RIGHT BREAST IN FEMALE, ESTROGEN RECEPTOR POSITIVE: Primary | ICD-10-CM

## 2024-01-25 DIAGNOSIS — Z17.0 MALIGNANT NEOPLASM OF OVERLAPPING SITES OF RIGHT BREAST IN FEMALE, ESTROGEN RECEPTOR POSITIVE: Primary | ICD-10-CM

## 2024-01-25 PROCEDURE — 3074F SYST BP LT 130 MM HG: CPT | Mod: CPTII,S$GLB,, | Performed by: NURSE PRACTITIONER

## 2024-01-25 PROCEDURE — 3008F BODY MASS INDEX DOCD: CPT | Mod: CPTII,S$GLB,, | Performed by: NURSE PRACTITIONER

## 2024-01-25 PROCEDURE — 3078F DIAST BP <80 MM HG: CPT | Mod: CPTII,S$GLB,, | Performed by: NURSE PRACTITIONER

## 2024-01-25 PROCEDURE — 99214 OFFICE O/P EST MOD 30 MIN: CPT | Mod: S$GLB,,, | Performed by: NURSE PRACTITIONER

## 2024-01-25 PROCEDURE — 1159F MED LIST DOCD IN RCRD: CPT | Mod: CPTII,S$GLB,, | Performed by: NURSE PRACTITIONER

## 2024-01-25 PROCEDURE — 99999 PR PBB SHADOW E&M-EST. PATIENT-LVL V: CPT | Mod: PBBFAC,,, | Performed by: NURSE PRACTITIONER

## 2024-01-25 RX ORDER — FUROSEMIDE 20 MG/1
20 TABLET ORAL DAILY
Qty: 7 TABLET | Refills: 0 | Status: SHIPPED | OUTPATIENT
Start: 2024-01-25 | End: 2024-03-18

## 2024-01-25 NOTE — PROGRESS NOTES
Primary Care Oncology Visit    Subjective:   Patient ID: Dov Hernandez is a 43 y.o. female.    Chief Complaint: Follow-up    Dov Hernandez comes in today for a primary care/oncology visit because she would like to get established with a new pcp.    She does not have a primary care provider currently or has not seen her primary care provider in the last 6 months.     Cancer Staging   Malignant neoplasm of overlapping sites of right breast in female, estrogen receptor positive  Staging form: Breast, AJCC 8th Edition  - Clinical stage from 4/26/2023: Stage IB (cT2, cN0, cM0, G1, ER+, CT+, HER2: Equivocal) - Signed by Jenna Pickard MD on 4/26/2023  - Pathologic stage from 6/14/2023: Stage IB (pT2, pN1(sn), cM0, G2, ER+, CT+, HER2-) - Signed by Fran Ken Jr., MD on 6/17/2023    Patient Care Team:  Jenna Pickard MD as Consulting Physician (Surgical Oncology)  Jessica Griffith MD as Consulting Physician (Hematology and Oncology)  Fran Ken Jr., MD as Consulting Physician (Radiation Oncology)    Recent Visits  No visits were found meeting these conditions.  Showing recent visits within past 30 days and meeting all other requirements  Today's Visits  Date Type Provider Dept   01/25/24 Office Visit Lalitha Zamarripa NP [facility] Hematology Oncology 3rd Floor   Showing today's visits and meeting all other requirements  Future Appointments  No visits were found meeting these conditions.  Showing future appointments within next 7 days and meeting all other requirements    BP Readings from Last 5 Encounters:   01/25/24 129/79   01/02/24 130/81   12/13/23 130/81   10/11/23 123/72   09/20/23 (!) 145/87     Lab Results   Component Value Date    WBC 5.44 12/13/2023    HGB 14.2 12/13/2023    HCT 38.7 12/13/2023    MCV 84 12/13/2023     12/13/2023    CHOL 210 (H) 12/13/2022    TRIG 122 12/13/2022    HDL 80 (H) 12/13/2022    ALT 19 12/13/2023    AST 22 12/13/2023    TSH 1.914 09/15/2017    HGBA1C  4.9 06/04/2013      Dexa results: Results for orders placed during the hospital encounter of 12/18/23    DXA Bone Density Axial Skeleton 1 or more sites    Narrative  EXAMINATION:  DXA BONE DENSITY AXIAL SKELETON 1 OR MORE SITES    CLINICAL HISTORY:  Long term (current) use of aromatase inhibitors    FINDINGS:  BMD lumbar spine 1.154 g/cm squared.  T-score 0.1, Z-score 0.1.    BMD left femoral neck is 1.071 g/cm squared.  T-score 0.2, Z-score 0.7.    BMD right femoral neck 1.017 g/cm squared.  T-score -0.1, Z-score 0.3.    Impression  Normal BMD lumbar spine, right hip, and left hip.    10 year fracture risk:    Major osteoporotic fracture 1.9%, hip fracture 0.0%.      Electronically signed by: Vicente Smyth MD  Date:    12/18/2023  Time:    14:09    HPI:   Breast cancer: Patient would like to establish care with a new PCP.  She is currently on zoladex and arimidex for right sided breast cancer.  She is s/p a bilateral mastectomy with flap reconstruction.  She had chemotherapy with TC x 4 and has completed radiation therapy.  She is being followed by Dr. Griffith.  She had a negative PET scan in 7/2023.    Otherwise she reports being healthy with no hx of htn, diabetes or hld.  She has no major family history of heart disease.  Her farther had kidney cancer with no s/s until it had metastasized.  She has hot flashes from the arimidex and some situational anxiety.  On effexor for this.  Has noted some intermittent mild diarrhea, some swelling in her hands since being on zoladex.  Occasionally she will have bilateral low back pain and lower abdominal cramping (feels like menstrual cycle cramping).  She has not had any spotting or bleeding.        Review of Systems   Constitutional:  Negative for chills, diaphoresis and fever.   HENT:  Negative for congestion, ear pain and hearing loss.    Eyes:  Negative for pain, redness and visual disturbance.   Respiratory:  Negative for cough and shortness of breath.     Cardiovascular:  Negative for chest pain and leg swelling.        Bilateral hand swelling   Gastrointestinal:  Positive for diarrhea. Negative for abdominal distention, abdominal pain and constipation.   Genitourinary:  Negative for difficulty urinating and dysuria.   Musculoskeletal:  Positive for back pain. Negative for gait problem and joint swelling.   Skin:  Negative for rash.   Neurological:  Negative for dizziness and light-headedness.   Psychiatric/Behavioral:  Negative for confusion. The patient is nervous/anxious.        Review of patient's allergies indicates:   Allergen Reactions    Latex, natural rubber Rash       Current Outpatient Medications   Medication Instructions    anastrozole (ARIMIDEX) 1 mg, Oral, Daily    baclofen (LIORESAL) 10 mg, Oral, 3 times daily PRN    cefadroxil (DURICEF) 500 MG Cap No dose, route, or frequency recorded.    ciprofloxacin-dexAMETHasone 0.3-0.1% (CIPRODEX) 0.3-0.1 % DrpS Both Ears    clindamycin (CLEOCIN T) 1 % external solution APPLY TO THE AFFECTED AREA(S) OF SCALP AFTER SHOWERING    diazePAM (VALIUM) 5 MG tablet No dose, route, or frequency recorded.    doxycycline (VIBRAMYCIN) 100 mg, Oral, 2 times daily    fluconazole (DIFLUCAN) 150 MG Tab TAKE 1 TABLET BY MOUTH TODAY THEN TAKE 1 TABLET IN ONE WEEK    fluticasone propionate (FLONASE) 50 mcg/actuation nasal spray Each Nostril    furosemide (LASIX) 20 mg, Oral, Daily    LIDOcaine-prilocaine (EMLA) cream Topical (Top), As needed (PRN)    mupirocin (BACTROBAN) 2 % ointment Topical (Top), 2 times daily    ondansetron (ZOFRAN-ODT) 8 mg, Oral, Every 6 hours PRN    oxyCODONE-acetaminophen (PERCOCET) 7.5-325 mg per tablet No dose, route, or frequency recorded.    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    Anxiety 06/14/2023    Malignant neoplasm of overlapping sites of right breast in female, estrogen receptor positive 04/26/2023    Heartburn 12/17/2021    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    Pain in joint, pelvic region and thigh 08/05/2014       Past Medical History:   Diagnosis Date    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    OCP & aziza from derm, Doxy caused yeast    Heartburn 12/17/2021    Strawberry awi 1981    back of neck    Strawberry wai 1981    under nose        Past Surgical History:   Procedure Laterality Date    BILATERAL MASTECTOMY Bilateral 5/24/2023    Procedure: MASTECTOMY, BILATERAL;  Surgeon: Jenna Pickard MD;  Location: [facility];  Service: General;  Laterality: Bilateral;  2.5 HOURS / EMAIL SENT 5-9 @ 11:39 LK    COLPOSCOPY      INJECTION FOR SENTINEL NODE IDENTIFICATION Right 5/24/2023    Procedure: INJECTION, FOR SENTINEL NODE IDENTIFICATION;  Surgeon: Jenna Pickard MD;  Location: [facility];  Service: General;  Laterality: Right;    RECONSTRUCTION OF BREAST WITH DEEP INFERIOR EPIGASTRIC ARTERY  (ROSITA) FREE FLAP Bilateral 5/24/2023    Procedure: RECONSTRUCTION, BREAST, USING ROSITA FREE FLAP / BILATERAL DEEP INFERIOR EPIGASTRIC PERFORATORS FLAPS;  Surgeon: Mikey Roche MD;  Location: [facility];  Service: Plastics;  Laterality: Bilateral;    SENTINEL LYMPH NODE BIOPSY Right 5/24/2023    Procedure: BIOPSY, LYMPH NODE, SENTINEL;  Surgeon: Jenna Pickard MD;  Location: [facility];  Service: General;  Laterality: Right;    WISDOM TOOTH EXTRACTION          Objective:     Vitals:    01/25/24 0937   BP: 129/79   Pulse: 78   Resp: 18   Temp: 98.5 °F (36.9 °C)   TempSrc: Oral   SpO2: 98%   Weight: 71.9 kg (158 lb 8.2 oz)   Height: 5' 1" (1.549 m)   PainSc: 0-No pain       Body mass index is 29.95 kg/m².    Physical Exam  Constitutional:       " General: She is not in acute distress.     Appearance: She is well-developed. She is not diaphoretic.   HENT:      Head: Normocephalic and atraumatic.   Eyes:      General: No scleral icterus.     Conjunctiva/sclera: Conjunctivae normal.   Cardiovascular:      Rate and Rhythm: Normal rate and regular rhythm.      Pulses: Normal pulses.      Heart sounds: Normal heart sounds.   Pulmonary:      Effort: Pulmonary effort is normal. No respiratory distress.      Breath sounds: Normal breath sounds.   Chest:      Comments: Deferred today  Abdominal:      General: There is no distension.   Musculoskeletal:         General: Normal range of motion.      Cervical back: Normal range of motion and neck supple.   Skin:     General: Skin is warm and dry.   Neurological:      Mental Status: She is alert and oriented to person, place, and time.   Psychiatric:         Behavior: Behavior normal.         Assessment:     1. Malignant neoplasm of overlapping sites of right breast in female, estrogen receptor positive    2. Secondary adenocarcinoma of lymph node    3. Encounter for preventative adult health care examination    4. Aromatase inhibitor use    5. Anxiety    6. Hot flashes    7. Suppression of ovarian secretion    8. Peripheral edema    9. Family history of kidney cancer        Plan:   Intervention: education, labs ordered, and medication initiated  Follow up: follow up with primary care oncology     Dov was seen today for follow-up.  Diagnoses and all orders for this visit:  1. Malignant neoplasm of overlapping sites of right breast in female, estrogen receptor positive (Primary)  2. Secondary adenocarcinoma of lymph node  Continue arimidex and zoladex  Continue to follow with Dr. Griffith     -     Vitamin D; Future; Expected date: 01/25/2024  -     TSH; Future; Expected date: 01/25/2024    3. Encounter for preventative adult health care examination  Health maintenance reviewed  Declines vaccines currently  Will get full  lab panel, cbc and cmp just done in December    -     Hemoglobin A1C; Future; Expected date: 01/25/2024  -     Lipid Panel; Future; Expected date: 01/25/2024    4. Aromatase inhibitor use  Tolerating well so far with hot flashes  Dexa done 12/18/23: normal  Weight bearing exercises and calcium/vitamin D supplements    -     Lipid Panel; Future; Expected date: 01/25/2024    5. Anxiety  Stable  Continue effexor  Will continue to monitor  -     Vitamin D; Future; Expected date: 01/25/2024    6. Hot flashes  Stable  Continue effexor    7. Suppression of ovarian secretion  Pt with some cramping  No spotting  She would like to check hormone levels    -     FOLLICLE STIMULATING HORMONE; Future; Expected date: 01/25/2024  -     ESTRADIOL; Future; Expected date: 01/25/2024    8. Peripheral edema  Intermittent in bilateral hands  Will give a week of lasix to see if this helps  Low salt diet recommended  While on lasix, stay well hydrated and eat high potassium foods  -     furosemide (LASIX) 20 MG tablet; Take 1 tablet (20 mg total) by mouth once daily.    9. Family history of kidney cancer  Pt is having some bilateral back pain  Will speak to Dr. Griffith about ultrasound    Total time of this visit, including time spent face to face with patient and/or via video/audio, and also in preparing for today's visit for MDM and documentation. (Medical Decision Making, including consideration of possible diagnoses, management options, complex medical record review, review of diagnostic tests and information, consideration and discussion of significant complications based on comorbidities, and discussion with providers involved with the care of the patient) is 30 minutes. Greater than 50% was spent face to face with the patient counseling and coordinating care.    Lalitha Zamarripa NP      Med Onc Chart Routing      Follow up with physician    Follow up with IDA 2 months. in prim/onc clinic   Infusion scheduling note    Injection  scheduling note    Labs   Scheduling:  Preferred lab:  Lab interval:  Please schedule labs as fasting sometime in the next week or two (A1C, lipid panel, tsh, fsh, estradiol, vit d)   Imaging    Pharmacy appointment    Other referrals                There are no Patient Instructions on file for this visit.

## 2024-01-26 ENCOUNTER — LAB VISIT (OUTPATIENT)
Dept: LAB | Facility: HOSPITAL | Age: 44
End: 2024-01-26
Attending: NURSE PRACTITIONER
Payer: COMMERCIAL

## 2024-01-26 DIAGNOSIS — Z79.811 AROMATASE INHIBITOR USE: ICD-10-CM

## 2024-01-26 DIAGNOSIS — F41.9 ANXIETY: ICD-10-CM

## 2024-01-26 DIAGNOSIS — Z00.00 ENCOUNTER FOR PREVENTATIVE ADULT HEALTH CARE EXAMINATION: ICD-10-CM

## 2024-01-26 DIAGNOSIS — Z17.0 MALIGNANT NEOPLASM OF OVERLAPPING SITES OF RIGHT BREAST IN FEMALE, ESTROGEN RECEPTOR POSITIVE: ICD-10-CM

## 2024-01-26 DIAGNOSIS — E28.39 SUPPRESSION OF OVARIAN SECRETION: ICD-10-CM

## 2024-01-26 DIAGNOSIS — C50.811 MALIGNANT NEOPLASM OF OVERLAPPING SITES OF RIGHT BREAST IN FEMALE, ESTROGEN RECEPTOR POSITIVE: ICD-10-CM

## 2024-01-26 LAB
25(OH)D3+25(OH)D2 SERPL-MCNC: 46 NG/ML (ref 30–96)
CHOLEST SERPL-MCNC: 273 MG/DL (ref 120–199)
CHOLEST/HDLC SERPL: 4.2 {RATIO} (ref 2–5)
ESTIMATED AVG GLUCOSE: 100 MG/DL (ref 68–131)
ESTRADIOL SERPL-MCNC: 15 PG/ML
FSH SERPL-ACNC: 7.19 MIU/ML
HBA1C MFR BLD: 5.1 % (ref 4–5.6)
HDLC SERPL-MCNC: 65 MG/DL (ref 40–75)
HDLC SERPL: 23.8 % (ref 20–50)
LDLC SERPL CALC-MCNC: 187 MG/DL (ref 63–159)
NONHDLC SERPL-MCNC: 208 MG/DL
TRIGL SERPL-MCNC: 105 MG/DL (ref 30–150)
TSH SERPL DL<=0.005 MIU/L-ACNC: 1.89 UIU/ML (ref 0.4–4)

## 2024-01-26 PROCEDURE — 82670 ASSAY OF TOTAL ESTRADIOL: CPT | Performed by: NURSE PRACTITIONER

## 2024-01-26 PROCEDURE — 36415 COLL VENOUS BLD VENIPUNCTURE: CPT | Mod: PO | Performed by: NURSE PRACTITIONER

## 2024-01-26 PROCEDURE — 83001 ASSAY OF GONADOTROPIN (FSH): CPT | Performed by: NURSE PRACTITIONER

## 2024-01-26 PROCEDURE — 83036 HEMOGLOBIN GLYCOSYLATED A1C: CPT | Performed by: NURSE PRACTITIONER

## 2024-01-26 PROCEDURE — 84443 ASSAY THYROID STIM HORMONE: CPT | Performed by: NURSE PRACTITIONER

## 2024-01-26 PROCEDURE — 80061 LIPID PANEL: CPT | Performed by: NURSE PRACTITIONER

## 2024-01-26 PROCEDURE — 82306 VITAMIN D 25 HYDROXY: CPT | Performed by: NURSE PRACTITIONER

## 2024-01-29 ENCOUNTER — CLINICAL SUPPORT (OUTPATIENT)
Dept: REHABILITATION | Facility: HOSPITAL | Age: 44
End: 2024-01-29
Payer: COMMERCIAL

## 2024-01-29 DIAGNOSIS — Z91.89 AT RISK FOR LYMPHEDEMA: ICD-10-CM

## 2024-01-29 DIAGNOSIS — R29.898 WEAKNESS OF BOTH UPPER EXTREMITIES: ICD-10-CM

## 2024-01-29 DIAGNOSIS — M62.89 MUSCLE TIGHTNESS: Primary | ICD-10-CM

## 2024-01-29 PROCEDURE — 97140 MANUAL THERAPY 1/> REGIONS: CPT

## 2024-01-29 PROCEDURE — 97110 THERAPEUTIC EXERCISES: CPT

## 2024-01-29 NOTE — PATIENT INSTRUCTIONS
FOAM ROLLER SNOW ANGELS    Lie down on a foam roller and allow your arms to drop towards the floor with your elbows straight as shown. Then slide your arms towards over your head and then return your arms to your side as shown and repeat.

## 2024-01-29 NOTE — PROGRESS NOTES
Physical Therapy Daily Treatment Note       Date: 1/29/2024   Name: Dov Tejeda Mary  Clinic Number: [id number]    Therapy Diagnosis:   Encounter Diagnoses   Name Primary?    Muscle tightness Yes    At risk for lymphedema     Weakness of both upper extremities      Physician: Jenna Pickard MD    Physician Orders: PT Eval and Treat   Medical Diagnosis from Referral: C50.811,Z17.0 (ICD-10-CM) - Malignant neoplasm of overlapping sites of right breast in female, estrogen receptor positive   Evaluation Date: 1/22/2024  Authorization Period Expiration: 02/22/2024  Plan of Care Expiration: 3/22/2024  Progress Note Due: 2/19/2024  Visit # / Visits authorized: 1/ 1   FOTO: 1/3    Precautions:   standard, cancer, anxiety, latex allergy      Time In: 11:21 am  Time Out: 12:00 pm  Total Appointment Time (timed & untimed codes): 39 minutes      Subjective     Pt reports: she has been wearing a compression bra since her last visit and it seems to be helping. The padding also seems to be helping move that fluid.   She was compliant with home exercise program.  Response to previous treatment: increased soreness in lats L>R  Pain Scale: Dov rates pain on a scale of 0/10 on VAS.   Pain location: N/A     Fatigue: none  Functional change: Nothing yet  Treatment:   Chemotherapy: TC x 4 cycles completed 9/14/2023   Radiation:  Radiation therapy completed 11/7/2023   Endocrine therapy: Adjuvant endocrine therapy (Anastrozole) started 12/2023  Surgery date: Bilateral mastectomy (Bilateral, 5/24/2023); Miller lymph node biopsy (Right, 5/24/2023); Injection for sentinel node identification (Right, 5/24/2023); and Reconstruction of breast with deep inferior epigastric artery  (ROSITA) free flap (Bilateral, 5/24/2023       Objective     Objective Measures updated at progress report unless specified.     Treatment     Dov received therapeutic exercises to develop strength,  "endurance, ROM, flexibility, posture and core stabilization for 8 minutes including:   LTR with hands behind head 1 set x 10 reps  Supine pec stretch   2'  Open book, iglesia   1 set x 10 reps    Dov received the following manual therapy techniques: Myofacial release and Soft tissue Mobilization were applied to the: L breast/lateral chest for 31 minutes, including:   Acromial retraction  Layer mobilization  Pectoralis stretch  Pectoralis stretch with abduction  Axillary stretch  Lateral chest stretch  Gentle manual trigger point release in lats  Patient was screened for use of kinesiotape and was cleared for use.  1. Has the patient ever had a reaction to the adhesive in bandaids? No  2. Has the patient ever uses athletic/kinesiotape in the past? Yes  3. Is the patient hemodynamically impaired (PE, DVT, CHF, Kidney failure)? No  4. Can the PT/PTA apply the tape to your skin? Yes    Patient was instructed on duration to wear the tape, proper drying techniques for the tape, and to remove the tape if he/she had any issues with it.    Patient verbalized understanding of these instructions. 1 "I strip with tails" was placed with paper off tension over her upper L breast to decrease edema.      Home Exercise Program and Patient Education   Education provided re:  - role of PT in multi - disciplinary team, goals for PT  - progress towards goals   - Pt was educated in lymphedema etiology and management plans.      Written Home Exercises Provided: yes.  Exercises were reviewed and Dov was able to demonstrate them prior to the end of the session.  Dov demonstrated good  understanding of the education provided.     See EMR under Patient Instructions for exercises provided 1/29/2024.    Pt has no cultural, educational or language barriers to learning provided.    Assessment     Patient is responding well to physical therapy. She was able to perform all of today's exercises with no increase in symptoms prior to leaving the " clinic. PT was able to appreciate decreased firmness in the patient's left breast, more along the lateral border compared to her upper L breast. PT was also able to detect increased tension in the patient's left lat. Patient endorsed adequate challenge with supine pec stretch over the towel roll. PT to assess patient's response to kinesiotaping next visit. Will progress as tolerated.     Pt prognosis is Good. Pt will continue to benefit from skilled outpatient physical therapy to address the deficits listed in the problem list chart on initial evaluation, provide pt/family education and to maximize pt's level of independence in the home and community environment.     Anticipated barriers to physical therapy: none anticipated    Goals as follows:  Short Term goals: 4 weeks  1. Patient will demonstrate 100% understanding of lymphedema risk reduction practices to include self monitoring for lymphedema. (progressing, not met)  2. Patient will demonstrate independence with Home Exercise program established. (progressing, not met)  3. Pt will increase AROM/PROM in shoulder abduction ROM to 175 degrees on left to improve functional reach, carry, push, pull pain free. (progressing, not met)  4. Pt will increase AROM/PROM in shoulder flexion to 175 degrees on left to improve functional reach, carry, push, pull pain free.(progressing, not met)     Long Term Goals: 8 weeks   1.  Pt will increase strength to 5/5 in gross UE musculature to improve tolerance to all functional activities pain free. (progressing, not met)  2. Pt will demonstrate full/maximized tissue mobility to increase ROM and promote healthy tissue to be pain free at discharge. (progressing, not met)  3. Pt will increase AROM/PROM in shoulder abduction ROM to 180 degrees on left to improve functional reach, carry, push, pull pain free. (progressing, not met)     Plan     Outpatient physical therapy 2x week for 8 weeks to include the following:   Manual Therapy,  Neuromuscular Re-ed, Patient Education, Self Care, Therapeutic Activities, Therapeutic Exercise, and IASTM . Dry needling with manual therapy techniques to decrease pain, inflammation and swelling, increase circulation and promote healing process.    Therapist: Maddi Orta, PT  1/29/2024

## 2024-01-30 ENCOUNTER — TELEPHONE (OUTPATIENT)
Dept: HEMATOLOGY/ONCOLOGY | Facility: CLINIC | Age: 44
End: 2024-01-30
Payer: COMMERCIAL

## 2024-01-30 DIAGNOSIS — E78.5 HYPERLIPIDEMIA, UNSPECIFIED HYPERLIPIDEMIA TYPE: Primary | ICD-10-CM

## 2024-01-30 DIAGNOSIS — Z79.811 AROMATASE INHIBITOR USE: ICD-10-CM

## 2024-01-30 NOTE — Clinical Note
Eliane,   Can we get her set up with a dietitian sometime in the next month.  No rush.  Thanks.  Lalitha

## 2024-01-30 NOTE — PROGRESS NOTES
Patient called and results discussed.  Other than cholesterol, all other labs acceptable.  10 year ASCVD risk score calculated and was low at 0.9%.  will trial 3 to 4 months of diet modifications and have patient meet with dietitian.  Discussed exercise and weight loss.  Will repeat in 3 to 4 months.

## 2024-01-30 NOTE — TELEPHONE ENCOUNTER
Patient called and results discussed.  Other than cholesterol, all other labs acceptable.  10 year ASCVD risk score calculated and was low at 0.9%.  will trial 3 to 4 months of diet modifications and have patient meet with dietitian.  Discussed exercise and weight loss.  Will repeat in 3 to 4 months.       Lalitha Zamarripa NP

## 2024-01-31 ENCOUNTER — TELEPHONE (OUTPATIENT)
Dept: HEMATOLOGY/ONCOLOGY | Facility: CLINIC | Age: 44
End: 2024-01-31
Payer: COMMERCIAL

## 2024-01-31 NOTE — TELEPHONE ENCOUNTER
Spoke w/ pt in regards to nutrition referral per Lalitha Zamarripa NP recommendation. Pt approved for scheduling virtual visit w/ Augie Umanzor RD on Thursday 2/8/2024 @ 11AM. Pt stated pt is familiar with utilizing the portal for virtual appt. MA acknowledged.      [place], [place] ext [phone]

## 2024-02-02 ENCOUNTER — TELEPHONE (OUTPATIENT)
Dept: HEMATOLOGY/ONCOLOGY | Facility: CLINIC | Age: 44
End: 2024-02-02
Payer: COMMERCIAL

## 2024-02-02 DIAGNOSIS — Z80.51 FAMILY HISTORY OF KIDNEY CANCER: ICD-10-CM

## 2024-02-02 DIAGNOSIS — M54.50 ACUTE BILATERAL LOW BACK PAIN WITHOUT SCIATICA: Primary | ICD-10-CM

## 2024-02-02 NOTE — TELEPHONE ENCOUNTER
Given patient with back pain currently and family hx of kidney cancer in father.  Will get kidney us x1.  Discussed that no routine screening would be recommended moving forward as long as genetics were negative.  Have reached out to genetics team to verify this.    Lalitha Zamarripa NP

## 2024-02-07 ENCOUNTER — INFUSION (OUTPATIENT)
Dept: INFUSION THERAPY | Facility: HOSPITAL | Age: 44
End: 2024-02-07
Payer: COMMERCIAL

## 2024-02-07 DIAGNOSIS — Z17.0 MALIGNANT NEOPLASM OF OVERLAPPING SITES OF RIGHT BREAST IN FEMALE, ESTROGEN RECEPTOR POSITIVE: Primary | ICD-10-CM

## 2024-02-07 DIAGNOSIS — C50.811 MALIGNANT NEOPLASM OF OVERLAPPING SITES OF RIGHT BREAST IN FEMALE, ESTROGEN RECEPTOR POSITIVE: Primary | ICD-10-CM

## 2024-02-07 PROCEDURE — 63600175 PHARM REV CODE 636 W HCPCS: Mod: JZ,JG | Performed by: NURSE PRACTITIONER

## 2024-02-07 PROCEDURE — 96402 CHEMO HORMON ANTINEOPL SQ/IM: CPT

## 2024-02-07 RX ADMIN — GOSERELIN ACETATE 3.6 MG: 3.6 IMPLANT SUBCUTANEOUS at 09:02

## 2024-02-08 ENCOUNTER — TELEPHONE (OUTPATIENT)
Dept: INTERNAL MEDICINE | Facility: CLINIC | Age: 44
End: 2024-02-08
Payer: COMMERCIAL

## 2024-02-08 ENCOUNTER — HOSPITAL ENCOUNTER (OUTPATIENT)
Dept: RADIOLOGY | Facility: HOSPITAL | Age: 44
Discharge: HOME | End: 2024-02-08
Attending: NURSE PRACTITIONER
Payer: COMMERCIAL

## 2024-02-08 DIAGNOSIS — M54.50 ACUTE BILATERAL LOW BACK PAIN WITHOUT SCIATICA: ICD-10-CM

## 2024-02-08 DIAGNOSIS — Z80.51 FAMILY HISTORY OF KIDNEY CANCER: ICD-10-CM

## 2024-02-08 PROCEDURE — 76770 US EXAM ABDO BACK WALL COMP: CPT | Mod: 26,,, | Performed by: RADIOLOGY

## 2024-02-08 PROCEDURE — 76770 US EXAM ABDO BACK WALL COMP: CPT | Mod: TC

## 2024-02-09 ENCOUNTER — PATIENT MESSAGE (OUTPATIENT)
Dept: OBSTETRICS AND GYNECOLOGY | Facility: CLINIC | Age: 44
End: 2024-02-09
Payer: COMMERCIAL

## 2024-02-09 ENCOUNTER — CLINICAL SUPPORT (OUTPATIENT)
Dept: REHABILITATION | Facility: HOSPITAL | Age: 44
End: 2024-02-09
Payer: COMMERCIAL

## 2024-02-09 ENCOUNTER — PATIENT MESSAGE (OUTPATIENT)
Dept: HEMATOLOGY/ONCOLOGY | Facility: CLINIC | Age: 44
End: 2024-02-09
Payer: COMMERCIAL

## 2024-02-09 DIAGNOSIS — M62.89 MUSCLE TIGHTNESS: Primary | ICD-10-CM

## 2024-02-09 DIAGNOSIS — Z91.89 AT RISK FOR LYMPHEDEMA: ICD-10-CM

## 2024-02-09 DIAGNOSIS — R29.898 WEAKNESS OF BOTH UPPER EXTREMITIES: ICD-10-CM

## 2024-02-09 PROCEDURE — 97140 MANUAL THERAPY 1/> REGIONS: CPT

## 2024-02-09 PROCEDURE — 97110 THERAPEUTIC EXERCISES: CPT

## 2024-02-09 PROCEDURE — 97112 NEUROMUSCULAR REEDUCATION: CPT

## 2024-02-09 NOTE — PATIENT INSTRUCTIONS
"      Theraband shoulder external rotation    Hold the band out to the front with both hands, elbows at your sides and bent 90 degrees.     Stretch the band apart as far as you can without pain. Keep the elbows in contact with your ribs. Squeeze the shoulder blades together.   Then return to start position.  Do 10 reps per set. Do 3 sets per session. Do 1 sessions per day           Banded Shoulder Diagonals    Standing with tall posture and engaged core.    Pull the band across your chest in diagonal direction.    Keep elbows straight through the entire pull, squeezing your shoulder blades together as you pull.    Hand going up should be "thumb up" hand position.     Repeat both sides.   Reps 10 per set. Do 3 sets per session. Do 1 session per day.         ELASTIC BAND BILATERAL HORIZONTAL ABDUCTION    Start by holding an elastic band in front of your chest with your elbows straight. Then, pull your arms apart and towards the side. Return to starting position and repeat.   Do 10 reps per set. Do 3 sets per session. Do 1 session per day.  "

## 2024-02-09 NOTE — PROGRESS NOTES
Physical Therapy Daily Treatment Note       Date: 2/9/2024   Name: Dov Tejeda Mary  Clinic Number: [id number]    Therapy Diagnosis:   Encounter Diagnoses   Name Primary?    Muscle tightness Yes    At risk for lymphedema     Weakness of both upper extremities        Physician: Jenna Pickard MD    Physician Orders: PT Eval and Treat   Medical Diagnosis from Referral: C50.811,Z17.0 (ICD-10-CM) - Malignant neoplasm of overlapping sites of right breast in female, estrogen receptor positive   Evaluation Date: 1/22/2024  Authorization Period Expiration: 12/31/2024  Plan of Care Expiration: 3/22/2024  Progress Note Due: 2/19/2024  Visit # / Visits authorized: 2/ 20 (plus eval)   FOTO: 1/3    Precautions:   standard, cancer, anxiety, latex allergy      Time In: 9:24 am  Time Out:10:21 am  Total Appointment Time (timed & untimed codes): 57 minutes      Subjective     Pt reports: doing better this morning. Both her and her plastic surgeon feel that those spots are softening. She may have to have 2 surgeries as the firmer areas may be soft tissue. She doesn't feed the taping made much of a difference.  She was compliant with home exercise program.  Response to previous treatment: increased soreness in lats L>R  Pain Scale: Dov rates pain on a scale of 0/10 on VAS.   Pain location: N/A     Fatigue: none  Functional change: Nothing yet  Treatment:   Chemotherapy: TC x 4 cycles completed 9/14/2023   Radiation:  Radiation therapy completed 11/7/2023   Endocrine therapy: Adjuvant endocrine therapy (Anastrozole) started 12/2023  Surgery date: Bilateral mastectomy (Bilateral, 5/24/2023); Pall Mall lymph node biopsy (Right, 5/24/2023); Injection for sentinel node identification (Right, 5/24/2023); and Reconstruction of breast with deep inferior epigastric artery  (ROSITA) free flap (Bilateral, 5/24/2023       Objective     Objective Measures updated at progress report  unless specified.     Shoulder Range of Motion:   Active /Passive ROM Right Left   Flexion 180 180   Abduction 180 180   Extension 65 65   IR/90deg 90 90   ER/90deg 95 95     Intake Outcome Measure for FOTO Shoulder Survey     Therapist reviewed FOTO scores for Dov Hernandez on 2/09/2024.   FOTO documents entered into [facility] - see Media section.     Intake Score: 75%  Treatment     Dov received therapeutic exercises to develop strength, endurance, ROM, flexibility, posture and core stabilization for 8 minutes including:   Physioball roll outs, 3 ways 2'  Supine pec stretch   2'  Open book, iglesia   1 set x 10 reps    Dov received the following manual therapy techniques: Myofacial release and Soft tissue Mobilization were applied to the: L breast/lateral chest for 30 minutes, including:   Acromial retraction  Layer mobilization  Pectoralis stretch  Pectoralis stretch with abduction  Axillary stretch  Lateral chest stretch  Gentle manual trigger point release in lats    Dov participated in neuromuscular re-education activities to improve: Balance, Coordination, Proprioception and Posture for 19 minutes. The following activities were included:  B shoulder ER, red  2 sets x 10 reps  Horizontal abd, red  2 sets x 10 reps  Diagonals, red   1 set x 10 reps    Home Exercise Program and Patient Education   Education provided re:  - role of PT in multi - disciplinary team, goals for PT  - progress towards goals   - Pt was educated in lymphedema etiology and management plans.      Written Home Exercises Provided: yes.  Exercises were reviewed and Dov was able to demonstrate them prior to the end of the session.  Dov demonstrated good  understanding of the education provided.     See EMR under Patient Instructions for exercises provided 1/29/2024.    Pt has no cultural, educational or language barriers to learning provided.    Assessment     Patient is responding well to physical therapy. She was able to demonstrate  AROM of both shoulders WNL with no complaints of pain, only minimal pulling in her lats with B shoulder abduction. She performed all of today's new exercises with no increase in symptoms prior to leaving the clinic. PT was able to palpate decreased firmness in her L breast compared to previous visits. She continues to respond to manual therapy techniques as she reports decreased pulling/discomfort with overhead movements. Will progress as tolerated.     Pt prognosis is Good. Pt will continue to benefit from skilled outpatient physical therapy to address the deficits listed in the problem list chart on initial evaluation, provide pt/family education and to maximize pt's level of independence in the home and community environment.     Anticipated barriers to physical therapy: none anticipated    Goals as follows:  Short Term goals: 4 weeks  1. Patient will demonstrate 100% understanding of lymphedema risk reduction practices to include self monitoring for lymphedema. (progressing, not met)  2. Patient will demonstrate independence with Home Exercise program established. (progressing, not met)  3. Pt will increase AROM/PROM in shoulder abduction ROM to 175 degrees on left to improve functional reach, carry, push, pull pain free. (progressing, not met)  4. Pt will increase AROM/PROM in shoulder flexion to 175 degrees on left to improve functional reach, carry, push, pull pain free.(progressing, not met)     Long Term Goals: 8 weeks   1.  Pt will increase strength to 5/5 in gross UE musculature to improve tolerance to all functional activities pain free. (progressing, not met)  2. Pt will demonstrate full/maximized tissue mobility to increase ROM and promote healthy tissue to be pain free at discharge. (progressing, not met)  3. Pt will increase AROM/PROM in shoulder abduction ROM to 180 degrees on left to improve functional reach, carry, push, pull pain free. (progressing, not met)     Plan     Outpatient physical  therapy 2x week for 8 weeks to include the following:   Manual Therapy, Neuromuscular Re-ed, Patient Education, Self Care, Therapeutic Activities, Therapeutic Exercise, and IASTM . Dry needling with manual therapy techniques to decrease pain, inflammation and swelling, increase circulation and promote healing process.    Therapist: Maddi Orta, PT  2/9/2024

## 2024-02-15 ENCOUNTER — PATIENT MESSAGE (OUTPATIENT)
Dept: OBSTETRICS AND GYNECOLOGY | Facility: CLINIC | Age: 44
End: 2024-02-15
Payer: COMMERCIAL

## 2024-02-19 DIAGNOSIS — R23.2 HOT FLASHES: ICD-10-CM

## 2024-02-19 DIAGNOSIS — F41.9 ANXIETY: ICD-10-CM

## 2024-02-19 RX ORDER — VENLAFAXINE HYDROCHLORIDE 75 MG/1
75 CAPSULE, EXTENDED RELEASE ORAL DAILY
Qty: 30 CAPSULE | Refills: 2 | Status: SHIPPED | OUTPATIENT
Start: 2024-02-19 | End: 2024-03-06

## 2024-02-21 ENCOUNTER — CLINICAL SUPPORT (OUTPATIENT)
Dept: HEMATOLOGY/ONCOLOGY | Facility: CLINIC | Age: 44
End: 2024-02-21
Payer: COMMERCIAL

## 2024-02-21 ENCOUNTER — PATIENT MESSAGE (OUTPATIENT)
Dept: HEMATOLOGY/ONCOLOGY | Facility: CLINIC | Age: 44
End: 2024-02-21
Payer: COMMERCIAL

## 2024-02-21 DIAGNOSIS — Z71.3 NUTRITIONAL COUNSELING: Primary | ICD-10-CM

## 2024-02-21 DIAGNOSIS — E78.5 HYPERLIPIDEMIA, UNSPECIFIED HYPERLIPIDEMIA TYPE: ICD-10-CM

## 2024-02-21 DIAGNOSIS — Z79.811 AROMATASE INHIBITOR USE: ICD-10-CM

## 2024-02-21 DIAGNOSIS — C77.9 SECONDARY ADENOCARCINOMA OF LYMPH NODE: ICD-10-CM

## 2024-02-21 DIAGNOSIS — C50.811 MALIGNANT NEOPLASM OF OVERLAPPING SITES OF RIGHT BREAST IN FEMALE, ESTROGEN RECEPTOR POSITIVE: ICD-10-CM

## 2024-02-21 DIAGNOSIS — Z17.0 MALIGNANT NEOPLASM OF OVERLAPPING SITES OF RIGHT BREAST IN FEMALE, ESTROGEN RECEPTOR POSITIVE: ICD-10-CM

## 2024-02-21 NOTE — PROGRESS NOTES
The patient location is: [place]  The chief complaint leading to consultation is: hyperlipidemia and Aromatase Inhibitor use.    Visit type: audiovisual    Oncology Nutrition Assessment Medical Nutrition Therapy Initial Visit    Dov Tejeda Mary  1980    Referring Provider: Lalitha Zamarripa NP   Reason for Referral: hyperlipidemia and Aromatase Inhibitor use.    Diagnosis: Malignant neoplasm of overlapping sites of right breast in female, ER+ NJ+ HER2-; Double mastectomy in May 2023. Pending breast reconstruction on Monday 26, 2024.  Treatment: Arimidex; Completed 4 cycles of DOCETAXEL CYCLOPHOSPHAMIDE (7/13/23 to 9/15/23)    PMHx:   Past Medical History:   Diagnosis Date    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    OCP & aziza from derm, Doxy caused yeast    Heartburn 12/17/2021    Strawberry wai 1981    back of neck    Strawberry wai 1981    under nose     Allergies: Latex, natural rubber    Nutrition Assessment    Patient is a 43 y.o.female with a history of Breast Cancer being seen for hyperlipidemia. Exercising for 20 minutes 2-3 times a week on amiandoke. Not currently on active treatment, aside from Arimidex. No nutrition impact symptoms of note.     Intake: Regular Diet. Consuming red meat once a week, primarily chicken, turkey, or fish - baked or grilled. Eating 2 meals daily. Eating steamed vegetables. Snacking on almonds or walnuts. Occasionally chips with sandwich.   Beverages: 120 oz water daily. Protein Shake in morning.    Anthropometrics:     Weight:   Wt Readings from Last 3 Encounters:   01/25/24 71.9 kg (158 lb 8.2 oz)   01/02/24 70.8 kg (156 lb 1.4 oz)   01/02/24 70.8 kg (156 lb)                                    Height: 61 inches  BMI: 30           Current Medications:    Current Outpatient Medications:     anastrozole (ARIMIDEX) 1 mg Tab, Take 1 tablet (1 mg total) by mouth once daily., Disp: 30 tablet, Rfl: 2    baclofen (LIORESAL) 10 MG tablet, Take 1 tablet (10 mg  total) by mouth 3 (three) times daily as needed (hiccups)., Disp: 30 tablet, Rfl: 1    cefadroxil (DURICEF) 500 MG Cap, , Disp: , Rfl:     ciprofloxacin-dexAMETHasone 0.3-0.1% (CIPRODEX) 0.3-0.1 % DrpS, Place into both ears., Disp: , Rfl:     clindamycin (CLEOCIN T) 1 % external solution, APPLY TO THE AFFECTED AREA(S) OF SCALP AFTER SHOWERING, Disp: , Rfl:     diazePAM (VALIUM) 5 MG tablet, , Disp: , Rfl:     doxycycline (VIBRAMYCIN) 100 MG Cap, Take 100 mg by mouth 2 (two) times daily., Disp: , Rfl:     fluconazole (DIFLUCAN) 150 MG Tab, TAKE 1 TABLET BY MOUTH TODAY THEN TAKE 1 TABLET IN ONE WEEK, Disp: , Rfl:     fluticasone propionate (FLONASE) 50 mcg/actuation nasal spray, by Each Nostril route., Disp: , Rfl:     furosemide (LASIX) 20 MG tablet, Take 1 tablet (20 mg total) by mouth once daily., Disp: 7 tablet, Rfl: 0    LIDOcaine-prilocaine (EMLA) cream, Apply topically as needed., Disp: 30 g, Rfl: 0    mupirocin (BACTROBAN) 2 % ointment, Apply topically 2 (two) times daily., Disp: , Rfl:     ondansetron (ZOFRAN-ODT) 8 MG TbDL, Take 1 tablet (8 mg total) by mouth every 6 (six) hours as needed., Disp: 30 tablet, Rfl: 2    oxyCODONE-acetaminophen (PERCOCET) 7.5-325 mg per tablet, , Disp: , Rfl:     prochlorperazine (COMPAZINE) 10 MG tablet, Take 1 tablet (10 mg total) by mouth every 6 (six) hours as needed., Disp: 30 tablet, Rfl: 3    sulfacetamide sodium-sulfur 8-4 % Susp, Apply topically., Disp: , Rfl:     venlafaxine (EFFEXOR XR) 75 MG 24 hr capsule, Take 1 capsule (75 mg total) by mouth once daily., Disp: 30 capsule, Rfl: 2    Labs: Reviewed 1/26/24: chol 273,      Nutrition Diagnosis    Nutrition Problem: Altered nutrition related laboratory values  Etiology (related to): lack of prior need for nutrition education   Signs/Symptoms (as evidenced by):  elevated chol and LDL    Nutrition Intervention    Nutrition Prescription  1633-2190 kcals/day 20-25 kcal/kg   72-86 g protein/day 1-1.2 gm/kg  3164-4959  mL fluid/day 1 ml/kcal    Recommendations:   Discussed sources of saturated and trans fat. Recommend limiting intake of both to promote improved lipid panel. Aim for red meat once every 2 weeks versus weekly.  Continue exercising for management of cholesterol and wellness. Discussed aiming for 30 minutes of exercise. Noted that patient will not be able to exercise for 2-3 weeks post upcoming reconstruction surgery planned for 2/26/24.   Recommend increasing fiber to cholesterol mgmt. Reviewed ideal fiber sources from foods eaten as well as option for Metamucil or Benefiber.   Encouraged lean protein for wound healing post-op.    Materials Provided/Reviewed: LDL Cholesterol Handout from [facility] sent via patient portal    Nutrition Monitoring and Evaluation    Monitor: labs lipid panel    Follow up PRN    Communication to referring provider/care team: Note available in chart.     Consultation Time: 30 Minutes    Augie COLON, , LDN  [facility] in Clinical Nutrition  Board Certified Specialist in Oncology Nutrition     [facility]  [phone]                                                          Face to Face time with patient: 25  40 minutes of total time spent on the encounter, which includes face to face time and non-face to face time preparing to see the patient (eg, review of tests), Obtaining and/or reviewing separately obtained history, Documenting clinical information in the electronic or other health record, Independently interpreting results (not separately reported) and communicating results to the patient/family/caregiver, or Care coordination (not separately reported).     Each patient to whom he or she provides medical services by telemedicine is:  (1) informed of the relationship between the physician and patient and the respective role of any other health care provider with respect to management of the patient; and (2) notified that he or she  may decline to receive medical services by telemedicine and may withdraw from such care at any time.

## 2024-03-04 ENCOUNTER — LAB VISIT (OUTPATIENT)
Dept: LAB | Facility: HOSPITAL | Age: 44
End: 2024-03-04
Attending: INTERNAL MEDICINE
Payer: COMMERCIAL

## 2024-03-04 DIAGNOSIS — C50.811 MALIGNANT NEOPLASM OF OVERLAPPING SITES OF RIGHT BREAST IN FEMALE, ESTROGEN RECEPTOR POSITIVE: ICD-10-CM

## 2024-03-04 DIAGNOSIS — Z17.0 MALIGNANT NEOPLASM OF OVERLAPPING SITES OF RIGHT BREAST IN FEMALE, ESTROGEN RECEPTOR POSITIVE: ICD-10-CM

## 2024-03-04 LAB
ALBUMIN SERPL BCP-MCNC: 3.9 G/DL (ref 3.5–5.2)
ALP SERPL-CCNC: 65 U/L (ref 55–135)
ALT SERPL W/O P-5'-P-CCNC: 21 U/L (ref 10–44)
ANION GAP SERPL CALC-SCNC: 10 MMOL/L (ref 8–16)
AST SERPL-CCNC: 20 U/L (ref 10–40)
BASOPHILS # BLD AUTO: 0.03 K/UL (ref 0–0.2)
BASOPHILS NFR BLD: 0.5 % (ref 0–1.9)
BILIRUB SERPL-MCNC: 0.5 MG/DL (ref 0.1–1)
BUN SERPL-MCNC: 11 MG/DL (ref 6–20)
CALCIUM SERPL-MCNC: 9.9 MG/DL (ref 8.7–10.5)
CHLORIDE SERPL-SCNC: 105 MMOL/L (ref 95–110)
CO2 SERPL-SCNC: 26 MMOL/L (ref 23–29)
CREAT SERPL-MCNC: 0.8 MG/DL (ref 0.5–1.4)
DIFFERENTIAL METHOD BLD: ABNORMAL
EOSINOPHIL # BLD AUTO: 0.1 K/UL (ref 0–0.5)
EOSINOPHIL NFR BLD: 1.6 % (ref 0–8)
ERYTHROCYTE [DISTWIDTH] IN BLOOD BY AUTOMATED COUNT: 12.7 % (ref 11.5–14.5)
EST. GFR  (NO RACE VARIABLE): >60 ML/MIN/1.73 M^2
GLUCOSE SERPL-MCNC: 94 MG/DL (ref 70–110)
HCT VFR BLD AUTO: 38.7 % (ref 37–48.5)
HGB BLD-MCNC: 13.3 G/DL (ref 12–16)
IMM GRANULOCYTES # BLD AUTO: 0.01 K/UL (ref 0–0.04)
IMM GRANULOCYTES NFR BLD AUTO: 0.2 % (ref 0–0.5)
LYMPHOCYTES # BLD AUTO: 1.4 K/UL (ref 1–4.8)
LYMPHOCYTES NFR BLD: 23.4 % (ref 18–48)
MCH RBC QN AUTO: 32 PG (ref 27–31)
MCHC RBC AUTO-ENTMCNC: 34.4 G/DL (ref 32–36)
MCV RBC AUTO: 93 FL (ref 82–98)
MONOCYTES # BLD AUTO: 0.6 K/UL (ref 0.3–1)
MONOCYTES NFR BLD: 9.9 % (ref 4–15)
NEUTROPHILS # BLD AUTO: 3.7 K/UL (ref 1.8–7.7)
NEUTROPHILS NFR BLD: 64.4 % (ref 38–73)
NRBC BLD-RTO: 0 /100 WBC
PLATELET # BLD AUTO: 247 K/UL (ref 150–450)
PMV BLD AUTO: 10.2 FL (ref 9.2–12.9)
POTASSIUM SERPL-SCNC: 4.2 MMOL/L (ref 3.5–5.1)
PROT SERPL-MCNC: 7.2 G/DL (ref 6–8.4)
RBC # BLD AUTO: 4.15 M/UL (ref 4–5.4)
SODIUM SERPL-SCNC: 141 MMOL/L (ref 136–145)
WBC # BLD AUTO: 5.77 K/UL (ref 3.9–12.7)

## 2024-03-04 PROCEDURE — 85025 COMPLETE CBC W/AUTO DIFF WBC: CPT | Performed by: INTERNAL MEDICINE

## 2024-03-04 PROCEDURE — 80053 COMPREHEN METABOLIC PANEL: CPT | Performed by: INTERNAL MEDICINE

## 2024-03-04 PROCEDURE — 36415 COLL VENOUS BLD VENIPUNCTURE: CPT | Mod: PO | Performed by: INTERNAL MEDICINE

## 2024-03-06 ENCOUNTER — INFUSION (OUTPATIENT)
Dept: INFUSION THERAPY | Facility: HOSPITAL | Age: 44
End: 2024-03-06
Payer: COMMERCIAL

## 2024-03-06 ENCOUNTER — OFFICE VISIT (OUTPATIENT)
Dept: HEMATOLOGY/ONCOLOGY | Facility: CLINIC | Age: 44
End: 2024-03-06
Payer: COMMERCIAL

## 2024-03-06 VITALS
TEMPERATURE: 98 F | SYSTOLIC BLOOD PRESSURE: 137 MMHG | RESPIRATION RATE: 14 BRPM | DIASTOLIC BLOOD PRESSURE: 86 MMHG | OXYGEN SATURATION: 99 % | BODY MASS INDEX: 29.49 KG/M2 | WEIGHT: 156.06 LBS | HEART RATE: 80 BPM

## 2024-03-06 DIAGNOSIS — E28.39 SUPPRESSION OF OVARIAN SECRETION: ICD-10-CM

## 2024-03-06 DIAGNOSIS — Z17.0 MALIGNANT NEOPLASM OF OVERLAPPING SITES OF RIGHT BREAST IN FEMALE, ESTROGEN RECEPTOR POSITIVE: Primary | ICD-10-CM

## 2024-03-06 DIAGNOSIS — C50.811 MALIGNANT NEOPLASM OF OVERLAPPING SITES OF RIGHT BREAST IN FEMALE, ESTROGEN RECEPTOR POSITIVE: Primary | ICD-10-CM

## 2024-03-06 DIAGNOSIS — C77.9 SECONDARY ADENOCARCINOMA OF LYMPH NODE: ICD-10-CM

## 2024-03-06 DIAGNOSIS — Z79.811 AROMATASE INHIBITOR USE: ICD-10-CM

## 2024-03-06 DIAGNOSIS — Z79.811 USE OF ANASTROZOLE: ICD-10-CM

## 2024-03-06 PROCEDURE — 3008F BODY MASS INDEX DOCD: CPT | Mod: CPTII,S$GLB,, | Performed by: INTERNAL MEDICINE

## 2024-03-06 PROCEDURE — 99999 PR PBB SHADOW E&M-EST. PATIENT-LVL IV: CPT | Mod: PBBFAC,,, | Performed by: INTERNAL MEDICINE

## 2024-03-06 PROCEDURE — 3075F SYST BP GE 130 - 139MM HG: CPT | Mod: CPTII,S$GLB,, | Performed by: INTERNAL MEDICINE

## 2024-03-06 PROCEDURE — 3079F DIAST BP 80-89 MM HG: CPT | Mod: CPTII,S$GLB,, | Performed by: INTERNAL MEDICINE

## 2024-03-06 PROCEDURE — 96402 CHEMO HORMON ANTINEOPL SQ/IM: CPT

## 2024-03-06 PROCEDURE — 3044F HG A1C LEVEL LT 7.0%: CPT | Mod: CPTII,S$GLB,, | Performed by: INTERNAL MEDICINE

## 2024-03-06 PROCEDURE — 99215 OFFICE O/P EST HI 40 MIN: CPT | Mod: S$GLB,,, | Performed by: INTERNAL MEDICINE

## 2024-03-06 PROCEDURE — 1159F MED LIST DOCD IN RCRD: CPT | Mod: CPTII,S$GLB,, | Performed by: INTERNAL MEDICINE

## 2024-03-06 PROCEDURE — 63600175 PHARM REV CODE 636 W HCPCS: Mod: JZ,JG | Performed by: INTERNAL MEDICINE

## 2024-03-06 RX ORDER — VENLAFAXINE HYDROCHLORIDE 75 MG/1
150 CAPSULE, EXTENDED RELEASE ORAL DAILY
Qty: 60 CAPSULE | Refills: 3 | Status: SHIPPED | OUTPATIENT
Start: 2024-03-06 | End: 2024-03-18

## 2024-03-06 RX ADMIN — GOSERELIN ACETATE 3.6 MG: 3.6 IMPLANT SUBCUTANEOUS at 09:03

## 2024-03-06 NOTE — PROGRESS NOTES
PROGRESS NOTE    Subjective:      Patient ID: Dov Hernandez is a 43 y.o. female.  MRN: [id number]  : 1980    History of Present Illness:   HPI   Dov Hernandez is a 43 y.o. female who is referred for right breast IDC.     She had a normal screening mammogram in . This year, she underwent a screening mammogram in March that showed right breast calcifications, measured to be 39 mm on diagnostic mammogram.   She underwent a right breast biopsy that showed IDC, 2 mm, ER 30%, FL 30%, Her 2 karrie negative, Grade 1, low Ki 67 at 5% with associated DCIS.      She has had a breast MRI that showed a 2 cm mass. We have also discussed her case at our multi d breast tumor board.   She underwent b/l mastectomy with ROSITA flap reconstruction and right SLNB. Tumor size was 2.2 cm, 1/1 SLN was positive for metastatic carcinoma.     Case discussed at tumor board. Pros and cons of further ALND vs XRT discussed. She has a close follow up with Dr. Pickard and has seen Dr. Ken, has declined ALND and will proceed with XRT.     Oncotype returned with RS 29; RR 23%. Adjuvant chemotherapy is recommended.      Baseline PET reviewed, no residual or metastatic disease noted.     S/p cycle 1 of TC on 23.   Had an infusion reaction immediately after cycle 1, face was flushed and she had back pain, infusion was stopped, treated with solucortef 100 mg, Benadryl 25 mg IVP BP= 141/79 HR=86 , then given Pepcid 20 mg and  restarted after 30 minutes without any further issues     Developed a rash on her chest and arms 3 days later and then spread to upper thighs and responded to benadryl and dexamethasone.     Interim history:  S/p cycle 4 on .  RT started on 10/16/23, completed 16 fractions.   Started Zoladex on 11/15/23, LMP was in 2023.  On anastrozole since , has mild hot flashes, otherwise tolerating well.   Worsening anxiety, on effexor 75 mg .     Had a second reconstruction surgery recently but had a hard  area of likely necrosis, was removed and further surgery is planned.     Planning to undergo oophorectomy to avoid monthly injections of zoladex. .     Had repair of the right ear drum. Still feels hearing is muffled on that side.         Oncology History:  Oncology History   Malignant neoplasm of overlapping sites of right breast in female, estrogen receptor positive   4/13/2023 Biopsy    Breast, right, biopsy:   Invasive ductal carcinoma   Tumor size: 2 mm in this material   Overall Grade: grade 1     4/13/2023 Breast Tumor Markers    Estrogen: Positive (weak intensity 30%)  Progesterone: Positive (weak intensity 30%)  HER2: Negative   Ki67: 5%     4/25/2023 Genetic Testing    Negative, VUS x 1(SDHA)     4/25/2023 Imaging Significant Findings    MRI revealed 2 cm mass correlating with biopsy proven malignancy      4/26/2023 Initial Diagnosis    Malignant neoplasm of overlapping sites of right breast in female, estrogen receptor positive     4/26/2023 Cancer Staged    Staging form: Breast, AJCC 8th Edition  - Clinical stage from 4/26/2023: Stage IB (cT2, cN0, cM0, G1, ER+, ND+, HER2: Equivocal)     5/2/2023 Tumor Conference       The team agreed to proceed with upfront surgery for further sampling of the tumor     5/24/2023 Breast Surgery    Bilateral mastectomy with ROSITA flap reconstruction and right SLNB     6/6/2023 Tumor Conference     There team discussed further axillary dissection versus proceeding with XRT. If ALND and no further positive nodes then no XRT would be recommended. If there are additional nodes, then she would be recommended for XRT.   Chemotherapy would be most likely recommended as well as adjuvant endocrine therapy   Shameka trial?        6/14/2023 Cancer Staged    Staging form: Breast, AJCC 8th Edition  - Pathologic stage from 6/14/2023: Stage IB (pT2, pN1(sn), cM0, G2, ER+, ND+, HER2-)     7/13/2023 -  Chemotherapy    Treatment Summary   Plan Name: OP BREAST TC - DOCETAXEL CYCLOPHOSPHAMIDE  Q3W  Treatment Goal: Curative  Status: Active  Start Date: 7/13/2023  End Date: 9/15/2023  Provider: Jessica Griffith MD  Chemotherapy: cycloPHOSphamide 1,000 mg in sodium chloride 0.9% 290 mL chemo infusion, 1,020 mg, Intravenous, Clinic/HOD 1 time, 4 of 4 cycles  Administration: 1,000 mg (7/13/2023), 1,000 mg (8/3/2023), 1,000 mg (8/24/2023), 1,000 mg (9/14/2023)  DOCEtaxel (TAXOTERE) 100 mg in sodium chloride 0.9% 295 mL chemo infusion, 102 mg (100 % of original dose 60 mg/m2), Intravenous, Clinic/HOD 1 time, 4 of 4 cycles  Dose modification: 60 mg/m2 (original dose 60 mg/m2, Cycle 1, Reason: Treatment parameters not met)  Administration: 100 mg (7/13/2023), 130 mg (8/3/2023), 130 mg (8/24/2023), 130 mg (9/14/2023)     10/17/2023 - 11/7/2023 Radiation Therapy    Treating physician: Fran Ken  Total Dose: 42.4 Gy to Rt. breast and axilla   Fractions: 16 fractions at 2.65 Gy/fraction        Past medical, surgical, family, and social history were reviewed today and there are no changes of note unless mentioned in HPI.   MEDS and ALLERGIES were reviewed with patient and meds reconciled.     History:  Past Medical History:   Diagnosis Date    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    OCP & aziza from derm, Doxy caused yeast    Heartburn 12/17/2021    Strawberry wai 1981    back of neck    Strawberry wai 1981    under nose      Past Surgical History:   Procedure Laterality Date    BILATERAL MASTECTOMY Bilateral 5/24/2023    Procedure: MASTECTOMY, BILATERAL;  Surgeon: Jenna Pickard MD;  Location: [facility];  Service: General;  Laterality: Bilateral;  2.5 HOURS / EMAIL SENT 5-9 @ 11:39 LK    COLPOSCOPY      INJECTION FOR SENTINEL NODE IDENTIFICATION Right 5/24/2023    Procedure: INJECTION, FOR SENTINEL NODE IDENTIFICATION;  Surgeon: Jenna Pickard MD;  Location: [facility];  Service: General;  Laterality: Right;    RECONSTRUCTION OF BREAST WITH DEEP INFERIOR EPIGASTRIC ARTERY  (ROSITA) FREE FLAP Bilateral  "2023    Procedure: RECONSTRUCTION, BREAST, USING ROSITA FREE FLAP / BILATERAL DEEP INFERIOR EPIGASTRIC PERFORATORS FLAPS;  Surgeon: Mikey Roche MD;  Location: [facility];  Service: Plastics;  Laterality: Bilateral;    SENTINEL LYMPH NODE BIOPSY Right 2023    Procedure: BIOPSY, LYMPH NODE, SENTINEL;  Surgeon: Jenna Pickard MD;  Location: [facility];  Service: General;  Laterality: Right;    WISDOM TOOTH EXTRACTION       Family History   Problem Relation Age of Onset    Hyperlipidemia Mother     Kidney cancer Father 58    Cancer Brother 22        Parotid Gland cancer    Birth marks Child 0        strawberry wai(s)    Birth marks Child 0        strawberry wai(s)    Café-au-lait spots Maternal Uncle         "a small patch on his neck"    Café-au-lait spots Other         "a spot on his arm and partial torso"    Other Other 2        tested negative for macrocephaly    Cervical cancer Other     Throat cancer Other     Pancreatic cancer Other         1 second cousin ()    Other Other         brain tumors (several 2nd cousins)    Breast cancer Neg Hx     Colon cancer Neg Hx     Ovarian cancer Neg Hx     Melanoma Neg Hx       Social History     Tobacco Use    Smoking status: Never    Smokeless tobacco: Never   Substance and Sexual Activity    Alcohol use: Yes     Comment: social    Drug use: No    Sexual activity: Yes     Partners: Male     Birth control/protection: None        ROS:  Review of Systems   Constitutional:  Negative for fever, malaise/fatigue and weight loss.   HENT:  Negative for congestion, hearing loss, nosebleeds and sore throat.    Eyes:  Negative for double vision and photophobia.   Respiratory:  Negative for cough, hemoptysis, sputum production, shortness of breath and wheezing.    Cardiovascular:  Negative for chest pain, palpitations, orthopnea and leg swelling.   Gastrointestinal:  Negative for abdominal pain, blood in stool, constipation, diarrhea, heartburn, nausea and vomiting. "   Genitourinary:  Negative for dysuria, hematuria and urgency.   Musculoskeletal:  Negative for back pain, joint pain and myalgias.   Skin:  Negative for itching and rash.   Neurological:  Negative for dizziness, tingling, seizures, weakness and headaches.   Endo/Heme/Allergies:  Negative for polydipsia. Does not bruise/bleed easily.        Hot flashes   Psychiatric/Behavioral:  Negative for depression and memory loss. The patient is nervous/anxious. The patient does not have insomnia.        Objective:     Vitals:    03/06/24 0842   BP: 137/86   Pulse: 80   Resp: 14   Temp: 98 °F (36.7 °C)   TempSrc: Oral   SpO2: 99%   Weight: 70.8 kg (156 lb 1.4 oz)   PainSc: 0-No pain       Wt Readings from Last 10 Encounters:   03/06/24 70.8 kg (156 lb 1.4 oz)   01/25/24 71.9 kg (158 lb 8.2 oz)   01/02/24 70.8 kg (156 lb 1.4 oz)   01/02/24 70.8 kg (156 lb)   12/13/23 70.8 kg (156 lb 1.4 oz)   10/11/23 73 kg (160 lb 15 oz)   09/20/23 73.5 kg (162 lb 0.6 oz)   09/13/23 73 kg (160 lb 15 oz)   08/24/23 71.2 kg (156 lb 15.5 oz)   08/22/23 71.2 kg (156 lb 15.5 oz)       Physical Exam  Vitals and nursing note reviewed.   Constitutional:       General: She is not in acute distress.     Appearance: She is not diaphoretic.   HENT:      Head: Normocephalic.   Eyes:      General: No scleral icterus.     Conjunctiva/sclera: Conjunctivae normal.   Neck:      Thyroid: No thyromegaly.   Cardiovascular:      Rate and Rhythm: Normal rate.   Pulmonary:      Effort: Pulmonary effort is normal.   Chest:      Comments: Breast exam deferred today  Musculoskeletal:         General: Normal range of motion.      Cervical back: Neck supple.   Skin:     General: Skin is warm and dry.      Findings: No erythema or rash.   Neurological:      Mental Status: She is alert and oriented to person, place, and time.      Gait: Gait is intact.   Psychiatric:         Mood and Affect: Affect normal.         Cognition and Memory: Memory normal.         Judgment:  Judgment normal.          Diagnostic Tests:  Significant Imaging: I have reviewed and interpreted all pertinent imaging results/findings.    Laboratory Data:  All pertinent labs have been reviewed.    Labs:   Lab Results   Component Value Date    WBC 5.77 03/04/2024    RBC 4.15 03/04/2024    HGB 13.3 03/04/2024    HCT 38.7 03/04/2024    MCV 93 03/04/2024     03/04/2024    GLU 94 03/04/2024     03/04/2024    K 4.2 03/04/2024    BUN 11 03/04/2024    CREATININE 0.8 03/04/2024    AST 20 03/04/2024    ALT 21 03/04/2024    BILITOT 0.5 03/04/2024     Assessment/Plan:   Malignant neoplasm of overlapping sites of right breast in female, estrogen receptor positive  cT2cN0, G1, ER 30%, [place] + 30%, Her 2 karrie negative Ki 67 5%   pT2 pN1a     Genetics VUS Oncotype 29  Pre menopausal     We discussed that she has node positive disease on path.She is agreeable to completed XRT in the adjuvant setting.     As for adjuvant therapy, given she is premenopausal and node +, has High risk Oncotype, adjuvant chemotherapy is appropriate. See prior notes for discussion. Baseline PET scan done based on her preference was reassuring. Sub cm pulmonary nodules noted, repeat scan as clinically indicated.     I  recommended adjuvant chemotherapy with Docetaxel and cytoxan q 3 weeks x 4, via PIV.   She is s/p XRT.   We discussed OFS and AI, already OFS.   Continue anastrozole. Will discuss b/l oophorectomy with her gynecologist and if she chooses to undergo the procedure, will stop Zoladex after her surgery.     Continue anastrozole , likely extended therapy.     Surveillance scans discussed. She would like to continue yearly surveillance scans. We discussed that PET is not recommended and scans can be done as clinically indicated. We discussed that there is no data to support survival benefit from routine scans. She wants to at least have a yearly CT scan for her peace of mind and she will discuss further with Dr. Piedra, whom she has  requested to see after my departure.     Anxiety  Continue Effexor. Increase  to 150 mg given anxiety and hot flashes. .    Has some ativan at home, ok to take as needed        ECOG SCORE    0 - Fully active-able to carry on all pre-disease performance without restriction       Discussion:   No follow-ups on file.    Plan was discussed with the patient at length, and she verbalized understanding. Dov was given an opportunity to ask questions that were answered to her satisfaction, and she was advised to call in the interval if any problems or questions arise.    Electronically signed by Jessica Griffith MD     Route Chart for Scheduling    Med Onc Chart Routing      Follow up with physician 3 months. Dr. Piedra   Follow up with I[facility]    Infusion scheduling note   5/1,5/29 injections   Injection scheduling note    Labs    Imaging    Pharmacy appointment    Other referrals                Treatment Plan Information   OP BREAST TC - DOCETAXEL CYCLOPHOSPHAMIDE Q3W   Jessica Griffith MD   Upcoming Treatment Dates - OP BREAST TC - DOCETAXEL CYCLOPHOSPHAMIDE Q3W    No upcoming days in selected categories.    Supportive Plan Information  OP BREAST GOSERELIN Q4W   Jessica Griffith MD   Upcoming Treatment Dates - OP BREAST GOSERELIN Q4W    2/8/2024       Chemotherapy       goserelin (ZOLADEX) injection 3.6 mg  3/7/2024       Chemotherapy       goserelin (ZOLADEX) injection 3.6 mg  4/4/2024       Chemotherapy       goserelin (ZOLADEX) injection 3.6 mg  5/2/2024       Chemotherapy       goserelin (ZOLADEX) injection 3.6 mg    MDM includes  :    - Acute or chronic illness or injury that poses a threat to life or bodily function  - Independent review and explanation of 3+ results from unique tests  - Discussion of management and ordering 3+ unique tests  - Extensive discussion of treatment and management  - Prescription drug management  - Drug therapy requiring intensive monitoring for toxicity

## 2024-03-18 ENCOUNTER — OFFICE VISIT (OUTPATIENT)
Dept: INTERNAL MEDICINE | Facility: CLINIC | Age: 44
End: 2024-03-18
Payer: COMMERCIAL

## 2024-03-18 VITALS
WEIGHT: 157.19 LBS | HEART RATE: 103 BPM | SYSTOLIC BLOOD PRESSURE: 124 MMHG | HEIGHT: 61 IN | BODY MASS INDEX: 29.68 KG/M2 | DIASTOLIC BLOOD PRESSURE: 82 MMHG

## 2024-03-18 DIAGNOSIS — E78.2 MIXED HYPERLIPIDEMIA: ICD-10-CM

## 2024-03-18 DIAGNOSIS — Z00.00 ENCOUNTER FOR ANNUAL HEALTH EXAMINATION: Primary | ICD-10-CM

## 2024-03-18 DIAGNOSIS — F41.9 ANXIETY: ICD-10-CM

## 2024-03-18 PROCEDURE — 99396 PREV VISIT EST AGE 40-64: CPT | Mod: S$GLB,,, | Performed by: INTERNAL MEDICINE

## 2024-03-18 PROCEDURE — 3079F DIAST BP 80-89 MM HG: CPT | Mod: CPTII,S$GLB,, | Performed by: INTERNAL MEDICINE

## 2024-03-18 PROCEDURE — 99999 PR PBB SHADOW E&M-EST. PATIENT-LVL III: CPT | Mod: PBBFAC,,, | Performed by: INTERNAL MEDICINE

## 2024-03-18 PROCEDURE — 3074F SYST BP LT 130 MM HG: CPT | Mod: CPTII,S$GLB,, | Performed by: INTERNAL MEDICINE

## 2024-03-18 PROCEDURE — 3008F BODY MASS INDEX DOCD: CPT | Mod: CPTII,S$GLB,, | Performed by: INTERNAL MEDICINE

## 2024-03-18 PROCEDURE — 1160F RVW MEDS BY RX/DR IN RCRD: CPT | Mod: CPTII,S$GLB,, | Performed by: INTERNAL MEDICINE

## 2024-03-18 PROCEDURE — 3044F HG A1C LEVEL LT 7.0%: CPT | Mod: CPTII,S$GLB,, | Performed by: INTERNAL MEDICINE

## 2024-03-18 PROCEDURE — 1159F MED LIST DOCD IN RCRD: CPT | Mod: CPTII,S$GLB,, | Performed by: INTERNAL MEDICINE

## 2024-03-18 RX ORDER — VENLAFAXINE HYDROCHLORIDE 150 MG/1
150 CAPSULE, EXTENDED RELEASE ORAL DAILY
Qty: 90 CAPSULE | Refills: 3 | Status: SHIPPED | OUTPATIENT
Start: 2024-03-18 | End: 2025-03-18

## 2024-03-18 NOTE — PROGRESS NOTES
Subjective:       Patient ID: Dov Hernandez is a 43 y.o. female.    Chief Complaint: Establish Care    HPI    Presents to establish care.     Feeling well today no new complaints.     Has hx of breast cancer.   She had abnormal mammogram in March 2023.  She underwent a right breast biopsy that showed IDC, 2 mm, ER 30%, [place] 30%, Her 2 karrie negative, Grade 1, low Ki 67 at 5% with associated DCIS.    She underwent b/l mastectomy in May 2023 with ROSITA flap reconstruction and right SLNB. Tumor size was 2.2 cm, 1/1 SLN was positive for metastatic carcinoma.    She then had chemo from 7/2023 to 9/2023 and XRT which she completed in 11.2023.   Now On arimidex.   Also on anastrozole and she is considering bilateral oophorectomy with her gynecologist and if she chooses to undergo the procedure, will stop Zoladex after her surgery.   Had reconstructive surgery in Feb 2024 with plastic surgeon outside of [facility]. Is due for next surgery in May 2024.   Plan is surveillance scan with yearly CT. She will also discuss with Dr. Piedra who will be her new oncologist.         Generalized Anxiety/depression: started after diagnosis. Was started on Effexor by oncologist and is doing well on this medicaiton.     HLD: last lipid panel showed LDL on 187 which was a big jump from previous year of 105. Possibly due to treatment?       Health Maintenance:  Colon Cancer Screening: start at age 45   Mammogram: breast cancer hx as above.   HIV: negative 2013   Hep C: negative 2020   Lipids: as above.   Pap Smear: normal PAP and HPV co test in 2024./  Vaccines:  up to date.       Review of Systems   Constitutional:  Negative for activity change, appetite change and chills.   HENT:  Negative for ear pain, sinus pressure/congestion and sneezing.    Respiratory:  Negative for cough and shortness of breath.    Cardiovascular:  Negative for chest pain, palpitations and leg swelling.   Gastrointestinal:  Negative for abdominal distention, abdominal  pain, constipation, diarrhea, nausea and vomiting.   Genitourinary:  Negative for dysuria and hematuria.   Musculoskeletal:  Negative for arthralgias, back pain and myalgias.   Neurological:  Negative for dizziness and headaches.   Psychiatric/Behavioral:  Negative for agitation. The patient is not nervous/anxious.            Past Medical History:   Diagnosis Date    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    OCP & aziza from derm, Doxy caused yeast    Heartburn 12/17/2021    Strawberry wai 1981    back of neck    Strawberry wai 1981    under nose     Past Surgical History:   Procedure Laterality Date    BILATERAL MASTECTOMY Bilateral 5/24/2023    Procedure: MASTECTOMY, BILATERAL;  Surgeon: Jenna Pickard MD;  Location: [facility];  Service: General;  Laterality: Bilateral;  2.5 HOURS / EMAIL SENT 5-9 @ 11:39 LK    COLPOSCOPY      INJECTION FOR SENTINEL NODE IDENTIFICATION Right 5/24/2023    Procedure: INJECTION, FOR SENTINEL NODE IDENTIFICATION;  Surgeon: Jenna Pickard MD;  Location: [facility];  Service: General;  Laterality: Right;    RECONSTRUCTION OF BREAST WITH DEEP INFERIOR EPIGASTRIC ARTERY  (ROSITA) FREE FLAP Bilateral 5/24/2023    Procedure: RECONSTRUCTION, BREAST, USING ROSITA FREE FLAP / BILATERAL DEEP INFERIOR EPIGASTRIC PERFORATORS FLAPS;  Surgeon: Mikey Roche MD;  Location: [facility];  Service: Plastics;  Laterality: Bilateral;    SENTINEL LYMPH NODE BIOPSY Right 5/24/2023    Procedure: BIOPSY, LYMPH NODE, SENTINEL;  Surgeon: Jenna Pickard MD;  Location: [facility];  Service: General;  Laterality: Right;    WISDOM TOOTH EXTRACTION        Patient Active Problem List   Diagnosis    Pain in joint, pelvic region and thigh    Acne varioliformis    Heartburn    Malignant neoplasm of overlapping sites of right breast in female, estrogen receptor positive    Secondary adenocarcinoma of lymph node    Anxiety    Hot flashes    Muscle tightness    At risk for lymphedema    Weakness of both upper extremities         Objective:      Physical Exam  Constitutional:       Appearance: Normal appearance.   HENT:      Head: Normocephalic.   Cardiovascular:      Rate and Rhythm: Normal rate and regular rhythm.      Pulses: Normal pulses.      Heart sounds: Normal heart sounds.   Pulmonary:      Effort: Pulmonary effort is normal.      Breath sounds: Normal breath sounds.   Abdominal:      General: Abdomen is flat. Bowel sounds are normal.      Palpations: Abdomen is soft.   Musculoskeletal:         General: Normal range of motion.      Cervical back: Normal range of motion and neck supple.   Skin:     General: Skin is warm and dry.   Neurological:      General: No focal deficit present.      Mental Status: She is alert and oriented to person, place, and time.   Psychiatric:         Mood and Affect: Mood normal.         Assessment:       Problem List Items Addressed This Visit          Psychiatric    Anxiety     Other Visit Diagnoses       Encounter for annual health examination    -  Primary    Mixed hyperlipidemia        Relevant Orders    LIPID PANEL    COMPREHENSIVE METABOLIC PANEL            Plan:         Dov was seen today for establish care.    Diagnoses and all orders for this visit:    Encounter for annual health examination  Colon Cancer Screening: start at age 45   Mammogram: breast cancer hx as above.   HIV: negative 2013   Hep C: negative 2020   Lipids: as above.   Pap Smear: normal PAP and HPV co test in 2024./  Vaccines:  up to date.   Annual wellness exam completed.     All medications, histories, and concerns reviewed, reconciled, and addressed.     Appropriate Screenings per pt's sex and age have been reviewed and discussed with pt.       Mixed hyperlipidemia  last lipid panel showed LDL on 187 which was a big jump from previous year of 105. Possibly due to treatment?   Repeat lipids in 4 months and if remain elevated consider starting statin.     Anxiety  -     venlafaxine (EFFEXOR-XR) 150 MG Cp24; Take 1  capsule (150 mg total) by mouth once daily.                 Letitia Sherman MD   Internal Medicine   Primary Care

## 2024-03-25 ENCOUNTER — TELEPHONE (OUTPATIENT)
Dept: HEMATOLOGY/ONCOLOGY | Facility: CLINIC | Age: 44
End: 2024-03-25
Payer: COMMERCIAL

## 2024-03-29 ENCOUNTER — PATIENT MESSAGE (OUTPATIENT)
Dept: HEMATOLOGY/ONCOLOGY | Facility: CLINIC | Age: 44
End: 2024-03-29
Payer: COMMERCIAL

## 2024-04-01 DIAGNOSIS — C50.811 MALIGNANT NEOPLASM OF OVERLAPPING SITES OF RIGHT BREAST IN FEMALE, ESTROGEN RECEPTOR POSITIVE: ICD-10-CM

## 2024-04-01 DIAGNOSIS — Z17.0 MALIGNANT NEOPLASM OF OVERLAPPING SITES OF RIGHT BREAST IN FEMALE, ESTROGEN RECEPTOR POSITIVE: ICD-10-CM

## 2024-04-01 RX ORDER — ANASTROZOLE 1 MG/1
1 TABLET ORAL DAILY
Qty: 30 TABLET | Refills: 2 | Status: SHIPPED | OUTPATIENT
Start: 2024-04-01 | End: 2025-04-01

## 2024-04-04 ENCOUNTER — INFUSION (OUTPATIENT)
Dept: INFUSION THERAPY | Facility: HOSPITAL | Age: 44
End: 2024-04-04
Payer: COMMERCIAL

## 2024-04-04 DIAGNOSIS — Z17.0 MALIGNANT NEOPLASM OF OVERLAPPING SITES OF RIGHT BREAST IN FEMALE, ESTROGEN RECEPTOR POSITIVE: Primary | ICD-10-CM

## 2024-04-04 DIAGNOSIS — C50.811 MALIGNANT NEOPLASM OF OVERLAPPING SITES OF RIGHT BREAST IN FEMALE, ESTROGEN RECEPTOR POSITIVE: Primary | ICD-10-CM

## 2024-04-04 PROCEDURE — 63600175 PHARM REV CODE 636 W HCPCS: Mod: JZ,JG | Performed by: INTERNAL MEDICINE

## 2024-04-04 PROCEDURE — 11980 IMPLANT HORMONE PELLET(S): CPT

## 2024-04-04 PROCEDURE — 96402 CHEMO HORMON ANTINEOPL SQ/IM: CPT

## 2024-04-04 RX ADMIN — GOSERELIN ACETATE 3.6 MG: 3.6 IMPLANT SUBCUTANEOUS at 11:04

## 2024-04-06 ENCOUNTER — PATIENT MESSAGE (OUTPATIENT)
Dept: INTERNAL MEDICINE | Facility: CLINIC | Age: 44
End: 2024-04-06
Payer: COMMERCIAL

## 2024-05-01 ENCOUNTER — INFUSION (OUTPATIENT)
Dept: INFUSION THERAPY | Facility: HOSPITAL | Age: 44
End: 2024-05-01
Payer: COMMERCIAL

## 2024-05-01 DIAGNOSIS — Z17.0 MALIGNANT NEOPLASM OF OVERLAPPING SITES OF RIGHT BREAST IN FEMALE, ESTROGEN RECEPTOR POSITIVE: Primary | ICD-10-CM

## 2024-05-01 DIAGNOSIS — C50.811 MALIGNANT NEOPLASM OF OVERLAPPING SITES OF RIGHT BREAST IN FEMALE, ESTROGEN RECEPTOR POSITIVE: Primary | ICD-10-CM

## 2024-05-01 PROCEDURE — 96402 CHEMO HORMON ANTINEOPL SQ/IM: CPT

## 2024-05-01 PROCEDURE — 63600175 PHARM REV CODE 636 W HCPCS: Mod: JZ,JG | Performed by: NURSE PRACTITIONER

## 2024-05-01 RX ADMIN — GOSERELIN ACETATE 3.6 MG: 3.6 IMPLANT SUBCUTANEOUS at 09:05

## 2024-05-10 ENCOUNTER — PATIENT MESSAGE (OUTPATIENT)
Dept: INTERNAL MEDICINE | Facility: CLINIC | Age: 44
End: 2024-05-10
Payer: COMMERCIAL

## 2024-05-10 DIAGNOSIS — G56.03 BILATERAL CARPAL TUNNEL SYNDROME: Primary | ICD-10-CM

## 2024-05-13 ENCOUNTER — OFFICE VISIT (OUTPATIENT)
Dept: OBSTETRICS AND GYNECOLOGY | Facility: CLINIC | Age: 44
End: 2024-05-13
Attending: OBSTETRICS & GYNECOLOGY
Payer: COMMERCIAL

## 2024-05-13 VITALS
DIASTOLIC BLOOD PRESSURE: 84 MMHG | SYSTOLIC BLOOD PRESSURE: 132 MMHG | HEIGHT: 61 IN | BODY MASS INDEX: 29.15 KG/M2 | WEIGHT: 154.38 LBS

## 2024-05-13 DIAGNOSIS — C50.811 MALIGNANT NEOPLASM OF OVERLAPPING SITES OF RIGHT BREAST IN FEMALE, ESTROGEN RECEPTOR POSITIVE: ICD-10-CM

## 2024-05-13 DIAGNOSIS — Z17.0 MALIGNANT NEOPLASM OF OVERLAPPING SITES OF RIGHT BREAST IN FEMALE, ESTROGEN RECEPTOR POSITIVE: ICD-10-CM

## 2024-05-13 DIAGNOSIS — Z01.419 WELL WOMAN EXAM WITH ROUTINE GYNECOLOGICAL EXAM: Primary | ICD-10-CM

## 2024-05-13 PROCEDURE — 88175 CYTOPATH C/V AUTO FLUID REDO: CPT | Performed by: OBSTETRICS & GYNECOLOGY

## 2024-05-13 PROCEDURE — 87624 HPV HI-RISK TYP POOLED RSLT: CPT | Performed by: OBSTETRICS & GYNECOLOGY

## 2024-05-13 NOTE — PROGRESS NOTES
CC: Well woman exam    Dov Hernandez is a 43 y.o. female  presents for well woman exam.  LMP: No LMP recorded. (Menstrual status: Other)..  S/p bilateral mastectomy, chemo/ radiation, having revision surgery of left breast. On anastrozole/arimidex, interested in risk reducing hyst/BSO.       Past Medical History:   Diagnosis Date    Acne varioliformis 2017    OCP & aziza from derm, Doxy caused yeast    Heartburn 2021    Strawberry wai 1981    back of neck    Strawberry wai 1981    under nose     Past Surgical History:   Procedure Laterality Date    BILATERAL MASTECTOMY Bilateral 2023    Procedure: MASTECTOMY, BILATERAL;  Surgeon: Jenna Pickard MD;  Location: [facility];  Service: General;  Laterality: Bilateral;  2.5 HOURS / EMAIL SENT  @ 11:39 LK    COLPOSCOPY      INJECTION FOR SENTINEL NODE IDENTIFICATION Right 2023    Procedure: INJECTION, FOR SENTINEL NODE IDENTIFICATION;  Surgeon: Jenna Pickard MD;  Location: [facility];  Service: General;  Laterality: Right;    RECONSTRUCTION OF BREAST WITH DEEP INFERIOR EPIGASTRIC ARTERY  (ROSITA) FREE FLAP Bilateral 2023    Procedure: RECONSTRUCTION, BREAST, USING ROSITA FREE FLAP / BILATERAL DEEP INFERIOR EPIGASTRIC PERFORATORS FLAPS;  Surgeon: Mikey Roche MD;  Location: [facility];  Service: Plastics;  Laterality: Bilateral;    SENTINEL LYMPH NODE BIOPSY Right 2023    Procedure: BIOPSY, LYMPH NODE, SENTINEL;  Surgeon: Jenna Pickard MD;  Location: [facility];  Service: General;  Laterality: Right;    WISDOM TOOTH EXTRACTION       Social History     Socioeconomic History    Marital status:     Number of children: 2   Occupational History    Occupation:      Employer: [employer]   Tobacco Use    Smoking status: Never    Smokeless tobacco: Never   Substance and Sexual Activity    Alcohol use: Yes     Comment: social    Drug use: No    Sexual activity: Yes     Partners: Male      "Birth control/protection: None   Social History Narrative    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    , 2 children (10 yo son, 8 yo daughter [employer])    Mom Renee Tejeda    nonsmoker      GYN Yolanda     Social Determinants of Health     Financial Resource Strain: Low Risk  (2024)    Overall Financial Resource Strain (CARDIA)     Difficulty of Paying Living Expenses: Not hard at all   Food Insecurity: No Food Insecurity (2024)    Hunger Vital Sign     Worried About Running Out of Food in the Last Year: Never true     Ran Out of Food in the Last Year: Never true   Transportation Needs: No Transportation Needs (2024)    PRAPARE - Transportation     Lack of Transportation (Medical): No     Lack of Transportation (Non-Medical): No   Physical Activity: Insufficiently Active (2024)    Exercise Vital Sign     Days of Exercise per Week: 3 days     Minutes of Exercise per Session: 30 min   Stress: No Stress Concern Present (2024)    [employer] [place] [employer] - Occupational Stress Questionnaire     Feeling of Stress : Only a little   Housing Stability: Low Risk  (2024)    Housing Stability Vital Sign     Unable to Pay for Housing in the Last Year: No     Number of Places Lived in the Last Year: 1     Unstable Housing in the Last Year: No     Family History   Problem Relation Name Age of Onset    Hyperlipidemia Mother      Kidney cancer Father  58    Cancer Brother  22        Parotid Gland cancer    Birth marks Child  0        strawberry wai(s)    Birth marks Child  0        strawberry wai(s)    Café-au-lait spots Maternal Uncle          "a small patch on his neck"    Café-au-lait spots Other nephew         "a spot on his arm and partial torso"    Other Other nephew 2        tested negative for macrocephaly    Cervical cancer Other      Throat cancer Other      Pancreatic cancer Other          1 second cousin ()    Other Other          brain tumors (several 2nd " "cousins)    Breast cancer Neg Hx      Colon cancer Neg Hx      Ovarian cancer Neg Hx      Melanoma Neg Hx       OB History          2    Para   2    Term   2            AB        Living   2         SAB        IAB        Ectopic        Multiple        Live Births   2                 /84   Ht 5' 1" (1.549 m)   Wt 70 kg (154 lb 6.4 oz)   BMI 29.17 kg/m²       ROS:  GENERAL: Denies weight gain or weight loss. Feeling well overall.   SKIN: Denies rash or lesions.   HEAD: Denies head injury or headache.   NODES: Denies enlarged lymph nodes.   CHEST: Denies chest pain or shortness of breath.   CARDIOVASCULAR: Denies palpitations or left sided chest pain.   ABDOMEN: No abdominal pain, constipation, diarrhea, nausea, vomiting or rectal bleeding.   URINARY: No frequency, dysuria, hematuria, or burning on urination.  REPRODUCTIVE: See HPI.   BREASTS: The patient performs breast self-examination and denies pain, lumps, or nipple discharge.   HEMATOLOGIC: No easy bruisability or excessive bleeding.   MUSCULOSKELETAL: Denies joint pain or swelling.   NEUROLOGIC: Denies syncope or weakness.   PSYCHIATRIC: Denies depression, anxiety or mood swings.    PHYSICAL EXAM:  APPEARANCE: Well nourished, well developed, in no acute distress.  AFFECT: WNL, alert and oriented x 3  SKIN: No acne or hirsutism  NECK: Neck symmetric without masses or thyromegaly  NODES: No inguinal, cervical, axillary, or femoral lymph node enlargement  CHEST: Good respiratory effect  ABDOMEN: Soft.  No tenderness or masses.  No hepatosplenomegaly.  No hernias.  BREASTS: Symmetrical, no skin changes or visible lesions.  No palpable masses, nipple discharge bilaterally.  PELVIC: Normal external genitalia without lesions.  Normal hair distribution.  Adequate perineal body, normal urethral meatus.  Vagina moist and well rugated without lesions or discharge.  Cervix pink, without lesions, discharge or tenderness.  No significant cystocele or " rectocele.  Bimanual exam shows uterus to be normal size, regular, mobile and nontender.  Adnexa without masses or tenderness.    EXTREMITIES: No edema.    Well woman exam with routine gynecological exam  -     Liquid-Based Pap Smear, Screening  -     HPV High Risk Genotypes, PCR    Malignant neoplasm of overlapping sites of right breast in female, estrogen receptor positive  -     Ambulatory referral/consult to Gynecologic Oncology; Future; Expected date: 05/20/2024            Patient was counseled today on A.C.S. Pap guidelines and recommendations for yearly pelvic exams, mammograms and monthly self breast exams; to see her PCP for other health maintenance.     No follow-ups on file.

## 2024-05-14 ENCOUNTER — TELEPHONE (OUTPATIENT)
Dept: GYNECOLOGIC ONCOLOGY | Facility: CLINIC | Age: 44
End: 2024-05-14
Payer: COMMERCIAL

## 2024-05-16 LAB
HPV HR 12 DNA SPEC QL NAA+PROBE: NEGATIVE
HPV16 AG SPEC QL: NEGATIVE
HPV18 DNA SPEC QL NAA+PROBE: NEGATIVE

## 2024-05-20 ENCOUNTER — PATIENT MESSAGE (OUTPATIENT)
Dept: HEMATOLOGY/ONCOLOGY | Facility: CLINIC | Age: 44
End: 2024-05-20
Payer: COMMERCIAL

## 2024-05-20 LAB
FINAL PATHOLOGIC DIAGNOSIS: NORMAL
Lab: NORMAL

## 2024-05-21 ENCOUNTER — HOSPITAL ENCOUNTER (OUTPATIENT)
Dept: RADIOLOGY | Facility: HOSPITAL | Age: 44
Discharge: HOME | End: 2024-05-21
Attending: ORTHOPAEDIC SURGERY
Payer: COMMERCIAL

## 2024-05-21 ENCOUNTER — OFFICE VISIT (OUTPATIENT)
Dept: ORTHOPEDICS | Facility: CLINIC | Age: 44
End: 2024-05-21
Payer: COMMERCIAL

## 2024-05-21 ENCOUNTER — PATIENT MESSAGE (OUTPATIENT)
Dept: ORTHOPEDICS | Facility: CLINIC | Age: 44
End: 2024-05-21

## 2024-05-21 VITALS — HEIGHT: 61 IN | WEIGHT: 154.31 LBS | BODY MASS INDEX: 29.13 KG/M2

## 2024-05-21 DIAGNOSIS — M79.641 BILATERAL HAND PAIN: Primary | ICD-10-CM

## 2024-05-21 DIAGNOSIS — G56.23 CUBITAL TUNNEL SYNDROME, BILATERAL: ICD-10-CM

## 2024-05-21 DIAGNOSIS — M79.642 BILATERAL HAND PAIN: ICD-10-CM

## 2024-05-21 DIAGNOSIS — G56.03 BILATERAL CARPAL TUNNEL SYNDROME: Primary | ICD-10-CM

## 2024-05-21 DIAGNOSIS — M79.641 BILATERAL HAND PAIN: ICD-10-CM

## 2024-05-21 DIAGNOSIS — M79.642 BILATERAL HAND PAIN: Primary | ICD-10-CM

## 2024-05-21 PROCEDURE — 99999 PR PBB SHADOW E&M-EST. PATIENT-LVL III: CPT | Mod: PBBFAC,,, | Performed by: ORTHOPAEDIC SURGERY

## 2024-05-21 PROCEDURE — 1159F MED LIST DOCD IN RCRD: CPT | Mod: CPTII,S$GLB,, | Performed by: ORTHOPAEDIC SURGERY

## 2024-05-21 PROCEDURE — 3008F BODY MASS INDEX DOCD: CPT | Mod: CPTII,S$GLB,, | Performed by: ORTHOPAEDIC SURGERY

## 2024-05-21 PROCEDURE — 73130 X-RAY EXAM OF HAND: CPT | Mod: TC,50

## 2024-05-21 PROCEDURE — 73130 X-RAY EXAM OF HAND: CPT | Mod: 26,,, | Performed by: RADIOLOGY

## 2024-05-21 PROCEDURE — 99204 OFFICE O/P NEW MOD 45 MIN: CPT | Mod: S$GLB,,, | Performed by: ORTHOPAEDIC SURGERY

## 2024-05-21 PROCEDURE — 3044F HG A1C LEVEL LT 7.0%: CPT | Mod: CPTII,S$GLB,, | Performed by: ORTHOPAEDIC SURGERY

## 2024-05-21 RX ORDER — MELOXICAM 15 MG/1
15 TABLET ORAL DAILY
Qty: 30 TABLET | Refills: 2 | Status: SHIPPED | OUTPATIENT
Start: 2024-05-21

## 2024-05-21 NOTE — PROGRESS NOTES
[facility]  History & Physical  Orthopedics    SUBJECTIVE:      COVID-19 attestation:  This patient was treated during the COVID-19 pandemic.  This was discussed with the patient, they are aware of our current policies and procedures, were given the option of delaying their visit and or switching to a virtual visit, delaying their surgery when applicable, and they elect to proceed.    Chief Complaint: bilateral CTS and cubital tunnel syndrome    Referring Provider: Letitia Sherman MD     History of Present Illness:  Patient is a 43 y.o. right hand dominant female who presents today with complaints of bilateral hand tingling/numbness and pain. She states it is typically worse at night where her whole hand goes numb and she has significant pain throughout the day in the hands. She had a history of breast cancer and underwent chemo and radiation that could also cause some neuropathy. She is currently off chemo and radiation altogether.     Onset of symptoms/DOI was multiple months ago.    Symptoms are aggravated by activity, movement, and at night.    Symptoms are alleviated by rest and during the day.    Symptoms consist of pain and numbness/tingling.    The patient rates their pain as a 5/10.    Attempted treatment(s) and/or interventions include activity modifications, rest, anti-inflammatory medications.     The patient denies any fevers, chills, N/V, D/C and presents for evaluation.       Past Medical History:   Diagnosis Date    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    OCP & aziza from derm, Doxy caused yeast    Heartburn 12/17/2021    Strawberry wai 1981    back of neck    Strawberry wai 1981    under nose     Past Surgical History:   Procedure Laterality Date    BILATERAL MASTECTOMY Bilateral 5/24/2023    Procedure: MASTECTOMY, BILATERAL;  Surgeon: Jenna Pickard MD;  Location: [facility];  Service: General;  Laterality: Bilateral;  2.5 HOURS / EMAIL SENT 5-9 @ 11:39 LK     "COLPOSCOPY      INJECTION FOR SENTINEL NODE IDENTIFICATION Right 2023    Procedure: INJECTION, FOR SENTINEL NODE IDENTIFICATION;  Surgeon: Jenna Pickard MD;  Location: [facility];  Service: General;  Laterality: Right;    RECONSTRUCTION OF BREAST WITH DEEP INFERIOR EPIGASTRIC ARTERY  (ROSITA) FREE FLAP Bilateral 2023    Procedure: RECONSTRUCTION, BREAST, USING ROSITA FREE FLAP / BILATERAL DEEP INFERIOR EPIGASTRIC PERFORATORS FLAPS;  Surgeon: Mikey Roche MD;  Location: [facility];  Service: Plastics;  Laterality: Bilateral;    SENTINEL LYMPH NODE BIOPSY Right 2023    Procedure: BIOPSY, LYMPH NODE, SENTINEL;  Surgeon: Jenna Pickard MD;  Location: [facility];  Service: General;  Laterality: Right;    WISDOM TOOTH EXTRACTION       Review of patient's allergies indicates:   Allergen Reactions    Latex, natural rubber Rash     Social History     Social History Narrative    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    , 2 children (10 yo son, 6 yo daughter [employer])    Mom Renee Tejeda    nonsmoker      GYN Yolanda     Family History   Problem Relation Name Age of Onset    Hyperlipidemia Mother      Kidney cancer Father  58    Cancer Brother  22        Parotid Gland cancer    Birth marks Child  0        strawberry wai(s)    Birth marks Child  0        strawberry wai(s)    Café-au-lait spots Maternal Uncle          "a small patch on his neck"    Café-au-lait spots Other nephew         "a spot on his arm and partial torso"    Other Other nephew 2        tested negative for macrocephaly    Cervical cancer Other      Throat cancer Other      Pancreatic cancer Other          1 second cousin ()    Other Other          brain tumors (several 2nd cousins)    Breast cancer Neg Hx      Colon cancer Neg Hx      Ovarian cancer Neg Hx      Melanoma Neg Hx           Current Outpatient Medications:     anastrozole (ARIMIDEX) 1 mg Tab, Take 1 tablet (1 mg total) by mouth once daily., Disp: 30 tablet, Rfl: 2    " "venlafaxine (EFFEXOR-XR) 150 MG Cp24, Take 1 capsule (150 mg total) by mouth once daily., Disp: 90 capsule, Rfl: 3      Review of Systems:  As per HPI otherwise noncontributory    OBJECTIVE:      Vital Signs (Most Recent):  Vitals:    05/21/24 1043   Weight: 70 kg (154 lb 5.2 oz)   Height: 5' 1" (1.549 m)     Body mass index is 29.16 kg/m².      Physical Exam:  Constitutional: The patient appears well-developed and well-nourished. No distress.   Skin: No lesions appreciated  Head: Normocephalic and atraumatic.   Nose: Nose normal.   Ears: No deformities seen  Eyes: Conjunctivae and EOM are normal.   Neck: No tracheal deviation present.   Cardiovascular: Normal rate and intact distal pulses.    Pulmonary/Chest: Effort normal. No respiratory distress.   Abdominal: There is no guarding.   Neurological: The patient is alert.   Psychiatric: The patient has a normal mood and affect.     Bilateral Hand/Wrist Examination:    Observation/Inspection:  Swelling  none    Deformity  none  Discoloration  none     Scars   none    Atrophy  none    HAND/WRIST EXAMINATION:  Finkelstein's Test   Neg  WHAT Test    Neg  Snuff box tenderness   Neg  Rodrigez's Test    Neg  Hook of Hamate Tenderness  Neg  CMC grind    Neg  Circumduction test   Neg    Neurovascular Exam:  Digits WWP, brisk CR < 3s throughout  NVI motor/LTS to M/R/U nerves, radial pulse 2+  Tinel's Test - Carpal Tunnel  Pos bilat  Tinel's Test - Cubital Tunnel  Pos bilat  Phalen's Test    Neg  Median Nerve Compression Test Pos bilat    ROM hand full, painless    ROM wrist full, painless    ROM elbow full, painless    Abdomen not guarded  Respirations nonlabored  Perfusion intact    Diagnostic Results:     Imaging - I independently viewed the patient's imaging as well as the radiology report.  Xrays of the patient's bilateral hands  demonstrate no evidence of any acute fractures or dislocations or significant degenerative changes.    EMG - none    ASSESSMENT/PLAN:      43 y.o. 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  We discussed the working diagnosis as well as several other potential alternative diagnoses.  Treatment options were discussed, both conservative and surgical.  Conservative treatment options would include things such as activity modifications, workplace modifications, a period of rest, oral vs topical OTC and prescription anti-inflammatory medications, occupational therapy, splinting/bracing, immobilization, corticosteroid injections, and others.  Surgical options were discussed as well.     At this time, the patient would like to proceed with a trial of BUE EMG and bilateral night splints for carpal tunnel. Recommend she take OTC NSAIDs as needed. She will return to clinic following EMG.    Should the patient's symptoms worsen, persist, or fail to improve they should return for reevaluation and I would be happy to see them back anytime.        Vernon Olivas M.D.    Please be aware that this note has been generated with the assistance of [employer] voice-to-text.  Please excuse any spelling or grammatical errors.    Thank you for choosing Dr. Vernon Olivas for your orthopedic hand and upper extremity care. It is our goal to provide you with exceptional care that will help keep you healthy, active, and get you back in the game.     If you felt that you received exemplary care today, please consider leaving feedback for Dr. Olivas on [employer] at [email or web address].    Please do not hesitate to reach out to us via email, phone, or MyChart with any questions, concerns, or feedback.

## 2024-05-23 ENCOUNTER — OFFICE VISIT (OUTPATIENT)
Dept: GYNECOLOGIC ONCOLOGY | Facility: CLINIC | Age: 44
End: 2024-05-23
Payer: COMMERCIAL

## 2024-05-23 VITALS
WEIGHT: 155 LBS | BODY MASS INDEX: 29.29 KG/M2 | SYSTOLIC BLOOD PRESSURE: 142 MMHG | HEART RATE: 77 BPM | DIASTOLIC BLOOD PRESSURE: 82 MMHG

## 2024-05-23 DIAGNOSIS — C50.811 MALIGNANT NEOPLASM OF OVERLAPPING SITES OF RIGHT BREAST IN FEMALE, ESTROGEN RECEPTOR POSITIVE: Primary | ICD-10-CM

## 2024-05-23 DIAGNOSIS — Z17.0 MALIGNANT NEOPLASM OF OVERLAPPING SITES OF RIGHT BREAST IN FEMALE, ESTROGEN RECEPTOR POSITIVE: Primary | ICD-10-CM

## 2024-05-23 PROCEDURE — 3079F DIAST BP 80-89 MM HG: CPT | Mod: CPTII,S$GLB,, | Performed by: OBSTETRICS & GYNECOLOGY

## 2024-05-23 PROCEDURE — 99204 OFFICE O/P NEW MOD 45 MIN: CPT | Mod: S$GLB,,, | Performed by: OBSTETRICS & GYNECOLOGY

## 2024-05-23 PROCEDURE — 1159F MED LIST DOCD IN RCRD: CPT | Mod: CPTII,S$GLB,, | Performed by: OBSTETRICS & GYNECOLOGY

## 2024-05-23 PROCEDURE — 99999 PR PBB SHADOW E&M-EST. PATIENT-LVL III: CPT | Mod: PBBFAC,,, | Performed by: OBSTETRICS & GYNECOLOGY

## 2024-05-23 PROCEDURE — 3044F HG A1C LEVEL LT 7.0%: CPT | Mod: CPTII,S$GLB,, | Performed by: OBSTETRICS & GYNECOLOGY

## 2024-05-23 PROCEDURE — 3077F SYST BP >= 140 MM HG: CPT | Mod: CPTII,S$GLB,, | Performed by: OBSTETRICS & GYNECOLOGY

## 2024-05-23 PROCEDURE — 3008F BODY MASS INDEX DOCD: CPT | Mod: CPTII,S$GLB,, | Performed by: OBSTETRICS & GYNECOLOGY

## 2024-05-23 RX ORDER — GABAPENTIN 100 MG/1
100 CAPSULE ORAL
COMMUNITY
Start: 2024-05-17

## 2024-05-23 NOTE — PROGRESS NOTES
REFERRING PROVIDER  Diane Moeller DO     Oncologist: Jessica Griffith  Breast Surgeon: Jenna Pickard  Plastic Surgeon: Mikey Roche    HISTORY OF PRESENT CONDITION  CC: Consideration of RR Hyst BSO  Dov Hernandez is a 43 y.o.  with h/o Stage IB (pT2, pN1(sn), cM0, G2, ER+, NM+, HER2-) breast cancer s/p bilateral mastectomy with ROSITA flap reconstruction and right SLNB on 2023, Adjuvant Taxotere Cytoxan through 2023, and radiation through 2023 (see details in Dr. Griffith's note dated 3/6/2024)    She desires risk reducing hysterectomy and bso toward the end of the year after she completes her breast reconstruction.  Her left reconstruction was complicated by a breast abscess / hematoma.  She is currently on Zoladex and anastrozole.      Data Reviewed  2024 Pap: NILM HPVneg    Past Medical History:   Diagnosis Date    Acne varioliformis 2017    OCP & aziza from derm, Doxy caused yeast    Heartburn 2021    Strawberry wai 1981    back of neck    Strawberry wai 1981    under nose      Current Outpatient Medications   Medication Sig    anastrozole (ARIMIDEX) 1 mg Tab Take 1 tablet (1 mg total) by mouth once daily.    gabapentin (NEURONTIN) 100 MG capsule Take 100 mg by mouth.    meloxicam (MOBIC) 15 MG tablet Take 1 tablet (15 mg total) by mouth once daily.    venlafaxine (EFFEXOR-XR) 150 MG Cp24 Take 1 capsule (150 mg total) by mouth once daily.     No current facility-administered medications for this visit.     Review of patient's allergies indicates:   Allergen Reactions    Latex, natural rubber Rash       Past Surgical History:   Procedure Laterality Date    BILATERAL MASTECTOMY Bilateral 2023    Procedure: MASTECTOMY, BILATERAL;  Surgeon: Jenna Pickard MD;  Location: [facility];  Service: General;  Laterality: Bilateral;  2.5 HOURS / EMAIL SENT  @ 11:39 LK    COLPOSCOPY      INJECTION FOR SENTINEL NODE IDENTIFICATION Right 2023    Procedure: INJECTION,  "FOR SENTINEL NODE IDENTIFICATION;  Surgeon: Jenna Pickard MD;  Location: [facility];  Service: General;  Laterality: Right;    RECONSTRUCTION OF BREAST WITH DEEP INFERIOR EPIGASTRIC ARTERY  (ROSITA) FREE FLAP Bilateral 2023    Procedure: RECONSTRUCTION, BREAST, USING ROSITA FREE FLAP / BILATERAL DEEP INFERIOR EPIGASTRIC PERFORATORS FLAPS;  Surgeon: Mikey Roche MD;  Location: [facility];  Service: Plastics;  Laterality: Bilateral;    SENTINEL LYMPH NODE BIOPSY Right 2023    Procedure: BIOPSY, LYMPH NODE, SENTINEL;  Surgeon: Jenna Pickard MD;  Location: [facility];  Service: General;  Laterality: Right;    WISDOM TOOTH EXTRACTION          FAMILY HISTORY  Family History   Problem Relation Name Age of Onset    Hyperlipidemia Mother      Kidney cancer Father  58    Cancer Brother  22        Parotid Gland cancer    Birth marks Child  0        strawberry wai(s)    Birth marks Child  0        strawberry wai(s)    Café-au-lait spots Maternal Uncle          "a small patch on his neck"    Café-au-lait spots Other nephew         "a spot on his arm and partial torso"    Other Other nephew 2        tested negative for macrocephaly    Cervical cancer Other      Throat cancer Other      Pancreatic cancer Other          1 second cousin ()    Other Other          brain tumors (several 2nd cousins)    Breast cancer Neg Hx      Colon cancer Neg Hx      Ovarian cancer Neg Hx      Melanoma Neg Hx         SOCIAL HISTORY    reports that she has never smoked. She has never used smokeless tobacco. She reports current alcohol use. She reports that she does not use drugs.    REVIEW OF SYSTEMS  Review of Systems   Constitutional:  Negative for activity change, appetite change, chills, fatigue, fever and unexpected weight change.   Respiratory:  Negative for cough, chest tightness and shortness of breath.    Cardiovascular:  Negative for chest pain, palpitations and leg swelling.   Gastrointestinal:  Negative for abdominal " distention, abdominal pain, blood in stool, constipation, diarrhea, nausea and vomiting.   Genitourinary:  Negative for dyspareunia, dysuria, frequency, hematuria, menstrual problem, pelvic pain, urgency, vaginal bleeding and vaginal discharge.   Musculoskeletal:  Negative for arthralgias and myalgias.   Skin:  Negative for color change and rash.   Neurological:  Negative for dizziness, weakness and light-headedness.   Hematological:  Negative for adenopathy.   Psychiatric/Behavioral:  Negative for decreased concentration, dysphoric mood and sleep disturbance. The patient is not nervous/anxious.      OBJECTIVE   Vitals:    05/23/24 1000   BP: (!) 142/82   Pulse: 77      Body mass index is 29.29 kg/m².     Physical Exam:   Constitutional: She is oriented to person, place, and time. She appears well-developed and well-nourished. No distress.    HENT:   Head: Normocephalic and atraumatic.    Eyes: Conjunctivae and EOM are normal.      Pulmonary/Chest: Effort normal. No respiratory distress.        Abdominal: Soft. She exhibits abdominal incision (healed from ROSITA flap). She exhibits no distension and no mass. There is no abdominal tenderness. There is no rebound and no guarding. No hernia.                 Neurological: She is alert and oriented to person, place, and time.    Skin: No rash noted.    Psychiatric: She has a normal mood and affect. Her behavior is normal.     ECOG status: 0    LABORATORY DATA  Lab data reviewed.    RADIOLOGICAL DATA  Radiology data reviewed.    PATHOLOGY DATA  Pathology data reviewed.    ASSESSMENT    1. Malignant neoplasm of overlapping sites of right breast in female, estrogen receptor positive    Patient with ER / AL + breast cancer who desires risk reducing surgery.  We discussed the rationale behind oophorectomy and the pros and cons of incorporating hysterectomy.   She would like to proceed with robotic assisted total hysterectomy and bso toward the end of the year.  She will contact  us 6 - 8 weeks prior to when she would like to schedule.    I discussed extensively the risks, benefits, alternatives, and indications of the planned procedure to include the risk of damage to bowel, bladder, ureter, or any other abdominal or pelvic organ.  Additionally, she understands the standard surgical risks of infection, allergic reaction, bleeding possibly severe enough to require blood transfusion.  She expresses understanding, was given an opportunity to ask any questions, and now she strongly desires to proceed with planned procedure.  Informed consent was signed.     PLAN  Patient will call when ready to proceed with surgery          Benjy Valladares MD

## 2024-05-26 ENCOUNTER — PATIENT MESSAGE (OUTPATIENT)
Dept: HEMATOLOGY/ONCOLOGY | Facility: CLINIC | Age: 44
End: 2024-05-26
Payer: COMMERCIAL

## 2024-05-27 ENCOUNTER — TELEPHONE (OUTPATIENT)
Dept: HEMATOLOGY/ONCOLOGY | Facility: CLINIC | Age: 44
End: 2024-05-27
Payer: COMMERCIAL

## 2024-05-27 NOTE — TELEPHONE ENCOUNTER
----- Message from Fay Cook sent at 5/27/2024 10:04 AM CDT -----  Regarding: Scheduling Request  Contact: Dov Hernandez  Scheduling Request           Appt Type:  Ep     Date/Time Preference:any     Treating Provider:Tadeo     Caller Name:Dov Hernandez      Contact Preference:[phone] (home)       Comments/notes:Patient is calling to schedule appt with doctor. Requesting a call back

## 2024-05-27 NOTE — TELEPHONE ENCOUNTER
"----- Message from Regino Niño sent at 5/27/2024  8:58 AM CDT -----  Reschedule Existing Appointment    Appt Date: 6/24    Type of appt: RTC in 3 months    Physician: Tadeo    Reason for rescheduling? Cancelled by office; Requesting to r/s for 6/24    Caller: Self    Contact Preference:  [phone] (home)       Additional Information: Stating she's been looking forward to this appt and really wants to see Dr. Piedra    "Thank you for all that you do for our patients"  "

## 2024-05-28 ENCOUNTER — PATIENT MESSAGE (OUTPATIENT)
Dept: HEMATOLOGY/ONCOLOGY | Facility: CLINIC | Age: 44
End: 2024-05-28
Payer: COMMERCIAL

## 2024-05-29 ENCOUNTER — INFUSION (OUTPATIENT)
Dept: INFUSION THERAPY | Facility: HOSPITAL | Age: 44
End: 2024-05-29
Payer: COMMERCIAL

## 2024-05-29 VITALS — HEIGHT: 61 IN | BODY MASS INDEX: 29.27 KG/M2 | WEIGHT: 155 LBS

## 2024-05-29 DIAGNOSIS — C50.811 MALIGNANT NEOPLASM OF OVERLAPPING SITES OF RIGHT BREAST IN FEMALE, ESTROGEN RECEPTOR POSITIVE: Primary | ICD-10-CM

## 2024-05-29 DIAGNOSIS — Z17.0 MALIGNANT NEOPLASM OF OVERLAPPING SITES OF RIGHT BREAST IN FEMALE, ESTROGEN RECEPTOR POSITIVE: Primary | ICD-10-CM

## 2024-05-29 PROCEDURE — 63600175 PHARM REV CODE 636 W HCPCS: Mod: JZ,JG | Performed by: INTERNAL MEDICINE

## 2024-05-29 PROCEDURE — 96401 CHEMO ANTI-NEOPL SQ/IM: CPT

## 2024-05-29 RX ADMIN — GOSERELIN ACETATE 3.6 MG: 3.6 IMPLANT SUBCUTANEOUS at 09:05

## 2024-06-04 ENCOUNTER — OFFICE VISIT (OUTPATIENT)
Dept: HEMATOLOGY/ONCOLOGY | Facility: CLINIC | Age: 44
End: 2024-06-04
Payer: COMMERCIAL

## 2024-06-04 VITALS
RESPIRATION RATE: 17 BRPM | BODY MASS INDEX: 29.76 KG/M2 | DIASTOLIC BLOOD PRESSURE: 95 MMHG | OXYGEN SATURATION: 99 % | SYSTOLIC BLOOD PRESSURE: 168 MMHG | TEMPERATURE: 98 F | HEART RATE: 88 BPM | WEIGHT: 157.63 LBS | HEIGHT: 61 IN

## 2024-06-04 DIAGNOSIS — Z17.0 MALIGNANT NEOPLASM OF OVERLAPPING SITES OF RIGHT BREAST IN FEMALE, ESTROGEN RECEPTOR POSITIVE: Primary | ICD-10-CM

## 2024-06-04 DIAGNOSIS — Z79.811 AROMATASE INHIBITOR USE: ICD-10-CM

## 2024-06-04 DIAGNOSIS — C77.9 SECONDARY ADENOCARCINOMA OF LYMPH NODE: ICD-10-CM

## 2024-06-04 DIAGNOSIS — C50.811 MALIGNANT NEOPLASM OF OVERLAPPING SITES OF RIGHT BREAST IN FEMALE, ESTROGEN RECEPTOR POSITIVE: Primary | ICD-10-CM

## 2024-06-04 PROCEDURE — 1159F MED LIST DOCD IN RCRD: CPT | Mod: CPTII,S$GLB,, | Performed by: INTERNAL MEDICINE

## 2024-06-04 PROCEDURE — 99215 OFFICE O/P EST HI 40 MIN: CPT | Mod: S$GLB,,, | Performed by: INTERNAL MEDICINE

## 2024-06-04 PROCEDURE — G2211 COMPLEX E/M VISIT ADD ON: HCPCS | Mod: S$GLB,,, | Performed by: INTERNAL MEDICINE

## 2024-06-04 PROCEDURE — 3080F DIAST BP >= 90 MM HG: CPT | Mod: CPTII,S$GLB,, | Performed by: INTERNAL MEDICINE

## 2024-06-04 PROCEDURE — 3077F SYST BP >= 140 MM HG: CPT | Mod: CPTII,S$GLB,, | Performed by: INTERNAL MEDICINE

## 2024-06-04 PROCEDURE — 3044F HG A1C LEVEL LT 7.0%: CPT | Mod: CPTII,S$GLB,, | Performed by: INTERNAL MEDICINE

## 2024-06-04 PROCEDURE — 99999 PR PBB SHADOW E&M-EST. PATIENT-LVL IV: CPT | Mod: PBBFAC,,, | Performed by: INTERNAL MEDICINE

## 2024-06-04 PROCEDURE — 3008F BODY MASS INDEX DOCD: CPT | Mod: CPTII,S$GLB,, | Performed by: INTERNAL MEDICINE

## 2024-06-04 RX ORDER — FUROSEMIDE 20 MG/1
20 TABLET ORAL DAILY
Qty: 30 TABLET | Refills: 1 | Status: SHIPPED | OUTPATIENT
Start: 2024-06-04 | End: 2025-06-04

## 2024-06-04 NOTE — PROGRESS NOTES
[facility]/ [facility]   at [facility] Note:      Chief Complaint:   Encounter Diagnoses   Name Primary?    Malignant neoplasm of overlapping sites of right breast in female, estrogen receptor positive Yes    Secondary adenocarcinoma of lymph node     Aromatase inhibitor use         Cancer Staging   Malignant neoplasm of overlapping sites of right breast in female, estrogen receptor positive  Staging form: Breast, AJCC 8th Edition  - Clinical stage from 4/26/2023: Stage IB (cT2, cN0, cM0, G1, ER+, LA+, HER2: Equivocal) - Signed by Jenna Pickard MD on 4/26/2023  - Pathologic stage from 6/14/2023: Stage IB (pT2, pN1(sn), cM0, G2, ER+, LA+, HER2-) - Signed by Fran Ken Jr., MD on 6/17/2023      HPI:  Dov Hernandez is a 43 y.o. female who presents today for evaluation of breast cancer. Previously seen by Dr Griffith. She has been off of arimidex x 1 week due to joint aches. Joint aches have been severe. Nothing makes them better/worse. She has not noticed any difference after being off x 1 week. Joint aches are diffuse     Oncology History  Patient had a normal screening mammogram in 2022.   Screening mammogram in March 2023 showed right breast calcifications, measured to be 39 mm on diagnostic mammogram.     Right breast biopsy 4/13/23: IDC, 2 mm, ER 30%, LA 30%, Her 2 karrie negative, Grade 1, low Ki 67 at 5% with associated DCIS.       Breast MRI 4/25/23: 2 cm mass    Case discussed at [facility] breast tumor board.     Bilateral mastectomy with ROSITA flap reconstruction and right SLNB 5/24/23: IDC, 2.2cm; 1/1LN+   Declined ALND and proceeded with XRT.      Oncotype RS 29; RR 23%; Adjuvant chemotherapy is recommended.     Baseline PET reviewed, no residual or metastatic disease noted.     Adjuvant TC x 4 7/13/23 - 9/14/23  S/p cycle 1 of TC on 7/13/23.   Zoladex started 11/15/23, LMP was in June 2023.  Anastrozole started December 2023   Worsening  "anxiety, on effexor 75 mg .      Planning oophorectomy to avoid monthly injections of zoladex 2024      Had repair of the right ear drum. Still feels hearing is muffled on that side.  Revision of L breast scheduled in 2024      Social History     Tobacco Use    Smoking status: Never    Smokeless tobacco: Never   Substance Use Topics    Alcohol use: Yes     Comment: social    Drug use: No     Family History   Problem Relation Name Age of Onset    Hyperlipidemia Mother      Kidney cancer Father  58    Cancer Brother  22        Parotid Gland cancer    Birth marks Child  0        strawberry wai(s)    Birth marks Child  0        strawberry wai(s)    Café-au-lait spots Maternal Uncle          "a small patch on his neck"    Café-au-lait spots Other nephew         "a spot on his arm and partial torso"    Other Other nephew 2        tested negative for macrocephaly    Cervical cancer Other      Throat cancer Other      Pancreatic cancer Other          1 second cousin ()    Other Other          brain tumors (several 2nd cousins)    Breast cancer Neg Hx      Colon cancer Neg Hx      Ovarian cancer Neg Hx      Melanoma Neg Hx       Past Medical History:   Diagnosis Date    Acne varioliformis 2017    OCP & aziza from derm, Doxy caused yeast    Heartburn 2021    Strawberry wai 1981    back of neck    Strawberry wai 1981    under nose     Past Surgical History:   Procedure Laterality Date    BILATERAL MASTECTOMY Bilateral 2023    Procedure: MASTECTOMY, BILATERAL;  Surgeon: Jenna Pickard MD;  Location: [facility];  Service: General;  Laterality: Bilateral;  2.5 HOURS / EMAIL SENT  @ 11:39 LK    COLPOSCOPY      INJECTION FOR SENTINEL NODE IDENTIFICATION Right 2023    Procedure: INJECTION, FOR SENTINEL NODE IDENTIFICATION;  Surgeon: Jnena Pickard MD;  Location: [facility];  Service: General;  Laterality: Right;    RECONSTRUCTION OF BREAST WITH DEEP INFERIOR EPIGASTRIC ARTERY " " (ROSITA) FREE FLAP Bilateral 5/24/2023    Procedure: RECONSTRUCTION, BREAST, USING ROSITA FREE FLAP / BILATERAL DEEP INFERIOR EPIGASTRIC PERFORATORS FLAPS;  Surgeon: Mikey Roche MD;  Location: [facility];  Service: Plastics;  Laterality: Bilateral;    SENTINEL LYMPH NODE BIOPSY Right 5/24/2023    Procedure: BIOPSY, LYMPH NODE, SENTINEL;  Surgeon: Jenna Pickard MD;  Location: [facility];  Service: General;  Laterality: Right;    WISDOM TOOTH EXTRACTION         Patient Active Problem List   Diagnosis    Pain in joint, pelvic region and thigh    Acne varioliformis    Heartburn    Malignant neoplasm of overlapping sites of right breast in female, estrogen receptor positive    Secondary adenocarcinoma of lymph node    Anxiety    Hot flashes    Muscle tightness    At risk for lymphedema    Weakness of both upper extremities       Current Outpatient Medications   Medication Instructions    anastrozole (ARIMIDEX) 1 mg, Oral, Daily    furosemide (LASIX) 20 mg, Oral, Daily    gabapentin (NEURONTIN) 100 mg, Oral    meloxicam (MOBIC) 15 mg, Oral, Daily    venlafaxine (EFFEXOR-XR) 150 mg, Oral, Daily       Review of Systems:   Review of Systems   Constitutional: Negative.    HENT: Negative.     Respiratory: Negative.     Cardiovascular: Negative.    Gastrointestinal: Negative.    Musculoskeletal: Negative.    All other systems reviewed and are negative.      PHYSICAL EXAM:  BP (!) 168/95   Pulse 88   Temp 98 °F (36.7 °C) (Oral)   Resp 17   Ht 5' 1" (1.549 m)   Wt 71.5 kg (157 lb 10.1 oz)   LMP  (LMP Unknown)   SpO2 99%   BMI 29.78 kg/m²     General Appearance:    Alert, cooperative, no distress, appears stated age   Head:    Normocephalic, without obvious abnormality, atraumatic   Eyes:    PERRL, conjunctiva/corneas clear   Nose:   Nares normal, septum midline   Throat:   Lips, mucosa, and tongue normal; teeth and gums normal   Lungs:     Respirations unlabored   Extremities:   Extremities normal, atraumatic, no " cyanosis or edema   Pulses:   2+ and symmetric all extremities   Skin:   Skin color, texture, turgor normal, no rashes or lesions   Neurologic:   CNII-XII intact, normal gait           Pertinent Labs & Imaging:  Pathology Results  (Last 30 days)                 05/13/24 1405  Liquid-Based Pap Smear, Screening Final result    Narrative:  Release to patient->Immediate               No results found for this or any previous visit (from the past 24 hour(s)).    Assessment & Plan:    1. Malignant neoplasm of overlapping sites of right breast in female, estrogen receptor positive  - furosemide (LASIX) 20 MG tablet; Take 1 tablet (20 mg total) by mouth once daily.  Dispense: 30 tablet; Refill: 1  - Ambulatory referral/consult to [facility]; Future    2. Secondary adenocarcinoma of lymph node  - furosemide (LASIX) 20 MG tablet; Take 1 tablet (20 mg total) by mouth once daily.  Dispense: 30 tablet; Refill: 1  - Ambulatory referral/consult to [facility]; Future    3. Aromatase inhibitor use  - Ambulatory referral/consult to [facility]; Future      Reviewed patients referring notes, imaging and pathology. Discussed diagnosis, staging, and treatment in detail with patient.   Patient with Stage I LN+ ER+ breast cancer s/p bilateral mastectomy with reconstruction, adjuvant XRT, adjuvant TC x 4 now on arimidex  Arimidex on hold x 1 week for diffuse joint aches. Continue to hold x 3 additional weeks  If improvement, plan to start letrozole. She will follow up with Dr Piedra at that time   Due to hysterectomy December 2024. Will plan to discontinue zoladex for January   Normal DXA Dec 2023, repeat Dec 2025    Per NCCN Guidelines, breast cancer patients should be followed every 3-12 months for the first five years and then annually thereafter with annual mammography if mastectomy or breast reconstruction was not undertaken. At this time, there is no indication for  routine laboratory or imaging in the surveillance for metastatic disease, unless clinical signs or symptoms arise.    Healthy diet, low alcohol intake, and an active lifestyle, with a goal of an ideal BMI (20-25) is also encouraged to reduce the risk of breast cancer occurrences and recurrences, as well as improve side effects to anti-estrogen medications      Route Chart for Scheduling    Med Onc Chart Routing      Follow up with physician . Already scheduled with [place]   Follow up with [facility]    Infusion scheduling note    Injection scheduling note    Labs    Imaging    Pharmacy appointment    Other referrals                    Supportive Plan Information  OP BREAST GOSERELIN Q4W   Jessica Griffith MD   Upcoming Treatment Dates - OP BREAST GOSERELIN Q4W    5/31/2024       Chemotherapy       goserelin (ZOLADEX) injection 3.6 mg    MDM includes  :    - Acute or chronic illness or injury that poses a threat to life or bodily function  - Review of prior external notes from unique source  - Independent review and explanation of 3+ results from unique tests  - Extensive discussion of treatment and management  - Prescription drug management  - Drug therapy requiring intensive monitoring for toxicity        Lidia Osman MD   06/04/2024

## 2024-06-07 ENCOUNTER — PATIENT MESSAGE (OUTPATIENT)
Dept: HEMATOLOGY/ONCOLOGY | Facility: CLINIC | Age: 44
End: 2024-06-07
Payer: COMMERCIAL

## 2024-06-10 ENCOUNTER — PATIENT MESSAGE (OUTPATIENT)
Dept: HEMATOLOGY/ONCOLOGY | Facility: CLINIC | Age: 44
End: 2024-06-10
Payer: COMMERCIAL

## 2024-06-10 ENCOUNTER — PATIENT MESSAGE (OUTPATIENT)
Dept: INTERNAL MEDICINE | Facility: CLINIC | Age: 44
End: 2024-06-10
Payer: COMMERCIAL

## 2024-06-13 ENCOUNTER — TELEPHONE (OUTPATIENT)
Dept: OBSTETRICS AND GYNECOLOGY | Facility: CLINIC | Age: 44
End: 2024-06-13
Payer: COMMERCIAL

## 2024-06-24 ENCOUNTER — PATIENT MESSAGE (OUTPATIENT)
Dept: HEMATOLOGY/ONCOLOGY | Facility: CLINIC | Age: 44
End: 2024-06-24

## 2024-06-24 ENCOUNTER — OFFICE VISIT (OUTPATIENT)
Dept: HEMATOLOGY/ONCOLOGY | Facility: CLINIC | Age: 44
End: 2024-06-24
Payer: COMMERCIAL

## 2024-06-24 ENCOUNTER — LAB VISIT (OUTPATIENT)
Dept: LAB | Facility: HOSPITAL | Age: 44
End: 2024-06-24
Attending: INTERNAL MEDICINE
Payer: COMMERCIAL

## 2024-06-24 VITALS
DIASTOLIC BLOOD PRESSURE: 90 MMHG | BODY MASS INDEX: 29.72 KG/M2 | HEART RATE: 88 BPM | HEIGHT: 61 IN | TEMPERATURE: 97 F | RESPIRATION RATE: 17 BRPM | SYSTOLIC BLOOD PRESSURE: 146 MMHG | OXYGEN SATURATION: 100 % | WEIGHT: 157.44 LBS

## 2024-06-24 DIAGNOSIS — C50.811 MALIGNANT NEOPLASM OF OVERLAPPING SITES OF RIGHT BREAST IN FEMALE, ESTROGEN RECEPTOR POSITIVE: Primary | ICD-10-CM

## 2024-06-24 DIAGNOSIS — C50.811 MALIGNANT NEOPLASM OF OVERLAPPING SITES OF RIGHT BREAST IN FEMALE, ESTROGEN RECEPTOR POSITIVE: ICD-10-CM

## 2024-06-24 DIAGNOSIS — Z17.0 MALIGNANT NEOPLASM OF OVERLAPPING SITES OF RIGHT BREAST IN FEMALE, ESTROGEN RECEPTOR POSITIVE: ICD-10-CM

## 2024-06-24 DIAGNOSIS — Z17.0 MALIGNANT NEOPLASM OF OVERLAPPING SITES OF RIGHT BREAST IN FEMALE, ESTROGEN RECEPTOR POSITIVE: Primary | ICD-10-CM

## 2024-06-24 DIAGNOSIS — C77.9 SECONDARY ADENOCARCINOMA OF LYMPH NODE: ICD-10-CM

## 2024-06-24 DIAGNOSIS — G62.9 NEUROPATHY: ICD-10-CM

## 2024-06-24 LAB
ALBUMIN SERPL BCP-MCNC: 3.7 G/DL (ref 3.5–5.2)
ALP SERPL-CCNC: 74 U/L (ref 55–135)
ALT SERPL W/O P-5'-P-CCNC: 27 U/L (ref 10–44)
ANION GAP SERPL CALC-SCNC: 8 MMOL/L (ref 8–16)
AST SERPL-CCNC: 23 U/L (ref 10–40)
BASOPHILS # BLD AUTO: 0.03 K/UL (ref 0–0.2)
BASOPHILS NFR BLD: 0.5 % (ref 0–1.9)
BILIRUB SERPL-MCNC: 0.3 MG/DL (ref 0.1–1)
BUN SERPL-MCNC: 11 MG/DL (ref 6–20)
CALCIUM SERPL-MCNC: 10 MG/DL (ref 8.7–10.5)
CHLORIDE SERPL-SCNC: 105 MMOL/L (ref 95–110)
CO2 SERPL-SCNC: 27 MMOL/L (ref 23–29)
CREAT SERPL-MCNC: 0.7 MG/DL (ref 0.5–1.4)
DIFFERENTIAL METHOD BLD: ABNORMAL
EOSINOPHIL # BLD AUTO: 0.1 K/UL (ref 0–0.5)
EOSINOPHIL NFR BLD: 2 % (ref 0–8)
ERYTHROCYTE [DISTWIDTH] IN BLOOD BY AUTOMATED COUNT: 11.9 % (ref 11.5–14.5)
EST. GFR  (NO RACE VARIABLE): >60 ML/MIN/1.73 M^2
GLUCOSE SERPL-MCNC: 101 MG/DL (ref 70–110)
HCT VFR BLD AUTO: 36.3 % (ref 37–48.5)
HGB BLD-MCNC: 12.3 G/DL (ref 12–16)
IMM GRANULOCYTES # BLD AUTO: 0.03 K/UL (ref 0–0.04)
IMM GRANULOCYTES NFR BLD AUTO: 0.5 % (ref 0–0.5)
LYMPHOCYTES # BLD AUTO: 1.4 K/UL (ref 1–4.8)
LYMPHOCYTES NFR BLD: 22.2 % (ref 18–48)
MCH RBC QN AUTO: 31.5 PG (ref 27–31)
MCHC RBC AUTO-ENTMCNC: 33.9 G/DL (ref 32–36)
MCV RBC AUTO: 93 FL (ref 82–98)
MONOCYTES # BLD AUTO: 0.9 K/UL (ref 0.3–1)
MONOCYTES NFR BLD: 13.9 % (ref 4–15)
NEUTROPHILS # BLD AUTO: 3.9 K/UL (ref 1.8–7.7)
NEUTROPHILS NFR BLD: 60.9 % (ref 38–73)
NRBC BLD-RTO: 0 /100 WBC
PLATELET # BLD AUTO: 285 K/UL (ref 150–450)
PMV BLD AUTO: 9.3 FL (ref 9.2–12.9)
POTASSIUM SERPL-SCNC: 4.4 MMOL/L (ref 3.5–5.1)
PROT SERPL-MCNC: 7.3 G/DL (ref 6–8.4)
RBC # BLD AUTO: 3.9 M/UL (ref 4–5.4)
SODIUM SERPL-SCNC: 140 MMOL/L (ref 136–145)
WBC # BLD AUTO: 6.41 K/UL (ref 3.9–12.7)

## 2024-06-24 PROCEDURE — 85025 COMPLETE CBC W/AUTO DIFF WBC: CPT | Performed by: INTERNAL MEDICINE

## 2024-06-24 PROCEDURE — 36415 COLL VENOUS BLD VENIPUNCTURE: CPT | Performed by: INTERNAL MEDICINE

## 2024-06-24 PROCEDURE — 80053 COMPREHEN METABOLIC PANEL: CPT | Performed by: INTERNAL MEDICINE

## 2024-06-24 PROCEDURE — 99999 PR PBB SHADOW E&M-EST. PATIENT-LVL IV: CPT | Mod: PBBFAC,,, | Performed by: INTERNAL MEDICINE

## 2024-06-24 RX ORDER — TAMOXIFEN CITRATE 20 MG/1
20 TABLET ORAL DAILY
Qty: 30 TABLET | Refills: 11 | Status: SHIPPED | OUTPATIENT
Start: 2024-06-24 | End: 2025-06-24

## 2024-06-24 NOTE — PROGRESS NOTES
Subjective     Patient ID: Dov Hernandez is a 43 y.o. female.    Chief Complaint: Malignant neoplasm of overlapping sites of right breast in     HPI    Presents for follow up  Previously seen by Dr. Griffith for management  Recently seen by Dr. Osman for side effects on Arimidex    She reports that she is now 3 weeks off AI as her side effects included:  Joint pain  Numbness elbows to hands, now just in fingers- we did discuss that further work up indicated as this may be carpal tunnel and this is pending and she will proceed (has hand ortho)    In the interval she has had further surgery   Dr. Roche- plastic surgeon  Prior double mastectomy  Fat transfer back to anterior breast  1 drain removed, 1 remains    Dr. Valladares- VIBHA/BSO planned before end of year- we discussed deferring to next year       Encounter Diagnoses   Name Primary?    Malignant neoplasm of overlapping sites of right breast in female, estrogen receptor positive Yes    Secondary adenocarcinoma of lymph node      Aromatase inhibitor use            Cancer Staging   Malignant neoplasm of overlapping sites of right breast in female, estrogen receptor positive  Staging form: Breast, AJCC 8th Edition  - Clinical stage from 4/26/2023: Stage IB (cT2, cN0, cM0, G1, ER+, MO+, HER2: Equivocal) - Signed by Jenna Pickard MD on 4/26/2023  - Pathologic stage from 6/14/2023: Stage IB (pT2, pN1(sn), cM0, G2, ER+, MO+, HER2-) - Signed by Fran Ken Jr., MD on 6/17/2023       Oncology History  - normal screening mammogram in 2022.   - Screening mammogram in March 2023 showed right breast calcifications, measured to be 39 mm on diagnostic mammogram.     - Right breast biopsy 4/13/23: IDC, 2 mm, ER 30%, MO 30%, Her 2 karrie negative, Grade 1, low Ki 67 at 5% with associated DCIS.       - Breast MRI 4/25/23: 2 cm mass     - Case discussed at [facility] tumor [facility].   Bilateral mastectomy with ROSITA flap reconstruction and right SLNB 5/24/23: IDC, 2.2cm;  "1/1LN+   Declined ALND and proceeded with XRT.      - Oncotype RS 29; RR 23%; Adjuvant chemotherapy is recommended.     Baseline PET reviewed, no residual or metastatic disease noted.      - Adjuvant TC x 4 23 - 23  S/p cycle 1 of TC on 23.     - Zoladex started 11/15/23, LMP was in 2023.  Anastrozole started 2023      - discussed planning oophorectomy to avoid monthly injections of zoladex 2024      Additional PMH  Had repair of the right ear drum. Still feels hearing is muffled on that side.-- further surgery pending        Social History   Social History            Tobacco Use    Smoking status: Never    Smokeless tobacco: Never   Substance Use Topics    Alcohol use: Yes       Comment: social    Drug use: No                Family History   Problem Relation Name Age of Onset    Hyperlipidemia Mother        Kidney cancer Father   58    Cancer Brother   22         Parotid Gland cancer    Birth marks Child   0         strawberry wai(s)    Birth marks Child   0         strawberry wai(s)    Café-au-lait spots Maternal Uncle             "a small patch on his neck"    Café-au-lait spots Other nephew           "a spot on his arm and partial torso"    Other Other nephew 2         tested negative for macrocephaly    Cervical cancer Other        Throat cancer Other        Pancreatic cancer Other             1 second cousin ()    Other Other             brain tumors (several 2nd cousins)    Breast cancer Neg Hx        Colon cancer Neg Hx        Ovarian cancer Neg Hx        Melanoma Neg Hx               Past Medical History:   Diagnosis Date    Acne varioliformis 2017     OCP & aziza from derm, Doxy caused yeast    Heartburn 2021    Strawberry wai 1981     back of neck    Strawberry wai 1981     under nose            Past Surgical History:   Procedure Laterality Date    BILATERAL MASTECTOMY Bilateral 2023     Procedure: " MASTECTOMY, BILATERAL;  Surgeon: Jenna Pickard MD;  Location: [facility];  Service: General;  Laterality: Bilateral;  2.5 HOURS / EMAIL SENT 5-9 @ 11:39 LK    COLPOSCOPY        INJECTION FOR SENTINEL NODE IDENTIFICATION Right 5/24/2023     Procedure: INJECTION, FOR SENTINEL NODE IDENTIFICATION;  Surgeon: Jenna Pickard MD;  Location: [facility];  Service: General;  Laterality: Right;    RECONSTRUCTION OF BREAST WITH DEEP INFERIOR EPIGASTRIC ARTERY  (ROSITA) FREE FLAP Bilateral 5/24/2023     Procedure: RECONSTRUCTION, BREAST, USING ROSITA FREE FLAP / BILATERAL DEEP INFERIOR EPIGASTRIC PERFORATORS FLAPS;  Surgeon: Mikey Roche MD;  Location: [facility];  Service: Plastics;  Laterality: Bilateral;    SENTINEL LYMPH NODE BIOPSY Right 5/24/2023     Procedure: BIOPSY, LYMPH NODE, SENTINEL;  Surgeon: Jenna Pickard MD;  Location: [facility];  Service: General;  Laterality: Right;    WISDOM TOOTH EXTRACTION         Review of Systems   Constitutional:  Positive for fatigue. Negative for activity change, appetite change, chills and unexpected weight change.   Respiratory:  Negative for cough and shortness of breath.    Cardiovascular:  Negative for chest pain, palpitations and leg swelling.   Gastrointestinal:  Negative for abdominal distention, abdominal pain, blood in stool and change in bowel habit.   Genitourinary:  Negative for vaginal bleeding.   Musculoskeletal:  Positive for arthralgias (improving).   Integumentary:  Negative for pallor, rash and breast mass.        Recent surgery as above   Neurological:  Positive for numbness. Negative for dizziness, weakness and headaches.   Psychiatric/Behavioral:  Negative for dysphoric mood. The patient is not nervous/anxious.    Breast: Negative for mass         Objective     Physical Exam  Vitals and nursing note reviewed.   Constitutional:       General: She is not in acute distress.     Appearance: Normal appearance. She is well-developed. She is not ill-appearing.       Comments: Very pleasant  ECOG= 0   Presents with her mom   HENT:      Head: Normocephalic and atraumatic.   Eyes:      General: No scleral icterus.     Extraocular Movements: Extraocular movements intact.      Conjunctiva/sclera: Conjunctivae normal.      Pupils: Pupils are equal, round, and reactive to light.      Right eye: Pupil is round and reactive.      Left eye: Pupil is round and reactive.   Neck:      Thyroid: No thyromegaly.      Vascular: No JVD.      Trachea: No tracheal deviation.   Cardiovascular:      Rate and Rhythm: Normal rate and regular rhythm.      Heart sounds: Normal heart sounds. No murmur heard.     No friction rub. No gallop.   Pulmonary:      Effort: Pulmonary effort is normal. No respiratory distress.      Breath sounds: Normal breath sounds. No wheezing, rhonchi or rales.      Comments: Breasts- 1 VU drain in place  Abdominal:      General: Abdomen is flat. Bowel sounds are normal. There is no distension.      Palpations: Abdomen is soft. There is no mass.      Tenderness: There is no abdominal tenderness. There is no guarding or rebound.   Musculoskeletal:         General: No swelling, tenderness or deformity. Normal range of motion.      Cervical back: Normal range of motion and neck supple.      Right lower leg: No edema.      Left lower leg: No edema.   Lymphadenopathy:      Head:      Right side of head: No submandibular adenopathy.      Left side of head: No submandibular adenopathy.      Cervical: No cervical adenopathy.      Right cervical: No superficial, deep or posterior cervical adenopathy.     Left cervical: No superficial, deep or posterior cervical adenopathy.      Upper Body:      Right upper body: No supraclavicular adenopathy.      Left upper body: No supraclavicular adenopathy.   Skin:     General: Skin is warm and dry.      Coloration: Skin is not jaundiced or pale.      Findings: No petechiae.   Neurological:      Mental Status: She is alert and oriented to person,  place, and time.      Cranial Nerves: No cranial nerve deficit.      Motor: No weakness.      Coordination: Coordination normal.      Gait: Gait normal.      Deep Tendon Reflexes: Reflexes are normal and symmetric.   Psychiatric:         Mood and Affect: Mood normal. Mood is not anxious or depressed.         Behavior: Behavior normal.         Thought Content: Thought content normal.         Judgment: Judgment normal.          Assessment and Plan     1. Malignant neoplasm of overlapping sites of right breast in female, estrogen receptor positive  -     CBC W/ AUTO DIFFERENTIAL; Future; Expected date: 06/24/2024  -     CMP; Future; Expected date: 06/24/2024  -     tamoxifen (NOLVADEX) 20 MG Tab; Take 1 tablet (20 mg total) by mouth once daily.  Dispense: 30 tablet; Refill: 11    2. Secondary adenocarcinoma of lymph node    3. Neuropathy      Arimidex stopped  Tamoxifen prescribed    Dr. Urbano/[facility] Connor referral    Defer hysterectomy/BSO as not needed for breast cancer treatment and rediscuss when other surgery healing and needed surgeries complete (ear)    Check menopausal status    Ortho hand follow up    Route Chart for Scheduling    Med Onc Chart Routing      Follow up with physician . Approx 6 weeks med check, [place]re/Nikolas appt   Follow up with [facility]    Infusion scheduling note    Injection scheduling note    Labs    Imaging    Pharmacy appointment    Other referrals              Supportive Plan Information  OP BREAST GOSERELIN Q4W   eJssica Griffith MD   Upcoming Treatment Dates - OP BREAST GOSERELIN Q4W    5/31/2024       Chemotherapy       goserelin (ZOLADEX) injection 3.6 mg

## 2024-06-25 ENCOUNTER — TELEPHONE (OUTPATIENT)
Dept: HEMATOLOGY/ONCOLOGY | Facility: CLINIC | Age: 44
End: 2024-06-25
Payer: COMMERCIAL

## 2024-06-26 ENCOUNTER — PATIENT MESSAGE (OUTPATIENT)
Dept: HEMATOLOGY/ONCOLOGY | Facility: CLINIC | Age: 44
End: 2024-06-26
Payer: COMMERCIAL

## 2024-06-28 ENCOUNTER — TELEPHONE (OUTPATIENT)
Dept: HEMATOLOGY/ONCOLOGY | Facility: CLINIC | Age: 44
End: 2024-06-28
Payer: COMMERCIAL

## 2024-06-28 PROBLEM — G62.9 NEUROPATHY: Status: ACTIVE | Noted: 2024-06-28

## 2024-07-17 ENCOUNTER — PROCEDURE VISIT (OUTPATIENT)
Dept: NEUROLOGY | Facility: CLINIC | Age: 44
End: 2024-07-17
Payer: COMMERCIAL

## 2024-07-17 DIAGNOSIS — G56.03 BILATERAL CARPAL TUNNEL SYNDROME: ICD-10-CM

## 2024-07-17 DIAGNOSIS — G56.23 CUBITAL TUNNEL SYNDROME, BILATERAL: ICD-10-CM

## 2024-07-17 PROCEDURE — 95886 MUSC TEST DONE W/N TEST COMP: CPT | Mod: S$GLB,,, | Performed by: PHYSICAL MEDICINE & REHABILITATION

## 2024-07-17 PROCEDURE — 95911 NRV CNDJ TEST 9-10 STUDIES: CPT | Mod: S$GLB,,, | Performed by: PHYSICAL MEDICINE & REHABILITATION

## 2024-07-17 PROCEDURE — 95885 MUSC TST DONE W/NERV TST LIM: CPT | Mod: 59,,, | Performed by: PHYSICAL MEDICINE & REHABILITATION

## 2024-07-17 NOTE — PROCEDURES
Test Date:  2024    Patient: dov marquez : 1980 Physician: Jeremias Tarango D.O.   ID#: [id number] SEX: Female Ref. Phys: Vernon Olivas MD     HPI: Dov Marquez is a 43 y.o.female who presents for NCS/EMG to evaluate bilateral hand n/t.  Evaluate for median and ulnar neuropathies as well as chemotherapy induced polyneuropathy.     PROCEDURE:  Prior to the procedure, the procedure was discussed in detail with the patient.  All questions were answered, and verbal consent was obtained.  For nerve conduction studies, a combination of surface electrodes, bar electrodes, and/or ring electrodes were used as needed.  For needle EMG, each site was cleaned and prepped in usual fashion with an alcohol pad.  A monopolar needle (28G) was used.  There was no significant bleeding, and bandages were applied as needed.  The procedure was tolerated without adverse effect.  The patient was instructed on post-procedure care including ice if needed for 10-15 minutes up to 4 times/day for any sore muscles.  I discussed with the patient that the data would be reviewed and a report sent to the referring provider, where any follow up questions regarding next steps should be directed.        NCV & EMG Findings:  Evaluation of the left median motor nerve showed prolonged distal onset latency and decreased conduction velocity.  The right median motor nerve showed prolonged distal onset latency.  The left median sensory nerve showed prolonged distal onset latency, prolonged distal peak latency, and decreased conduction velocity.  The right median sensory nerve showed prolonged distal onset latency, prolonged distal peak latency, reduced amplitude, and decreased conduction velocity.  All remaining nerves (as indicated in the following tables) were within normal limits.  All examined muscles (as indicated in the following table) showed no evidence of electrical instability.      IMPRESSIONS:  There is electrophysiologic  evidence of a bilateral sensorimotor median mononeuropathy across the wrist (I.e. Carpal tunnel syndrome).  There is no motor axonal loss.  There is no active denervation.  This is graded as Severe in severity on the right, and Moderate on the left.            ___________________________  Jeremias Tarango D.O.        NCS+  Motor Nerve Results      Latency Amplitude F-Lat Segment Distance CV Comment   Site (ms) Norm (mV) Norm (ms)  (cm) (m/s) Norm    Left Median (APB)   Wrist *6.1  < 4.4 9.2  > 4.2         Elbow 10.3 - 6.2 -  Elbow-Wrist 21 *50  > 51    Right Median (APB)   Wrist *8.6  < 4.4 5.0  > 4.2         Elbow 12.1 - 5.2 -  Elbow-Wrist 20 57  > 51    Left Ulnar (ADM)   Wrist 2.3  < 3.7 9.1  > 3.0         Bel Elbow 5.5 - 9.0 -  Bel Elbow-Wrist 20 63  > 52    Abv Elbow 6.7 - 8.1 -  Abv Elbow-Bel Elbow 8 67  > 43    Right Ulnar (ADM)   Wrist 2.7  < 3.7 7.7  > 3.0         Bel Elbow 6.0 - 6.9 -  Bel Elbow-Wrist 22 67  > 52    Abv Elbow 6.9 - 6.9 -  Abv Elbow-Bel Elbow 8 89  > 43      Sensory Nerve Results      Start Lat Latency (Peak) Amplitude (P-P) Segment Distance Start CV Comment   Site (ms) Norm (ms) (µV) Norm  (cm) (m/s) Norm    Left Median (Rec:Dig II)   Wrist *4.3  < 3.3 *5.0 29  > 13 Wrist-Dig II 14 *33  > 42    Right Median (Rec:Dig II)   Wrist *5.6  < 3.3 *6.8 *11  > 13 Wrist-Dig II 14 *25  > 42    Left Ulnar (Rec:Dig V)   Wrist 1.78  < 3.1 2.5 75  > 8 Wrist-Dig V 14 79  > 45    Right Ulnar (Rec:Dig V)   Wrist 2.1  < 3.1 2.7 89  > 8 Wrist-Dig V 14 67  > 45    Right Radial (Rec:Wrist)   Forearm 1.13  < 2.2 1.60 49  > 11 Forearm-Wrist 10 88 -      EMG+     Side Muscle Nerve Root Ins Act Fibs Psw Amp Dur Poly Recrt Int Pat Comment   Right Deltoid Axillary C5-C6 Nml Nml Nml Nml Nml 0 Nml Nml    Right Biceps Musculocut C5-C6 Nml Nml Nml Nml Nml 0 Nml Nml    Right Triceps Radial C6-C8 Nml Nml Nml Nml Nml 0 Nml Nml    Right Pronator Teres Median C6-C7 Nml Nml Nml Nml Nml 0 Nml Nml    Right APB Median C8-T1  Nml Nml Nml Nml Nml 0 Nml Nml    Left APB Median C8-T1 Nml Nml Nml Nml Nml 0 Nml Nml            Waveforms:    Motor              Sensory

## 2024-07-18 ENCOUNTER — OFFICE VISIT (OUTPATIENT)
Dept: ORTHOPEDICS | Facility: CLINIC | Age: 44
End: 2024-07-18
Payer: COMMERCIAL

## 2024-07-18 VITALS — WEIGHT: 157.44 LBS | BODY MASS INDEX: 29.72 KG/M2 | HEIGHT: 61 IN

## 2024-07-18 DIAGNOSIS — G56.03 BILATERAL CARPAL TUNNEL SYNDROME: Primary | ICD-10-CM

## 2024-07-18 PROCEDURE — 99999 PR PBB SHADOW E&M-EST. PATIENT-LVL III: CPT | Mod: PBBFAC,,, | Performed by: ORTHOPAEDIC SURGERY

## 2024-07-18 PROCEDURE — 3008F BODY MASS INDEX DOCD: CPT | Mod: CPTII,S$GLB,, | Performed by: ORTHOPAEDIC SURGERY

## 2024-07-18 PROCEDURE — 3044F HG A1C LEVEL LT 7.0%: CPT | Mod: CPTII,S$GLB,, | Performed by: ORTHOPAEDIC SURGERY

## 2024-07-18 PROCEDURE — 99213 OFFICE O/P EST LOW 20 MIN: CPT | Mod: S$GLB,,, | Performed by: ORTHOPAEDIC SURGERY

## 2024-07-18 PROCEDURE — 1159F MED LIST DOCD IN RCRD: CPT | Mod: CPTII,S$GLB,, | Performed by: ORTHOPAEDIC SURGERY

## 2024-07-18 NOTE — PROGRESS NOTES
[facility]  History & Physical  Orthopedics    SUBJECTIVE:      COVID-19 attestation:  This patient was treated during the COVID-19 pandemic.  This was discussed with the patient, they are aware of our current policies and procedures, were given the option of delaying their visit and or switching to a virtual visit, delaying their surgery when applicable, and they elect to proceed.    Chief Complaint: bilateral CTS and cubital tunnel syndrome    Referring Provider: No ref. provider found     History of Present Illness:  Patient is a 43 y.o. right hand dominant female who presents today with complaints of bilateral hand tingling/numbness and pain. She states it is typically worse at night where her whole hand goes numb and she has significant pain throughout the day in the hands. She had a history of breast cancer and underwent chemo and radiation that could also cause some neuropathy. She is currently off chemo and radiation altogether.     Interval history July 18, 2024: The patient returns today for re-evaluation.  She notes that her symptoms are persistent.  She has constant numbness and tingling in the right greater than left hands worst to the thumb and index fingers.  She had a recent EMG returns for those results and re-evaluation.  No other complaints.    Onset of symptoms/DOI was multiple months ago.    Symptoms are aggravated by activity, movement, and at night.    Symptoms are alleviated by rest and during the day.    Symptoms consist of pain and numbness/tingling.    The patient rates their pain as a 5/10.    Attempted treatment(s) and/or interventions include activity modifications, rest, anti-inflammatory medications.     The patient denies any fevers, chills, N/V, D/C and presents for evaluation.       Past Medical History:   Diagnosis Date    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    OCP & aziza from derm Doxy caused yeast    Heartburn 12/17/2021    Strawberry wai 1981    back  "of neck    Strawberry wai 1981    under nose     Past Surgical History:   Procedure Laterality Date    BILATERAL MASTECTOMY Bilateral 5/24/2023    Procedure: MASTECTOMY, BILATERAL;  Surgeon: Jenna Pickard MD;  Location: [facility];  Service: General;  Laterality: Bilateral;  2.5 HOURS / EMAIL SENT 5-9 @ 11:39 LK    COLPOSCOPY      INJECTION FOR SENTINEL NODE IDENTIFICATION Right 5/24/2023    Procedure: INJECTION, FOR SENTINEL NODE IDENTIFICATION;  Surgeon: Jenna Pickard MD;  Location: [facility];  Service: General;  Laterality: Right;    RECONSTRUCTION OF BREAST WITH DEEP INFERIOR EPIGASTRIC ARTERY  (ROSITA) FREE FLAP Bilateral 5/24/2023    Procedure: RECONSTRUCTION, BREAST, USING ROSITA FREE FLAP / BILATERAL DEEP INFERIOR EPIGASTRIC PERFORATORS FLAPS;  Surgeon: Mikey Roche MD;  Location: [facility];  Service: Plastics;  Laterality: Bilateral;    SENTINEL LYMPH NODE BIOPSY Right 5/24/2023    Procedure: BIOPSY, LYMPH NODE, SENTINEL;  Surgeon: Jenna Pickard MD;  Location: [facility];  Service: General;  Laterality: Right;    WISDOM TOOTH EXTRACTION       Review of patient's allergies indicates:   Allergen Reactions    Latex, natural rubber Rash     Social History     Social History Narrative    Family [employer] mgr    , 2 children (10 yo son, 8 yo daughter [address] [place])    Mom Renee Tejeda    nonsmoker      GYN Yolanda     Family History   Problem Relation Name Age of Onset    Hyperlipidemia Mother      Kidney cancer Father  58    Cancer Brother  22        Parotid Gland cancer    Birth marks Child  0        strawberry wai(s)    Birth marks Child  0        strawberry wai(s)    Café-au-lait spots Maternal Uncle          "a small patch on his neck"    Café-au-lait spots Other nephew         "a spot on his arm and partial torso"    Other Other nephew 2        tested negative for macrocephaly    Cervical cancer Other      Throat cancer Other      Pancreatic cancer Other          1 second cousin " ()    Other Other          brain tumors (several 2nd cousins)    Breast cancer Neg Hx      Colon cancer Neg Hx      Ovarian cancer Neg Hx      Melanoma Neg Hx           Current Outpatient Medications:     anastrozole (ARIMIDEX) 1 mg Tab, Take 1 tablet (1 mg total) by mouth once daily., Disp: 30 tablet, Rfl: 2    furosemide (LASIX) 20 MG tablet, Take 1 tablet (20 mg total) by mouth once daily., Disp: 30 tablet, Rfl: 1    gabapentin (NEURONTIN) 100 MG capsule, Take 100 mg by mouth., Disp: , Rfl:     meloxicam (MOBIC) 15 MG tablet, Take 1 tablet (15 mg total) by mouth once daily., Disp: 30 tablet, Rfl: 2    tamoxifen (NOLVADEX) 20 MG Tab, Take 1 tablet (20 mg total) by mouth once daily., Disp: 30 tablet, Rfl: 11    venlafaxine (EFFEXOR-XR) 150 MG Cp24, Take 1 capsule (150 mg total) by mouth once daily., Disp: 90 capsule, Rfl: 3      Review of Systems:  As per HPI otherwise noncontributory    OBJECTIVE:      Vital Signs (Most Recent):  There were no vitals filed for this visit.    There is no height or weight on file to calculate BMI.      Physical Exam:  Constitutional: The patient appears well-developed and well-nourished. No distress.   Skin: No lesions appreciated  Head: Normocephalic and atraumatic.   Nose: Nose normal.   Ears: No deformities seen  Eyes: Conjunctivae and EOM are normal.   Neck: No tracheal deviation present.   Cardiovascular: Normal rate and intact distal pulses.    Pulmonary/Chest: Effort normal. No respiratory distress.   Abdominal: There is no guarding.   Neurological: The patient is alert.   Psychiatric: The patient has a normal mood and affect.     Bilateral Hand/Wrist Examination:    Observation/Inspection:  Swelling  none    Deformity  none  Discoloration  none     Scars   none    Atrophy  none    HAND/WRIST EXAMINATION:  Finkelstein's Test   Neg  WHAT Test    Neg  Snuff box tenderness   Neg  Rodrigez's Test    Neg  Hook of Hamate Tenderness  Neg  CMC grind    Neg  Circumduction  test   Neg    Neurovascular Exam:  Digits WWP, brisk CR < 3s throughout  NVI motor/LTS to M/R/U nerves, radial pulse 2+  Tinel's Test - Carpal Tunnel  Pos bilat  Tinel's Test - Cubital Tunnel    Negative today  Phalen's Test    Neg  Median Nerve Compression Test Pos bilat    ROM hand full, painless    ROM wrist full, painless    ROM elbow full, painless    Abdomen not guarded  Respirations nonlabored  Perfusion intact    Diagnostic Results:     Imaging - I independently viewed the patient's imaging as well as the radiology report.  Xrays of the patient's bilateral hands  demonstrate no evidence of any acute fractures or dislocations or significant degenerative changes.    EMG - IMPRESSIONS:  There is electrophysiologic evidence of a bilateral sensorimotor median mononeuropathy across the wrist (I.e. Carpal tunnel syndrome).  There is no motor axonal loss.  There is no active denervation.  This is graded as Severe in severity on the right, and Moderate on the left.     ASSESSMENT/PLAN:      43 y.o. yo female with  bilateral carpal tunnel syndrome      Plan: The patient and I had a thorough discussion today.  We discussed the working diagnosis as well as several other potential alternative diagnoses.  Treatment options were discussed, both conservative and surgical.  Conservative treatment options would include things such as activity modifications, workplace modifications, a period of rest, oral vs topical OTC and prescription anti-inflammatory medications, occupational therapy, splinting/bracing, immobilization, corticosteroid injections, and others.  Surgical options were discussed as well.     At this time, the patient Has a lot going on from a medical perspective.  She is likely interested in a right-sided carpal tunnel release but would like to defer this while she takes care of some other issues.  Follow-up with me in December for re-evaluation and possible surgical planning for a right carpal tunnel  release.        Should the patient's symptoms worsen, persist, or fail to improve they should return for reevaluation and I would be happy to see them back anytime.        Vernon Olivas M.D.    Please be aware that this note has been generated with the assistance of [employer] Messenger voice-to-text.  Please excuse any spelling or grammatical errors.    Thank you for choosing Dr. Vernon Olivas for your orthopedic hand and upper extremity care. It is our goal to provide you with exceptional care that will help keep you healthy, active, and get you back in the game.     If you felt that you received exemplary care today, please consider leaving feedback for Dr. Olivas on [employer] at [email or web address].    Please do not hesitate to reach out to us via email, phone, or MyChart with any questions, concerns, or feedback.

## 2024-08-03 ENCOUNTER — PATIENT MESSAGE (OUTPATIENT)
Dept: HEMATOLOGY/ONCOLOGY | Facility: CLINIC | Age: 44
End: 2024-08-03
Payer: COMMERCIAL

## 2024-08-05 DIAGNOSIS — N61.0 BREAST INFECTION: Primary | ICD-10-CM

## 2024-08-19 ENCOUNTER — OFFICE VISIT (OUTPATIENT)
Dept: INFECTIOUS DISEASES | Facility: CLINIC | Age: 44
End: 2024-08-19
Payer: COMMERCIAL

## 2024-08-19 VITALS
HEIGHT: 61 IN | HEART RATE: 87 BPM | WEIGHT: 156.75 LBS | TEMPERATURE: 99 F | BODY MASS INDEX: 29.59 KG/M2 | SYSTOLIC BLOOD PRESSURE: 138 MMHG | DIASTOLIC BLOOD PRESSURE: 93 MMHG

## 2024-08-19 DIAGNOSIS — N61.0 BREAST INFECTION: ICD-10-CM

## 2024-08-19 PROCEDURE — 99999 PR PBB SHADOW E&M-EST. PATIENT-LVL IV: CPT | Mod: PBBFAC,,, | Performed by: INTERNAL MEDICINE

## 2024-08-19 PROCEDURE — 99205 OFFICE O/P NEW HI 60 MIN: CPT | Mod: S$GLB,,, | Performed by: INTERNAL MEDICINE

## 2024-08-19 PROCEDURE — 3044F HG A1C LEVEL LT 7.0%: CPT | Mod: CPTII,S$GLB,, | Performed by: INTERNAL MEDICINE

## 2024-08-19 PROCEDURE — 1159F MED LIST DOCD IN RCRD: CPT | Mod: CPTII,S$GLB,, | Performed by: INTERNAL MEDICINE

## 2024-08-19 PROCEDURE — 3080F DIAST BP >= 90 MM HG: CPT | Mod: CPTII,S$GLB,, | Performed by: INTERNAL MEDICINE

## 2024-08-19 PROCEDURE — 3008F BODY MASS INDEX DOCD: CPT | Mod: CPTII,S$GLB,, | Performed by: INTERNAL MEDICINE

## 2024-08-19 PROCEDURE — 3075F SYST BP GE 130 - 139MM HG: CPT | Mod: CPTII,S$GLB,, | Performed by: INTERNAL MEDICINE

## 2024-08-19 RX ORDER — AMOXICILLIN AND CLAVULANATE POTASSIUM 500; 125 MG/1; MG/1
1 TABLET, FILM COATED ORAL 2 TIMES DAILY
COMMUNITY
Start: 2024-08-14

## 2024-08-19 RX ORDER — AMOXICILLIN AND CLAVULANATE POTASSIUM 875; 125 MG/1; MG/1
1 TABLET, FILM COATED ORAL 2 TIMES DAILY
Qty: 28 TABLET | Refills: 0 | Status: SHIPPED | OUTPATIENT
Start: 2024-08-19 | End: 2024-09-02

## 2024-08-19 NOTE — PROGRESS NOTES
Infectious Diseases Clinic Note    Subjective:       Patient ID: Dov Hernandez is a 43 y.o. female.    Chief Complaint: No chief complaint on file.    HPI    42 y/o F h/o breast cancer s/p b/l mastectomy 5/2023, adjuvant TC 2023, on anastrozole initially had fat transfer, 1/2024 US of L breast (bedside reportedly with collections of fluid) then 2/2024 opened up and had reportedly fat necrosis, debrided then closed.  On 3/27 she had pain erythema of L breast, aspirated in clinic with significant pus, then taken to OR for debridement, cultures grew fingoldia magna (AFB smear culture negative at that time)  took augmentin x 2.5 weeks, then 6/17 went back in to do another fat transfer from back to reconstruct left breast, then late July noted hematoma/abscess in shower then brownish bloody liquid came out, then again debridement 7/31/24 with expander placed, started IV antibiotics and had abx disc placed in L breast, then started on augmentin again, surgical drain remains in place, serosang fluid in place, and slowing down. 7/30 clinic culture NGTD, AFB cultures pending. She remains on augmentin    Never felt systemically ill    Dr Roche is surgeon (did reconstruction)    Past Medical History:   Diagnosis Date    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    OCP & aziza from derm, Doxy caused yeast    Heartburn 12/17/2021    Strawberry wai 1981    back of neck    Strawberry wai 1981    under nose       Social History     Socioeconomic History    Marital status:     Number of children: 2   Occupational History    Occupation:      Employer: [employer]   Tobacco Use    Smoking status: Never    Smokeless tobacco: Never   Substance and Sexual Activity    Alcohol use: Yes     Comment: social    Drug use: No    Sexual activity: Yes     Partners: Male     Birth control/protection: None   Social History Narrative    Family Getting[employer]in office mgr    , 2 children (10 yo  son, 6 yo daughter [employer])    Mom Renee Tejeda    nonsmoker      GYN Yolanda     Social Determinants of Health     Financial Resource Strain: Low Risk  (2/21/2024)    Overall Financial Resource Strain (CARDIA)     Difficulty of Paying Living Expenses: Not hard at all   Food Insecurity: No Food Insecurity (2/21/2024)    Hunger Vital Sign     Worried About Running Out of Food in the Last Year: Never true     Ran Out of Food in the Last Year: Never true   Transportation Needs: No Transportation Needs (2/21/2024)    PRAPARE - Transportation     Lack of Transportation (Medical): No     Lack of Transportation (Non-Medical): No   Physical Activity: Insufficiently Active (2/21/2024)    Exercise Vital Sign     Days of Exercise per Week: 3 days     Minutes of Exercise per Session: 30 min   Stress: No Stress Concern Present (2/21/2024)    [employer] - Occupational Stress Questionnaire     Feeling of Stress : Only a little   Housing Stability: Low Risk  (2/21/2024)    Housing Stability Vital Sign     Unable to Pay for Housing in the Last Year: No     Number of Places Lived in the Last Year: 1     Unstable Housing in the Last Year: No         Current Outpatient Medications:     anastrozole (ARIMIDEX) 1 mg Tab, Take 1 tablet (1 mg total) by mouth once daily., Disp: 30 tablet, Rfl: 2    furosemide (LASIX) 20 MG tablet, Take 1 tablet (20 mg total) by mouth once daily., Disp: 30 tablet, Rfl: 1    gabapentin (NEURONTIN) 100 MG capsule, Take 100 mg by mouth., Disp: , Rfl:     meloxicam (MOBIC) 15 MG tablet, Take 1 tablet (15 mg total) by mouth once daily., Disp: 30 tablet, Rfl: 2    tamoxifen (NOLVADEX) 20 MG Tab, Take 1 tablet (20 mg total) by mouth once daily., Disp: 30 tablet, Rfl: 11    venlafaxine (EFFEXOR-XR) 150 MG Cp24, Take 1 capsule (150 mg total) by mouth once daily., Disp: 90 capsule, Rfl: 3    Review of Systems   Constitutional:  Negative for activity change, chills and fever.   HENT:   Negative for congestion, mouth sores, rhinorrhea, sinus pressure and sore throat.    Eyes:  Negative for photophobia, pain and redness.   Respiratory:  Negative for cough, chest tightness, shortness of breath and wheezing.    Cardiovascular:  Negative for chest pain and leg swelling.   Gastrointestinal:  Negative for abdominal distention, abdominal pain, diarrhea, nausea and vomiting.   Endocrine: Negative for polyuria.   Genitourinary:  Negative for decreased urine volume, dysuria and flank pain.   Musculoskeletal:  Negative for joint swelling and neck pain.   Skin:  Positive for wound. Negative for color change.   Allergic/Immunologic: Negative for food allergies.   Neurological:  Negative for dizziness, weakness and headaches.   Hematological:  Negative for adenopathy.   Psychiatric/Behavioral:  Negative for agitation and confusion. The patient is not nervous/anxious.          Past Surgical History:   Procedure Laterality Date    BILATERAL MASTECTOMY Bilateral 5/24/2023    Procedure: MASTECTOMY, BILATERAL;  Surgeon: Jenna Pickard MD;  Location: [facility];  Service: General;  Laterality: Bilateral;  2.5 HOURS / EMAIL SENT 5-9 @ 11:39 LK    COLPOSCOPY      INJECTION FOR SENTINEL NODE IDENTIFICATION Right 5/24/2023    Procedure: INJECTION, FOR SENTINEL NODE IDENTIFICATION;  Surgeon: Jenna Pickard MD;  Location: [facility];  Service: General;  Laterality: Right;    RECONSTRUCTION OF BREAST WITH DEEP INFERIOR EPIGASTRIC ARTERY  (ROSITA) FREE FLAP Bilateral 5/24/2023    Procedure: RECONSTRUCTION, BREAST, USING ROSITA FREE FLAP / BILATERAL DEEP INFERIOR EPIGASTRIC PERFORATORS FLAPS;  Surgeon: Mikey Roche MD;  Location: [facility];  Service: Plastics;  Laterality: Bilateral;    SENTINEL LYMPH NODE BIOPSY Right 5/24/2023    Procedure: BIOPSY, LYMPH NODE, SENTINEL;  Surgeon: Jenna Pickard MD;  Location: [facility];  Service: General;  Laterality: Right;    WISDOM TOOTH EXTRACTION          Reviewed records today as well as  relevant labs, cultures, and imaging  No toxicities from antimicrobials augmentin  Extremely medically complex    Objective:      There were no vitals filed for this visit.  Physical Exam  Constitutional:       Appearance: She is well-developed.   HENT:      Head: Normocephalic and atraumatic.   Eyes:      Pupils: Pupils are equal, round, and reactive to light.   Cardiovascular:      Rate and Rhythm: Normal rate.   Pulmonary:      Effort: Pulmonary effort is normal.      Breath sounds: Normal breath sounds.   Abdominal:      General: Bowel sounds are normal.      Palpations: Abdomen is soft.   Musculoskeletal:         General: No tenderness.      Cervical back: Normal range of motion and neck supple.   Skin:     General: Skin is warm and dry.      Comments: L chest wall with induration but no TTP or erythema or fluctuance noted.  Drain in place with serosang fluid   Neurological:      Mental Status: She is alert and oriented to person, place, and time.             Assessment/Plan:         44 y/o F h/o breast cancer s/p b/l mastectomy 5/2023, adjuvant TC 2023, on anastrozole initially had fat transfer, 1/2024 US of L breast (bedside reportedly with collections of fluid) then 2/2024 opened up and had reportedly fat necrosis, debrided then closed.  On 3/27 she had pain erythema of L breast, aspirated in clinic with significant pus, then taken to OR for debridement, cultures grew fingoldia magna (AFB smear culture negative at that time)  took augmentin x 2.5 weeks, then 6/17 went back in to do another fat transfer from back to reconstruct left breast, then late July noted hematoma/abscess in shower then brownish bloody liquid came out, then again debridement 7/31/24 with expander placed, started IV antibiotics and had abx disc placed in L breast, then started on augmentin again, surgical drain remains in place, serosang fluid in place, and slowing down. 7/30 clinic culture NGTD, AFB cultures pending. She remains on  augmentin  - continue augmentin until 2 weeks after drain removed (I have ordered this)  - I am concerned that NTM may ultimately be implicated as culprit but finegoldia also makes sense given indolent presentation  - aggressive surgical management has been helpful and will continue to be critical if infection persists.  - agree with holding on any further reconstruction until we know this infection has resolved - so waiting at least a few months observing patient off of antibiotics       Discussed care with onc team/provider

## 2024-08-20 ENCOUNTER — TELEPHONE (OUTPATIENT)
Dept: GYNECOLOGIC ONCOLOGY | Facility: CLINIC | Age: 44
End: 2024-08-20
Payer: COMMERCIAL

## 2024-08-20 DIAGNOSIS — Z17.0 MALIGNANT NEOPLASM OF OVERLAPPING SITES OF RIGHT BREAST IN FEMALE, ESTROGEN RECEPTOR POSITIVE: Primary | ICD-10-CM

## 2024-08-20 DIAGNOSIS — C50.811 MALIGNANT NEOPLASM OF OVERLAPPING SITES OF RIGHT BREAST IN FEMALE, ESTROGEN RECEPTOR POSITIVE: Primary | ICD-10-CM

## 2024-08-20 DIAGNOSIS — C77.9 SECONDARY ADENOCARCINOMA OF LYMPH NODE: ICD-10-CM

## 2024-08-20 DIAGNOSIS — R10.2 PELVIC PAIN: ICD-10-CM

## 2024-08-23 ENCOUNTER — PATIENT MESSAGE (OUTPATIENT)
Dept: HEMATOLOGY/ONCOLOGY | Facility: CLINIC | Age: 44
End: 2024-08-23
Payer: COMMERCIAL

## 2024-08-23 DIAGNOSIS — R10.2 UTERINE ADNEXAL PAIN: Primary | ICD-10-CM

## 2024-08-26 ENCOUNTER — PATIENT MESSAGE (OUTPATIENT)
Dept: OBSTETRICS AND GYNECOLOGY | Facility: CLINIC | Age: 44
End: 2024-08-26
Payer: COMMERCIAL

## 2024-08-26 ENCOUNTER — PATIENT MESSAGE (OUTPATIENT)
Dept: HEMATOLOGY/ONCOLOGY | Facility: CLINIC | Age: 44
End: 2024-08-26
Payer: COMMERCIAL

## 2024-08-26 ENCOUNTER — HOSPITAL ENCOUNTER (OUTPATIENT)
Dept: RADIOLOGY | Facility: HOSPITAL | Age: 44
Discharge: HOME | End: 2024-08-26
Attending: INTERNAL MEDICINE
Payer: COMMERCIAL

## 2024-08-26 DIAGNOSIS — R10.2 UTERINE ADNEXAL PAIN: ICD-10-CM

## 2024-08-26 PROCEDURE — 76830 TRANSVAGINAL US NON-OB: CPT | Mod: 26,,, | Performed by: RADIOLOGY

## 2024-08-26 PROCEDURE — 76856 US EXAM PELVIC COMPLETE: CPT | Mod: 26,,, | Performed by: RADIOLOGY

## 2024-08-26 PROCEDURE — 76856 US EXAM PELVIC COMPLETE: CPT | Mod: TC

## 2024-08-26 PROCEDURE — 76830 TRANSVAGINAL US NON-OB: CPT | Mod: TC

## 2024-09-12 ENCOUNTER — OFFICE VISIT (OUTPATIENT)
Dept: HEMATOLOGY/ONCOLOGY | Facility: CLINIC | Age: 44
End: 2024-09-12
Payer: COMMERCIAL

## 2024-09-12 VITALS
DIASTOLIC BLOOD PRESSURE: 79 MMHG | TEMPERATURE: 97 F | OXYGEN SATURATION: 98 % | WEIGHT: 154.13 LBS | SYSTOLIC BLOOD PRESSURE: 124 MMHG | HEIGHT: 61 IN | RESPIRATION RATE: 17 BRPM | BODY MASS INDEX: 29.1 KG/M2 | HEART RATE: 91 BPM

## 2024-09-12 DIAGNOSIS — C50.811 MALIGNANT NEOPLASM OF OVERLAPPING SITES OF RIGHT BREAST IN FEMALE, ESTROGEN RECEPTOR POSITIVE: Primary | ICD-10-CM

## 2024-09-12 DIAGNOSIS — Z17.0 MALIGNANT NEOPLASM OF OVERLAPPING SITES OF RIGHT BREAST IN FEMALE, ESTROGEN RECEPTOR POSITIVE: Primary | ICD-10-CM

## 2024-09-12 PROCEDURE — 3008F BODY MASS INDEX DOCD: CPT | Mod: CPTII,S$GLB,, | Performed by: INTERNAL MEDICINE

## 2024-09-12 PROCEDURE — 3078F DIAST BP <80 MM HG: CPT | Mod: CPTII,S$GLB,, | Performed by: INTERNAL MEDICINE

## 2024-09-12 PROCEDURE — 3074F SYST BP LT 130 MM HG: CPT | Mod: CPTII,S$GLB,, | Performed by: INTERNAL MEDICINE

## 2024-09-12 PROCEDURE — 3044F HG A1C LEVEL LT 7.0%: CPT | Mod: CPTII,S$GLB,, | Performed by: INTERNAL MEDICINE

## 2024-09-12 PROCEDURE — 99999 PR PBB SHADOW E&M-EST. PATIENT-LVL IV: CPT | Mod: PBBFAC,,, | Performed by: INTERNAL MEDICINE

## 2024-09-12 PROCEDURE — 99214 OFFICE O/P EST MOD 30 MIN: CPT | Mod: S$GLB,,, | Performed by: INTERNAL MEDICINE

## 2024-09-12 PROCEDURE — 1159F MED LIST DOCD IN RCRD: CPT | Mod: CPTII,S$GLB,, | Performed by: INTERNAL MEDICINE

## 2024-09-12 PROCEDURE — G2211 COMPLEX E/M VISIT ADD ON: HCPCS | Mod: S$GLB,,, | Performed by: INTERNAL MEDICINE

## 2024-09-12 PROCEDURE — 1160F RVW MEDS BY RX/DR IN RCRD: CPT | Mod: CPTII,S$GLB,, | Performed by: INTERNAL MEDICINE

## 2024-09-12 NOTE — PROGRESS NOTES
"Subjective     Patient ID: Dov Hernandez is a 43 y.o. female.    Chief Complaint: Malignant neoplasm of overlapping sites of right breast in     HPI    Presents for follow up on Tamoxifen (changed from AI)  Easier to tolerate  Pain in joints better  She was diagnosed with carpal tunnel in the interval  - saw Dr. Olivas in the interval  Plan: The patient and I had a thorough discussion today.  We discussed the working diagnosis as well as several other potential alternative diagnoses.  Treatment options were discussed, both conservative and surgical.  Conservative treatment options would include things such as activity modifications, workplace modifications, a period of rest, oral vs topical OTC and prescription anti-inflammatory medications, occupational therapy, splinting/bracing, immobilization, corticosteroid injections, and others.  Surgical options were discussed as well.      - another infection in breast  Removed by plastic surgeon "tissue not taking"  Implant placed  States culture sent off a ruptured abscess- negative for clear cause- repeat done at surgery and also no dominant organisms  She has seen ID- Dr. Goyal   - per his assessment and plan  44 y/o F h/o breast cancer s/p b/l mastectomy 5/2023, adjuvant TC 2023, on anastrozole initially had fat transfer, 1/2024 US of L breast (bedside reportedly with collections of fluid) then 2/2024 opened up and had reportedly fat necrosis, debrided then closed.  On 3/27 she had pain erythema of L breast, aspirated in clinic with significant pus, then taken to OR for debridement, cultures grew fingoldia magna (AFB smear culture negative at that time)  took augmentin x 2.5 weeks, then 6/17 went back in to do another fat transfer from back to reconstruct left breast, then late July noted hematoma/abscess in shower then brownish bloody liquid came out, then again debridement 7/31/24 with expander placed, started IV antibiotics and had abx disc placed in L breast, " then started on augmentin again, surgical drain remains in place, serosang fluid in place, and slowing down. 7/30 clinic culture NGTD, AFB cultures pending. She remains on augmentin  - continue augmentin until 2 weeks after drain removed (I have ordered this)  - I am concerned that NTM may ultimately be implicated as culprit but finegoldia also makes sense given indolent presentation  - aggressive surgical management has been helpful and will continue to be critical if infection persists.  - agree with holding on any further reconstruction until we know this infection has resolved - so waiting at least a few months observing patient off of antibiotics    - still has a pending ear surgery    - still pending surgery as well with Dr. Valladares- VIBHA/BSO planned before end of year- we discussed deferring to next year              Encounter Diagnoses   Name Primary?    Malignant neoplasm of overlapping sites of right breast in female, estrogen receptor positive Yes    Secondary adenocarcinoma of lymph node      Aromatase inhibitor use            Cancer Staging   Malignant neoplasm of overlapping sites of right breast in female, estrogen receptor positive  Staging form: Breast, AJCC 8th Edition  - Clinical stage from 4/26/2023: Stage IB (cT2, cN0, cM0, G1, ER+, MS+, HER2: Equivocal) - Signed by Jenna Pickard MD on 4/26/2023  - Pathologic stage from 6/14/2023: Stage IB (pT2, pN1(sn), cM0, G2, ER+, MS+, HER2-) - Signed by Fran Ken Jr., MD on 6/17/2023        Oncology History  - normal screening mammogram in 2022.   - Screening mammogram in March 2023 showed right breast calcifications, measured to be 39 mm on diagnostic mammogram.      - Right breast biopsy 4/13/23: IDC, 2 mm, ER 30%, MS 30%, Her 2 karrie negative, Grade 1, low Ki 67 at 5% with associated DCIS.       - Breast MRI 4/25/23: 2 cm mass     - Case discussed at multi  breast tumor board.   Bilateral mastectomy with ROSITA flap reconstruction and right  "SLNB 23: IDC, 2.2cm; 1/1LN+   Declined ALND and proceeded with XRT.      - Oncotype RS 29; RR 23%; Adjuvant chemotherapy is recommended.     Baseline PET reviewed, no residual or metastatic disease noted.      - Adjuvant TC x 4 23 - 23  S/p cycle 1 of TC on 23.      - Zoladex started 11/15/23, LMP was in 2023.  Anastrozole started 2023      - discussed planning oophorectomy to avoid monthly injections of zoladex 2024      Additional PMH  Had repair of the right ear drum. Still feels hearing is muffled on that side.-- further surgery pending        Social History   Social History                Tobacco Use    Smoking status: Never    Smokeless tobacco: Never   Substance Use Topics    Alcohol use: Yes       Comment: social    Drug use: No                     Family History   Problem Relation Name Age of Onset    Hyperlipidemia Mother        Kidney cancer Father   58    Cancer Brother   22         Parotid Gland cancer    Birth marks Child   0         strawberry wai(s)    Birth marks Child   0         strawberry wai(s)    Café-au-lait spots Maternal Uncle             "a small patch on his neck"    Café-au-lait spots Other nephew           "a spot on his arm and partial torso"    Other Other nephew 2         tested negative for macrocephaly    Cervical cancer Other        Throat cancer Other        Pancreatic cancer Other             1 second cousin ()    Other Other             brain tumors (several 2nd cousins)    Breast cancer Neg Hx        Colon cancer Neg Hx        Ovarian cancer Neg Hx        Melanoma Neg Hx                  Past Medical History:   Diagnosis Date    Acne varioliformis 2017     OCP & aziza from derm, Doxy caused yeast    Heartburn 2021    Strawberry wai 1981     back of neck    Strawberry wai 1981     under nose                Past Surgical History:   Procedure Laterality Date    BILATERAL " MASTECTOMY Bilateral 5/24/2023     Procedure: MASTECTOMY, BILATERAL;  Surgeon: Jenna Pickard MD;  Location: [facility];  Service: General;  Laterality: Bilateral;  2.5 HOURS / EMAIL SENT 5-9 @ 11:39 LK    COLPOSCOPY        INJECTION FOR SENTINEL NODE IDENTIFICATION Right 5/24/2023     Procedure: INJECTION, FOR SENTINEL NODE IDENTIFICATION;  Surgeon: Jenna Pickard MD;  Location: [facility];  Service: General;  Laterality: Right;    RECONSTRUCTION OF BREAST WITH DEEP INFERIOR EPIGASTRIC ARTERY  (ROSITA) FREE FLAP Bilateral 5/24/2023     Procedure: RECONSTRUCTION, BREAST, USING ROSITA FREE FLAP / BILATERAL DEEP INFERIOR EPIGASTRIC PERFORATORS FLAPS;  Surgeon: Mikey Roche MD;  Location: [facility];  Service: Plastics;  Laterality: Bilateral;    SENTINEL LYMPH NODE BIOPSY Right 5/24/2023     Procedure: BIOPSY, LYMPH NODE, SENTINEL;  Surgeon: Jenna Pickard MD;  Location: [facility];  Service: General;  Laterality: Right;    WISDOM TOOTH EXTRACTION              Review of Systems   Constitutional:  Negative for activity change, appetite change, chills, fatigue and unexpected weight change.   Respiratory:  Negative for cough and shortness of breath.    Cardiovascular:  Negative for chest pain, palpitations and leg swelling.   Gastrointestinal:  Negative for abdominal distention, abdominal pain, blood in stool and change in bowel habit.   Genitourinary:  Negative for vaginal bleeding.   Musculoskeletal:  Positive for arthralgias (improving).   Integumentary:  Negative for pallor, rash and breast mass.        Recent surgery as above   Neurological:  Positive for numbness. Negative for dizziness, weakness and headaches.   Psychiatric/Behavioral:  Negative for dysphoric mood. The patient is not nervous/anxious.    Breast: Negative for mass         Objective     Physical Exam  Vitals and nursing note reviewed.   Constitutional:       General: She is not in acute distress.     Appearance: Normal appearance. She is  well-developed. She is not ill-appearing.      Comments: Very pleasant  ECOG= 0   Presents with her mom   HENT:      Head: Normocephalic and atraumatic.   Eyes:      General: No scleral icterus.     Extraocular Movements: Extraocular movements intact.      Conjunctiva/sclera: Conjunctivae normal.      Pupils: Pupils are equal, round, and reactive to light.      Right eye: Pupil is round and reactive.      Left eye: Pupil is round and reactive.   Neck:      Thyroid: No thyromegaly.      Vascular: No JVD.      Trachea: No tracheal deviation.   Cardiovascular:      Rate and Rhythm: Normal rate and regular rhythm.      Heart sounds: Normal heart sounds. No murmur heard.     No friction rub. No gallop.   Pulmonary:      Effort: Pulmonary effort is normal. No respiratory distress.      Breath sounds: Normal breath sounds. No wheezing, rhonchi or rales.      Comments: Breasts- 1 VU drain in place  Abdominal:      General: Abdomen is flat. Bowel sounds are normal. There is no distension.      Palpations: Abdomen is soft. There is no mass.      Tenderness: There is no abdominal tenderness. There is no guarding or rebound.   Musculoskeletal:         General: No swelling, tenderness or deformity. Normal range of motion.      Cervical back: Normal range of motion and neck supple.      Right lower leg: No edema.      Left lower leg: No edema.   Lymphadenopathy:      Head:      Right side of head: No submandibular adenopathy.      Left side of head: No submandibular adenopathy.      Cervical: No cervical adenopathy.      Right cervical: No superficial, deep or posterior cervical adenopathy.     Left cervical: No superficial, deep or posterior cervical adenopathy.      Upper Body:      Right upper body: No supraclavicular adenopathy.      Left upper body: No supraclavicular adenopathy.   Skin:     General: Skin is warm and dry.      Coloration: Skin is not jaundiced or pale.      Findings: No petechiae.   Neurological:      Mental  Status: She is alert and oriented to person, place, and time.      Cranial Nerves: No cranial nerve deficit.      Motor: No weakness.      Coordination: Coordination normal.      Gait: Gait normal.      Deep Tendon Reflexes: Reflexes are normal and symmetric.   Psychiatric:         Mood and Affect: Mood normal. Mood is not anxious or depressed.         Behavior: Behavior normal.         Thought Content: Thought content normal.         Judgment: Judgment normal.          Assessment and Plan     1. Malignant neoplasm of overlapping sites of right breast in female, estrogen receptor positive  -     CBC W/ AUTO DIFFERENTIAL; Future; Expected date: 09/12/2024        Continue Tamoxifen  Defer hysterectomy/BSO as not needed for breast cancer treatment and rediscuss when other surgery healing and needed surgeries complete (ear and carpal tunnel)  Normal pelvic ultrasound     Check menopausal status with next labs     Ortho hand follow up in 12/2024    Message sent to Dr. Jyotsna SAAVEDRA to discuss her concerns around surgeries    Route Chart for Scheduling    Med Onc Chart Routing      Follow up with physician    Follow up with IDA 3 months. cbc, cmp, lh and estradiol and EP   Infusion scheduling note    Injection scheduling note    Labs    Imaging    Pharmacy appointment    Other referrals              Supportive Plan Information  OP BREAST GOSERELIN Q4W Lidia Osman MD   Associated Diagnosis: Malignant neoplasm of overlapping sites of right breast in female, estrogen receptor positive Stage IB, Stage IB cT2, cN0, cM0, G1, ER+, ME+, HER2: Equivocal, pT2, pN1(sn), cM0, G2, ER+, ME+, HER2- noted on 4/26/2023   Line of treatment: Adjuvant   Treatment goal: Curative     Upcoming Treatment Dates - OP BREAST GOSERELIN Q4W    5/31/2024       Chemotherapy       goserelin (ZOLADEX) injection 3.6 mg

## 2024-09-16 ENCOUNTER — TELEPHONE (OUTPATIENT)
Dept: INFECTIOUS DISEASES | Facility: CLINIC | Age: 44
End: 2024-09-16
Payer: COMMERCIAL

## 2024-09-16 NOTE — TELEPHONE ENCOUNTER
----- Message from Swapna Piedra MD sent at 9/16/2024  2:11 PM CDT -----  Thanks so much!  Referral is placed  ----- Message -----  From: Jeremias Goyal MD  Sent: 9/16/2024  11:05 AM CDT  To: Swapna Piedra MD; Kiera Mancini MA    Totally, she needs to see me to f/u on all this - I'm most concerned about breast surgeries - others likely fine but want to review everything before she has anything put back in.  Kiera she can come see me in person after 9/27.  Thanks for heads up Swapna  ----- Message -----  From: Swapna Piedra MD  Sent: 9/12/2024  11:02 AM CDT  To: Jeremias Goyal MD      Avery patient  When she saw you last, her 6 week cultures and AFB were pending  She has several surgeries needed in near future  - breast which you are aware of- now will be an implant  - ear surgery  - carpal tunnel surgery  - VIBHA/BSO    She is worried about proceeding with worries infection may be risks for these recoveries as well

## 2024-09-17 ENCOUNTER — LAB VISIT (OUTPATIENT)
Dept: LAB | Facility: HOSPITAL | Age: 44
End: 2024-09-17
Attending: INTERNAL MEDICINE
Payer: COMMERCIAL

## 2024-09-17 DIAGNOSIS — E78.2 MIXED HYPERLIPIDEMIA: ICD-10-CM

## 2024-09-17 LAB
ALBUMIN SERPL BCP-MCNC: 3.5 G/DL (ref 3.5–5.2)
ALP SERPL-CCNC: 62 U/L (ref 55–135)
ALT SERPL W/O P-5'-P-CCNC: 16 U/L (ref 10–44)
ANION GAP SERPL CALC-SCNC: 7 MMOL/L (ref 8–16)
AST SERPL-CCNC: 17 U/L (ref 10–40)
BILIRUB SERPL-MCNC: 0.3 MG/DL (ref 0.1–1)
BUN SERPL-MCNC: 12 MG/DL (ref 6–20)
CALCIUM SERPL-MCNC: 9.4 MG/DL (ref 8.7–10.5)
CHLORIDE SERPL-SCNC: 108 MMOL/L (ref 95–110)
CHOLEST SERPL-MCNC: 237 MG/DL (ref 120–199)
CHOLEST/HDLC SERPL: 3.7 {RATIO} (ref 2–5)
CO2 SERPL-SCNC: 27 MMOL/L (ref 23–29)
CREAT SERPL-MCNC: 0.8 MG/DL (ref 0.5–1.4)
EST. GFR  (NO RACE VARIABLE): >60 ML/MIN/1.73 M^2
GLUCOSE SERPL-MCNC: 95 MG/DL (ref 70–110)
HDLC SERPL-MCNC: 64 MG/DL (ref 40–75)
HDLC SERPL: 27 % (ref 20–50)
LDLC SERPL CALC-MCNC: 144.2 MG/DL (ref 63–159)
NONHDLC SERPL-MCNC: 173 MG/DL
POTASSIUM SERPL-SCNC: 4.8 MMOL/L (ref 3.5–5.1)
PROT SERPL-MCNC: 6.7 G/DL (ref 6–8.4)
SODIUM SERPL-SCNC: 142 MMOL/L (ref 136–145)
TRIGL SERPL-MCNC: 144 MG/DL (ref 30–150)

## 2024-09-17 PROCEDURE — 36415 COLL VENOUS BLD VENIPUNCTURE: CPT | Mod: PO | Performed by: INTERNAL MEDICINE

## 2024-09-17 PROCEDURE — 80061 LIPID PANEL: CPT | Performed by: INTERNAL MEDICINE

## 2024-09-17 PROCEDURE — 80053 COMPREHEN METABOLIC PANEL: CPT | Performed by: INTERNAL MEDICINE

## 2024-09-18 ENCOUNTER — OFFICE VISIT (OUTPATIENT)
Dept: HEMATOLOGY/ONCOLOGY | Facility: CLINIC | Age: 44
End: 2024-09-18
Payer: COMMERCIAL

## 2024-09-18 ENCOUNTER — OFFICE VISIT (OUTPATIENT)
Dept: INTERNAL MEDICINE | Facility: CLINIC | Age: 44
End: 2024-09-18
Payer: COMMERCIAL

## 2024-09-18 VITALS
HEIGHT: 61 IN | OXYGEN SATURATION: 98 % | BODY MASS INDEX: 29.68 KG/M2 | DIASTOLIC BLOOD PRESSURE: 80 MMHG | HEART RATE: 99 BPM | WEIGHT: 157.19 LBS | SYSTOLIC BLOOD PRESSURE: 126 MMHG

## 2024-09-18 VITALS
BODY MASS INDEX: 29.81 KG/M2 | HEART RATE: 79 BPM | SYSTOLIC BLOOD PRESSURE: 136 MMHG | WEIGHT: 157.88 LBS | DIASTOLIC BLOOD PRESSURE: 85 MMHG | HEIGHT: 61 IN | OXYGEN SATURATION: 99 %

## 2024-09-18 DIAGNOSIS — Z79.810 SERM USE (SELECTIVE ESTROGEN RECEPTOR MODULATOR): ICD-10-CM

## 2024-09-18 DIAGNOSIS — C50.811 MALIGNANT NEOPLASM OF OVERLAPPING SITES OF RIGHT BREAST IN FEMALE, ESTROGEN RECEPTOR POSITIVE: Primary | ICD-10-CM

## 2024-09-18 DIAGNOSIS — Z17.0 MALIGNANT NEOPLASM OF OVERLAPPING SITES OF RIGHT BREAST IN FEMALE, ESTROGEN RECEPTOR POSITIVE: ICD-10-CM

## 2024-09-18 DIAGNOSIS — Z17.0 MALIGNANT NEOPLASM OF OVERLAPPING SITES OF RIGHT BREAST IN FEMALE, ESTROGEN RECEPTOR POSITIVE: Primary | ICD-10-CM

## 2024-09-18 DIAGNOSIS — E78.2 MIXED HYPERLIPIDEMIA: Primary | ICD-10-CM

## 2024-09-18 DIAGNOSIS — R22.31 AXILLARY LUMP, RIGHT: ICD-10-CM

## 2024-09-18 DIAGNOSIS — G56.03 BILATERAL CARPAL TUNNEL SYNDROME: ICD-10-CM

## 2024-09-18 DIAGNOSIS — C50.811 MALIGNANT NEOPLASM OF OVERLAPPING SITES OF RIGHT BREAST IN FEMALE, ESTROGEN RECEPTOR POSITIVE: ICD-10-CM

## 2024-09-18 DIAGNOSIS — R23.2 HOT FLASHES: ICD-10-CM

## 2024-09-18 DIAGNOSIS — Z90.13 H/O BILATERAL MASTECTOMY: ICD-10-CM

## 2024-09-18 PROCEDURE — 3079F DIAST BP 80-89 MM HG: CPT | Mod: CPTII,S$GLB,, | Performed by: NURSE PRACTITIONER

## 2024-09-18 PROCEDURE — 3074F SYST BP LT 130 MM HG: CPT | Mod: CPTII,S$GLB,, | Performed by: INTERNAL MEDICINE

## 2024-09-18 PROCEDURE — 3075F SYST BP GE 130 - 139MM HG: CPT | Mod: CPTII,S$GLB,, | Performed by: NURSE PRACTITIONER

## 2024-09-18 PROCEDURE — 3044F HG A1C LEVEL LT 7.0%: CPT | Mod: CPTII,S$GLB,, | Performed by: INTERNAL MEDICINE

## 2024-09-18 PROCEDURE — 99214 OFFICE O/P EST MOD 30 MIN: CPT | Mod: S$GLB,,, | Performed by: NURSE PRACTITIONER

## 2024-09-18 PROCEDURE — 3079F DIAST BP 80-89 MM HG: CPT | Mod: CPTII,S$GLB,, | Performed by: INTERNAL MEDICINE

## 2024-09-18 PROCEDURE — 3008F BODY MASS INDEX DOCD: CPT | Mod: CPTII,S$GLB,, | Performed by: NURSE PRACTITIONER

## 2024-09-18 PROCEDURE — 1159F MED LIST DOCD IN RCRD: CPT | Mod: CPTII,S$GLB,, | Performed by: NURSE PRACTITIONER

## 2024-09-18 PROCEDURE — G2211 COMPLEX E/M VISIT ADD ON: HCPCS | Mod: S$GLB,,, | Performed by: NURSE PRACTITIONER

## 2024-09-18 PROCEDURE — 99214 OFFICE O/P EST MOD 30 MIN: CPT | Mod: S$GLB,,, | Performed by: INTERNAL MEDICINE

## 2024-09-18 PROCEDURE — 99999 PR PBB SHADOW E&M-EST. PATIENT-LVL III: CPT | Mod: PBBFAC,,, | Performed by: NURSE PRACTITIONER

## 2024-09-18 PROCEDURE — 3008F BODY MASS INDEX DOCD: CPT | Mod: CPTII,S$GLB,, | Performed by: INTERNAL MEDICINE

## 2024-09-18 PROCEDURE — 99999 PR PBB SHADOW E&M-EST. PATIENT-LVL III: CPT | Mod: PBBFAC,,, | Performed by: INTERNAL MEDICINE

## 2024-09-18 PROCEDURE — 3044F HG A1C LEVEL LT 7.0%: CPT | Mod: CPTII,S$GLB,, | Performed by: NURSE PRACTITIONER

## 2024-09-18 RX ORDER — GABAPENTIN 100 MG/1
100 CAPSULE ORAL NIGHTLY
Qty: 90 CAPSULE | Refills: 3 | Status: SHIPPED | OUTPATIENT
Start: 2024-09-18

## 2024-09-18 NOTE — PROGRESS NOTES
Subjective:       Patient ID: Dov Hernandez is a 43 y.o. female.    Chief Complaint: Follow-up    She noticed a lump in her right axilla three days ago. Will be seeing her oncologist today to address this.     She has also had a difficult recovery from left reconstructed breast and has recently been on augmentin for 4 to 5 weeks for infection on her left side. Completed augmentin about two weeks ago     Switched recently to tamoxifen and has been having hot flashes. She is on Effexor 150 mg daily.     Alsp having worsening bilateral carpal tunnel. Tried gabapentin but did not help     Has hx of breast cancer.   She had abnormal mammogram in March 2023.  She underwent a right breast biopsy that showed IDC, 2 mm, ER 30%, WA 30%, Her 2 karrie negative, Grade 1, low Ki 67 at 5% with associated DCIS.    She underwent b/l mastectomy in May 2023 with ROSITA flap reconstruction and right SLNB. Tumor size was 2.2 cm, 1/1 SLN was positive for metastatic carcinoma.    She then had chemo from 7/2023 to 9/2023 and XRT which she completed in 11.2023.   Now On arimidex.   Also on anastrozole and she is considering bilateral oophorectomy with her gynecologist and if she chooses to undergo the procedure, will stop Zoladex after her surgery.   Had reconstructive surgery in Feb 2024 with plastic surgeon outside of [facility]. Has had complicated post op course with infections most recently treated with Augmentin.         Generalized Anxiety/depression: started after diagnosis. Was started on Effexor by oncologist and is doing well on this medicaiton.      HLD: most recent LDL down o 147 from 183 previously.        Health Maintenance:  Colon Cancer Screening: start at age 45   Mammogram: breast cancer hx as above.   HIV: negative 2013   Hep C: negative 2020   Lipids: as above.   Pap Smear: normal PAP and HPV co test in 2024./  Vaccines:  up to date.            Follow-up  Pertinent negatives include no abdominal pain, arthralgias, chest  pain, chills, coughing, headaches, myalgias, nausea or vomiting.     Review of Systems   Constitutional:  Negative for activity change, appetite change and chills.   HENT:  Negative for ear pain, sinus pressure/congestion and sneezing.    Respiratory:  Negative for cough and shortness of breath.    Cardiovascular:  Negative for chest pain, palpitations and leg swelling.   Gastrointestinal:  Negative for abdominal distention, abdominal pain, constipation, diarrhea, nausea and vomiting.   Genitourinary:  Negative for dysuria and hematuria.   Musculoskeletal:  Negative for arthralgias, back pain and myalgias.   Neurological:  Negative for dizziness and headaches.   Psychiatric/Behavioral:  Negative for agitation. The patient is not nervous/anxious.            Past Medical History:   Diagnosis Date    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    OCP & aziza from derm, Doxy caused yeast    Breast cancer     Heartburn 12/17/2021    Strawberry wai 1981    back of neck    Strawberry wai 1981    under nose     Past Surgical History:   Procedure Laterality Date    BILATERAL MASTECTOMY Bilateral 5/24/2023    Procedure: MASTECTOMY, BILATERAL;  Surgeon: Jenna Pickard MD;  Location: [facility];  Service: General;  Laterality: Bilateral;  2.5 HOURS / EMAIL SENT 5-9 @ 11:39 LK    COLPOSCOPY      INJECTION FOR SENTINEL NODE IDENTIFICATION Right 5/24/2023    Procedure: INJECTION, FOR SENTINEL NODE IDENTIFICATION;  Surgeon: Jenna Pickard MD;  Location: [facility];  Service: General;  Laterality: Right;    RECONSTRUCTION OF BREAST WITH DEEP INFERIOR EPIGASTRIC ARTERY  (ROSITA) FREE FLAP Bilateral 5/24/2023    Procedure: RECONSTRUCTION, BREAST, USING ROSITA FREE FLAP / BILATERAL DEEP INFERIOR EPIGASTRIC PERFORATORS FLAPS;  Surgeon: Mikey Roche MD;  Location: [facility];  Service: Plastics;  Laterality: Bilateral;    SENTINEL LYMPH NODE BIOPSY Right 5/24/2023    Procedure: BIOPSY, LYMPH NODE, SENTINEL;  Surgeon: Jenna Pickard MD;   Location: [facility];  Service: General;  Laterality: Right;    WISDOM TOOTH EXTRACTION        Patient Active Problem List   Diagnosis    Pain in joint, pelvic region and thigh    Acne varioliformis    Heartburn    Malignant neoplasm of overlapping sites of right breast in female, estrogen receptor positive    Secondary adenocarcinoma of lymph node    Anxiety    Hot flashes    Muscle tightness    At risk for lymphedema    Weakness of both upper extremities    Neuropathy        Objective:      Physical Exam  Constitutional:       Appearance: Normal appearance.   HENT:      Head: Normocephalic.   Cardiovascular:      Rate and Rhythm: Normal rate and regular rhythm.      Pulses: Normal pulses.      Heart sounds: Normal heart sounds.   Pulmonary:      Effort: Pulmonary effort is normal.      Breath sounds: Normal breath sounds.   Abdominal:      General: Abdomen is flat. Bowel sounds are normal.      Palpations: Abdomen is soft.   Musculoskeletal:         General: Normal range of motion.      Cervical back: Normal range of motion and neck supple.   Skin:     General: Skin is warm and dry.   Neurological:      General: No focal deficit present.      Mental Status: She is alert and oriented to person, place, and time.   Psychiatric:         Mood and Affect: Mood normal.         Assessment:       Problem List Items Addressed This Visit          Oncology    Malignant neoplasm of overlapping sites of right breast in female, estrogen receptor positive       Other    Hot flashes     Other Visit Diagnoses       Mixed hyperlipidemia    -  Primary    Relevant Orders    Comprehensive Metabolic Panel    CBC Auto Differential    Lipid Panel    Hemoglobin A1C    TSH    Bilateral carpal tunnel syndrome                Plan:         Dov was seen today for follow-up.    Diagnoses and all orders for this visit:    Mixed hyperlipidemia  Improving on last labs. Will check again in 6 months.     Bilateral carpal tunnel syndrome  She will  follow up with hand surgery     Hot flashes  -     gabapentin (NEURONTIN) 100 MG capsule; Take 1 capsule (100 mg total) by mouth every evening. (Patient not taking: Reported on 9/19/2024)  She tried gabapenitn for carpal tunnel which did not work but will restart for hot flashes.   Continue effexor     Malignant neoplasm of overlapping sites of right breast in female, estrogen receptor positive  Follow up with oncology today           Letitia Sherman MD   Internal Medicine   Primary Care

## 2024-09-18 NOTE — PROGRESS NOTES
"Subjective     Patient ID: Dov Hernandez is a 43 y.o. female.    Chief Complaint: Malignant neoplasm of overlapping sites of right breast in     HPI    Present today for an urgent care visit for a lump noted under her right axilla three days ago.  The lump is tender and feels cord like.  No redness or swelling.  No fever.  She has had many problems with healing of the left reconstructed breast and has recently been on augmentin for 4 to 5 weeks for infection on her left side.  Completed augmentin about two weeks ago.  Other than this new lump, she is feeling ell today    Per Dr. Piedra's note:   Presents for follow up on Tamoxifen (changed from AI)  Easier to tolerate  Pain in joints better  She was diagnosed with carpal tunnel in the interval  - saw Dr. Olivas in the interval  Plan: The patient and I had a thorough discussion today.  We discussed the working diagnosis as well as several other potential alternative diagnoses.  Treatment options were discussed, both conservative and surgical.  Conservative treatment options would include things such as activity modifications, workplace modifications, a period of rest, oral vs topical OTC and prescription anti-inflammatory medications, occupational therapy, splinting/bracing, immobilization, corticosteroid injections, and others.  Surgical options were discussed as well.      - another infection in breast  Removed by plastic surgeon "tissue not taking"  Implant placed  States culture sent off a ruptured abscess- negative for clear cause- repeat done at surgery and also no dominant organisms  She has seen ID- Dr. Goyal   - per his assessment and plan  44 y/o F h/o breast cancer s/p b/l mastectomy 5/2023, adjuvant TC 2023, on anastrozole initially had fat transfer, 1/2024 US of L breast (bedside reportedly with collections of fluid) then 2/2024 opened up and had reportedly fat necrosis, debrided then closed.  On 3/27 she had pain erythema of L breast, aspirated in " clinic with significant pus, then taken to OR for debridement, cultures grew scar albarrana (AFB smear culture negative at that time)  took augmentin x 2.5 weeks, then 6/17 went back in to do another fat transfer from back to reconstruct left breast, then late July noted hematoma/abscess in shower then brownish bloody liquid came out, then again debridement 7/31/24 with expander placed, started IV antibiotics and had abx disc placed in L breast, then started on augmentin again, surgical drain remains in place, serosang fluid in place, and slowing down. 7/30 clinic culture NGTD, AFB cultures pending. She remains on augmentin  - continue augmentin until 2 weeks after drain removed (I have ordered this)  - I am concerned that NTM may ultimately be implicated as culprit but finegoldia also makes sense given indolent presentation  - aggressive surgical management has been helpful and will continue to be critical if infection persists.  - agree with holding on any further reconstruction until we know this infection has resolved - so waiting at least a few months observing patient off of antibiotics    - still has a pending ear surgery    - still pending surgery as well with Dr. Valladares- VIBHA/BSO planned before end of year- we discussed deferring to next year              Encounter Diagnoses   Name Primary?    Malignant neoplasm of overlapping sites of right breast in female, estrogen receptor positive Yes    Secondary adenocarcinoma of lymph node      Aromatase inhibitor use            Cancer Staging   Malignant neoplasm of overlapping sites of right breast in female, estrogen receptor positive  Staging form: Breast, AJCC 8th Edition  - Clinical stage from 4/26/2023: Stage IB (cT2, cN0, cM0, G1, ER+, HI+, HER2: Equivocal) - Signed by Jenna Pickard MD on 4/26/2023  - Pathologic stage from 6/14/2023: Stage IB (pT2, pN1(sn), cM0, G2, ER+, HI+, HER2-) - Signed by Fran Ken Jr., MD on 6/17/2023        Oncology  "History  - normal screening mammogram in .   - Screening mammogram in 2023 showed right breast calcifications, measured to be 39 mm on diagnostic mammogram.      - Right breast biopsy 23: IDC, 2 mm, ER 30%, CO 30%, Her 2 karrie negative, Grade 1, low Ki 67 at 5% with associated DCIS.       - Breast MRI 23: 2 cm mass     - Case discussed at [facility] breast tumor board.   Bilateral mastectomy with ROSITA flap reconstruction and right SLNB 23: IDC, 2.2cm; 1/1LN+   Declined ALND and proceeded with XRT.      - Oncotype RS 29; RR 23%; Adjuvant chemotherapy is recommended.     Baseline PET reviewed, no residual or metastatic disease noted.      - Adjuvant TC x 4 23 - 23  S/p cycle 1 of TC on 23.      - Zoladex started 11/15/23, LMP was in 2023.  Anastrozole started 2023      - discussed planning oophorectomy to avoid monthly injections of zoladex 2024      Additional PMH  Had repair of the right ear drum. Still feels hearing is muffled on that side.-- further surgery pending        Social History   Social History                Tobacco Use    Smoking status: Never    Smokeless tobacco: Never   Substance Use Topics    Alcohol use: Yes       Comment: social    Drug use: No                     Family History   Problem Relation Name Age of Onset    Hyperlipidemia Mother        Kidney cancer Father   58    Cancer Brother   22         Parotid Gland cancer    Birth marks Child   0         strawberry wai(s)    Birth marks Child   0         strawberry wai(s)    Café-au-lait spots Maternal Uncle             "a small patch on his neck"    Café-au-lait spots Other nephew           "a spot on his arm and partial torso"    Other Other nephew 2         tested negative for macrocephaly    Cervical cancer Other        Throat cancer Other        Pancreatic cancer Other             1 second cousin ()    Other Other             brain tumors (several 2nd cousins)    Breast " cancer Neg Hx        Colon cancer Neg Hx        Ovarian cancer Neg Hx        Melanoma Neg Hx                  Past Medical History:   Diagnosis Date    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     OCP & aziza from derm, Doxy caused yeast    Heartburn 12/17/2021    Strawberry wai 1981     back of neck    Strawberry wai 1981     under nose                Past Surgical History:   Procedure Laterality Date    BILATERAL MASTECTOMY Bilateral 5/24/2023     Procedure: MASTECTOMY, BILATERAL;  Surgeon: Jenna Pickard MD;  Location: [facility];  Service: General;  Laterality: Bilateral;  2.5 HOURS / EMAIL SENT 5-9 @ 11:39 LK    COLPOSCOPY        INJECTION FOR SENTINEL NODE IDENTIFICATION Right 5/24/2023     Procedure: INJECTION, FOR SENTINEL NODE IDENTIFICATION;  Surgeon: Jenna Pickard MD;  Location: [facility];  Service: General;  Laterality: Right;    RECONSTRUCTION OF BREAST WITH DEEP INFERIOR EPIGASTRIC ARTERY  (ROSITA) FREE FLAP Bilateral 5/24/2023     Procedure: RECONSTRUCTION, BREAST, USING ROSITA FREE FLAP / BILATERAL DEEP INFERIOR EPIGASTRIC PERFORATORS FLAPS;  Surgeon: Mikey Roche MD;  Location: [facility];  Service: Plastics;  Laterality: Bilateral;    SENTINEL LYMPH NODE BIOPSY Right 5/24/2023     Procedure: BIOPSY, LYMPH NODE, SENTINEL;  Surgeon: Jenna Pickard MD;  Location: [facility];  Service: General;  Laterality: Right;    WISDOM TOOTH EXTRACTION              Review of Systems   Constitutional:  Negative for activity change, appetite change, chills, fatigue and unexpected weight change.   Respiratory:  Negative for cough and shortness of breath.    Cardiovascular:  Negative for chest pain, palpitations and leg swelling.   Gastrointestinal:  Negative for abdominal distention, abdominal pain, blood in stool and change in bowel habit.   Genitourinary:  Negative for vaginal bleeding.   Musculoskeletal:  Positive for arthralgias (improving).   Integumentary:  Negative for pallor, rash and breast mass.         Recent surgery as above   Neurological:  Positive for numbness. Negative for dizziness, weakness and headaches.   Psychiatric/Behavioral:  Negative for dysphoric mood. The patient is not nervous/anxious.    Breast: Negative for mass         Objective     Physical Exam  Constitutional:       General: She is not in acute distress.     Appearance: Normal appearance. She is not ill-appearing.   HENT:      Head: Normocephalic and atraumatic.   Eyes:      Extraocular Movements: Extraocular movements intact.   Cardiovascular:      Rate and Rhythm: Normal rate and regular rhythm.      Pulses: Normal pulses.      Heart sounds: Normal heart sounds. No murmur heard.  Pulmonary:      Effort: Pulmonary effort is normal. No respiratory distress.      Breath sounds: Normal breath sounds.   Chest:      Comments: S/p bilateral breast reconstruction.  Eraser sized lump noted to right axilla with prominent cord felt.  No redness or warmth noted.  Tender to touch  Abdominal:      General: There is no distension.   Skin:     General: Skin is warm and dry.   Neurological:      General: No focal deficit present.      Mental Status: She is alert and oriented to person, place, and time.             Assessment and Plan     1. Malignant neoplasm of overlapping sites of right breast in female, estrogen receptor positive  -     US Axilla Only (Breast Imaging) Right; Future; Expected date: 09/18/2024    2. Axillary lump, right  -     US Axilla Only (Breast Imaging) Right; Future; Expected date: 09/18/2024    3. H/O bilateral mastectomy  -     US Axilla Only (Breast Imaging) Right; Future; Expected date: 09/18/2024    4. SERM use (selective estrogen receptor modulator)      Will get right axillary ultrasound for further evaluation of new lump.  No redness or warmth to indicate infection.  The area is tender.  She is seeing infectious disease tomorrow and will have them take a look as well and give recommendations if antibiotics are warranted.   Ultrasound scheduled for tomorrow    Continue Tamoxifen  Defer hysterectomy/BSO as not needed for breast cancer treatment and rediscuss when other surgery healing and needed surgeries complete (ear and carpal tunnel)  Normal pelvic ultrasound     Check menopausal status with next labs     Ortho hand follow up in 12/2024    Message sent to Dr. Jyotsna SAAVEDRA to discuss her concerns around surgeries    Route Chart for Scheduling    Med Onc Chart Routing  Urgent    Follow up with physician . As previously scheduled with Dr. Piedra   Follow up with [facility]    Infusion scheduling note    Injection scheduling note    Labs    Imaging    Pharmacy appointment    Other referrals              Urgent Care Oncology Visit    Date and Time: 09/19/2024 12:40 PM   Patient MRN: [id number]   Chief Complaint:   Chief Complaint   Patient presents with    Malignant neoplasm of overlapping sites of right breast in      Reason for Urgent Care Visit:  right axillary lump  Patient Type: no active treatment but established  Intervention: imaging ordered  Dispo: return to clinic as previous  UrgOnc appointment addressed via:  pt scheduled  This UrgOnc visit was in person  Time spent handling UC issue:  30 minutes    Was this virtual or in person UrgOnc visit appropriate? Yes      Supportive Plan Information  OP BREAST GOSERELIN Q4W Lidia Osman MD   Associated Diagnosis: Malignant neoplasm of overlapping sites of right breast in female, estrogen receptor positive Stage IB, Stage IB cT2, cN0, cM0, G1, ER+, WV+, HER2: Equivocal, pT2, pN1(sn), cM0, G2, ER+, WV+, HER2- noted on 4/26/2023   Line of treatment: Adjuvant   Treatment goal: Curative     Upcoming Treatment Dates - OP BREAST GOSERELIN Q4W    5/31/2024       Chemotherapy       goserelin (ZOLADEX) injection 3.6 mg      Lalitha Zamarripa NP

## 2024-09-18 NOTE — Clinical Note
Jose,   Can we get this nice and very anxious patient in for a right axillary ultrasound as soon as able.  Thanks.  Lalitha

## 2024-09-19 ENCOUNTER — HOSPITAL ENCOUNTER (OUTPATIENT)
Dept: RADIOLOGY | Facility: HOSPITAL | Age: 44
Discharge: HOME | End: 2024-09-19
Attending: NURSE PRACTITIONER
Payer: COMMERCIAL

## 2024-09-19 ENCOUNTER — OFFICE VISIT (OUTPATIENT)
Dept: INFECTIOUS DISEASES | Facility: CLINIC | Age: 44
End: 2024-09-19
Payer: COMMERCIAL

## 2024-09-19 ENCOUNTER — TELEPHONE (OUTPATIENT)
Dept: RADIOLOGY | Facility: HOSPITAL | Age: 44
End: 2024-09-19
Payer: COMMERCIAL

## 2024-09-19 VITALS — WEIGHT: 157 LBS | BODY MASS INDEX: 29.64 KG/M2 | HEIGHT: 61 IN

## 2024-09-19 VITALS
DIASTOLIC BLOOD PRESSURE: 89 MMHG | BODY MASS INDEX: 29.59 KG/M2 | TEMPERATURE: 98 F | SYSTOLIC BLOOD PRESSURE: 142 MMHG | WEIGHT: 156.75 LBS | HEART RATE: 78 BPM | HEIGHT: 61 IN

## 2024-09-19 DIAGNOSIS — Z17.0 MALIGNANT NEOPLASM OF OVERLAPPING SITES OF RIGHT BREAST IN FEMALE, ESTROGEN RECEPTOR POSITIVE: Primary | ICD-10-CM

## 2024-09-19 DIAGNOSIS — Z90.13 H/O BILATERAL MASTECTOMY: ICD-10-CM

## 2024-09-19 DIAGNOSIS — Z17.0 MALIGNANT NEOPLASM OF OVERLAPPING SITES OF RIGHT BREAST IN FEMALE, ESTROGEN RECEPTOR POSITIVE: ICD-10-CM

## 2024-09-19 DIAGNOSIS — R22.31 AXILLARY LUMP, RIGHT: ICD-10-CM

## 2024-09-19 DIAGNOSIS — C50.811 MALIGNANT NEOPLASM OF OVERLAPPING SITES OF RIGHT BREAST IN FEMALE, ESTROGEN RECEPTOR POSITIVE: Primary | ICD-10-CM

## 2024-09-19 DIAGNOSIS — C50.811 MALIGNANT NEOPLASM OF OVERLAPPING SITES OF RIGHT BREAST IN FEMALE, ESTROGEN RECEPTOR POSITIVE: ICD-10-CM

## 2024-09-19 PROCEDURE — 77061 BREAST TOMOSYNTHESIS UNI: CPT | Mod: 26,RT,, | Performed by: RADIOLOGY

## 2024-09-19 PROCEDURE — 99999 PR PBB SHADOW E&M-EST. PATIENT-LVL III: CPT | Mod: PBBFAC,,, | Performed by: INTERNAL MEDICINE

## 2024-09-19 PROCEDURE — 77065 DX MAMMO INCL CAD UNI: CPT | Mod: 26,RT,, | Performed by: RADIOLOGY

## 2024-09-19 PROCEDURE — 77065 DX MAMMO INCL CAD UNI: CPT | Mod: TC,RT

## 2024-09-19 PROCEDURE — 76882 US LMTD JT/FCL EVL NVASC XTR: CPT | Mod: TC,RT

## 2024-09-19 PROCEDURE — 76882 US LMTD JT/FCL EVL NVASC XTR: CPT | Mod: 26,RT,, | Performed by: RADIOLOGY

## 2024-09-19 PROCEDURE — 77061 BREAST TOMOSYNTHESIS UNI: CPT | Mod: TC,RT

## 2024-09-19 NOTE — PROGRESS NOTES
Infectious Diseases Clinic Note    Subjective:       Patient ID: Dov Hernandez is a 43 y.o. female.    Chief Complaint: Follow-up    HPI    Here for f/u doing well  Has to have carpal tunnel surgery  Also has to have ear drum repair  Also may need VIBHA  Will need further reconstruction of breast  Drain has been out x 3 weeks no issues at that site, off all antibiotics for 3 weeks    Just had lump under armpit pop up Sunday morning 5 days ago, has not gotten worse, is pea size and feels superficial to her, and wili of a cord, no fevers, no appetite changes no shaving of that arm    A 2 x 1 x 3 mm circumscribed elliptical mass is seen subjacent to the dermis.       Past Medical History:   Diagnosis Date    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    OCP & aziza from derm, Doxy caused yeast    Breast cancer     Heartburn 12/17/2021    Strawberry wai 1981    back of neck    Strawberry wai 1981    under nose       Social History     Socioeconomic History    Marital status:     Number of children: 2   Occupational History    Occupation:      Employer: 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    , 2 children (10 yo son, 6 yo daughter [place])    Mom Renee Tejeda    nonsmoker      GYN Yolanda     Social Determinants of Health     Financial Resource Strain: Low Risk  (2/21/2024)    Overall Financial Resource Strain (CARDIA)     Difficulty of Paying Living Expenses: Not hard at all   Food Insecurity: No Food Insecurity (2/21/2024)    Hunger Vital Sign     Worried About Running Out of Food in the Last Year: Never true     Ran Out of Food in the Last Year: Never true   Transportation Needs: No Transportation  Needs (2/21/2024)    PRAPARE - Transportation     Lack of Transportation (Medical): No     Lack of Transportation (Non-Medical): No   Physical Activity: Insufficiently Active (2/21/2024)    Exercise Vital Sign     Days of Exercise per Week: 3 days     Minutes of Exercise per Session: 30 min   Stress: No Stress Concern Present (2/21/2024)    [employer] - Occupational Stress Questionnaire     Feeling of Stress : Only a little   Housing Stability: Low Risk  (2/21/2024)    Housing Stability Vital Sign     Unable to Pay for Housing in the Last Year: No     Number of Places Lived in the Last Year: 1     Unstable Housing in the Last Year: No         Current Outpatient Medications:     tamoxifen (NOLVADEX) 20 MG Tab, Take 1 tablet (20 mg total) by mouth once daily., Disp: 30 tablet, Rfl: 11    venlafaxine (EFFEXOR-XR) 150 MG Cp24, Take 1 capsule (150 mg total) by mouth once daily., Disp: 90 capsule, Rfl: 3    amoxicillin-clavulanate 500-125mg (AUGMENTIN) 500-125 mg Tab, Take 1 tablet by mouth 2 (two) times daily. (Patient not taking: Reported on 9/19/2024), Disp: , Rfl:     furosemide (LASIX) 20 MG tablet, Take 1 tablet (20 mg total) by mouth once daily. (Patient not taking: Reported on 9/19/2024), Disp: 30 tablet, Rfl: 1    gabapentin (NEURONTIN) 100 MG capsule, Take 1 capsule (100 mg total) by mouth every evening. (Patient not taking: Reported on 9/19/2024), Disp: 90 capsule, Rfl: 3    Review of Systems   All other systems reviewed and are negative.        Past Surgical History:   Procedure Laterality Date    BILATERAL MASTECTOMY Bilateral 5/24/2023    Procedure: MASTECTOMY, BILATERAL;  Surgeon: Jenna Pickard MD;  Location: [facility] OR;  Service: General;  Laterality: Bilateral;  2.5 HOURS / EMAIL SENT 5-9 @ 11:39 LK    COLPOSCOPY      INJECTION FOR SENTINEL NODE IDENTIFICATION Right 5/24/2023    Procedure: INJECTION, FOR SENTINEL NODE IDENTIFICATION;  Surgeon: Jenna Pickard MD;  Location: [facility]  OR;  Service: General;  Laterality: Right;    RECONSTRUCTION OF BREAST WITH DEEP INFERIOR EPIGASTRIC ARTERY  (ROSITA) FREE FLAP Bilateral 5/24/2023    Procedure: RECONSTRUCTION, BREAST, USING ROSITA FREE FLAP / BILATERAL DEEP INFERIOR EPIGASTRIC PERFORATORS FLAPS;  Surgeon: Mikey Roche MD;  Location: [facility];  Service: Plastics;  Laterality: Bilateral;    SENTINEL LYMPH NODE BIOPSY Right 5/24/2023    Procedure: BIOPSY, LYMPH NODE, SENTINEL;  Surgeon: Jenna Pickard MD;  Location: [facility] OR;  Service: General;  Laterality: Right;    WISDOM TOOTH EXTRACTION            Extremely medically complex      Objective:      Vitals:    09/19/24 1357   BP: (!) 142/89   Pulse: 78   Temp: 98.1 °F (36.7 °C)     Physical Exam  Skin:     Comments: L breast no erythema or drainage, small palpable 1 cm nodules under R axilla, no erythema or drainge but they are TTP, in cord like pattern             Assessment/Plan:           44 y/o F h/o breast cancer s/p b/l mastectomy 5/2023, adjuvant TC 2023, on anastrozole initially had fat transfer, 1/2024 US of L breast (bedside reportedly with collections of fluid) then 2/2024 opened up and had reportedly fat necrosis, debrided then closed.  On 3/27 she had pain erythema of L breast, aspirated in clinic with significant pus, then taken to OR for debridement, cultures grew mariania magna (AFB smear culture negative at that time)  took augmentin x 2.5 weeks, then 6/17 went back in to do another fat transfer from back to reconstruct left breast, then late July noted hematoma/abscess in shower then brownish bloody liquid came out, then again debridement 7/31/24 with expander placed, started IV antibiotics and had abx disc placed in L breast, then started on augmentin again.. 7/30 clinic culture NGTD, AFB cultures pending. She has completed augmentin, drain removed 3 weeks ago, off all abx for past 3 weeks doing very well now with small corded nodules under R axilla with US showing dermal  process (not lymphnodes).  - these do not appear to be infected on exam  - I am concerned that NTM may ultimately be implicated as culprit but finegoldia also makes sense given indolent presentation and good news she is doing very well without NTM therapy or further antibiotics  - aggressive surgical management has been helpful and will continue to be critical if infection persists.  - agree with holding on any further reconstruction until we know this infection has resolved - so waiting at least a few months observing patient off of antibiotics   - no absolute infectious contraindications to ear procedure, carpal tunnel surgery, or VIBHA but ok to take augmentin 5 days before and 5 days after procedures as she is concerned  Discussed care with oncology team/provider     41 minutes of total time spent on the encounter, which includes face to face time and non-face to face time preparing to see the patient (eg, review of tests), Obtaining and/or reviewing separately obtained history, Documenting clinical information in the electronic or other health record, Independently interpreting results (not separately reported) and communicating results to the patient/family/caregiver, or Care coordination (not separately reported).      This patient's visit complexity is inherent to evaluation and management associated with medical care services that are part of ongoing care related to this patient's single, serious condition or complex condition

## 2024-09-23 ENCOUNTER — PATIENT MESSAGE (OUTPATIENT)
Dept: HEMATOLOGY/ONCOLOGY | Facility: CLINIC | Age: 44
End: 2024-09-23
Payer: COMMERCIAL

## 2024-09-25 ENCOUNTER — OFFICE VISIT (OUTPATIENT)
Dept: ORTHOPEDICS | Facility: CLINIC | Age: 44
End: 2024-09-25
Payer: COMMERCIAL

## 2024-09-25 VITALS — BODY MASS INDEX: 29.59 KG/M2 | HEIGHT: 61 IN | WEIGHT: 156.75 LBS

## 2024-09-25 DIAGNOSIS — G56.03 BILATERAL CARPAL TUNNEL SYNDROME: Primary | ICD-10-CM

## 2024-09-25 DIAGNOSIS — G56.01 CARPAL TUNNEL SYNDROME, RIGHT: ICD-10-CM

## 2024-09-25 PROCEDURE — 99999 PR PBB SHADOW E&M-EST. PATIENT-LVL IV: CPT | Mod: PBBFAC,,,

## 2024-09-25 RX ORDER — SODIUM CHLORIDE 9 MG/ML
INJECTION, SOLUTION INTRAVENOUS CONTINUOUS
OUTPATIENT
Start: 2024-09-25

## 2024-09-25 RX ORDER — MUPIROCIN 20 MG/G
OINTMENT TOPICAL
OUTPATIENT
Start: 2024-09-25

## 2024-09-25 RX ORDER — CEFAZOLIN SODIUM 2 G/50ML
2 SOLUTION INTRAVENOUS
OUTPATIENT
Start: 2024-09-25

## 2024-09-25 NOTE — PROGRESS NOTES
[facility]  History & Physical  Orthopedics    SUBJECTIVE:      COVID-19 attestation:  This patient was treated during the COVID-19 pandemic.  This was discussed with the patient, they are aware of our current policies and procedures, were given the option of delaying their visit and or switching to a virtual visit, delaying their surgery when applicable, and they elect to proceed.    Chief Complaint: bilateral CTS and cubital tunnel syndrome    Referring Provider: No ref. provider found     History of Present Illness:  Patient is a 43 y.o. right hand dominant female who presents today with complaints of bilateral hand tingling/numbness and pain. She states it is typically worse at night where her whole hand goes numb and she has significant pain throughout the day in the hands. She had a history of breast cancer and underwent chemo and radiation that could also cause some neuropathy. She is currently off chemo and radiation altogether.     Interval history July 18, 2024: The patient returns today for re-evaluation.  She notes that her symptoms are persistent.  She has constant numbness and tingling in the right greater than left hands worst to the thumb and index fingers.  She had a recent EMG returns for those results and re-evaluation.  No other complaints.    Interval history September 25, 2024: The patient returns for re-evaluation. She notes her symptoms of numbness and tingling are constant with her right side hurting worse than the left. She notes her symptoms are in the median nerve distrubution, and she denies ulnar nerve distribution symptoms at this time.  She has tried bracing and conservative treatment with minimal relief. She reports her left breast infection that she was dealing with in July has resolved. She admits she has received the clearance from Oncology, plastics, and Infectious Disease to go forth with a carpal tunnel release surgery.  She presents today to sign  surgical consents and for re-evaluation. No new complaints.    Onset of symptoms/DOI was multiple months ago.    Symptoms are aggravated by activity, movement, and at night.    Symptoms are alleviated by rest and during the day.    Symptoms consist of pain and numbness/tingling.    The patient rates their pain as a 0/10    Attempted treatment(s) and/or interventions include activity modifications, rest, anti-inflammatory medications.     The patient denies any fevers, chills, N/V, D/C and presents for evaluation.       Past Medical History:   Diagnosis Date    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    OCP & aziza from derm, Doxy caused yeast    Breast cancer     Heartburn 12/17/2021    Strawberry wai 1981    back of neck    Strawberry wai 1981    under nose     Past Surgical History:   Procedure Laterality Date    BILATERAL MASTECTOMY Bilateral 5/24/2023    Procedure: MASTECTOMY, BILATERAL;  Surgeon: Jenna Pickard MD;  Location: [facility];  Service: General;  Laterality: Bilateral;  2.5 HOURS / EMAIL SENT 5-9 @ 11:39 LK    COLPOSCOPY      INJECTION FOR SENTINEL NODE IDENTIFICATION Right 5/24/2023    Procedure: INJECTION, FOR SENTINEL NODE IDENTIFICATION;  Surgeon: Jenna Pickard MD;  Location: [facility];  Service: General;  Laterality: Right;    RECONSTRUCTION OF BREAST WITH DEEP INFERIOR EPIGASTRIC ARTERY  (ROSITA) FREE FLAP Bilateral 5/24/2023    Procedure: RECONSTRUCTION, BREAST, USING ROSITA FREE FLAP / BILATERAL DEEP INFERIOR EPIGASTRIC PERFORATORS FLAPS;  Surgeon: Mikey Roche MD;  Location: [facility];  Service: Plastics;  Laterality: Bilateral;    SENTINEL LYMPH NODE BIOPSY Right 5/24/2023    Procedure: BIOPSY, LYMPH NODE, SENTINEL;  Surgeon: Jenna Pickard MD;  Location: [facility];  Service: General;  Laterality: Right;    WISDOM TOOTH EXTRACTION       Review of patient's allergies indicates:   Allergen Reactions    Latex, natural rubber Rash     Social History     Social History Narrative    Family  "[employer] office mgr    , 2 children (10 yo son, 8 yo daughter [address] [place])    Mom Renee Tejeda    nonsmoker      GYN Yolanda     Family History   Problem Relation Name Age of Onset    Hyperlipidemia Mother      Kidney cancer Father  58    Cancer Brother  22        Parotid Gland cancer    Birth marks Child  0        strawberry wai(s)    Birth marks Child  0        strawberry wai(s)    Café-au-lait spots Maternal Uncle          "a small patch on his neck"    Café-au-lait spots Other nephew         "a spot on his arm and partial torso"    Other Other nephew 2        tested negative for macrocephaly    Cervical cancer Other      Throat cancer Other      Pancreatic cancer Other          1 second cousin ()    Other Other          brain tumors (several 2nd cousins)    Breast cancer Neg Hx      Colon cancer Neg Hx      Ovarian cancer Neg Hx      Melanoma Neg Hx           Current Outpatient Medications:     amoxicillin-clavulanate 500-125mg (AUGMENTIN) 500-125 mg Tab, Take 1 tablet by mouth 2 (two) times daily. (Patient not taking: Reported on 2024), Disp: , Rfl:     furosemide (LASIX) 20 MG tablet, Take 1 tablet (20 mg total) by mouth once daily. (Patient not taking: Reported on 2024), Disp: 30 tablet, Rfl: 1    gabapentin (NEURONTIN) 100 MG capsule, Take 1 capsule (100 mg total) by mouth every evening. (Patient not taking: Reported on 2024), Disp: 90 capsule, Rfl: 3    tamoxifen (NOLVADEX) 20 MG Tab, Take 1 tablet (20 mg total) by mouth once daily., Disp: 30 tablet, Rfl: 11    venlafaxine (EFFEXOR-XR) 150 MG Cp24, Take 1 capsule (150 mg total) by mouth once daily., Disp: 90 capsule, Rfl: 3      Review of Systems:  As per HPI otherwise noncontributory    OBJECTIVE:      Vital Signs (Most Recent):  There were no vitals filed for this visit.    There is no height or weight on file to calculate BMI.      Physical Exam:  Constitutional: The patient appears well-developed and well-nourished. " No distress.   Skin: No lesions appreciated  Head: Normocephalic and atraumatic.   Nose: Nose normal.   Ears: No deformities seen  Eyes: Conjunctivae and EOM are normal.   Neck: No tracheal deviation present.   Cardiovascular: Normal rate and intact distal pulses.    Pulmonary/Chest: Effort normal. No respiratory distress.   Abdominal: There is no guarding.   Neurological: The patient is alert.   Psychiatric: The patient has a normal mood and affect.     Bilateral Hand/Wrist Examination:    Observation/Inspection:  Swelling  none    Deformity  none  Discoloration  none     Scars   none    Atrophy  none    HAND/WRIST EXAMINATION:    Neurovascular Exam:  Digits WWP, brisk CR < 3s throughout  NVI motor/LTS to M/R/U nerves, radial pulse 2+  Tinel's Test - Carpal Tunnel  Pos bilat  Tinel's Test - Cubital Tunnel  Negative today  Phalen's Test    Neg  Median Nerve Compression Test Pos bilat    ROM hand full, painless    ROM wrist full, painless    ROM elbow full, painless    Abdomen not guarded  Respirations nonlabored  Perfusion intact    Diagnostic Results:     Imaging - I independently viewed the patient's imaging as well as the radiology report.  Xrays of the patient's bilateral hands  demonstrate no evidence of any acute fractures or dislocations or significant degenerative changes.    EMG - IMPRESSIONS:  There is electrophysiologic evidence of a bilateral sensorimotor median mononeuropathy across the wrist (I.e. Carpal tunnel syndrome).  There is no motor axonal loss.  There is no active denervation.  This is graded as Severe in severity on the right, and Moderate on the left.     ASSESSMENT/PLAN:      43 y.o. yo female with  bilateral carpal tunnel syndrome (Right > Left)      Plan: The patient and I had a thorough discussion today.  We discussed the working diagnosis as well as several other potential alternative diagnoses.  Treatment options were discussed, both conservative and surgical.  Conservative treatment  options would include things such as activity modifications, workplace modifications, a period of rest, oral vs topical OTC and prescription anti-inflammatory medications, occupational therapy, splinting/bracing, immobilization, corticosteroid injections, and others.  Surgical options were discussed as well.     At this time, the patient would like to proceed with a right endoscopic carpal tunnel release with Dr. Olivas on October 25, 2024.  Surgical consents signed today. Preop and postoperative paperwork reviewed in clinic today.  Postoperative home exercise program sheet provided. Surgery request placed for [place].  She will follow up 2 weeks status post a right carpal tunnel release or sooner if needed.     The patient has not responded to adequate non operative treatment at this time and/or non operative treatment is not indicated. Thus, the risks, benefits and alternatives to surgery were discussed with the patient in detail.  Specific risks include but are not limited to bleeding, infection, vessel and/or nerve damage, pain, numbness, tingling, compartment syndrome, need for additional surgery, failure to return to pre-injury and/or preoperative functional status, inability to return to work, scar sensitivity, delayed healing, complex regional pain syndrome, weakness, pulley injury, tendon injury, bowstringing, partial and/or incomplete relief of symptoms, persistence of and/or worsening of symptoms, hardware and/or surgical failure, prominent and/or symptomatic hardware possibly necessitating future removal, osteomyelitis, amputation, loss of function, stiffness, rotational malalignment, functional debility, dysfunction, decreased  strength, need for prolonged postoperative rehabilitation, malunion, nonunion, deep venous thrombosis, pulmonary embolism, arthritis and death. Further, surgery would be done to prevent further neurological injury/degeneration rather than to ameliorate any existing deficits.  The patient states an understanding and wishes to proceed with surgery.   All questions were answered.  No guarantees were implied or stated.  Written informed consent was obtained.         Should the patient's symptoms worsen, persist, or fail to improve they should return for reevaluation and I would be happy to see them back anytime.         Bita Irvin PA-C    Please be aware that this note has been generated with the assistance of [employer] voice-to-text.  Please excuse any spelling or grammatical errors.      Please do not hesitate to reach out to us via email, phone, or MyChart with any questions, concerns, or feedback.

## 2024-10-16 ENCOUNTER — ANESTHESIA EVENT (OUTPATIENT)
Dept: SURGERY | Facility: HOSPITAL | Age: 44
End: 2024-10-16
Payer: COMMERCIAL

## 2024-10-18 ENCOUNTER — PATIENT MESSAGE (OUTPATIENT)
Dept: HEMATOLOGY/ONCOLOGY | Facility: CLINIC | Age: 44
End: 2024-10-18
Payer: COMMERCIAL

## 2024-10-18 DIAGNOSIS — M79.672 LEFT FOOT PAIN: Primary | ICD-10-CM

## 2024-10-24 ENCOUNTER — TELEPHONE (OUTPATIENT)
Dept: ORTHOPEDICS | Facility: CLINIC | Age: 44
End: 2024-10-24
Payer: COMMERCIAL

## 2024-10-24 PROBLEM — G56.01 CARPAL TUNNEL SYNDROME, RIGHT: Status: ACTIVE | Noted: 2024-10-24

## 2024-10-24 RX ORDER — ACETAMINOPHEN 500 MG
1000 TABLET ORAL EVERY 8 HOURS PRN
Qty: 42 TABLET | Refills: 0 | Status: SHIPPED | OUTPATIENT
Start: 2024-10-24

## 2024-10-24 RX ORDER — IBUPROFEN 600 MG/1
600 TABLET ORAL EVERY 8 HOURS PRN
Qty: 21 TABLET | Refills: 0 | Status: SHIPPED | OUTPATIENT
Start: 2024-10-24

## 2024-10-24 RX ORDER — TRAMADOL HYDROCHLORIDE 50 MG/1
50 TABLET ORAL EVERY 6 HOURS PRN
Qty: 20 TABLET | Refills: 0 | Status: SHIPPED | OUTPATIENT
Start: 2024-10-24

## 2024-10-24 NOTE — TELEPHONE ENCOUNTER
Spoke with the patient. Notified of 6:30 AM arrival time to the [facility]. Notified of negative COVID test. Informed of current visitor policy.  Reminded of NPO. Patient verbalized understanding to all.    Clotilde Soler   Medical Assistant to Dr. Ross Dunbar [facility]

## 2024-10-25 ENCOUNTER — ANESTHESIA (OUTPATIENT)
Dept: SURGERY | Facility: HOSPITAL | Age: 44
End: 2024-10-25
Payer: COMMERCIAL

## 2024-10-25 ENCOUNTER — HOSPITAL ENCOUNTER (OUTPATIENT)
Facility: HOSPITAL | Age: 44
Discharge: HOME | End: 2024-10-25
Attending: ORTHOPAEDIC SURGERY | Admitting: ORTHOPAEDIC SURGERY
Payer: COMMERCIAL

## 2024-10-25 VITALS
WEIGHT: 156 LBS | DIASTOLIC BLOOD PRESSURE: 72 MMHG | SYSTOLIC BLOOD PRESSURE: 131 MMHG | BODY MASS INDEX: 29.45 KG/M2 | HEIGHT: 61 IN | TEMPERATURE: 98 F | HEART RATE: 78 BPM | RESPIRATION RATE: 21 BRPM | OXYGEN SATURATION: 99 %

## 2024-10-25 DIAGNOSIS — G56.03 BILATERAL CARPAL TUNNEL SYNDROME: ICD-10-CM

## 2024-10-25 DIAGNOSIS — G56.01 CARPAL TUNNEL SYNDROME, RIGHT: Primary | ICD-10-CM

## 2024-10-25 PROCEDURE — 71000033 HC RECOVERY, INTIAL HOUR: Performed by: ORTHOPAEDIC SURGERY

## 2024-10-25 PROCEDURE — 63600175 PHARM REV CODE 636 W HCPCS: Performed by: STUDENT IN AN ORGANIZED HEALTH CARE EDUCATION/TRAINING PROGRAM

## 2024-10-25 PROCEDURE — 94761 N-INVAS EAR/PLS OXIMETRY MLT: CPT

## 2024-10-25 PROCEDURE — 25000003 PHARM REV CODE 250: Performed by: NURSE ANESTHETIST, CERTIFIED REGISTERED

## 2024-10-25 PROCEDURE — 71000015 HC POSTOP RECOV 1ST HR: Performed by: ORTHOPAEDIC SURGERY

## 2024-10-25 PROCEDURE — 37000008 HC ANESTHESIA 1ST 15 MINUTES: Performed by: ORTHOPAEDIC SURGERY

## 2024-10-25 PROCEDURE — 25000003 PHARM REV CODE 250

## 2024-10-25 PROCEDURE — 64721 CARPAL TUNNEL SURGERY: CPT | Mod: RT,,, | Performed by: ORTHOPAEDIC SURGERY

## 2024-10-25 PROCEDURE — 99900035 HC TECH TIME PER 15 MIN (STAT)

## 2024-10-25 PROCEDURE — 36000707: Performed by: ORTHOPAEDIC SURGERY

## 2024-10-25 PROCEDURE — 36000706: Performed by: ORTHOPAEDIC SURGERY

## 2024-10-25 PROCEDURE — 63600175 PHARM REV CODE 636 W HCPCS

## 2024-10-25 PROCEDURE — 25000003 PHARM REV CODE 250: Performed by: NURSE PRACTITIONER

## 2024-10-25 PROCEDURE — 63600175 PHARM REV CODE 636 W HCPCS: Performed by: NURSE PRACTITIONER

## 2024-10-25 PROCEDURE — 64415 NJX AA&/STRD BRCH PLXS IMG: CPT | Performed by: STUDENT IN AN ORGANIZED HEALTH CARE EDUCATION/TRAINING PROGRAM

## 2024-10-25 PROCEDURE — 37000009 HC ANESTHESIA EA ADD 15 MINS: Performed by: ORTHOPAEDIC SURGERY

## 2024-10-25 PROCEDURE — 27201423 OPTIME MED/SURG SUP & DEVICES STERILE SUPPLY: Performed by: ORTHOPAEDIC SURGERY

## 2024-10-25 PROCEDURE — 63600175 PHARM REV CODE 636 W HCPCS: Performed by: NURSE ANESTHETIST, CERTIFIED REGISTERED

## 2024-10-25 RX ORDER — GLUCAGON 1 MG
1 KIT INJECTION
Status: DISCONTINUED | OUTPATIENT
Start: 2024-10-25 | End: 2024-10-25 | Stop reason: HOSPADM

## 2024-10-25 RX ORDER — CEFAZOLIN 2 G/1
2 INJECTION, POWDER, FOR SOLUTION INTRAMUSCULAR; INTRAVENOUS
Status: COMPLETED | OUTPATIENT
Start: 2024-10-25 | End: 2024-10-25

## 2024-10-25 RX ORDER — ONDANSETRON HYDROCHLORIDE 2 MG/ML
INJECTION, SOLUTION INTRAVENOUS
Status: DISCONTINUED | OUTPATIENT
Start: 2024-10-25 | End: 2024-10-25

## 2024-10-25 RX ORDER — MUPIROCIN 20 MG/G
OINTMENT TOPICAL 2 TIMES DAILY
Status: DISCONTINUED | OUTPATIENT
Start: 2024-10-25 | End: 2024-10-25 | Stop reason: HOSPADM

## 2024-10-25 RX ORDER — CALCIUM ACETATE 667 MG/1
667 CAPSULE ORAL DAILY
COMMUNITY

## 2024-10-25 RX ORDER — PROPOFOL 10 MG/ML
VIAL (ML) INTRAVENOUS CONTINUOUS PRN
Status: DISCONTINUED | OUTPATIENT
Start: 2024-10-25 | End: 2024-10-25

## 2024-10-25 RX ORDER — CELECOXIB 200 MG/1
400 CAPSULE ORAL
Status: COMPLETED | OUTPATIENT
Start: 2024-10-25 | End: 2024-10-25

## 2024-10-25 RX ORDER — FENTANYL CITRATE 50 UG/ML
25 INJECTION, SOLUTION INTRAMUSCULAR; INTRAVENOUS EVERY 5 MIN PRN
Status: DISCONTINUED | OUTPATIENT
Start: 2024-10-25 | End: 2024-10-25 | Stop reason: HOSPADM

## 2024-10-25 RX ORDER — SODIUM CHLORIDE 9 MG/ML
INJECTION, SOLUTION INTRAVENOUS CONTINUOUS
Status: DISCONTINUED | OUTPATIENT
Start: 2024-10-25 | End: 2024-10-25 | Stop reason: HOSPADM

## 2024-10-25 RX ORDER — OXYCODONE HYDROCHLORIDE 5 MG/1
5 TABLET ORAL
Status: DISCONTINUED | OUTPATIENT
Start: 2024-10-25 | End: 2024-10-25 | Stop reason: HOSPADM

## 2024-10-25 RX ORDER — MUPIROCIN 20 MG/G
OINTMENT TOPICAL
Status: DISCONTINUED | OUTPATIENT
Start: 2024-10-25 | End: 2024-10-25 | Stop reason: HOSPADM

## 2024-10-25 RX ORDER — HYDROCODONE BITARTRATE AND ACETAMINOPHEN 5; 325 MG/1; MG/1
1 TABLET ORAL EVERY 4 HOURS PRN
Status: DISCONTINUED | OUTPATIENT
Start: 2024-10-25 | End: 2024-10-25 | Stop reason: HOSPADM

## 2024-10-25 RX ORDER — MULTIVITAMIN
1 TABLET ORAL DAILY
COMMUNITY

## 2024-10-25 RX ORDER — ONDANSETRON 4 MG/1
8 TABLET, ORALLY DISINTEGRATING ORAL EVERY 8 HOURS PRN
Status: DISCONTINUED | OUTPATIENT
Start: 2024-10-25 | End: 2024-10-25 | Stop reason: HOSPADM

## 2024-10-25 RX ORDER — SODIUM CHLORIDE 0.9 % (FLUSH) 0.9 %
10 SYRINGE (ML) INJECTION
Status: DISCONTINUED | OUTPATIENT
Start: 2024-10-25 | End: 2024-10-25 | Stop reason: HOSPADM

## 2024-10-25 RX ORDER — HALOPERIDOL 5 MG/ML
0.5 INJECTION INTRAMUSCULAR EVERY 10 MIN PRN
Status: DISCONTINUED | OUTPATIENT
Start: 2024-10-25 | End: 2024-10-25 | Stop reason: HOSPADM

## 2024-10-25 RX ORDER — PROPOFOL 10 MG/ML
VIAL (ML) INTRAVENOUS
Status: DISCONTINUED | OUTPATIENT
Start: 2024-10-25 | End: 2024-10-25

## 2024-10-25 RX ORDER — ONDANSETRON HYDROCHLORIDE 2 MG/ML
4 INJECTION, SOLUTION INTRAVENOUS DAILY PRN
Status: DISCONTINUED | OUTPATIENT
Start: 2024-10-25 | End: 2024-10-25 | Stop reason: HOSPADM

## 2024-10-25 RX ORDER — MIDAZOLAM HYDROCHLORIDE 1 MG/ML
1 INJECTION, SOLUTION INTRAMUSCULAR; INTRAVENOUS
Status: DISCONTINUED | OUTPATIENT
Start: 2024-10-25 | End: 2024-10-25 | Stop reason: HOSPADM

## 2024-10-25 RX ORDER — LIDOCAINE HYDROCHLORIDE 20 MG/ML
INJECTION INTRAVENOUS
Status: DISCONTINUED | OUTPATIENT
Start: 2024-10-25 | End: 2024-10-25

## 2024-10-25 RX ORDER — ACETAMINOPHEN 500 MG
1000 TABLET ORAL
Status: COMPLETED | OUTPATIENT
Start: 2024-10-25 | End: 2024-10-25

## 2024-10-25 RX ADMIN — ACETAMINOPHEN 1000 MG: 500 TABLET ORAL at 07:10

## 2024-10-25 RX ADMIN — MEPIVACAINE HYDROCHLORIDE 30 ML: 15 INJECTION, SOLUTION EPIDURAL; INFILTRATION at 07:10

## 2024-10-25 RX ADMIN — CEFAZOLIN 2 G: 2 INJECTION, POWDER, FOR SOLUTION INTRAMUSCULAR; INTRAVENOUS at 09:10

## 2024-10-25 RX ADMIN — FENTANYL CITRATE 100 MCG: 50 INJECTION INTRAMUSCULAR; INTRAVENOUS at 07:10

## 2024-10-25 RX ADMIN — MUPIROCIN: 20 OINTMENT TOPICAL at 07:10

## 2024-10-25 RX ADMIN — ONDANSETRON 4 MG: 2 INJECTION INTRAMUSCULAR; INTRAVENOUS at 09:10

## 2024-10-25 RX ADMIN — PROPOFOL 25 MG: 10 INJECTION, EMULSION INTRAVENOUS at 09:10

## 2024-10-25 RX ADMIN — LIDOCAINE HYDROCHLORIDE 60 MG: 20 INJECTION INTRAVENOUS at 09:10

## 2024-10-25 RX ADMIN — SODIUM CHLORIDE: 9 INJECTION, SOLUTION INTRAVENOUS at 07:10

## 2024-10-25 RX ADMIN — CELECOXIB 400 MG: 200 CAPSULE ORAL at 07:10

## 2024-10-25 RX ADMIN — MIDAZOLAM 2 MG: 1 INJECTION INTRAMUSCULAR; INTRAVENOUS at 07:10

## 2024-10-25 RX ADMIN — PROPOFOL 150 MCG/KG/MIN: 10 INJECTION, EMULSION INTRAVENOUS at 09:10

## 2024-10-25 NOTE — ANESTHESIA PREPROCEDURE EVALUATION
10/25/2024  Pre-operative evaluation for Procedure(s) (LRB):  RELEASE, CARPAL TUNNEL, ENDOSCOPIC - RIGHT (Right)    Dov Hernandez is a 43 y.o. female     Patient Active Problem List   Diagnosis    Pain in joint, pelvic region and thigh    Acne varioliformis    Heartburn    Malignant neoplasm of overlapping sites of right breast in female, estrogen receptor positive    Secondary adenocarcinoma of lymph node    Anxiety    Hot flashes    Muscle tightness    At risk for lymphedema    Weakness of both upper extremities    Neuropathy    Carpal tunnel syndrome, right       Review of patient's allergies indicates:   Allergen Reactions    Latex, natural rubber Rash       No current facility-administered medications on file prior to encounter.     Current Outpatient Medications on File Prior to Encounter   Medication Sig Dispense Refill    tamoxifen (NOLVADEX) 20 MG Tab Take 1 tablet (20 mg total) by mouth once daily. 30 tablet 11    venlafaxine (EFFEXOR-XR) 150 MG Cp24 Take 1 capsule (150 mg total) by mouth once daily. 90 capsule 3       Past Surgical History:   Procedure Laterality Date    BILATERAL MASTECTOMY Bilateral 5/24/2023    Procedure: MASTECTOMY, BILATERAL;  Surgeon: Jenna Pickard MD;  Location: [facility];  Service: General;  Laterality: Bilateral;  2.5 HOURS / EMAIL SENT 5-9 @ 11:39 LK    COLPOSCOPY      INJECTION FOR SENTINEL NODE IDENTIFICATION Right 5/24/2023    Procedure: INJECTION, FOR SENTINEL NODE IDENTIFICATION;  Surgeon: Jenna Pickard MD;  Location: [facility];  Service: General;  Laterality: Right;    RECONSTRUCTION OF BREAST WITH DEEP INFERIOR EPIGASTRIC ARTERY  (ROSITA) FREE FLAP Bilateral 5/24/2023    Procedure: RECONSTRUCTION, BREAST, USING ROSITA FREE FLAP / BILATERAL DEEP INFERIOR EPIGASTRIC PERFORATORS FLAPS;  Surgeon: Mikey Roche MD;  Location: [facility];  Service: Plastics;   Laterality: Bilateral;    SENTINEL LYMPH NODE BIOPSY Right 5/24/2023    Procedure: BIOPSY, LYMPH NODE, SENTINEL;  Surgeon: Jenna Pickard MD;  Location: [facility];  Service: General;  Laterality: Right;    WISDOM TOOTH EXTRACTION         Social History     Socioeconomic History    Marital status:     Number of children: 2   Occupational History    Occupation:      Employer: [employer]   Tobacco Use    Smoking status: Never    Smokeless tobacco: Never   Substance and Sexual Activity    Alcohol use: Yes     Comment: social    Drug use: No    Sexual activity: Yes     Partners: Male     Birth control/protection: None   Social History Narrative    Family [employer] m[employer]    , 2 children (10 yo son, 8 yo daughter [employer] [place])    Mom Renee Tejeda    nonsmoker      GYN Yolanda     Social Drivers of Health     Financial Resource Strain: Low Risk  (2/21/2024)    Overall Financial Resource Strain (CARDIA)     Difficulty of Paying Living Expenses: Not hard at all   Food Insecurity: No Food Insecurity (2/21/2024)    Hunger Vital Sign     Worried About Running Out of Food in the Last Year: Never true     Ran Out of Food in the Last Year: Never true   Transportation Needs: No Transportation Needs (2/21/2024)    PRAPARE - Transportation     Lack of Transportation (Medical): No     Lack of Transportation (Non-Medical): No   Physical Activity: Insufficiently Active (2/21/2024)    Exercise Vital Sign     Days of Exercise per Week: 3 days     Minutes of Exercise per Session: 30 min   Stress: No Stress Concern Present (2/21/2024)    [place] [place] [employer] - Occupational Stress Questionnaire     Feeling of Stress : Only a little   Housing Stability: Low Risk  (2/21/2024)    Housing Stability Vital Sign     Unable to Pay for Housing in the Last Year: No     Number of Places Lived in the Last Year: 1     Unstable Housing in the Last Year: No         CBC: No results for  "input(s): "WBC", "RBC", "HGB", "HCT", "PLT", "MCV", "MCH", "MCHC" in the last 72 hours.    CMP: No results for input(s): "NA", "K", "CL", "CO2", "BUN", "CREATININE", "GLU", "MG", "PHOS", "CALCIUM", "ALBUMIN", "PROT", "ALKPHOS", "ALT", "AST", "BILITOT" in the last 72 hours.    INR  No results for input(s): "PT", "INR", "PROTIME", "APTT" in the last 72 hours.        Diagnostic Studies:      EKD Echo:  No results found for this or any previous visit.       Pre-op Assessment    I have reviewed the Patient Summary Reports.     I have reviewed the Nursing Notes. I have reviewed the NPO Status.   I have reviewed the Medications.     Review of Systems      Physical Exam  General: Well nourished and Cooperative    Airway:  Mallampati: II   Mouth Opening: Normal  TM Distance: Normal  Tongue: Normal  Neck ROM: Normal ROM    Chest/Lungs:  Clear to auscultation, Normal Respiratory Rate    Heart:  Rate: Normal  Rhythm: Regular Rhythm  Sounds: Normal        Anesthesia Plan  Type of Anesthesia, risks & benefits discussed:    Anesthesia Type: Gen ETT, Gen Natural Airway  Intra-op Monitoring Plan: Standard ASA Monitors  Post Op Pain Control Plan: multimodal analgesia and IV/PO Opioids PRN  Induction:  IV  Airway Plan: Direct and Video, Post-Induction  Informed Consent: Informed consent signed with the Patient and all parties understand the risks and agree with anesthesia plan.  All questions answered.   ASA Score: 2    Ready For Surgery From Anesthesia Perspective.     .      "

## 2024-10-25 NOTE — ANESTHESIA PROCEDURE NOTES
Supraclavicular single shot    Patient location during procedure: pre-op   Block not for primary anesthetic.  Reason for block: at surgeon's request and post-op pain management   Post-op Pain Location: right upper extremity   Start time: 10/25/2024 7:49 AM  Timeout: 10/25/2024 7:48 AM   End time: 10/25/2024 7:51 AM    Staffing  Authorizing Provider: Donna Glasgow MD  Performing Provider: Donna Glasgow MD    Staffing  Performed by: Donna Glasgow MD  Authorized by: Donna Glasgow MD    Preanesthetic Checklist  Completed: patient identified, IV checked, site marked, risks and benefits discussed, surgical consent, monitors and equipment checked, pre-op evaluation and timeout performed  Peripheral Block  Patient position: supine  Prep: ChloraPrep  Patient monitoring: heart rate, cardiac monitor, continuous pulse ox, continuous capnometry and frequent blood pressure checks  Block type: supraclavicular  Laterality: right  Injection technique: single shot  Needle  Needle type: Stimuplex   Needle gauge: 22 G  Needle length: 2 in  Needle localization: anatomical landmarks and ultrasound guidance   -ultrasound image captured on disc.  Assessment  Injection assessment: negative aspiration, negative parasthesia and local visualized surrounding nerve  Paresthesia pain: none  Heart rate change: no  Slow fractionated injection: yes  Pain Tolerance: no complaints and comfortable throughout block  Medications:    Medications: mepivacaine (CARBOCAINE) injection 15 mg/mL (1.5%) - Perineural   30 mL - 10/25/2024 7:50:00 AM    Additional Notes  VSS.  [facility] RN monitoring vitals throughout procedure.  Patient tolerated procedure well.

## 2024-10-25 NOTE — ANESTHESIA POSTPROCEDURE EVALUATION
Anesthesia Post Evaluation    Patient: Dov Hernandez    Procedure(s) Performed: Procedure(s) (LRB):  RELEASE, CARPAL TUNNEL, ENDOSCOPIC - RIGHT (Right)    Final Anesthesia Type: general      Patient location during evaluation: PACU  Patient participation: Yes- Able to Participate  Level of consciousness: awake and alert  Post-procedure vital signs: reviewed and stable  Pain management: adequate  Airway patency: patent  DIANNE mitigation strategies: Multimodal analgesia  PONV status at discharge: No PONV  Anesthetic complications: no      Cardiovascular status: blood pressure returned to baseline and hemodynamically stable  Respiratory status: unassisted  Hydration status: euvolemic  Follow-up not needed.              Vitals Value Taken Time   /65 10/25/24 0947   Temp 36.6 °C (97.9 °F) 10/25/24 0928   Pulse 79 10/25/24 0956   Resp 17 10/25/24 0956   SpO2 99 % 10/25/24 0956   Vitals shown include unfiled device data.      Event Time   Out of Recovery 09:54:00         Pain/Eneida Score: Pain Rating Prior to Med Admin: 5 (10/25/2024  7:46 AM)  Eneida Score: 9 (10/25/2024  9:24 AM)

## 2024-10-25 NOTE — TRANSFER OF CARE
"Anesthesia Transfer of Care Note    Patient: Dov Hernandez    Procedure(s) Performed: Procedure(s) (LRB):  RELEASE, CARPAL TUNNEL, ENDOSCOPIC - RIGHT (Right)    Patient location: PACU    Anesthesia Type: general and regional    Transport from OR: Transported from OR on room air with adequate spontaneous ventilation    Post pain: adequate analgesia    Post assessment: no apparent anesthetic complications and tolerated procedure well    Post vital signs: stable    Level of consciousness: awake    Nausea/Vomiting: no nausea/vomiting    Complications: none    Transfer of care protocol was followed      Last vitals: Visit Vitals  BP (!) 120/56 (BP Location: Left arm, Patient Position: Lying)   Pulse 78   Temp 36.7 °C (98 °F) (Oral)   Resp 12   Ht 5' 1" (1.549 m)   Wt 70.8 kg (156 lb)   LMP  (LMP Unknown)   SpO2 98%   Breastfeeding No   BMI 29.48 kg/m²     "

## 2024-11-04 ENCOUNTER — HOSPITAL ENCOUNTER (OUTPATIENT)
Dept: RADIOLOGY | Facility: HOSPITAL | Age: 44
Discharge: HOME | End: 2024-11-04
Attending: NURSE PRACTITIONER
Payer: COMMERCIAL

## 2024-11-04 DIAGNOSIS — M79.672 LEFT FOOT PAIN: ICD-10-CM

## 2024-11-04 PROCEDURE — 73630 X-RAY EXAM OF FOOT: CPT | Mod: TC,FY,LT

## 2024-11-04 PROCEDURE — 73630 X-RAY EXAM OF FOOT: CPT | Mod: 26,LT,, | Performed by: RADIOLOGY

## 2024-11-07 ENCOUNTER — OFFICE VISIT (OUTPATIENT)
Dept: ORTHOPEDICS | Facility: CLINIC | Age: 44
End: 2024-11-07
Payer: COMMERCIAL

## 2024-11-07 DIAGNOSIS — Z98.890 POST-OPERATIVE STATE: Primary | ICD-10-CM

## 2024-11-07 DIAGNOSIS — G56.01 CARPAL TUNNEL SYNDROME, RIGHT: ICD-10-CM

## 2024-11-07 PROCEDURE — 99999 PR PBB SHADOW E&M-EST. PATIENT-LVL III: CPT | Mod: PBBFAC,,,

## 2024-11-07 NOTE — PROGRESS NOTES
Dr. Olivas is the supervising physician for this encounter/patient    Dov Hernandez presents for post-operative evaluation.  The patient is now 13 days s/p right eCTR with Dr. Olivas on 10/25/24.  Overall the patient reports doing well, and she notes a 0/10 pain. She notes mild tenderness at the base of the thumb. She reports her numbness and tingling are completely resolved. The patient is taking nothing for post operative pain control. Patient admits to improving range of motion. She denies fevers, chills, night sweats, drainage, erythema, and warmth from the wound.     PE:    AA&O x 4.  NAD  HEENT:  NCAT, sclera nonicteric  Lungs:  Respirations are equal and unlabored.  CV:  2+ bilateral upper and lower extremity pulses.  MSK: The wound is healing well with no signs of erythema or warmth.  There is no drainage.  No clinical signs or symptoms of infection are present. Mild edema noted to the surgical incision.  Sutures discontinued today. Band-Aid applied to surgical incision. She is able to make a full composite fist to the right upper extremity. Wrist ROM is full. She is neurovascularly intact to the right upper extremity.    A/P: Status post above, doing well  1) Continue with weight bearing as tolerated.  2) Continue active and passive range of motion exercises.  3) All sutures removed today. Wound care and signs of infection discussed. Begin scar massage with vitamin E oil, mederma, or cocoa butter.  4) F/U as needed or sooner for any problems  5) Call with any questions/concerns in the interim       Bita Irvin PA-C

## 2024-11-25 ENCOUNTER — PATIENT MESSAGE (OUTPATIENT)
Dept: HEMATOLOGY/ONCOLOGY | Facility: CLINIC | Age: 44
End: 2024-11-25
Payer: COMMERCIAL

## 2024-12-10 ENCOUNTER — ANESTHESIA EVENT (OUTPATIENT)
Dept: SURGERY | Facility: OTHER | Age: 44
End: 2024-12-10
Payer: COMMERCIAL

## 2024-12-10 ENCOUNTER — HOSPITAL ENCOUNTER (OUTPATIENT)
Dept: PREADMISSION TESTING | Facility: OTHER | Age: 44
Discharge: HOME | End: 2024-12-10
Attending: OBSTETRICS & GYNECOLOGY
Payer: COMMERCIAL

## 2024-12-10 VITALS
WEIGHT: 155 LBS | DIASTOLIC BLOOD PRESSURE: 102 MMHG | RESPIRATION RATE: 18 BRPM | SYSTOLIC BLOOD PRESSURE: 148 MMHG | HEIGHT: 61 IN | TEMPERATURE: 98 F | HEART RATE: 87 BPM | OXYGEN SATURATION: 99 % | BODY MASS INDEX: 29.27 KG/M2

## 2024-12-10 DIAGNOSIS — C50.811 MALIGNANT NEOPLASM OF OVERLAPPING SITES OF RIGHT BREAST IN FEMALE, ESTROGEN RECEPTOR POSITIVE: ICD-10-CM

## 2024-12-10 DIAGNOSIS — Z17.0 MALIGNANT NEOPLASM OF OVERLAPPING SITES OF RIGHT BREAST IN FEMALE, ESTROGEN RECEPTOR POSITIVE: ICD-10-CM

## 2024-12-10 DIAGNOSIS — Z01.818 PREOP TESTING: Primary | ICD-10-CM

## 2024-12-10 DIAGNOSIS — E78.2 MIXED HYPERLIPIDEMIA: ICD-10-CM

## 2024-12-10 LAB
ABO + RH BLD: NORMAL
ALBUMIN SERPL BCP-MCNC: 3.8 G/DL (ref 3.5–5.2)
ALP SERPL-CCNC: 59 U/L (ref 40–150)
ALT SERPL W/O P-5'-P-CCNC: 23 U/L (ref 10–44)
ANION GAP SERPL CALC-SCNC: 11 MMOL/L (ref 8–16)
AST SERPL-CCNC: 21 U/L (ref 10–40)
BASOPHILS # BLD AUTO: 0.02 K/UL (ref 0–0.2)
BASOPHILS NFR BLD: 0.4 % (ref 0–1.9)
BILIRUB SERPL-MCNC: 0.3 MG/DL (ref 0.1–1)
BLD GP AB SCN CELLS X3 SERPL QL: NORMAL
BUN SERPL-MCNC: 10 MG/DL (ref 6–20)
CALCIUM SERPL-MCNC: 9.5 MG/DL (ref 8.7–10.5)
CHLORIDE SERPL-SCNC: 106 MMOL/L (ref 95–110)
CO2 SERPL-SCNC: 23 MMOL/L (ref 23–29)
CREAT SERPL-MCNC: 0.8 MG/DL (ref 0.5–1.4)
DIFFERENTIAL METHOD BLD: ABNORMAL
EOSINOPHIL # BLD AUTO: 0.2 K/UL (ref 0–0.5)
EOSINOPHIL NFR BLD: 3.3 % (ref 0–8)
ERYTHROCYTE [DISTWIDTH] IN BLOOD BY AUTOMATED COUNT: 11.9 % (ref 11.5–14.5)
EST. GFR  (NO RACE VARIABLE): >60 ML/MIN/1.73 M^2
ESTRADIOL SERPL-MCNC: <10 PG/ML
GLUCOSE SERPL-MCNC: 85 MG/DL (ref 70–110)
HCT VFR BLD AUTO: 38.6 % (ref 37–48.5)
HGB BLD-MCNC: 13.4 G/DL (ref 12–16)
IMM GRANULOCYTES # BLD AUTO: 0.01 K/UL (ref 0–0.04)
IMM GRANULOCYTES NFR BLD AUTO: 0.2 % (ref 0–0.5)
LH SERPL-ACNC: 2.8 MIU/ML
LYMPHOCYTES # BLD AUTO: 1.1 K/UL (ref 1–4.8)
LYMPHOCYTES NFR BLD: 21 % (ref 18–48)
MCH RBC QN AUTO: 31.8 PG (ref 27–31)
MCHC RBC AUTO-ENTMCNC: 34.7 G/DL (ref 32–36)
MCV RBC AUTO: 92 FL (ref 82–98)
MONOCYTES # BLD AUTO: 0.7 K/UL (ref 0.3–1)
MONOCYTES NFR BLD: 12.7 % (ref 4–15)
NEUTROPHILS # BLD AUTO: 3.3 K/UL (ref 1.8–7.7)
NEUTROPHILS NFR BLD: 62.4 % (ref 38–73)
NRBC BLD-RTO: 0 /100 WBC
PLATELET # BLD AUTO: 209 K/UL (ref 150–450)
PMV BLD AUTO: 9.2 FL (ref 9.2–12.9)
POTASSIUM SERPL-SCNC: 4.1 MMOL/L (ref 3.5–5.1)
PROT SERPL-MCNC: 7.3 G/DL (ref 6–8.4)
RBC # BLD AUTO: 4.22 M/UL (ref 4–5.4)
SODIUM SERPL-SCNC: 140 MMOL/L (ref 136–145)
SPECIMEN OUTDATE: NORMAL
WBC # BLD AUTO: 5.2 K/UL (ref 3.9–12.7)

## 2024-12-10 PROCEDURE — 86850 RBC ANTIBODY SCREEN: CPT | Performed by: ANESTHESIOLOGY

## 2024-12-10 PROCEDURE — 85025 COMPLETE CBC W/AUTO DIFF WBC: CPT | Performed by: INTERNAL MEDICINE

## 2024-12-10 PROCEDURE — 82670 ASSAY OF TOTAL ESTRADIOL: CPT | Performed by: INTERNAL MEDICINE

## 2024-12-10 PROCEDURE — 80053 COMPREHEN METABOLIC PANEL: CPT | Performed by: INTERNAL MEDICINE

## 2024-12-10 PROCEDURE — 83002 ASSAY OF GONADOTROPIN (LH): CPT | Performed by: INTERNAL MEDICINE

## 2024-12-10 PROCEDURE — 36415 COLL VENOUS BLD VENIPUNCTURE: CPT | Performed by: INTERNAL MEDICINE

## 2024-12-10 NOTE — ANESTHESIA PREPROCEDURE EVALUATION
12/10/2024  Dov Hernandez is a 44 y.o., female.      Pre-op Assessment    I have reviewed the Patient Summary Reports.     I have reviewed the Nursing Notes. I have reviewed the NPO Status.   I have reviewed the Medications.     Review of Systems  Anesthesia Hx:  No problems with previous Anesthesia             Denies Family Hx of Anesthesia complications.    Denies Personal Hx of Anesthesia complications.                    Social:  Non-Smoker       Hematology/Oncology:  Hematology Normal                     Current/Recent Cancer.  Breast              EENT/Dental:  EENT/Dental Normal           Cardiovascular:  Cardiovascular Normal Exercise tolerance: good                                             Pulmonary:  Pulmonary Normal                       Renal/:  Renal/ Normal                 Hepatic/GI:         Taking GLP-1 Agonists            Musculoskeletal:  Musculoskeletal Normal                Neurological:  Neurology Normal                                      Endocrine:  Endocrine Normal            Dermatological:  Skin Normal    Psych:   anxiety                 Physical Exam  General: Well nourished, Cooperative, Oriented and Alert    Airway:  Mallampati: II / II  Mouth Opening: Normal  TM Distance: Normal  Neck ROM: Normal ROM    Dental:  Intact        Anesthesia Plan  Type of Anesthesia, risks & benefits discussed:    Anesthesia Type: Gen ETT  Intra-op Monitoring Plan: Standard ASA Monitors  Post Op Pain Control Plan: multimodal analgesia  Induction:  IV and rapid sequence  Airway Plan: Video and Direct  Informed Consent: Informed consent signed with the Patient and all parties understand the risks and agree with anesthesia plan.  All questions answered.   ASA Score: 2  Day of Surgery Review of History & Physical: H&P Update referred to the surgeon/provider.  Anesthesia Plan Notes: T/S,  CBC    RSI for semaglutide    Ready For Surgery From Anesthesia Perspective.     .

## 2024-12-10 NOTE — DISCHARGE INSTRUCTIONS
Information to Prepare you for your Surgery    PRE-ADMIT TESTING -  [phone]   -Margaret  [address] [place] [facility] [address]  [facility]      Your surgery has been scheduled at [facility]. We are pleased to have the opportunity to serve you. For Further Information please call [phone].    On the day of surgery please report to the Information Desk on the 1st floor.    CONTACT YOUR PHYSICIAN'S OFFICE THE DAY PRIOR TO YOUR SURGERY TO OBTAIN YOUR ARRIVAL TIME.     The evening before surgery do not eat anything after 9 p.m. ( this includes hard candy, chewing gum and mints).  You may only have GATORADE, POWERADE AND WATER  from 9 p.m. until you leave your home.    AT LEAST 12-24 OUNCES BEFORE YOU LEAVE YOU HOUSE IN THE MORNING TO COME TO SURGERY.    DO NOT DRINK ANY LIQUIDS ON THE WAY TO THE HOSPITAL.      Why does your anesthesiologist allow you to drink Gatorade/Powerade before surgery?  Gatorade/Powerade helps to increase your comfort before surgery and to decrease your nausea after surgery. The carbohydrates in Gatorade/Powerade help reduce your body's stress response to surgery.      Outpatient Surgery- May allow 2 adult (18 and older) Support Persons (1 being the designated ) for all surgical/procedural patients. A breastfeeding mother will be allowed her infant and 2 adult Support Persons. No one under the age of 18 will be allowed in the building. No swapping out of visitors in the [facility].      SPECIAL MEDICATION INSTRUCTIONS: TAKE medications checked off by the Anesthesiologist on your Medication List.        Surgery Patients:    If you take ASPIRIN - Your PHYSICIAN/SURGEON will need to inform you IF/OR when you need to stop taking aspirin prior to your surgery.     The week prior to surgery do not ot take any medications containing IBUPROFEN or NSAIDS ( Advil, Motrin, Goodys, BC, Aleve, Naproxen,   Ketorolac, Meloxicam, Mobic, Toradol,etc) If you are not sure if you should take a medicine please call your surgeon's office.  Ok to take Tylenol    Do Not Wear any make-up (especially eye make-up) to surgery. Please remove any false eyelashes or eyelash extensions. If you arrive the day of surgery with makeup/eyelashes on you will be required to remove prior to surgery. (There is a risk of corneal abrasions if eye makeup/eyelash extensions are not removed)      Leave all valuables at home.   Do Not wear any jewelry or watches, including any metal in body piercings. Jewelry must be removed prior to coming to the hospital.  There is a possibility that rings that are unable to be removed may be cut off if they are on the surgical extremity.    Please remove all hair extensions, wigs, clips and any other metal accessories/ ornaments from your hair.  These items may pose a flammable/fire risk in Surgery and must be removed.    Do not shave your surgical area at least 5 days prior to your surgery. The surgical prep will be performed at the hospital according to Infection Control regulations.    Contact Lens must be removed before surgery. Either do not wear the contact lens or bring a case and solution for storage.  Please bring a container for eyeglasses or dentures as required.  Bring any paperwork your physician has provided, such as consent forms,  history and physicals, doctor's orders, etc.   Bring comfortable clothes that are loose fitting to wear upon discharge. Take into consideration the type of surgery being performed.  Maintain your diet as advised per your physician the day prior to surgery.      Adequate rest the night before surgery is advised.   Park in the Parking lot behind the hospital or in the [employer] [facility] across the street from the parking lot. Parking is complimentary.  If you will be discharged the same day as your procedure, please arrange for a responsible adult to drive you home or  to accompany you if traveling by taxi.   YOU WILL NOT BE PERMITTED TO DRIVE OR TO LEAVE THE HOSPITAL ALONE AFTER SURGERY.   If you are being discharged the same day, it is strongly recommended that you arrange for someone to remain with you for the first 24 hrs following your surgery.    The Surgeon will speak to your family/visitor after your surgery regarding the outcome of your surgery and post op care.  The Surgeon may speak to you after your surgery, but there is a possibility you may not remember the details.  Please check with your family members regarding the conversation with the Surgeon.    We strongly recommend whoever is bringing you home be present for discharge instructions.  This will ensure a thorough understanding for your post op home care.    If the patient has fever, cough, or signs/symptoms of Flu or Covid please do not come in for your surgery. Contact your surgeon and your primary care physician for further instructions.           Thank you for your cooperation.  The Staff of [facility].            Bathing Instructions with Hibiclens or Dial Soap    Shower the evening before and morning of your procedure with Chlorhexidine (Hibiclens)  do not use Chlorhexidine on your face or genitals. Do not get in your eyes.  Wash your face with water and your regular face wash/soap  Use your regular shampoo  Apply Chlorhexidine (Hibiclens) directly on your skin or on a wet washcloth and wash gently. When showering: Move away from the shower stream when applying Chlorhexidine (Hibiclens) to avoid rinsing off too soon.  Rinse thoroughly with warm water  Do not dilute Chlorhexidine (Hibiclens)   Dry off as usual, do not use any deodorant, powder, body lotions, perfume, after shave or cologne.     Thanks ,   Margaret

## 2024-12-11 NOTE — PROGRESS NOTES
Subjective     Patient ID: Dov Hernandez is a 44 y.o. female.    Chief Complaint: Malignant neoplasm of overlapping sites of right breast in     HPI    In the interval:  Underwent the carpal tunnel surgery- instant relief- will need to do the left in the future  2 infections in left breast- placed expander- implant later down the line    Hot flashes on Tamoxifen  Started Semaglutide 3 weeks ago- lost 3 lbs (now on 0.2 dosing)- ordered by plastic surgeon    Present today for an urgent care visit for a lump noted under her right axilla three days ago.  The lump is tender and feels cord like.  No redness or swelling.  No fever.  She has had many problems with healing of the left reconstructed breast and has recently been on augmentin for 4 to 5 weeks for infection on her left side.  Completed augmentin about two weeks ago.  Other than this new lump, she is feeling ell today    Presents for follow up on Tamoxifen (changed from AI)  Easier to tolerate  Pain in joints resolved    Robotic hysterectomy on Tuesday     PMH:           Encounter Diagnoses   Name Primary?    Malignant neoplasm of overlapping sites of right breast in female, estrogen receptor positive Yes    Secondary adenocarcinoma of lymph node      Aromatase inhibitor use            Cancer Staging   Malignant neoplasm of overlapping sites of right breast in female, estrogen receptor positive  Staging form: Breast, AJCC 8th Edition  - Clinical stage from 4/26/2023: Stage IB (cT2, cN0, cM0, G1, ER+, WI+, HER2: Equivocal) - Signed by Jenna Pickard MD on 4/26/2023  - Pathologic stage from 6/14/2023: Stage IB (pT2, pN1(sn), cM0, G2, ER+, WI+, HER2-) - Signed by Fran Ken Jr., MD on 6/17/2023        Oncology History  - normal screening mammogram in 2022.   - Screening mammogram in March 2023 showed right breast calcifications, measured to be 39 mm on diagnostic mammogram.      - Right breast biopsy 4/13/23: IDC, 2 mm, ER 30%, WI 30%, Her 2 karrie  "negative, Grade 1, low Ki 67 at 5% with associated DCIS.       - Breast MRI 23: 2 cm mass     - Case discussed at multi d breast tumor board.   Bilateral mastectomy with ROSITA flap reconstruction and right SLNB 23: IDC, 2.2cm; 1/1LN+   Declined ALND and proceeded with XRT.      - Oncotype RS 29; RR 23%; Adjuvant chemotherapy is recommended.     Baseline PET reviewed, no residual or metastatic disease noted.      - Adjuvant TC x 4 23 - 23  S/p cycle 1 of TC on 23.      - Zoladex started 11/15/23, LMP was in 2023.  Anastrozole started 2023      - discussed planning oophorectomy to avoid monthly injections of zoladex 2024      Additional PMH  Had repair of the right ear drum. Still feels hearing is muffled on that side.-- further surgery pending        Social History   Social History                Tobacco Use    Smoking status: Never    Smokeless tobacco: Never   Substance Use Topics    Alcohol use: Yes       Comment: social    Drug use: No                     Family History   Problem Relation Name Age of Onset    Hyperlipidemia Mother        Kidney cancer Father   58    Cancer Brother   22         Parotid Gland cancer    Birth marks Child   0         strawberry wai(s)    Birth marks Child   0         strawberry wai(s)    Café-au-lait spots Maternal Uncle             "a small patch on his neck"    Café-au-lait spots Other nephew           "a spot on his arm and partial torso"    Other Other nephew 2         tested negative for macrocephaly    Cervical cancer Other        Throat cancer Other        Pancreatic cancer Other             1 second cousin ()    Other Other             brain tumors (several 2nd cousins)    Breast cancer Neg Hx        Colon cancer Neg Hx        Ovarian cancer Neg Hx        Melanoma Neg Hx                  Past Medical History:   Diagnosis Date    Acne varioliformis 2017     OCP & aziza from derm, Doxy caused yeast    Heartburn " 12/17/2021    Strawberry wai 1981     back of neck    Strawberry wai 1981     under nose                Past Surgical History:   Procedure Laterality Date    BILATERAL MASTECTOMY Bilateral 5/24/2023     Procedure: MASTECTOMY, BILATERAL;  Surgeon: Jenna Pickard MD;  Location: [facility];  Service: General;  Laterality: Bilateral;  2.5 HOURS / EMAIL SENT 5-9 @ 11:39 LK    COLPOSCOPY        INJECTION FOR SENTINEL NODE IDENTIFICATION Right 5/24/2023     Procedure: INJECTION, FOR SENTINEL NODE IDENTIFICATION;  Surgeon: Jenna Pickard MD;  Location: [facility];  Service: General;  Laterality: Right;    RECONSTRUCTION OF BREAST WITH DEEP INFERIOR EPIGASTRIC ARTERY  (ROSITA) FREE FLAP Bilateral 5/24/2023     Procedure: RECONSTRUCTION, BREAST, USING ROSITA FREE FLAP / BILATERAL DEEP INFERIOR EPIGASTRIC PERFORATORS FLAPS;  Surgeon: Mikey Roche MD;  Location: [facility];  Service: Plastics;  Laterality: Bilateral;    SENTINEL LYMPH NODE BIOPSY Right 5/24/2023     Procedure: BIOPSY, LYMPH NODE, SENTINEL;  Surgeon: Jenna Pickard MD;  Location: [facility];  Service: General;  Laterality: Right;    WISDOM TOOTH EXTRACTION       Review of Systems   Constitutional:  Negative for activity change, appetite change, chills, fatigue and unexpected weight change.   Respiratory:  Negative for cough and shortness of breath.    Cardiovascular:  Negative for chest pain, palpitations and leg swelling.   Gastrointestinal:  Negative for abdominal distention, abdominal pain, blood in stool and change in bowel habit.   Genitourinary:  Negative for decreased urine volume, difficulty urinating, frequency and vaginal bleeding.   Musculoskeletal:  Positive for arthralgias (improving).   Integumentary:  Negative for pallor, rash and breast mass.        Recent surgery as above   Neurological:  Negative for dizziness, weakness, numbness (better) and headaches.   Psychiatric/Behavioral:  Negative for dysphoric mood. The patient is not  nervous/anxious.    Breast: Negative for mass         Objective     Physical Exam  Vitals and nursing note reviewed.   Constitutional:       General: She is not in acute distress.     Appearance: Normal appearance. She is well-developed. She is not ill-appearing.      Comments: Very pleasant  ECOG= 0   Presents with her mom   PAYTON:      Head: Normocephalic and atraumatic.   Eyes:      General: No scleral icterus.     Extraocular Movements: Extraocular movements intact.      Conjunctiva/sclera: Conjunctivae normal.      Pupils: Pupils are equal, round, and reactive to light.      Right eye: Pupil is round and reactive.      Left eye: Pupil is round and reactive.   Neck:      Thyroid: No thyromegaly.      Vascular: No JVD.      Trachea: No tracheal deviation.   Cardiovascular:      Rate and Rhythm: Normal rate and regular rhythm.      Heart sounds: Normal heart sounds. No murmur heard.     No friction rub. No gallop.   Pulmonary:      Effort: Pulmonary effort is normal. No respiratory distress.      Breath sounds: Normal breath sounds. No wheezing, rhonchi or rales.      Comments: Breasts- 1 VU drain in place  Abdominal:      General: Abdomen is flat. Bowel sounds are normal. There is no distension.      Palpations: Abdomen is soft. There is no mass.      Tenderness: There is no abdominal tenderness. There is no guarding or rebound.   Musculoskeletal:         General: No swelling, tenderness or deformity. Normal range of motion.      Cervical back: Normal range of motion and neck supple.      Right lower leg: No edema.      Left lower leg: No edema.   Lymphadenopathy:      Head:      Right side of head: No submandibular adenopathy.      Left side of head: No submandibular adenopathy.      Cervical: No cervical adenopathy.      Right cervical: No superficial, deep or posterior cervical adenopathy.     Left cervical: No superficial, deep or posterior cervical adenopathy.      Upper Body:      Right upper body: No  supraclavicular adenopathy.      Left upper body: No supraclavicular adenopathy.   Skin:     General: Skin is warm and dry.      Coloration: Skin is not jaundiced or pale.      Findings: No petechiae.   Neurological:      General: No focal deficit present.      Mental Status: She is alert and oriented to person, place, and time.      Cranial Nerves: No cranial nerve deficit.      Motor: No weakness.      Coordination: Coordination normal.      Gait: Gait normal.      Deep Tendon Reflexes: Reflexes are normal and symmetric.   Psychiatric:         Mood and Affect: Mood normal. Mood is not anxious or depressed.         Behavior: Behavior normal.         Thought Content: Thought content normal.         Judgment: Judgment normal.     Labs- reviewed     Assessment and Plan     1. Malignant neoplasm of overlapping sites of right breast in female, estrogen receptor positive    2. Secondary adenocarcinoma of lymph node    3. Hot flashes      Continue Tamoxifen- much better tolerated  Acupuncture referral  Referral to [facility]    Mom asks about PET scan and reviewed surveillance guidelines     Route Chart for Scheduling    Med Onc Chart Routing      Follow up with physician . Referral to [facility]; accupuncture referral   Follow up with [facility] 3 months. labs prior   Infusion scheduling note    Injection scheduling note    Labs    Imaging    Pharmacy appointment    Other referrals                 code applied: patient requires or will require a continuous, longitudinal, and active collaborative plan of care related to this patient's health condition, breast cancer --the management of which requires the direction of a practitioner with specialized clinical knowledge, skill, and expertise.      Supportive Plan Information  OP BREAST GOSERELIN Q4W Lidia Osman MD   Associated Diagnosis: Malignant neoplasm of overlapping sites of right breast in female, estrogen receptor positive Stage IB, Stage IB cT2, cN0, cM0, G1,  ER+, NV+, HER2: Equivocal, pT2, pN1(sn), cM0, G2, ER+, NV+, HER2- noted on 4/26/2023   Line of treatment: Adjuvant   Treatment goal: Curative     Upcoming Treatment Dates - OP BREAST GOSERELIN Q4W    5/31/2024       Chemotherapy       goserelin (ZOLADEX) injection 3.6 mg

## 2024-12-12 ENCOUNTER — OFFICE VISIT (OUTPATIENT)
Dept: HEMATOLOGY/ONCOLOGY | Facility: CLINIC | Age: 44
End: 2024-12-12
Payer: COMMERCIAL

## 2024-12-12 VITALS
SYSTOLIC BLOOD PRESSURE: 146 MMHG | HEART RATE: 72 BPM | WEIGHT: 159.38 LBS | DIASTOLIC BLOOD PRESSURE: 89 MMHG | RESPIRATION RATE: 17 BRPM | BODY MASS INDEX: 30.09 KG/M2 | HEIGHT: 61 IN | OXYGEN SATURATION: 99 % | TEMPERATURE: 97 F

## 2024-12-12 DIAGNOSIS — R23.2 HOT FLASHES: ICD-10-CM

## 2024-12-12 DIAGNOSIS — C77.9 SECONDARY ADENOCARCINOMA OF LYMPH NODE: ICD-10-CM

## 2024-12-12 DIAGNOSIS — C50.811 MALIGNANT NEOPLASM OF OVERLAPPING SITES OF RIGHT BREAST IN FEMALE, ESTROGEN RECEPTOR POSITIVE: Primary | ICD-10-CM

## 2024-12-12 DIAGNOSIS — Z17.0 MALIGNANT NEOPLASM OF OVERLAPPING SITES OF RIGHT BREAST IN FEMALE, ESTROGEN RECEPTOR POSITIVE: Primary | ICD-10-CM

## 2024-12-12 PROCEDURE — G2211 COMPLEX E/M VISIT ADD ON: HCPCS | Mod: S$GLB,,, | Performed by: INTERNAL MEDICINE

## 2024-12-12 PROCEDURE — 3079F DIAST BP 80-89 MM HG: CPT | Mod: CPTII,S$GLB,, | Performed by: INTERNAL MEDICINE

## 2024-12-12 PROCEDURE — 99214 OFFICE O/P EST MOD 30 MIN: CPT | Mod: S$GLB,,, | Performed by: INTERNAL MEDICINE

## 2024-12-12 PROCEDURE — 3077F SYST BP >= 140 MM HG: CPT | Mod: CPTII,S$GLB,, | Performed by: INTERNAL MEDICINE

## 2024-12-12 PROCEDURE — 1159F MED LIST DOCD IN RCRD: CPT | Mod: CPTII,S$GLB,, | Performed by: INTERNAL MEDICINE

## 2024-12-12 PROCEDURE — 1160F RVW MEDS BY RX/DR IN RCRD: CPT | Mod: CPTII,S$GLB,, | Performed by: INTERNAL MEDICINE

## 2024-12-12 PROCEDURE — 99999 PR PBB SHADOW E&M-EST. PATIENT-LVL V: CPT | Mod: PBBFAC,,, | Performed by: INTERNAL MEDICINE

## 2024-12-12 PROCEDURE — 3044F HG A1C LEVEL LT 7.0%: CPT | Mod: CPTII,S$GLB,, | Performed by: INTERNAL MEDICINE

## 2024-12-12 PROCEDURE — 3008F BODY MASS INDEX DOCD: CPT | Mod: CPTII,S$GLB,, | Performed by: INTERNAL MEDICINE

## 2024-12-13 ENCOUNTER — PATIENT MESSAGE (OUTPATIENT)
Dept: HEMATOLOGY/ONCOLOGY | Facility: CLINIC | Age: 44
End: 2024-12-13
Payer: COMMERCIAL

## 2024-12-13 ENCOUNTER — TELEPHONE (OUTPATIENT)
Dept: OBSTETRICS AND GYNECOLOGY | Facility: CLINIC | Age: 44
End: 2024-12-13
Payer: COMMERCIAL

## 2024-12-13 ENCOUNTER — TELEPHONE (OUTPATIENT)
Dept: HEMATOLOGY/ONCOLOGY | Facility: CLINIC | Age: 44
End: 2024-12-13
Payer: COMMERCIAL

## 2024-12-17 ENCOUNTER — ANESTHESIA (OUTPATIENT)
Dept: SURGERY | Facility: OTHER | Age: 44
End: 2024-12-17
Payer: COMMERCIAL

## 2024-12-17 ENCOUNTER — HOSPITAL ENCOUNTER (OUTPATIENT)
Facility: OTHER | Age: 44
Discharge: HOME | End: 2024-12-17
Attending: OBSTETRICS & GYNECOLOGY | Admitting: OBSTETRICS & GYNECOLOGY
Payer: COMMERCIAL

## 2024-12-17 DIAGNOSIS — Z15.01 BRCA GENE MUTATION POSITIVE: ICD-10-CM

## 2024-12-17 DIAGNOSIS — Z15.09 BRCA GENE MUTATION POSITIVE: ICD-10-CM

## 2024-12-17 DIAGNOSIS — Z90.710 STATUS POST HYSTERECTOMY: Primary | ICD-10-CM

## 2024-12-17 LAB
GLUCOSE SERPL-MCNC: 74 MG/DL (ref 70–110)
POCT GLUCOSE: 74 MG/DL (ref 70–110)

## 2024-12-17 PROCEDURE — 71000015 HC POSTOP RECOV 1ST HR: Performed by: OBSTETRICS & GYNECOLOGY

## 2024-12-17 PROCEDURE — 25000003 PHARM REV CODE 250: Performed by: ANESTHESIOLOGY

## 2024-12-17 PROCEDURE — 63600175 PHARM REV CODE 636 W HCPCS: Performed by: OBSTETRICS & GYNECOLOGY

## 2024-12-17 PROCEDURE — 37000008 HC ANESTHESIA 1ST 15 MINUTES: Performed by: OBSTETRICS & GYNECOLOGY

## 2024-12-17 PROCEDURE — 82962 GLUCOSE BLOOD TEST: CPT | Performed by: OBSTETRICS & GYNECOLOGY

## 2024-12-17 PROCEDURE — 71000039 HC RECOVERY, EACH ADD'L HOUR: Performed by: OBSTETRICS & GYNECOLOGY

## 2024-12-17 PROCEDURE — 36000712 HC OR TIME LEV V 1ST 15 MIN: Performed by: OBSTETRICS & GYNECOLOGY

## 2024-12-17 PROCEDURE — C2628 CATHETER, OCCLUSION: HCPCS | Performed by: OBSTETRICS & GYNECOLOGY

## 2024-12-17 PROCEDURE — 25000003 PHARM REV CODE 250: Performed by: NURSE ANESTHETIST, CERTIFIED REGISTERED

## 2024-12-17 PROCEDURE — 36000713 HC OR TIME LEV V EA ADD 15 MIN: Performed by: OBSTETRICS & GYNECOLOGY

## 2024-12-17 PROCEDURE — 25000003 PHARM REV CODE 250: Performed by: OBSTETRICS & GYNECOLOGY

## 2024-12-17 PROCEDURE — P9045 ALBUMIN (HUMAN), 5%, 250 ML: HCPCS | Mod: JZ,JG | Performed by: NURSE ANESTHETIST, CERTIFIED REGISTERED

## 2024-12-17 PROCEDURE — 63600175 PHARM REV CODE 636 W HCPCS: Mod: JZ,JG | Performed by: NURSE ANESTHETIST, CERTIFIED REGISTERED

## 2024-12-17 PROCEDURE — 88307 TISSUE EXAM BY PATHOLOGIST: CPT | Performed by: PATHOLOGY

## 2024-12-17 PROCEDURE — 71000033 HC RECOVERY, INTIAL HOUR: Performed by: OBSTETRICS & GYNECOLOGY

## 2024-12-17 PROCEDURE — 63600175 PHARM REV CODE 636 W HCPCS: Performed by: ANESTHESIOLOGY

## 2024-12-17 PROCEDURE — 88307 TISSUE EXAM BY PATHOLOGIST: CPT | Mod: 26,,, | Performed by: PATHOLOGY

## 2024-12-17 PROCEDURE — 71000016 HC POSTOP RECOV ADDL HR: Performed by: OBSTETRICS & GYNECOLOGY

## 2024-12-17 PROCEDURE — 27201423 OPTIME MED/SURG SUP & DEVICES STERILE SUPPLY: Performed by: OBSTETRICS & GYNECOLOGY

## 2024-12-17 PROCEDURE — 58571 TLH W/T/O 250 G OR LESS: CPT | Mod: ,,, | Performed by: OBSTETRICS & GYNECOLOGY

## 2024-12-17 PROCEDURE — 37000009 HC ANESTHESIA EA ADD 15 MINS: Performed by: OBSTETRICS & GYNECOLOGY

## 2024-12-17 RX ORDER — LIDOCAINE HYDROCHLORIDE 10 MG/ML
0.5 INJECTION, SOLUTION EPIDURAL; INFILTRATION; INTRACAUDAL; PERINEURAL ONCE
Status: DISCONTINUED | OUTPATIENT
Start: 2024-12-17 | End: 2024-12-17 | Stop reason: HOSPADM

## 2024-12-17 RX ORDER — LIDOCAINE HYDROCHLORIDE 10 MG/ML
INJECTION, SOLUTION INFILTRATION; PERINEURAL
Status: DISCONTINUED | OUTPATIENT
Start: 2024-12-17 | End: 2024-12-17 | Stop reason: HOSPADM

## 2024-12-17 RX ORDER — ONDANSETRON HYDROCHLORIDE 2 MG/ML
INJECTION, SOLUTION INTRAVENOUS
Status: DISCONTINUED | OUTPATIENT
Start: 2024-12-17 | End: 2024-12-17

## 2024-12-17 RX ORDER — OXYCODONE HYDROCHLORIDE 5 MG/1
5 TABLET ORAL EVERY 4 HOURS PRN
Qty: 15 TABLET | Refills: 0 | Status: SHIPPED | OUTPATIENT
Start: 2024-12-17

## 2024-12-17 RX ORDER — OXYCODONE HYDROCHLORIDE 5 MG/1
5 TABLET ORAL ONCE
Status: COMPLETED | OUTPATIENT
Start: 2024-12-17 | End: 2024-12-17

## 2024-12-17 RX ORDER — CEFAZOLIN 2 G/1
2 INJECTION, POWDER, FOR SOLUTION INTRAMUSCULAR; INTRAVENOUS
Status: COMPLETED | OUTPATIENT
Start: 2024-12-17 | End: 2024-12-17

## 2024-12-17 RX ORDER — PROCHLORPERAZINE EDISYLATE 5 MG/ML
5 INJECTION INTRAMUSCULAR; INTRAVENOUS EVERY 30 MIN PRN
Status: DISCONTINUED | OUTPATIENT
Start: 2024-12-17 | End: 2024-12-17 | Stop reason: HOSPADM

## 2024-12-17 RX ORDER — DIPHENHYDRAMINE HYDROCHLORIDE 50 MG/ML
25 INJECTION INTRAMUSCULAR; INTRAVENOUS EVERY 6 HOURS PRN
Status: DISCONTINUED | OUTPATIENT
Start: 2024-12-17 | End: 2024-12-17 | Stop reason: HOSPADM

## 2024-12-17 RX ORDER — MEPERIDINE HYDROCHLORIDE 25 MG/ML
12.5 INJECTION INTRAMUSCULAR; INTRAVENOUS; SUBCUTANEOUS ONCE AS NEEDED
Status: DISCONTINUED | OUTPATIENT
Start: 2024-12-17 | End: 2024-12-17 | Stop reason: HOSPADM

## 2024-12-17 RX ORDER — MUPIROCIN 20 MG/G
OINTMENT TOPICAL
Status: DISCONTINUED | OUTPATIENT
Start: 2024-12-17 | End: 2024-12-17 | Stop reason: HOSPADM

## 2024-12-17 RX ORDER — CYCLOBENZAPRINE HCL 5 MG
10 TABLET ORAL ONCE
Status: COMPLETED | OUTPATIENT
Start: 2024-12-17 | End: 2024-12-17

## 2024-12-17 RX ORDER — HYDROMORPHONE HYDROCHLORIDE 2 MG/ML
0.2 INJECTION, SOLUTION INTRAMUSCULAR; INTRAVENOUS; SUBCUTANEOUS
Status: DISCONTINUED | OUTPATIENT
Start: 2024-12-17 | End: 2024-12-17

## 2024-12-17 RX ORDER — LIDOCAINE HYDROCHLORIDE 20 MG/ML
INJECTION INTRAVENOUS
Status: DISCONTINUED | OUTPATIENT
Start: 2024-12-17 | End: 2024-12-17

## 2024-12-17 RX ORDER — ROCURONIUM BROMIDE 10 MG/ML
INJECTION, SOLUTION INTRAVENOUS
Status: DISCONTINUED | OUTPATIENT
Start: 2024-12-17 | End: 2024-12-17

## 2024-12-17 RX ORDER — OXYCODONE HYDROCHLORIDE 5 MG/1
5 TABLET ORAL
Status: DISCONTINUED | OUTPATIENT
Start: 2024-12-17 | End: 2024-12-17 | Stop reason: HOSPADM

## 2024-12-17 RX ORDER — HYDROMORPHONE HYDROCHLORIDE 2 MG/ML
0.4 INJECTION, SOLUTION INTRAMUSCULAR; INTRAVENOUS; SUBCUTANEOUS EVERY 5 MIN PRN
Status: DISCONTINUED | OUTPATIENT
Start: 2024-12-17 | End: 2024-12-17 | Stop reason: HOSPADM

## 2024-12-17 RX ORDER — ALBUMIN HUMAN 50 G/1000ML
SOLUTION INTRAVENOUS
Status: DISCONTINUED | OUTPATIENT
Start: 2024-12-17 | End: 2024-12-17

## 2024-12-17 RX ORDER — IBUPROFEN 600 MG/1
600 TABLET ORAL EVERY 6 HOURS
Qty: 30 TABLET | Refills: 1 | Status: SHIPPED | OUTPATIENT
Start: 2024-12-17

## 2024-12-17 RX ORDER — SODIUM CHLORIDE, SODIUM LACTATE, POTASSIUM CHLORIDE, CALCIUM CHLORIDE 600; 310; 30; 20 MG/100ML; MG/100ML; MG/100ML; MG/100ML
INJECTION, SOLUTION INTRAVENOUS CONTINUOUS
Status: DISCONTINUED | OUTPATIENT
Start: 2024-12-17 | End: 2024-12-17 | Stop reason: HOSPADM

## 2024-12-17 RX ORDER — MIDAZOLAM HYDROCHLORIDE 1 MG/ML
INJECTION INTRAMUSCULAR; INTRAVENOUS
Status: DISCONTINUED | OUTPATIENT
Start: 2024-12-17 | End: 2024-12-17

## 2024-12-17 RX ORDER — PROPOFOL 10 MG/ML
VIAL (ML) INTRAVENOUS
Status: DISCONTINUED | OUTPATIENT
Start: 2024-12-17 | End: 2024-12-17

## 2024-12-17 RX ORDER — PHENYLEPHRINE HYDROCHLORIDE 10 MG/ML
INJECTION INTRAVENOUS
Status: DISCONTINUED | OUTPATIENT
Start: 2024-12-17 | End: 2024-12-17

## 2024-12-17 RX ORDER — DOCUSATE SODIUM 100 MG/1
100 CAPSULE, LIQUID FILLED ORAL 2 TIMES DAILY
Qty: 30 CAPSULE | Refills: 1 | Status: SHIPPED | OUTPATIENT
Start: 2024-12-17

## 2024-12-17 RX ORDER — DEXTROMETHORPHAN HYDROBROMIDE, GUAIFENESIN 5; 100 MG/5ML; MG/5ML
650 LIQUID ORAL EVERY 6 HOURS
Qty: 30 TABLET | Refills: 1 | Status: SHIPPED | OUTPATIENT
Start: 2024-12-17

## 2024-12-17 RX ORDER — FENTANYL CITRATE 50 UG/ML
INJECTION, SOLUTION INTRAMUSCULAR; INTRAVENOUS
Status: DISCONTINUED | OUTPATIENT
Start: 2024-12-17 | End: 2024-12-17

## 2024-12-17 RX ORDER — KETOROLAC TROMETHAMINE 30 MG/ML
30 INJECTION, SOLUTION INTRAMUSCULAR; INTRAVENOUS ONCE
Status: COMPLETED | OUTPATIENT
Start: 2024-12-17 | End: 2024-12-17

## 2024-12-17 RX ORDER — ACETAMINOPHEN 10 MG/ML
1000 INJECTION, SOLUTION INTRAVENOUS ONCE
Status: COMPLETED | OUTPATIENT
Start: 2024-12-17 | End: 2024-12-17

## 2024-12-17 RX ORDER — SODIUM CHLORIDE 0.9 % (FLUSH) 0.9 %
3 SYRINGE (ML) INJECTION
Status: DISCONTINUED | OUTPATIENT
Start: 2024-12-17 | End: 2024-12-17 | Stop reason: HOSPADM

## 2024-12-17 RX ORDER — DEXAMETHASONE SODIUM PHOSPHATE 4 MG/ML
INJECTION, SOLUTION INTRA-ARTICULAR; INTRALESIONAL; INTRAMUSCULAR; INTRAVENOUS; SOFT TISSUE
Status: DISCONTINUED | OUTPATIENT
Start: 2024-12-17 | End: 2024-12-17

## 2024-12-17 RX ORDER — KETAMINE HCL IN 0.9 % NACL 50 MG/5 ML
SYRINGE (ML) INTRAVENOUS
Status: DISCONTINUED | OUTPATIENT
Start: 2024-12-17 | End: 2024-12-17

## 2024-12-17 RX ORDER — GLUCAGON 1 MG
1 KIT INJECTION
Status: DISCONTINUED | OUTPATIENT
Start: 2024-12-17 | End: 2024-12-17 | Stop reason: HOSPADM

## 2024-12-17 RX ORDER — LIDOCAINE HYDROCHLORIDE 10 MG/ML
1 INJECTION, SOLUTION EPIDURAL; INFILTRATION; INTRACAUDAL; PERINEURAL ONCE
Status: DISCONTINUED | OUTPATIENT
Start: 2024-12-17 | End: 2024-12-17 | Stop reason: HOSPADM

## 2024-12-17 RX ADMIN — HYDROMORPHONE HYDROCHLORIDE 0.4 MG: 2 INJECTION INTRAMUSCULAR; INTRAVENOUS; SUBCUTANEOUS at 10:12

## 2024-12-17 RX ADMIN — MUPIROCIN: 20 OINTMENT TOPICAL at 06:12

## 2024-12-17 RX ADMIN — SUGAMMADEX 200 MG: 100 INJECTION, SOLUTION INTRAVENOUS at 09:12

## 2024-12-17 RX ADMIN — LIDOCAINE HYDROCHLORIDE 6 MG: 20 INJECTION, SOLUTION INTRAVENOUS at 07:12

## 2024-12-17 RX ADMIN — ALBUMIN (HUMAN) 1 ML: 12.5 SOLUTION INTRAVENOUS at 07:12

## 2024-12-17 RX ADMIN — Medication 25 MG: at 07:12

## 2024-12-17 RX ADMIN — ALBUMIN (HUMAN) 249 ML: 12.5 SOLUTION INTRAVENOUS at 08:12

## 2024-12-17 RX ADMIN — FENTANYL CITRATE 100 MCG: 50 INJECTION, SOLUTION INTRAMUSCULAR; INTRAVENOUS at 07:12

## 2024-12-17 RX ADMIN — KETOROLAC TROMETHAMINE 30 MG: 30 INJECTION, SOLUTION INTRAMUSCULAR; INTRAVENOUS at 10:12

## 2024-12-17 RX ADMIN — CEFAZOLIN 2 G: 2 INJECTION, POWDER, FOR SOLUTION INTRAMUSCULAR; INTRAVENOUS at 07:12

## 2024-12-17 RX ADMIN — SODIUM CHLORIDE: 0.9 INJECTION, SOLUTION INTRAVENOUS at 09:12

## 2024-12-17 RX ADMIN — ONDANSETRON HYDROCHLORIDE 4 MG: 2 INJECTION INTRAMUSCULAR; INTRAVENOUS at 09:12

## 2024-12-17 RX ADMIN — PROPOFOL 200 MG: 10 INJECTION, EMULSION INTRAVENOUS at 07:12

## 2024-12-17 RX ADMIN — PHENYLEPHRINE HYDROCHLORIDE 100 MCG: 10 INJECTION INTRAVENOUS at 08:12

## 2024-12-17 RX ADMIN — MIDAZOLAM HYDROCHLORIDE 2 MG: 1 INJECTION, SOLUTION INTRAMUSCULAR; INTRAVENOUS at 07:12

## 2024-12-17 RX ADMIN — OXYCODONE 5 MG: 5 TABLET ORAL at 10:12

## 2024-12-17 RX ADMIN — ACETAMINOPHEN 1000 MG: 1000 INJECTION INTRAVENOUS at 10:12

## 2024-12-17 RX ADMIN — SODIUM CHLORIDE: 0.9 INJECTION, SOLUTION INTRAVENOUS at 06:12

## 2024-12-17 RX ADMIN — ROCURONIUM BROMIDE 20 MG: 10 INJECTION INTRAVENOUS at 07:12

## 2024-12-17 RX ADMIN — ROCURONIUM BROMIDE 50 MG: 10 INJECTION INTRAVENOUS at 07:12

## 2024-12-17 RX ADMIN — ALBUMIN (HUMAN) 250 ML: 12.5 SOLUTION INTRAVENOUS at 09:12

## 2024-12-17 RX ADMIN — CYCLOBENZAPRINE HYDROCHLORIDE 10 MG: 5 TABLET, FILM COATED ORAL at 10:12

## 2024-12-17 RX ADMIN — OXYCODONE 5 MG: 5 TABLET ORAL at 12:12

## 2024-12-17 RX ADMIN — DEXAMETHASONE SODIUM PHOSPHATE 8 MG: 4 INJECTION, SOLUTION INTRAMUSCULAR; INTRAVENOUS at 07:12

## 2024-12-17 NOTE — TRANSFER OF CARE
"Anesthesia Transfer of Care Note    Patient: Dov Hernandez    Procedure(s) Performed: Procedure(s) (LRB):  XI ROBOTIC HYSTERECTOMY (N/A)  XI ROBOTIC SALPINGO-OOPHORECTOMY (Bilateral)    Patient location: PACU    Anesthesia Type: general    Transport from OR: Transported from OR on 6-10 L/min O2 by face mask with adequate spontaneous ventilation    Post pain: adequate analgesia    Post assessment: no apparent anesthetic complications and tolerated procedure well    Post vital signs: stable    Level of consciousness: sedated    Nausea/Vomiting: no nausea/vomiting    Complications: none    Transfer of care protocol was followed      Last vitals: Visit Vitals  BP (!) 113/58 (BP Location: Right arm, Patient Position: Lying)   Pulse 78   Temp 36.3 °C (97.4 °F) (Skin)   Resp 18   Ht 5' 1" (1.549 m)   Wt 70.3 kg (155 lb)   LMP  (LMP Unknown)   SpO2 100%   Breastfeeding No   BMI 29.29 kg/m²     "

## 2024-12-17 NOTE — OR NURSING
VSS on RA. Pt states pain is tolerable. Dressing and PIV C/D/I. Epps removed in OR and bladder backfilled. Meets Phase I discharge criteria. Ready for transfer to Phase II. Family notified.

## 2024-12-17 NOTE — OP NOTE
[facility]  Gynecologic Oncology  Operative Note    SUMMARY     Date of Procedure: 12/17/2024     Procedure: Procedure(s) (LRB):  XI ROBOTIC HYSTERECTOMY (N/A)  XI ROBOTIC SALPINGO-OOPHORECTOMY (Bilateral)       Surgeons and Role:     * Benjy Valladares MD - Primary     * Fatuma Negrete MD - Resident - Assisting     * Rachel Junior MD - Resident - Assisting    Pre-Operative Diagnosis: Malignant neoplasm of overlapping sites of right breast in female, estrogen receptor positive [C50.811, Z17.0]  Secondary adenocarcinoma of lymph node [C77.9]  Pelvic pain [R10.2]    Post-Operative Diagnosis: Post-Op Diagnosis Codes:     * Malignant neoplasm of overlapping sites of right breast in female, estrogen receptor positive [C50.811, Z17.0]     * Secondary adenocarcinoma of lymph node [C77.9]     * Pelvic pain [R10.2]    Anesthesia: General    Technical Procedures Used:   1.  Robotic Assisted Total Hysterectomy  2.  Bilateral Salpingo-oophorectomy    Description of the Findings of the Procedure: Normal external female genitalia.  No vaginal lesions.  Cervix normal appearing.  Uterus sounded to 8 cm.  The uterus, tubes, and ovaries were grossly normal.  The upper abdomen was free of adhesions and normal to include peritoneal and diaphragmatic surfaces, liver edge, and greater curvature of the stomach.      Procedure in Detail: After noting appropriate consents, the patient was taken to the operating room and placed in the dorsal lithotomy position. SCDs were started prior to anesthesia administration. She was then prepped and draped in the usual sterile manner.  A bhardwaj catheter was inserted in a sterile manner. Next, a sterile speculum was inserted in the vagina, stay sutures were placed on the anterior cervix for retraction, and the uterus was sounded.  The KAMAR uterine manipulator was then inserted in the usual manner for uterine manipulation. Then, attention was turned to the abdomen. A LUQ 5mm incision  was made and an Optiview trocar was inserted into the abdomen under direct visualization and pneumoperitoneum was obtained with CO2 gas insufflation.  The insertion site was checked and found to be free of injury.  Now under direct visualization an 8 mm trocar and sleeve were inserted in the midline in a supra-umbilical position. Additional right sided 8mm ports x 2 and a left sided 8mm port were placed.  Trendelenberg was obtained and bowel was moved out of the pelvis. The robot was docked in the usual manner.      First, attention was turned to the right pelvic sidewall.  The right round ligament was grasped with the grasper and incised with monopolar keegan and the peritoneal incision was carried superior and laterally to open the right pararectal space.  The ureter was clearly identified crossing the pelvic brim and a window was made in the peritoneum between the ureter and the infundibulopelvic ligament.  The bipolar graspers were then used to seal the IP ligament and this was divided with the monopolar keegan.  The peritoneal window was then extended distally to the lateral aspect of the uterus and its intersection with the uterosacral ligament where the rectovaginal space was developed.  Attention was then turned anteriorly and the bladder reflection was clearly identified and a bladder flap created in the vesicouterine peritoneum.  With the posterior and anterior peritoneum taken down and with full and continued visualization of the ureter at all times, the uterine artery and vein were clearly identified and sealed with the bipolar grasper.  The uterine artery was then transected with the monopolar keegan and released below the level of the cervicovaginal junction.     In a similar manner attention was then turned to the left side of the pelvis.  The round ligament on the left was transected and the peritoneal incision carried cephalad.  The ureter was clearly identified and a window was made between the  ureter and the infundibulopelvic ligament.  This was sealed with bipolar grasper and divided with the monopolar keegan.  The peritoneum was then taken down to the lateral aspect of the uterus and posteriorly to the uterosacral ligament where the rectovaginal space was developed.  Anteriorly the peritoneal reflection was carried around to intersect with the bladder reflection made from the right-sided dissection.  The bladder was dissected down off of the Koh cup.  The left uterine artery and vein were clearly visualized coursing along the lateral aspect of the uterus.  Again, with continued visualization of the ureter, the uterine vessels were sealed with the bipolar grasper and transected and released distally with the monopolar keegan.  The colpotomy was then created anteriorly and carried circumferentially around the Koh cup.  The cervix uterus and bilateral tubes and ovaries were then removed vaginally.  The vagina was stitched with 0 V-Loc suture in a running manner. Good closure and hemostasis were achieved.      All instruments were removed and the robot was undocked. The skin was closed at all port sites with 4-0 Monocryl in a subcuticular manner. 1% lidocaine without epi was administered at all port sites. There were no complications and all counts were correct. I was present and scrubbed through the entire procedure. The patient was successfully extubated and brought to PACU for recovery.    Significant Surgical Tasks Conducted by the Assistant(s), if Applicable: NAHED العلي performed expert bedside robotic assist. I certify that the services for which payment is claimed were medically necessary, and that no qualified resident was available to perform the services.    Complications: No    Estimated Blood Loss (EBL): 5 ml           Condition: Good    Disposition: PACU - hemodynamically stable.    Attestation: I was present and scrubbed for the entire procedure.

## 2024-12-17 NOTE — ANESTHESIA PROCEDURE NOTES
Intubation    Date/Time: 12/17/2024 7:13 AM    Performed by: Ce Ansari CRNA  Authorized by: Adrian Benitez MD    Intubation:     Induction:  Intravenous    Intubated:  Postinduction    Mask Ventilation:  Easy mask    Attempts:  1    Attempted By:  CRNA    Method of Intubation:  Video laryngoscopy    Blade:  Keys 3    Laryngeal View Grade: Grade I - full view of cords      Difficult Airway Encountered?: No      Complications:  None    Airway Device:  Oral endotracheal tube    Airway Device Size:  7.0    Style/Cuff Inflation:  Cuffed (inflated to minimal occlusive pressure)    Tube secured:  22    Secured at:  The lips    Placement Verified By:  Capnometry    Complicating Factors:  None    Findings Post-Intubation:  BS equal bilateral and atraumatic/condition of teeth unchanged

## 2024-12-17 NOTE — DISCHARGE SUMMARY
"Discharge Summary  Gynecology      Admit Date: 2024    Discharge Date and Time: 2024     Attending Physician: Benjy Valladares MD    Principal Diagnoses: Status post hysterectomy    Active Hospital Problems    Diagnosis  POA    *S/P RA-TLH/BSO - 24 [Z90.710]  No      Resolved Hospital Problems   No resolved problems to display.       Procedures: Procedure(s) (LRB):  XI ROBOTIC HYSTERECTOMY (N/A)  XI ROBOTIC SALPINGO-OOPHORECTOMY (Bilateral)    Discharged Condition: good    Hospital Course:   Dov Hernandez is a 44 y.o. y.o.  female who presented on 2024   for above procedures for risk reducing surgery in the setting of stage 1B invasive ductal carcinoma of the right breast s/p BL mastectomy. Patient tolerated procedure. Post-operative course was uncomplicated.  On day of discharge, patient was urinating, ambulating, and tolerating a regular diet without difficulty. Pain was well controlled on PO medication. She was discharged home on POD#0 in stable condition with instructions to follow up with Dr. Valladares in 6 weeks.     Consults: None    Significant Diagnostic Studies:  No results for input(s): "WBC", "HGB", "HCT", "MCV", "PLT" in the last 168 hours.       Treatments:   1. Surgery as above    Disposition: Home or Self Care    Patient Instructions:   Current Discharge Medication List        START taking these medications    Details   acetaminophen (TYLENOL) 650 MG TbSR Take 1 tablet (650 mg total) by mouth every 6 (six) hours.  Qty: 30 tablet, Refills: 1      docusate sodium (COLACE) 100 MG capsule Take 1 capsule (100 mg total) by mouth 2 (two) times daily.  Qty: 30 capsule, Refills: 1      ibuprofen (ADVIL,MOTRIN) 600 MG tablet Take 1 tablet (600 mg total) by mouth every 6 (six) hours.  Qty: 30 tablet, Refills: 1      oxyCODONE (ROXICODONE) 5 MG immediate release tablet Take 1 tablet (5 mg total) by mouth every 4 (four) hours as needed for Pain.  Qty: 15 tablet, Refills: 0    " Comments: n/a   Associated Diagnoses: Status post hysterectomy           CONTINUE these medications which have NOT CHANGED    Details   multivitamin (THERAGRAN) per tablet Take 1 tablet by mouth once daily.      tamoxifen (NOLVADEX) 20 MG Tab Take 1 tablet (20 mg total) by mouth once daily.  Qty: 30 tablet, Refills: 11    Associated Diagnoses: Malignant neoplasm of overlapping sites of right breast in female, estrogen receptor positive      traMADoL (ULTRAM) 50 mg tablet Take 1 tablet (50 mg total) by mouth every 6 (six) hours as needed for Pain.  Qty: 20 tablet, Refills: 0      venlafaxine (EFFEXOR-XR) 150 MG Cp24 Take 1 capsule (150 mg total) by mouth once daily.  Qty: 90 capsule, Refills: 3      calcium acetate,phosphat bind, (PHOSLO) 667 mg capsule Take 667 mg by mouth once daily.      SEMAGLUTIDE, WEIGHT LOSS, SUBQ Inject 0.2 mg into the skin every 7 days. Takes on Mondays           STOP taking these medications       acetaminophen (TYLENOL) 500 MG tablet Comments:   Reason for Stopping:               Discharge Procedure Orders   Diet general     Lifting restrictions   Order Comments: No lifting greater than 15 pounds for six weeks.     Other restrictions (specify):   Order Comments: PELVIC REST:  No douching, tampons, or intercourse for 6 weeks.    If prescribed, vaginal estrogen cream may be used during the postoperative period.     DRIVING:  No driving while on narcotics. Driving may be resumed initially with a competent passenger one to two weeks after surgery if no longer taking narcotics.     EXERCISE:  For six weeks your exercise should be limited to walking. You may walk as far as you wish, as long as you increase your level of exertion gradually and avoid slippery surfaces. You may climb stairs as needed to get around, but should not use stair climbing for exercise.     Remove dressing in 24 hours   Order Comments: If you have a bandage on wound, you may remove it the day after dismissal.  If you had  steri-strips remove them once they begin to peel off (usually 2 weeks). Keep incision clean and dry.  Inspect the incision daily for signs and symptoms of infection.     Wound care routine (specify)   Order Comments: WOUND CARE:  If you have a band-aid or bandage on your wound, you may remove it the day after dismissal.  You may wash the wound with mild soap and water.   You may shower at any time but should avoid immersing any abdominal incisions in water for at least two weeks after surgery or until the wound is completely healed. If given, please shower with Hibiclens soap until bottle is completely finished. Keep your wound clean and dry.  You should observe your incision for signs of infection which include redness, warmth, drainage or fever.     Call MD for:  temperature >100.4     Call MD for:  persistent nausea and vomiting     Call MD for:  severe uncontrolled pain     Call MD for:  difficulty breathing, headache or visual disturbances     Call MD for:  redness, tenderness, or signs of infection (pain, swelling, redness, odor or green/yellow discharge around incision site)     Call MD for:  hives     Call MD for:   Order Comments: inability to void,urine is ketchup colored or you have large clots, vaginal bleeding is heavier than a period.    VAGINAL DISCHARGE: You may develop a vaginal discharge and intermittent vaginal spotting after surgery and up to 6 weeks postoperatively.  The discharge may have an odor and may change in color but it is normal.  This is due to dissolving stiches.  Contact your surgical team if you develop vaginal or vulvar irritation along with a discharge.  Also contact your surgical team if you have vaginal discharge that smells like urine or stool.    CONSTIPATION REMEDIES: Patients are often constipated after surgery or with use of oral narcotic medicine. You should continue to take the stool softener, Senokot-S during the next six weeks, and consume adequate amounts of water.  If  you have not had a bowel movement for 3 days after dismissal, or are uncomfortable and unable to pass stool, please try one or all of the following measures:  1.  Milk of Magnesia - 30 cc by mouth every 12 hours   2.  Dulcolax suppository - One suppository per rectum every 4-6 hours   3.  Metamucil, Fibercon or other bulk former - use as directed  4.  Fleets Enema        PAIN MEDICATIONS:     Take your pain medications as instructed. It is best to take pain medications before your pain becomes severe. This will allow you to take less medication yet have better pain relief. For the first 2 or 3 days it may be helpful to take your pain medications on a regular schedule (e.g. every 4 to 6 hours). This will help you to keep your pain under better control. You should then begin to take fewer medications each day until you no longer need them. Do not take pain medication on an empty stomach. This may lead to nausea and vomiting.     Activity as tolerated   Order Comments: Return to normal activity slowly as you feel able.  For 6 weeks your exercise should be limited to walking.  You may walk as far as you wish, as long as you increase your level of exertion gradually and avoid slippery surfaces.    If you had a hysterectomy at the surgery do not insert anything in your vagina for 9 weeks.     Shower on day dressing removed (No bath)   Order Comments: You may shower at any time but should avoid immersing any abdominal incisions in water for at least 2 weeks after surgery or until the wound is completely healed.  If given, please shower with Hibaclens soap until bottle is completely finish        Follow-up Information       Benjy Valladares MD Follow up in 6 week(s).    Specialty: Gynecologic Oncology  Why: Post operative visit  Contact information:  [address] [place] [address] [id number] [phone]                           Rcahel Junior MD  Obstetrics and Gynecology - PGY2

## 2024-12-17 NOTE — PLAN OF CARE
Dov Hernandez has met all discharge criteria from Phase II. Vital Signs are stable, ambulating  without difficulty.Pain is now under control and tolerable for the pt. Pain score 4 at this time.  Discharge instructions given, patient verbalized understanding. Discharged from facility via wheelchair in stable condition.

## 2024-12-17 NOTE — DISCHARGE SUMMARY
Discharge Summary  Gynecology      Admit Date: 2024    Discharge Date and Time: 2024     Attending Physician: Benjy Valladares MD    Principal Diagnoses: Status post hysterectomy    Active Hospital Problems    Diagnosis  POA    *S/P RA-TLH/BSO - 24 [Z90.710]  No      Resolved Hospital Problems   No resolved problems to display.       Procedures: Procedure(s) (LRB):  XI ROBOTIC HYSTERECTOMY (N/A)  XI ROBOTIC SALPINGO-OOPHORECTOMY (Bilateral)    Discharged Condition: good    Hospital Course:   Dov Hernandez is a 44 y.o. y.o.  female who presented on 2024   for above procedures for risk reducing surgery in the setting of stage 1B invasive ductal carcinoma of the right breast s/p BL mastectomy. Patient tolerated procedure. Post-operative course was uncomplicated.  On day of discharge, patient was urinating, ambulating, and tolerating a regular diet without difficulty. Pain was well controlled on PO medication. She was discharged home on POD#0 in stable condition with instructions to follow up with Dr. Valladares in 6 weeks.     Consults: None    Significant Diagnostic Studies:  Recent Labs   Lab 12/10/24  1025   WBC 5.20   HGB 13.4   HCT 38.6   MCV 92           Treatments:   1. Surgery as above    Disposition: Home or Self Care    Patient Instructions:   Current Discharge Medication List        START taking these medications    Details   acetaminophen (TYLENOL) 650 MG TbSR Take 1 tablet (650 mg total) by mouth every 6 (six) hours.  Qty: 30 tablet, Refills: 1      ibuprofen (ADVIL,MOTRIN) 600 MG tablet Take 1 tablet (600 mg total) by mouth every 6 (six) hours.  Qty: 30 tablet, Refills: 1      oxyCODONE (ROXICODONE) 5 MG immediate release tablet Take 1 tablet (5 mg total) by mouth every 4 (four) hours as needed for Pain.  Qty: 15 tablet, Refills: 0    Comments: n/a   Associated Diagnoses: Status post hysterectomy           CONTINUE these medications which have NOT CHANGED     Details   multivitamin (THERAGRAN) per tablet Take 1 tablet by mouth once daily.      tamoxifen (NOLVADEX) 20 MG Tab Take 1 tablet (20 mg total) by mouth once daily.  Qty: 30 tablet, Refills: 11    Associated Diagnoses: Malignant neoplasm of overlapping sites of right breast in female, estrogen receptor positive      traMADoL (ULTRAM) 50 mg tablet Take 1 tablet (50 mg total) by mouth every 6 (six) hours as needed for Pain.  Qty: 20 tablet, Refills: 0      venlafaxine (EFFEXOR-XR) 150 MG Cp24 Take 1 capsule (150 mg total) by mouth once daily.  Qty: 90 capsule, Refills: 3      calcium acetate,phosphat bind, (PHOSLO) 667 mg capsule Take 667 mg by mouth once daily.      SEMAGLUTIDE, WEIGHT LOSS, SUBQ Inject 0.2 mg into the skin every 7 days. Takes on Mondays           STOP taking these medications       acetaminophen (TYLENOL) 500 MG tablet Comments:   Reason for Stopping:               Discharge Procedure Orders   Diet general     Lifting restrictions   Order Comments: No lifting greater than 15 pounds for six weeks.     Other restrictions (specify):   Order Comments: PELVIC REST:  No douching, tampons, or intercourse for 6 weeks.    If prescribed, vaginal estrogen cream may be used during the postoperative period.     DRIVING:  No driving while on narcotics. Driving may be resumed initially with a competent passenger one to two weeks after surgery if no longer taking narcotics.     EXERCISE:  For six weeks your exercise should be limited to walking. You may walk as far as you wish, as long as you increase your level of exertion gradually and avoid slippery surfaces. You may climb stairs as needed to get around, but should not use stair climbing for exercise.     Remove dressing in 24 hours   Order Comments: If you have a bandage on wound, you may remove it the day after dismissal.  If you had steri-strips remove them once they begin to peel off (usually 2 weeks). Keep incision clean and dry.  Inspect the incision  daily for signs and symptoms of infection.     Wound care routine (specify)   Order Comments: WOUND CARE:  If you have a band-aid or bandage on your wound, you may remove it the day after dismissal.  You may wash the wound with mild soap and water.   You may shower at any time but should avoid immersing any abdominal incisions in water for at least two weeks after surgery or until the wound is completely healed. If given, please shower with Hibiclens soap until bottle is completely finished. Keep your wound clean and dry.  You should observe your incision for signs of infection which include redness, warmth, drainage or fever.     Call MD for:  temperature >100.4     Call MD for:  persistent nausea and vomiting     Call MD for:  severe uncontrolled pain     Call MD for:  difficulty breathing, headache or visual disturbances     Call MD for:  redness, tenderness, or signs of infection (pain, swelling, redness, odor or green/yellow discharge around incision site)     Call MD for:  hives     Call MD for:   Order Comments: inability to void,urine is ketchup colored or you have large clots, vaginal bleeding is heavier than a period.    VAGINAL DISCHARGE: You may develop a vaginal discharge and intermittent vaginal spotting after surgery and up to 6 weeks postoperatively.  The discharge may have an odor and may change in color but it is normal.  This is due to dissolving stiches.  Contact your surgical team if you develop vaginal or vulvar irritation along with a discharge.  Also contact your surgical team if you have vaginal discharge that smells like urine or stool.    CONSTIPATION REMEDIES: Patients are often constipated after surgery or with use of oral narcotic medicine. You should continue to take the stool softener, Senokot-S during the next six weeks, and consume adequate amounts of water.  If you have not had a bowel movement for 3 days after dismissal, or are uncomfortable and unable to pass stool, please try  one or all of the following measures:  1.  Milk of Magnesia - 30 cc by mouth every 12 hours   2.  Dulcolax suppository - One suppository per rectum every 4-6 hours   3.  Metamucil, Fibercon or other bulk former - use as directed  4.  Fleets Enema        PAIN MEDICATIONS:     Take your pain medications as instructed. It is best to take pain medications before your pain becomes severe. This will allow you to take less medication yet have better pain relief. For the first 2 or 3 days it may be helpful to take your pain medications on a regular schedule (e.g. every 4 to 6 hours). This will help you to keep your pain under better control. You should then begin to take fewer medications each day until you no longer need them. Do not take pain medication on an empty stomach. This may lead to nausea and vomiting.     Activity as tolerated   Order Comments: Return to normal activity slowly as you feel able.  For 6 weeks your exercise should be limited to walking.  You may walk as far as you wish, as long as you increase your level of exertion gradually and avoid slippery surfaces.    If you had a hysterectomy at the surgery do not insert anything in your vagina for 9 weeks.     Shower on day dressing removed (No bath)   Order Comments: You may shower at any time but should avoid immersing any abdominal incisions in water for at least 2 weeks after surgery or until the wound is completely healed.  If given, please shower with Hibaclens soap until bottle is completely finish        Follow-up Information       Benjy Valladares MD Follow up in 6 week(s).    Specialty: Gynecologic Oncology  Why: Post operative visit  Contact information:  [address] [place] [address] [id number] [phone]                         Fatuma Negrete MD, MPH  OBGYN PGY-4

## 2024-12-17 NOTE — OPERATIVE NOTE ADDENDUM
Certification of Assistant at Surgery       Surgery Date: 12/17/2024     Participating Surgeons:  Surgeons and Role:     * Benjy Valladares MD - Primary     * Fatuma Negrete MD - Resident - Assisting     * Rachel Junior MD - Resident - Assisting    Procedures:  Procedure(s) (LRB):  XI ROBOTIC HYSTERECTOMY (N/A)  XI ROBOTIC SALPINGO-OOPHORECTOMY (Bilateral)    Assistant Surgeon's Certification of Necessity:  I understand that section 1842 (b) (6) (d) of the Social Security Act generally prohibits Medicare Part B reasonable charge payment for the services of assistants at surgery in teaching hospitals when qualified residents are available to furnish such services. I certify that the services for which payment is claimed were medically necessary, and that no qualified resident was available to perform the services. I further understand that these services are subject to post-payment review by the Medicare carrier.      CRISTIAN Fleming    12/17/2024  9:37 AM

## 2024-12-17 NOTE — DISCHARGE INSTRUCTIONS

## 2024-12-17 NOTE — ANESTHESIA POSTPROCEDURE EVALUATION
Anesthesia Post Evaluation    Patient: Dov Hernandez    Procedure(s) Performed: Procedure(s) (LRB):  XI ROBOTIC HYSTERECTOMY (N/A)  XI ROBOTIC SALPINGO-OOPHORECTOMY (Bilateral)    Final Anesthesia Type: general      Patient location during evaluation: PACU  Patient participation: Yes- Able to Participate  Level of consciousness: awake and alert  Post-procedure vital signs: reviewed and stable  Pain management: adequate  Airway patency: patent    PONV status at discharge: No PONV  Anesthetic complications: no      Cardiovascular status: blood pressure returned to baseline  Respiratory status: unassisted  Hydration status: euvolemic  Follow-up not needed.              Vitals Value Taken Time   /62 12/17/24 1115   Temp 36.3 °C (97.4 °F) 12/17/24 0925   Pulse 90 12/17/24 1115   Resp 16 12/17/24 1115   SpO2 94 % 12/17/24 1115         No case tracking events are documented in the log.      Pain/Eneida Score: Pain Rating Prior to Med Admin: 6 (12/17/2024 10:45 AM)  Pain Rating Post Med Admin: 6 (12/17/2024 11:00 AM)  Eneida Score: 9 (12/17/2024 10:15 AM)

## 2024-12-17 NOTE — H&P
Dov Hernandez is 44 y.o.  presenting for scheduled RR RA-TLH/BSO.    Temp:  [98.7 °F (37.1 °C)] 98.7 °F (37.1 °C)  Pulse:  [84] 84  Resp:  [16] 16  SpO2:  [97 %] 97 %  BP: (133)/(82) 133/82    General: NAD, alert, oriented, cooperative  HEENT: NCAT, EOM grossly intact  Lungs: Normal WOB  Heart: regular rate  Abdomen: soft, nondistended, nontender, no rebound or guarding    Consents in chart. Pre-operative heparin not indicated. All questions answered and concerns addressed. To OR for planned procedure.    Fatuma Negrete MD, MPH  OBGYN PGY-4        24  REFERRING PROVIDER  Diane Moeller DO      Oncologist: Jessica Griffith  Breast Surgeon: Jenna Pickard  Plastic Surgeon: Mikey Roche     HISTORY OF PRESENT CONDITION  CC: Consideration of RR Hyst BSO  Dov Hernandez is a 43 y.o.  with h/o Stage IB (pT2, pN1(sn), cM0, G2, ER+, MN+, HER2-) breast cancer s/p bilateral mastectomy with ROSITA flap reconstruction and right SLNB on 2023, Adjuvant Taxotere Cytoxan through 2023, and radiation through 2023 (see details in Dr. Griffith's note dated 3/6/2024)     She desires risk reducing hysterectomy and bso toward the end of the year after she completes her breast reconstruction.  Her left reconstruction was complicated by a breast abscess / hematoma.  She is currently on Zoladex and anastrozole.       Data Reviewed  2024 Pap: NILM HPVneg          Past Medical History:   Diagnosis Date    Acne varioliformis 2017     OCP & aziza from derm, Doxy caused yeast    Heartburn 2021    Strawberry wai 1981     back of neck    Strawberry wai 1981     under nose           Current Outpatient Medications   Medication Sig    anastrozole (ARIMIDEX) 1 mg Tab Take 1 tablet (1 mg total) by mouth once daily.    gabapentin (NEURONTIN) 100 MG capsule Take 100 mg by mouth.    meloxicam (MOBIC) 15 MG tablet Take 1 tablet (15 mg total) by mouth once daily.    venlafaxine (EFFEXOR-XR) 150 MG  "Cp24 Take 1 capsule (150 mg total) by mouth once daily.      No current facility-administered medications for this visit.           Review of patient's allergies indicates:   Allergen Reactions    Latex, natural rubber Rash               Past Surgical History:   Procedure Laterality Date    BILATERAL MASTECTOMY Bilateral 2023     Procedure: MASTECTOMY, BILATERAL;  Surgeon: Jenna Pickard MD;  Location: [facility];  Service: General;  Laterality: Bilateral;  2.5 HOURS / EMAIL SENT  @ 11:39 LK    COLPOSCOPY        INJECTION FOR SENTINEL NODE IDENTIFICATION Right 2023     Procedure: INJECTION, FOR SENTINEL NODE IDENTIFICATION;  Surgeon: Jenna Pickard MD;  Location: [facility];  Service: General;  Laterality: Right;    RECONSTRUCTION OF BREAST WITH DEEP INFERIOR EPIGASTRIC ARTERY  (ROSITA) FREE FLAP Bilateral 2023     Procedure: RECONSTRUCTION, BREAST, USING ROSITA FREE FLAP / BILATERAL DEEP INFERIOR EPIGASTRIC PERFORATORS FLAPS;  Surgeon: Mikey Roche MD;  Location: [facility];  Service: Plastics;  Laterality: Bilateral;    SENTINEL LYMPH NODE BIOPSY Right 2023     Procedure: BIOPSY, LYMPH NODE, SENTINEL;  Surgeon: Jenna Pickard MD;  Location: [facility];  Service: General;  Laterality: Right;    WISDOM TOOTH EXTRACTION             FAMILY HISTORY         Family History   Problem Relation Name Age of Onset    Hyperlipidemia Mother        Kidney cancer Father   58    Cancer Brother   22         Parotid Gland cancer    Birth marks Child   0         strawberry wai(s)    Birth marks Child   0         strawberry wai(s)    Café-au-lait spots Maternal Uncle             "a small patch on his neck"    Café-au-lait spots Other nephew           "a spot on his arm and partial torso"    Other Other nephew 2         tested negative for macrocephaly    Cervical cancer Other        Throat cancer Other        Pancreatic cancer Other             1 second cousin ()    Other Other             brain tumors " (several 2nd cousins)    Breast cancer Neg Hx        Colon cancer Neg Hx        Ovarian cancer Neg Hx        Melanoma Neg Hx             SOCIAL HISTORY    reports that she has never smoked. She has never used smokeless tobacco. She reports current alcohol use. She reports that she does not use drugs.     REVIEW OF SYSTEMS  Review of Systems   Constitutional:  Negative for activity change, appetite change, chills, fatigue, fever and unexpected weight change.   Respiratory:  Negative for cough, chest tightness and shortness of breath.    Cardiovascular:  Negative for chest pain, palpitations and leg swelling.   Gastrointestinal:  Negative for abdominal distention, abdominal pain, blood in stool, constipation, diarrhea, nausea and vomiting.   Genitourinary:  Negative for dyspareunia, dysuria, frequency, hematuria, menstrual problem, pelvic pain, urgency, vaginal bleeding and vaginal discharge.   Musculoskeletal:  Negative for arthralgias and myalgias.   Skin:  Negative for color change and rash.   Neurological:  Negative for dizziness, weakness and light-headedness.   Hematological:  Negative for adenopathy.   Psychiatric/Behavioral:  Negative for decreased concentration, dysphoric mood and sleep disturbance. The patient is not nervous/anxious.       OBJECTIVE       Vitals:     05/23/24 1000   BP: (!) 142/82   Pulse: 77      Body mass index is 29.29 kg/m².      Physical Exam:   Constitutional: She is oriented to person, place, and time. She appears well-developed and well-nourished. No distress.    HENT:   Head: Normocephalic and atraumatic.    Eyes: Conjunctivae and EOM are normal.      Pulmonary/Chest: Effort normal. No respiratory distress.         Abdominal: Soft. She exhibits abdominal incision (healed from ROSITA flap). She exhibits no distension and no mass. There is no abdominal tenderness. There is no rebound and no guarding. No hernia.                 Neurological: She is alert and oriented to person, place,  and time.    Skin: No rash noted.    Psychiatric: She has a normal mood and affect. Her behavior is normal.      ECOG status: 0     LABORATORY DATA  Lab data reviewed.     RADIOLOGICAL DATA  Radiology data reviewed.     PATHOLOGY DATA  Pathology data reviewed.     ASSESSMENT    1. Malignant neoplasm of overlapping sites of right breast in female, estrogen receptor positive    Patient with ER / OH + breast cancer who desires risk reducing surgery.  We discussed the rationale behind oophorectomy and the pros and cons of incorporating hysterectomy.   She would like to proceed with robotic assisted total hysterectomy and bso toward the end of the year.  She will contact us 6 - 8 weeks prior to when she would like to schedule.     I discussed extensively the risks, benefits, alternatives, and indications of the planned procedure to include the risk of damage to bowel, bladder, ureter, or any other abdominal or pelvic organ.  Additionally, she understands the standard surgical risks of infection, allergic reaction, bleeding possibly severe enough to require blood transfusion.  She expresses understanding, was given an opportunity to ask any questions, and now she strongly desires to proceed with planned procedure.  Informed consent was signed.     PLAN  Patient will call when ready to proceed with surgery          Benjy Valladares MD

## 2024-12-18 VITALS
HEART RATE: 86 BPM | SYSTOLIC BLOOD PRESSURE: 135 MMHG | OXYGEN SATURATION: 100 % | HEIGHT: 61 IN | DIASTOLIC BLOOD PRESSURE: 84 MMHG | TEMPERATURE: 98 F | WEIGHT: 155 LBS | RESPIRATION RATE: 16 BRPM | BODY MASS INDEX: 29.27 KG/M2

## 2024-12-20 LAB
FINAL PATHOLOGIC DIAGNOSIS: NORMAL
GROSS: NORMAL
Lab: NORMAL

## 2024-12-20 NOTE — PROGRESS NOTES
I called Ms. Hernandez to check on her postop recovery and review her pathology results.  She is doing well and seems to be recovering very appropriately at this point after surgery, though she did have a rough first night after surgery.  We discussed her pathology results which were all benign.  I look forward to seeing her for her postop visit next month.

## 2025-01-02 ENCOUNTER — PATIENT MESSAGE (OUTPATIENT)
Dept: HEMATOLOGY/ONCOLOGY | Facility: CLINIC | Age: 45
End: 2025-01-02
Payer: COMMERCIAL

## 2025-01-02 ENCOUNTER — OFFICE VISIT (OUTPATIENT)
Dept: HEMATOLOGY/ONCOLOGY | Facility: CLINIC | Age: 45
End: 2025-01-02
Payer: COMMERCIAL

## 2025-01-02 DIAGNOSIS — R53.83 FATIGUE, UNSPECIFIED TYPE: ICD-10-CM

## 2025-01-02 DIAGNOSIS — N95.1 HOT FLASHES, MENOPAUSAL: ICD-10-CM

## 2025-01-02 DIAGNOSIS — R23.2 HOT FLASHES: ICD-10-CM

## 2025-01-02 DIAGNOSIS — N95.1 HOT FLASHES, MENOPAUSAL: Primary | ICD-10-CM

## 2025-01-02 DIAGNOSIS — C50.811 MALIGNANT NEOPLASM OF OVERLAPPING SITES OF RIGHT BREAST IN FEMALE, ESTROGEN RECEPTOR POSITIVE: ICD-10-CM

## 2025-01-02 DIAGNOSIS — Z17.0 MALIGNANT NEOPLASM OF OVERLAPPING SITES OF RIGHT BREAST IN FEMALE, ESTROGEN RECEPTOR POSITIVE: ICD-10-CM

## 2025-01-02 NOTE — PROGRESS NOTES
The patient location is: home  The chief complaint leading to consultation is: fatigue, hot flashes    Visit type: audiovisual    Each patient to whom he or she provides medical services by telemedicine is:  (1) informed of the relationship between the physician and patient and the respective role of any other health care provider with respect to management of the patient; and (2) notified that he or she may decline to receive medical services by telemedicine and may withdraw from such care at any time.    Notes: Dov Hernandez  44 y.o. is here to seek an integrative approach to discuss side effects related to breast cancer treatment. Dov Hernandez  was referred by Dr. Piedra     HPI  Mrs. Hernandez was diagnosed with breast cancer after a routine mammogram. She had a double mastectomy with ROSITA flap reconstruction on 5/2023. She had complications with the reconstruction and has additional upcoming surgeries to complete the reconstruction. She completed chemo and radiation. She reports she tolerated chemo and radiation well. She did not tolerate Arimidex due to joint pain. She is now on Tamoxifen and had a hysterectomy 12/17/2024. She is having a lot of fatigue and hot flashes. She is sleeping well, but feels she cannot get enough sleep and is always exhausted. She has a good appetite and follows a healthy diet. Her physical activity has been low due to the many surgeries. She does walk in the neighborhood. She is very active at home and with her family.   She has been  almost 15 years. She has 2 children, 11 and 14. She working from home    Pillars Assessment    Sleep  How many hours of sleep per night? 8 hours  Do you have trouble falling asleep, staying asleep or waking up earlier than you need to? no  Do you have daytime fatigue? yes  Do you need medication for sleep? no  Do you use any supplements or other interventions for sleep? no    Resilience  Rate your current level of stress-  low    Purpose  Do you feel you have a vision or a life purpose? Yes    Environment  Any exposures:no known exposures    Nutrition   Food allergies or sensitivities: no  Do you adhere to a particular type of diet? no  Do you have any concerns with your eating habits? no    Exercise  How would you describe your physical activity level? 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    Heartburn 12/17/2021    Strawberry wai 1981    back of neck    Strawberry wai 1981    under nose      Past Surgical History   Past Surgical History:   Procedure Laterality Date    BILATERAL MASTECTOMY Bilateral 5/24/2023    Procedure: MASTECTOMY, BILATERAL;  Surgeon: Jenna Pickard MD;  Location: [facility];  Service: General;  Laterality: Bilateral;  2.5 HOURS / EMAIL SENT 5-9 @ 11:39 LK    COLPOSCOPY      ENDOSCOPIC CARPAL TUNNEL RELEASE Right 10/25/2024    Procedure: RELEASE, CARPAL TUNNEL, ENDOSCOPIC - RIGHT;  Surgeon: Vernon Olivas MD;  Location: [facility];  Service: Orthopedics;  Laterality: Right;    INJECTION FOR SENTINEL NODE IDENTIFICATION Right 5/24/2023    Procedure: INJECTION, FOR SENTINEL NODE IDENTIFICATION;  Surgeon: Jenna Pickard MD;  Location: [facility];  Service: General;  Laterality: Right;    RECONSTRUCTION OF BREAST WITH DEEP INFERIOR EPIGASTRIC ARTERY  (ROSITA) FREE FLAP Bilateral 5/24/2023    Procedure: RECONSTRUCTION, BREAST, USING ROSITA FREE FLAP / BILATERAL DEEP INFERIOR EPIGASTRIC PERFORATORS FLAPS;  Surgeon: Mikey Roche MD;  Location: [facility];  Service: Plastics;  Laterality: Bilateral;    ROBOT-ASSISTED LAPAROSCOPIC ABDOMINAL HYSTERECTOMY USING DA RUDOLPH XI N/A 12/17/2024    Procedure: XI ROBOTIC HYSTERECTOMY;  Surgeon: Benjy Valladares MD;  Location: [facility];  Service: OB/GYN;  Laterality: N/A;  1.5 hr case    ROBOT-ASSISTED  "LAPAROSCOPIC SALPINGO-OOPHORECTOMY USING DA RUDOLPH XI Bilateral 2024    Procedure: XI ROBOTIC SALPINGO-OOPHORECTOMY;  Surgeon: Benjy Valladares MD;  Location: [facility];  Service: OB/GYN;  Laterality: Bilateral;    SENTINEL LYMPH NODE BIOPSY Right 2023    Procedure: BIOPSY, LYMPH NODE, SENTINEL;  Surgeon: Jenna Pickard MD;  Location: [facility];  Service: General;  Laterality: Right;    WISDOM TOOTH EXTRACTION        Family History   Family History   Problem Relation Name Age of Onset    Hyperlipidemia Mother      Kidney cancer Father  58    Cancer Brother  22        Parotid Gland cancer    Birth marks Child  0        strawberry wai(s)    Birth marks Child  0        strawberry wai(s)    Café-au-lait spots Maternal Uncle          "a small patch on his neck"    Café-au-lait spots Other nephew         "a spot on his arm and partial torso"    Other Other nephew 2        tested negative for macrocephaly    Cervical cancer Other      Throat cancer Other      Pancreatic cancer Other          1 second cousin ()    Other Other          brain tumors (several 2nd cousins)    Breast cancer Neg Hx      Colon cancer Neg Hx      Ovarian cancer Neg Hx      Melanoma Neg Hx        Allergies  Review of patient's allergies indicates:   Allergen Reactions    Latex, natural rubber Rash      Current Medications:    Current Outpatient Medications:     acetaminophen (TYLENOL) 650 MG TbSR, Take 1 tablet (650 mg total) by mouth every 6 (six) hours., Disp: 30 tablet, Rfl: 1    calcium acetate,phosphat bind, (PHOSLO) 667 mg capsule, Take 667 mg by mouth once daily., Disp: , Rfl:     docusate sodium (COLACE) 100 MG capsule, Take 1 capsule (100 mg total) by mouth 2 (two) times daily., Disp: 30 capsule, Rfl: 1    ibuprofen (ADVIL,MOTRIN) 600 MG tablet, Take 1 tablet (600 mg total) by mouth every 6 (six) hours., Disp: 30 tablet, Rfl: 1    multivitamin (THERAGRAN) per tablet, Take 1 tablet by mouth once daily., Disp: , Rfl:     " oxyCODONE (ROXICODONE) 5 MG immediate release tablet, Take 1 tablet (5 mg total) by mouth every 4 (four) hours as needed for Pain., Disp: 15 tablet, Rfl: 0    SEMAGLUTIDE, WEIGHT LOSS, SUBQ, Inject 0.2 mg into the skin every 7 days. Takes on Mondays, Disp: , Rfl:     tamoxifen (NOLVADEX) 20 MG Tab, Take 1 tablet (20 mg total) by mouth once daily., Disp: 30 tablet, Rfl: 11    traMADoL (ULTRAM) 50 mg tablet, Take 1 tablet (50 mg total) by mouth every 6 (six) hours as needed for Pain., Disp: 20 tablet, Rfl: 0    venlafaxine (EFFEXOR-XR) 150 MG Cp24, Take 1 capsule (150 mg total) by mouth once daily., Disp: 90 capsule, Rfl: 3     Review of Systems  Review of Systems   Constitutional:  Positive for malaise/fatigue.        Hot flashes   HENT: Negative.     Eyes: Negative.    Respiratory: Negative.     Cardiovascular: Negative.    Gastrointestinal: Negative.    Genitourinary: Negative.    Musculoskeletal: Negative.    Skin: Negative.    Neurological: Negative.    Endo/Heme/Allergies: Negative.    Psychiatric/Behavioral: Negative.          Physical Exam      There were no vitals filed for this visit.  There is no height or weight on file to calculate BMI.  Physical Exam  Constitutional:       Appearance: Normal appearance.   Neurological:      Mental Status: She is alert.   Psychiatric:         Mood and Affect: Mood normal.         Behavior: Behavior normal.        ASSESSMENT :  1. Malignant neoplasm of overlapping sites of right breast in female, estrogen receptor positive       PLAN:  Reviewed all information discussed at today's visit and all questions were answered.    Counseled on healthy lifestyle and behavior modifications   Veozah 45 mg once daily  CMP in 1 month and then every 3 months after.   Ferritin, iron and TIBC, ferritin, and B 12 within the next week.   Referral to Acupuncture  I explained acupuncture can reduce fatigue,  pain, and neuropathy. It can also assist with behavioral health such as depression and  anxiety and improve overall sleep quality. Acupuncture helps improve overall symptoms from treatment.   I discussed and recommended the following support services:  Yoga I suggested Luke Chi and/or Yoga as these practices reduce stress, increases flexibility and muscle strength, improves balance and promotes serenity in the power of movement to help fight disease and boost your immune system.   Music and relaxation therapy and Meditation which can decrease stress by lowering blood pressure, slowing breathing, and helping you be more present in the moment. It improves sleep by relaxing the body and mind at the end of the day.Meditation also trains you how to focus on one thing at a time, improving concentration. It also promotes emotional well-being by decreasing depression and anxiety, and helping create a more positive outlook on life.    We discussed possible causes for fatigue including iron, B 12, and Folate deficiency.  If these come back normal, I discussed placing a referral to Dr. Urbano in [facility].     Follow up with [facility] in 1 month, virtual    I spent a total of 43 minutes on the day of the visit.This includes face to face time and non-face to face time preparing to see the patient (eg, review of tests), obtaining and/or reviewing separately obtained history, documenting clinical information in the electronic or other health record, independently interpreting results and communicating results to the patient/family/caregiver, or care coordinator.

## 2025-01-03 ENCOUNTER — PATIENT MESSAGE (OUTPATIENT)
Dept: HEMATOLOGY/ONCOLOGY | Facility: CLINIC | Age: 45
End: 2025-01-03
Payer: COMMERCIAL

## 2025-01-03 ENCOUNTER — LAB VISIT (OUTPATIENT)
Dept: LAB | Facility: HOSPITAL | Age: 45
End: 2025-01-03
Attending: INTERNAL MEDICINE
Payer: COMMERCIAL

## 2025-01-03 DIAGNOSIS — R53.83 FATIGUE, UNSPECIFIED TYPE: ICD-10-CM

## 2025-01-03 LAB
FERRITIN SERPL-MCNC: 71 NG/ML (ref 20–300)
FOLATE SERPL-MCNC: 13.6 NG/ML (ref 4–24)
IRON SERPL-MCNC: 98 UG/DL (ref 30–160)
SATURATED IRON: 22 % (ref 20–50)
TOTAL IRON BINDING CAPACITY: 440 UG/DL (ref 250–450)
TRANSFERRIN SERPL-MCNC: 297 MG/DL (ref 200–375)
VIT B12 SERPL-MCNC: 472 PG/ML (ref 210–950)

## 2025-01-03 PROCEDURE — 36415 COLL VENOUS BLD VENIPUNCTURE: CPT | Mod: PO | Performed by: NURSE PRACTITIONER

## 2025-01-03 PROCEDURE — 82746 ASSAY OF FOLIC ACID SERUM: CPT | Performed by: NURSE PRACTITIONER

## 2025-01-03 PROCEDURE — 82607 VITAMIN B-12: CPT | Performed by: NURSE PRACTITIONER

## 2025-01-03 PROCEDURE — 82728 ASSAY OF FERRITIN: CPT | Performed by: NURSE PRACTITIONER

## 2025-01-03 PROCEDURE — 83540 ASSAY OF IRON: CPT | Performed by: NURSE PRACTITIONER

## 2025-01-06 ENCOUNTER — PATIENT MESSAGE (OUTPATIENT)
Dept: HEMATOLOGY/ONCOLOGY | Facility: CLINIC | Age: 45
End: 2025-01-06
Payer: COMMERCIAL

## 2025-01-06 ENCOUNTER — OFFICE VISIT (OUTPATIENT)
Dept: SURGERY | Facility: CLINIC | Age: 45
End: 2025-01-06
Payer: COMMERCIAL

## 2025-01-06 VITALS
OXYGEN SATURATION: 96 % | DIASTOLIC BLOOD PRESSURE: 95 MMHG | SYSTOLIC BLOOD PRESSURE: 139 MMHG | HEIGHT: 61 IN | WEIGHT: 155 LBS | HEART RATE: 91 BPM | BODY MASS INDEX: 29.27 KG/M2

## 2025-01-06 DIAGNOSIS — M25.519 SHOULDER PAIN, UNSPECIFIED CHRONICITY, UNSPECIFIED LATERALITY: ICD-10-CM

## 2025-01-06 DIAGNOSIS — C77.9 SECONDARY ADENOCARCINOMA OF LYMPH NODE: Primary | ICD-10-CM

## 2025-01-06 DIAGNOSIS — Z12.31 ENCOUNTER FOR SCREENING MAMMOGRAM FOR MALIGNANT NEOPLASM OF BREAST: ICD-10-CM

## 2025-01-06 DIAGNOSIS — Z12.39 ENCOUNTER FOR SCREENING BREAST EXAMINATION: ICD-10-CM

## 2025-01-06 DIAGNOSIS — Z85.3 PERSONAL HISTORY OF BREAST CANCER: Primary | ICD-10-CM

## 2025-01-06 DIAGNOSIS — R53.83 FATIGUE, UNSPECIFIED TYPE: ICD-10-CM

## 2025-01-06 DIAGNOSIS — R23.2 HOT FLASHES: Primary | ICD-10-CM

## 2025-01-06 DIAGNOSIS — Z17.0 MALIGNANT NEOPLASM OF OVERLAPPING SITES OF RIGHT BREAST IN FEMALE, ESTROGEN RECEPTOR POSITIVE: ICD-10-CM

## 2025-01-06 DIAGNOSIS — C50.811 MALIGNANT NEOPLASM OF OVERLAPPING SITES OF RIGHT BREAST IN FEMALE, ESTROGEN RECEPTOR POSITIVE: ICD-10-CM

## 2025-01-06 PROCEDURE — 3075F SYST BP GE 130 - 139MM HG: CPT | Mod: CPTII,S$GLB,, | Performed by: NURSE PRACTITIONER

## 2025-01-06 PROCEDURE — 3080F DIAST BP >= 90 MM HG: CPT | Mod: CPTII,S$GLB,, | Performed by: NURSE PRACTITIONER

## 2025-01-06 PROCEDURE — 1159F MED LIST DOCD IN RCRD: CPT | Mod: CPTII,S$GLB,, | Performed by: NURSE PRACTITIONER

## 2025-01-06 PROCEDURE — 99213 OFFICE O/P EST LOW 20 MIN: CPT | Mod: S$GLB,,, | Performed by: NURSE PRACTITIONER

## 2025-01-06 PROCEDURE — 99999 PR PBB SHADOW E&M-EST. PATIENT-LVL IV: CPT | Mod: PBBFAC,,, | Performed by: NURSE PRACTITIONER

## 2025-01-06 PROCEDURE — 3008F BODY MASS INDEX DOCD: CPT | Mod: CPTII,S$GLB,, | Performed by: NURSE PRACTITIONER

## 2025-01-06 NOTE — PROGRESS NOTES
[facility]  Department of Surgery    REFERRING PROVIDER: No referring provider defined for this encounter. Letitia Sherman MD    CHIEF COMPLAINT:   Chief Complaint   Patient presents with    Follow-up     CBE       DIAGNOSIS:   This is a 44 y.o. 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     TREATMENT:   1.  Bilateral total mastectomy and right sentinel node biopsy with ROSITA flap recon (Melchor) on 2023. JERMAINE Pickard M.D. Surgical Oncology. Final path revealed IDC (22 mm) with clear margins.  lymph node positive for metastatic carcinoma.   2. Oncotype 29; Chemotherapy, TC x 4 cycles completed 2023. GISEL Griffith M.D. Medical Oncology   3. Radiation therapy completed 2023. REN Ken M.D. Radiation Oncology   4. Adjuvant endocrine therapy (Anastrozole) started 2023, has been on OS. GISEL Griffith M.D. Medical Oncology. Switched to Tamoxifen 2/2 joint pain.      HISTORY OF PRESENT ILLNESS:   Dov Hernandez is a 44 y.o. female comes in for oncological follow up.  She denies change in her breast self-exam specifically denying new masses, skin or nipple changes, or nipple discharge. Past medical and surgical history is updated with new changes of fullness. There have been no changes to family history. The patient denies constitutional symptoms of night sweats, chills, weight loss, new headaches, visual changes, new back or bony pain, chest pain, or shortness of breath.        Review of Systems: See HPI/Interval History for other systems reviewed.     IMAGIN2023 NM PET CT ROUTINE     CLINICAL HISTORY:  Breast cancer, invasive, stage I/II/III, initial workup; Malignant neoplasm of overlapping sites of right female breast     TECHNIQUE:  10.8 mCi of F18-FDG was administered intravenously in the left antecubital fossa. After an approximately 60 min distribution time, PET/CT images were acquired from the skull base to mid thigh.   Transmission images were acquired to correct for attenuation using a whole body low-dose CT scan without IV contrast with the arms positioned above the head. Glycemia at the time of injection was 95 mg/dL.     COMPARISON:  CTA 05/11/2023.     FINDINGS:  Quality of the study: Adequate.     In the head and neck, there are no hypermetabolic lesions worrisome for malignancy. There are no hypermetabolic mucosal lesions, and there are no pathologically enlarged or hypermetabolic lymph nodes.     In the chest, there are no hypermetabolic lesions worrisome for malignancy.  Few scattered subcentimeter pulmonary nodules, too small for PET characterization.  Postoperative changes of bilateral mastectomy and flap reconstruction.  There is mild surrounding tracer uptake most compatible with postoperative change (for example, fused image 96).  No suspicious hypermetabolic lymphadenopathy.     In the abdomen and pelvis, there is physiologic tracer distribution within the abdominal organs and excretion into the genitourinary system.     Additional postoperative change in the anterior abdominal wall with mild heterogeneous tracer uptake.     In the bones, there are no hypermetabolic lesions worrisome for malignancy.     In the extremities, there are no hypermetabolic lesions worrisome for malignancy.     Impression:     In this patient with breast cancer status post bilateral mastectomy and flap reconstruction, there are no hypermetabolic lesions worrisome for residual/recurrent disease or metastasis.        MEDICATIONS/ALLERGIES:     Current Outpatient Medications   Medication Sig    SEMAGLUTIDE, WEIGHT LOSS, SUBQ Inject 0.2 mg into the skin every 7 days. Takes on Mondays    tamoxifen (NOLVADEX) 20 MG Tab Take 1 tablet (20 mg total) by mouth once daily.    venlafaxine (EFFEXOR-XR) 150 MG Cp24 Take 1 capsule (150 mg total) by mouth once daily.    acetaminophen (TYLENOL) 650 MG TbSR Take 1 tablet (650 mg total) by mouth every 6 (six)  "hours. (Patient not taking: Reported on 1/6/2025)    calcium acetate,phosphat bind, (PHOSLO) 667 mg capsule Take 667 mg by mouth once daily. (Patient not taking: Reported on 1/6/2025)    docusate sodium (COLACE) 100 MG capsule Take 1 capsule (100 mg total) by mouth 2 (two) times daily. (Patient not taking: Reported on 1/6/2025)    fezolinetant 45 mg Tab Take 1 tablet (45 mg) by mouth once daily. (Patient not taking: Reported on 1/6/2025)    ibuprofen (ADVIL,MOTRIN) 600 MG tablet Take 1 tablet (600 mg total) by mouth every 6 (six) hours. (Patient not taking: Reported on 1/6/2025)    multivitamin (THERAGRAN) per tablet Take 1 tablet by mouth once daily. (Patient not taking: Reported on 1/6/2025)    oxyCODONE (ROXICODONE) 5 MG immediate release tablet Take 1 tablet (5 mg total) by mouth every 4 (four) hours as needed for Pain. (Patient not taking: Reported on 1/6/2025)    traMADoL (ULTRAM) 50 mg tablet Take 1 tablet (50 mg total) by mouth every 6 (six) hours as needed for Pain. (Patient not taking: Reported on 1/6/2025)     No current facility-administered medications for this visit.      Review of patient's allergies indicates:   Allergen Reactions    Latex, natural rubber Rash       PHYSICAL EXAM:   BP (!) 139/95 (BP Location: Left arm, Patient Position: Sitting)   Pulse 91   Ht 5' 1" (1.549 m)   Wt 70.3 kg (155 lb)   LMP  (LMP Unknown)   SpO2 96%   BMI 29.29 kg/m²     Physical Exam   HENT:   Head: Normocephalic and atraumatic.   Eyes: Conjunctivae are normal. No scleral icterus.   Cardiovascular:  Normal rate, regular rhythm and normal pulses.            Pulmonary/Chest: Effort normal and breath sounds normal. No accessory muscle usage or stridor. No respiratory distress. She has no wheezes. Right breast exhibits no inverted nipple, no mass, no nipple discharge, no skin change and no tenderness. Left breast exhibits no inverted nipple, no mass, no nipple discharge, no skin change and no tenderness. "       Abdominal: Soft. Normal appearance. She exhibits no mass.   Musculoskeletal: No deformity. Lymphadenopathy:      Cervical: No cervical adenopathy.      Upper Body:      Right upper body: No supraclavicular or axillary adenopathy.      Left upper body: No supraclavicular or axillary adenopathy.     Neurological: She is alert.   Skin: Skin is warm and dry.     Psychiatric: Mood and judgment normal.       ASSESSMENT:   This is a 44 y.o. female without evidence of recurrence by exam, history or imaging.       PLAN:   1. Continue to follow up with Dr. Piedra and Hem Onc team   2. Continue monthly self breast exams and call the clinic with any changes or problems.  3.  Mammogram not recommended in the setting of bilateral mastectomy   4. Return to clinic in 1 year .  5. Will follow up with PT        The patient is in agreement with the plan. Questions were encouraged and answered to patient's satisfaction. Dov will call our office with any questions or concerns.

## 2025-01-07 ENCOUNTER — TELEPHONE (OUTPATIENT)
Dept: GYNECOLOGIC ONCOLOGY | Facility: CLINIC | Age: 45
End: 2025-01-07
Payer: COMMERCIAL

## 2025-01-07 ENCOUNTER — PATIENT MESSAGE (OUTPATIENT)
Dept: HEMATOLOGY/ONCOLOGY | Facility: CLINIC | Age: 45
End: 2025-01-07
Payer: COMMERCIAL

## 2025-01-07 ENCOUNTER — HOSPITAL ENCOUNTER (OUTPATIENT)
Dept: RADIOLOGY | Facility: HOSPITAL | Age: 45
Discharge: HOME | End: 2025-01-07
Attending: INTERNAL MEDICINE
Payer: COMMERCIAL

## 2025-01-07 DIAGNOSIS — M25.519 SHOULDER PAIN, UNSPECIFIED CHRONICITY, UNSPECIFIED LATERALITY: ICD-10-CM

## 2025-01-07 DIAGNOSIS — M25.519 SHOULDER PAIN, UNSPECIFIED CHRONICITY, UNSPECIFIED LATERALITY: Primary | ICD-10-CM

## 2025-01-07 PROCEDURE — 73030 X-RAY EXAM OF SHOULDER: CPT | Mod: 26,RT,, | Performed by: STUDENT IN AN ORGANIZED HEALTH CARE EDUCATION/TRAINING PROGRAM

## 2025-01-07 PROCEDURE — 73030 X-RAY EXAM OF SHOULDER: CPT | Mod: TC,FY,RT

## 2025-01-08 ENCOUNTER — PATIENT MESSAGE (OUTPATIENT)
Dept: OBSTETRICS AND GYNECOLOGY | Facility: CLINIC | Age: 45
End: 2025-01-08
Payer: COMMERCIAL

## 2025-01-09 ENCOUNTER — CLINICAL SUPPORT (OUTPATIENT)
Dept: OBSTETRICS AND GYNECOLOGY | Facility: CLINIC | Age: 45
End: 2025-01-09
Payer: COMMERCIAL

## 2025-01-09 DIAGNOSIS — N95.1 MENOPAUSAL SYMPTOMS: Primary | ICD-10-CM

## 2025-01-09 PROCEDURE — 99999 PR PBB SHADOW E&M-EST. PATIENT-LVL I: CPT | Mod: PBBFAC,,,

## 2025-01-10 NOTE — PROGRESS NOTES
Personal Information    Full Name: Dov Terrelliot 1980    Medical History    Do you have a chronic medical condition? (e.g., diabetes, hypertension, thyroid issues)   If yes, please specify:   No    2.   Do you have a history of hormone- related conditions? (e.g., endometriosis, breast cancer)   If yes, please explain:   Yes - 4/2023 Breast Cancer (Right) 5/2023 Double Mastectomy- 2023 Chemotherapy/Radiation     3.   Have you previously or are you currently taking hormone replacement therapy?   If yes, please list:   No    Menstrual History    At what age did you begin menstruating?   14    2.   Have you experienced any changes in your menstrual cycle in the last year?   If yes, please describe:   No     3.   When was your last menstrual period or age of last period?   2023- Around the same as her chemotherapy/radiation treatments     4.   Have you had a hysterectomy?   If yes, at what age?  Yes - Hysterectomy 12/17/2024 ovaries removed     Symptoms Assesments      Are you experiencing any of the following symptoms:  Hot Flashes: Yes   Night Sweats:  Yes Veozah    Vaginal Dryness or Pain with [place]: No   Mood Swings: No   Anxiety or Depression: Yes Effexor  daily    Sleep Disturbances: Yes   Fatigue: Yes   Weight Gain: Yes   Joint or Muscle Pain: No   Memory Issues or Brain Fog: Yes   Decreased Interest in Sex (Low Libido): No     Lifestyle Factors    How would you describe your diet?  Balanced    2.   How often do you exercise?   Occasionally    3.   Do you smoke or consume alcohol?   If yes, please specify frequency:   Yes - social drinker- does not smoke    4.   How would you rate your stress levels?   Moderate    Family History    Is there a family history of menopause-related conditions? (e.g., osteoporosis, breast cancer)  If yes, please specify  No    2.   Is there a family history of heart disease?   If yes, please specify   No      ** BRCA- negative- Stopped Arimidex  9/2023 dues side  effects*- Started the Tamoxifen 1082023(10 years)**    ** Colonoscopy- has not met age requirement**   **PCP- Dr. Letitia Sherman**

## 2025-01-21 ENCOUNTER — TELEPHONE (OUTPATIENT)
Dept: GYNECOLOGIC ONCOLOGY | Facility: CLINIC | Age: 45
End: 2025-01-21
Payer: COMMERCIAL

## 2025-01-28 ENCOUNTER — TELEPHONE (OUTPATIENT)
Dept: HEMATOLOGY/ONCOLOGY | Facility: CLINIC | Age: 45
End: 2025-01-28
Payer: COMMERCIAL

## 2025-01-28 ENCOUNTER — PATIENT MESSAGE (OUTPATIENT)
Dept: HEMATOLOGY/ONCOLOGY | Facility: CLINIC | Age: 45
End: 2025-01-28
Payer: COMMERCIAL

## 2025-01-28 DIAGNOSIS — N95.1 HOT FLASHES, MENOPAUSAL: ICD-10-CM

## 2025-01-28 RX ORDER — FEZOLINETANT 45 MG/1
45 TABLET, FILM COATED ORAL DAILY
Qty: 31 TABLET | Refills: 0 | OUTPATIENT
Start: 2025-01-28

## 2025-01-28 NOTE — TELEPHONE ENCOUNTER
Lmovm and will send portal msg to inform the patient that Joan will cancel her appt with her as it was only to manage Veozah. Rox Connor PA-C will now manage that. But if Ms. Hernandez would like to see Joan for any other issues she would be glad to see her again. All contact infor was left for pt to contact us back.  ----- Message from MICHAEL Beth sent at 1/28/2025  1:11 PM CST -----  Regarding: RE: VeozElizabeth [place], will you please call and let her know that my appointment will be cancelled as it was only for a Veozah check and Rox will manage the Veozah.  If she would like to follow up with me for any other issues I am more than happy to see her again.     Bianka Matute  ----- Message -----  From: Rox Connor PA-C  Sent: 1/28/2025   1:03 PM CST  To: MICHAEL Beth  Subject: RE: Veozah                                       Bernard Franco,  I am happy to take over the Veozah. Thanks for sending!  Rox  ----- Message -----  From: Joan Cervantes FNP-C  Sent: 1/28/2025  11:46 AM CST  To: Rox Connor PA-C  Subject: Mackenzie Gramajo,     I saw this patient and started her on Veozah. She also has complaints of fatigue. I sent her your way but the note says weight loss.  She might be interested in weight loss, but I wanted to let you know I sent her for fatigue and hot flashes.  Do you want to take over her Veozah script?  She is scheduled to see me for a Veozah follow and you on the same day which I think is unnecessary.      Please let me know what you think.     I hope you are doing well!   Thanks,   Bianka

## 2025-01-29 ENCOUNTER — OFFICE VISIT (OUTPATIENT)
Dept: GYNECOLOGIC ONCOLOGY | Facility: CLINIC | Age: 45
End: 2025-01-29
Payer: COMMERCIAL

## 2025-01-29 VITALS
DIASTOLIC BLOOD PRESSURE: 87 MMHG | HEART RATE: 82 BPM | SYSTOLIC BLOOD PRESSURE: 137 MMHG | WEIGHT: 153 LBS | BODY MASS INDEX: 28.91 KG/M2

## 2025-01-29 DIAGNOSIS — Z90.710 STATUS POST HYSTERECTOMY: Primary | ICD-10-CM

## 2025-01-29 PROCEDURE — 99024 POSTOP FOLLOW-UP VISIT: CPT | Mod: S$GLB,,, | Performed by: OBSTETRICS & GYNECOLOGY

## 2025-01-29 PROCEDURE — 99999 PR PBB SHADOW E&M-EST. PATIENT-LVL III: CPT | Mod: PBBFAC,,, | Performed by: OBSTETRICS & GYNECOLOGY

## 2025-01-29 PROCEDURE — 1159F MED LIST DOCD IN RCRD: CPT | Mod: CPTII,S$GLB,, | Performed by: OBSTETRICS & GYNECOLOGY

## 2025-01-29 PROCEDURE — 3075F SYST BP GE 130 - 139MM HG: CPT | Mod: CPTII,S$GLB,, | Performed by: OBSTETRICS & GYNECOLOGY

## 2025-01-29 PROCEDURE — 3079F DIAST BP 80-89 MM HG: CPT | Mod: CPTII,S$GLB,, | Performed by: OBSTETRICS & GYNECOLOGY

## 2025-01-29 NOTE — PROGRESS NOTES
REFERRING PROVIDER  Diane Moeller DO      Oncologist: Jessica Griffith / Lidia Osman  Breast Surgeon: Jenna Pickard  Plastic Surgeon: Mikey Roche    INTERVAL HISTORY  CC: post op visit  Dov Hernandez is a 44 y.o.  s/p robotic assisted total hysterectomy and bso on 2024 for cancer risk reduction.      She has no vaginal bleeding or discharge.  She is having no nausea or vomiting.  She has no bowel or bladder concerns.  She has no pain issues.  She is resuming her normal activities.    She has a h/o Stage IB (pT2, pN1(sn), cM0, G2, ER+, FL+, HER2-) breast cancer s/p bilateral mastectomy with ROSITA flap reconstruction and right SLNB on 2023, Adjuvant Taxotere Cytoxan through 2023, and radiation through 2023 (see details in Dr. Griffith's note dated 3/6/2024)     REVIEW OF SYSTEMS  Review of Systems   Constitutional:  Negative for appetite change, chills, fatigue, fever and unexpected weight change.   HENT:   Negative for lump/mass and mouth sores.    Respiratory:  Negative for chest tightness, cough and shortness of breath.    Cardiovascular:  Negative for chest pain, leg swelling and palpitations.   Gastrointestinal:  Negative for abdominal distention, abdominal pain, blood in stool, constipation, diarrhea, nausea and vomiting.   Genitourinary:  Negative for dysuria, frequency, vaginal bleeding and vaginal discharge.    Musculoskeletal:  Negative for arthralgias and myalgias.   Skin:  Negative for rash.   Neurological:  Negative for dizziness, light-headedness and numbness.   Hematological:  Negative for adenopathy.   Psychiatric/Behavioral:  Negative for decreased concentration, depression and sleep disturbance. The patient is not nervous/anxious.          OBJECTIVE   Vitals:    25 1304   BP: 137/87   Pulse: 82      Body mass index is 28.91 kg/m².     Physical Exam:   Constitutional: She is oriented to person, place, and time. She appears well-developed and well-nourished. No  distress.    HENT:   Head: Normocephalic and atraumatic.    Eyes: Conjunctivae and EOM are normal.      Pulmonary/Chest: Effort normal. No respiratory distress.        Abdominal: Soft. She exhibits abdominal incision (well healed). She exhibits no distension and no mass. There is no abdominal tenderness. There is no rebound and no guarding. No hernia.     Genitourinary:    Genitourinary Comments: Vulva - normal  Vagina - no lesions, vaginal cuff well healed  Cervix - surgically absent  Uterus - surgically absent  Adnexa - no masses                 Neurological: She is alert and oriented to person, place, and time.    Skin: No rash noted.    Psychiatric: She has a normal mood and affect. Her behavior is normal.     ECOG status: 0    LABORATORY DATA  Lab data reviewed.    RADIOLOGICAL DATA  Radiology data reviewed.    PATHOLOGY DATA  Pathology data reviewed.    ASSESSMENT    1. S/P RA-TLH/BSO - 12/17/24    The patient is doing very well postoperatively.  Based on her benign pathology, she can follow-up with her regular healthcare team.  She will return to see me if there are concerns regarding her surgery.     PLAN  1.  Patient to follow-up with regular health care team for routine health maintenance  2.  Follow-up with me if any issues related to her recent surgery  3.  Continue pelvic rest for full 8 weeks after day of surgery            Benjy Valladares MD

## 2025-02-01 DIAGNOSIS — N95.1 HOT FLASHES, MENOPAUSAL: ICD-10-CM

## 2025-02-03 ENCOUNTER — OFFICE VISIT (OUTPATIENT)
Dept: OBSTETRICS AND GYNECOLOGY | Facility: CLINIC | Age: 45
End: 2025-02-03
Payer: COMMERCIAL

## 2025-02-03 VITALS — WEIGHT: 153 LBS | HEIGHT: 61 IN | BODY MASS INDEX: 28.89 KG/M2

## 2025-02-03 DIAGNOSIS — Z79.811 AROMATASE INHIBITOR USE: ICD-10-CM

## 2025-02-03 DIAGNOSIS — Z17.0 MALIGNANT NEOPLASM OF OVERLAPPING SITES OF RIGHT BREAST IN FEMALE, ESTROGEN RECEPTOR POSITIVE: Primary | ICD-10-CM

## 2025-02-03 DIAGNOSIS — N95.1 MENOPAUSAL SYMPTOMS: ICD-10-CM

## 2025-02-03 DIAGNOSIS — E66.3 OVERWEIGHT: ICD-10-CM

## 2025-02-03 DIAGNOSIS — E88.810 METABOLIC SYNDROME: ICD-10-CM

## 2025-02-03 DIAGNOSIS — C50.811 MALIGNANT NEOPLASM OF OVERLAPPING SITES OF RIGHT BREAST IN FEMALE, ESTROGEN RECEPTOR POSITIVE: Primary | ICD-10-CM

## 2025-02-03 DIAGNOSIS — C77.9 SECONDARY ADENOCARCINOMA OF LYMPH NODE: ICD-10-CM

## 2025-02-03 PROCEDURE — 1159F MED LIST DOCD IN RCRD: CPT | Mod: CPTII,95,, | Performed by: PHYSICIAN ASSISTANT

## 2025-02-03 PROCEDURE — 3008F BODY MASS INDEX DOCD: CPT | Mod: CPTII,95,, | Performed by: PHYSICIAN ASSISTANT

## 2025-02-03 PROCEDURE — 98006 SYNCH AUDIO-VIDEO EST MOD 30: CPT | Mod: 95,,, | Performed by: PHYSICIAN ASSISTANT

## 2025-02-03 PROCEDURE — 1160F RVW MEDS BY RX/DR IN RCRD: CPT | Mod: CPTII,95,, | Performed by: PHYSICIAN ASSISTANT

## 2025-02-03 RX ORDER — SEMAGLUTIDE 0.25 MG/.5ML
0.25 INJECTION, SOLUTION SUBCUTANEOUS
Qty: 2 ML | Refills: 3 | Status: SHIPPED | OUTPATIENT
Start: 2025-02-03

## 2025-02-03 RX ORDER — FEZOLINETANT 45 MG/1
45 TABLET, FILM COATED ORAL DAILY
Qty: 31 TABLET | Refills: 0 | OUTPATIENT
Start: 2025-02-03

## 2025-02-03 NOTE — PROGRESS NOTES
The patient location is: home  The chief complaint leading to consultation is: Survivorship    Visit type: audiovisual    Face to Face time with patient: 30 minutes  40 minutes of total time spent on the encounter, which includes face to face time and non-face to face time preparing to see the patient (eg, review of tests), Obtaining and/or reviewing separately obtained history, Documenting clinical information in the electronic or other health record, Independently interpreting results (not separately reported) and communicating results to the patient/family/caregiver, or Care coordination (not separately reported).         Each patient to whom he or she provides medical services by telemedicine is:  (1) informed of the relationship between the physician and patient and the respective role of any other health care provider with respect to management of the patient; and (2) notified that he or she may decline to receive medical services by telemedicine and may withdraw from such care at any time.    Notes:    Subjective:      Dov Hernandez is a 44 y.o. female who presents for Survivorship. History of Stage IB (pT2, pN1(sn), cM0, G2, ER+, SD+, HER2-) breast cancer s/p bilateral mastectomy with ROSITA flap reconstruction and right SLNB on 5/24/2023, Adjuvant Taxotere Cytoxan through 9/14/2023, and radiation through 11/7/2023.  Reports fatigue, brain fog, weight gain and hot flashes that started with chemotherapy for breast cancer. She is now s/p hyst/BSO on 12/17/2023 and taking tamoxifen daily. Saw Joan with TI on the [facility]. Started Veozah 45mg QD and helping with hot flashes. She wishes to continue. Asked to have me manage this moving forward.  Started compounded semaglutide 0.3mg SC weekly with her plastic surgeon. Tolerating well and helping. She continues to have fatigue and wondering is she should get compounded with vitamin b12. Labs on 1/3/2024 showed normal levels of vitamin B12. She is sleeping well  at night. Eating protein 3 x per day and denies skipping meals. She is walking for exercise since still recovering from multiple surgeries. Has another revision scheduled with plastics on 2/13/2025 and will have one more revision after this. Denies snoring. No history of HTN. She would like to lose about another 15-20lbs.    Oncology history: Stage IB (pT2, pN1(sn), cM0, G2, ER+, CO+, HER2-) breast cancer s/p bilateral mastectomy with ROSITA flap reconstruction and right SLNB on 5/24/2023, Adjuvant Taxotere Cytoxan through 9/14/2023, and radiation through 11/7/2023. She is currently taking tamoxifen.   S/p robotic assisted total hysterectomy and bso on 12/17/2024 for cancer risk reduction.      12/10/2024  AST 21  ALT 23    PCP: Dr. Letitia Sherman    Routine labs: 9/17/2024  WWE: 5/13/2024 with Dr. Moeller  Pap smear: 5/20/2024  Mammogram: s/p bilateral mastectomy  DEXA: 12/18/2023      Lab Visit on 01/03/2025   Component Date Value Ref Range Status    Ferritin 01/03/2025 71  20.0 - 300.0 ng/mL Final    Iron 01/03/2025 98  30 - 160 ug/dL Final    Transferrin 01/03/2025 297  200 - 375 mg/dL Final    TIBC 01/03/2025 440  250 - 450 ug/dL Final    Saturated Iron 01/03/2025 22  20 - 50 % Final    Vitamin B-12 01/03/2025 472  210 - 950 pg/mL Final    Folate 01/03/2025 13.6  4.0 - 24.0 ng/mL Final   Admission on 12/17/2024, Discharged on 12/17/2024   Component Date Value Ref Range Status    POC Glucose 12/17/2024 74  70 - 110 MG/DL Final    POCT Glucose 12/17/2024 74  70 - 110 mg/dL Final    Final Pathologic Diagnosis 12/17/2024    Final                    Value:1. Uterus, cervix, bilateral fallopian tubes and ovaries; total hysterectomy and salpingo-oophorectomy:  Uterus with weakly proliferative endometrium   Cervix with no diagnostic histopathologic abnormalities  Bilateral fallopian tubes with no diagnostic histopathologic abnormalities  Bilateral ovaries with no diagnostic histopathologic abnormalities  Negative for  hyperplasia, atypia, dysplasia, and malignancy      Gross 2024    Final                    Value:Pathology ID: [id number]     :  1980    SURGERY MRN:  [id number]/PATHOLOGY MRN:  [id number]     PART 1:  SPECIMEN DESIGNATION: Received fresh and subsequently placed in formalin designated as &quot;uterus, cervix, bilateral tubes and ovaries&quot;.    COMPONENTS: The specimen consists of a 87 g total hysterectomy (6.7 cm superior to inferior x 3.8 cm right to left x 3.4 cm anterior to posterior) with attached right fallopian tube (5.2 cm in length x 0.5 cm in average diameter), left fallopian tube   (4.7 cm in length x 0.5 cm in average diameter), right ovary (2.4 x 1.5 x 0.8 cm, 1.9 g), and left ovary (2.3 x 1.5 x 0.9 cm, 1.5 g).     UTERINE SEROSA: The serosa is pink-tan, smooth and glistening.    CERVIX: The attached cervix (3.2 cm in diameter) has purple-gray, slightly mottled ectocervical mucosa. The cervical os is round and measures approximately 1.0 cm in diameter. The endocervical canal (2.5 cm in length x 0.7 cm in average width) has   pink-tan herringbone mucosa.     ENDO                          METRIUM: The endometrial cavity (2.5 cm cornu to cornu x 4.1 cm in length) has red-pink endometrium, with an average thickness measuring 0.1 cm. No endometrial masses are identified.    MYOMETRIUM: The myometrium is pink-tan, mildly trabeculated with an average thickness measuring 1.6 cm.  No myometrial masses are identified.    FALLOPIAN TUBES: The bilateral fallopian tubes have a purple-gray glistening serosal surface. The fimbriated ends are unremarkable. Cross-sections reveal an unremarkable patent lumen (0.1 cm).    OVARIES: The bilateral ovaries have a tan-yellow, slightly bosselated external surface. Cross-sections reveal pink-tan parenchyma with sparse grossly identifiable corporal bodies.  No cysts or solid masses are present.    CASSETTE SUMMARY: Representative sections are submitted as  follows:  PLA--1-A:  Anterior cervix  PPV--1-B:  Posterior cervix  BPU--1-C:  Anterior endomyometrial cross-section with serosa  MCE--1-D:  Posterior endomyometrial                           cross-section with serosa  TYS--1-E:  Right fallopian tube and ovary  XJE--1-F:  Left fallopian tube and ovary    Date Received/Opened:  12/17/2024  Date Grossed:  12/19/2024    -Shameka Hinojosa, MS, PA([facility])      Disclaimer 12/17/2024 Unless the case is a 'gross only' or additional testing only, the final diagnosis for each specimen is based on a microscopic examination of appropriate tissue sections.   Final   Hospital Outpatient Visit on 12/10/2024   Component Date Value Ref Range Status    Group & Rh 12/10/2024 AB NEG   Final    Indirect [place] 12/10/2024 NEG   Final    Specimen Outdate 12/10/2024 12/18/2024 23:59   Final    Estradiol 12/10/2024 <10 (A)  See Text pg/mL Final    LH 12/10/2024 2.8  See Text mIU/mL Final    WBC 12/10/2024 5.20  3.90 - 12.70 K/uL Final    RBC 12/10/2024 4.22  4.00 - 5.40 M/uL Final    Hemoglobin 12/10/2024 13.4  12.0 - 16.0 g/dL Final    Hematocrit 12/10/2024 38.6  37.0 - 48.5 % Final    MCV 12/10/2024 92  82 - 98 fL Final    MCH 12/10/2024 31.8 (H)  27.0 - 31.0 pg Final    MCHC 12/10/2024 34.7  32.0 - 36.0 g/dL Final    RDW 12/10/2024 11.9  11.5 - 14.5 % Final    Platelets 12/10/2024 209  150 - 450 K/uL Final    MPV 12/10/2024 9.2  9.2 - 12.9 fL Final    Immature Granulocytes 12/10/2024 0.2  0.0 - 0.5 % Final    Gran # (ANC) 12/10/2024 3.3  1.8 - 7.7 K/uL Final    Immature Grans (Abs) 12/10/2024 0.01  0.00 - 0.04 K/uL Final    Lymph # 12/10/2024 1.1  1.0 - 4.8 K/uL Final    Mono # 12/10/2024 0.7  0.3 - 1.0 K/uL Final    Eos # 12/10/2024 0.2  0.0 - 0.5 K/uL Final    Baso # 12/10/2024 0.02  0.00 - 0.20 K/uL Final    nRBC 12/10/2024 0  0 /100 WBC Final    Gran % 12/10/2024 62.4  38.0 - 73.0 % Final    Lymph % 12/10/2024 21.0  18.0 - 48.0 % Final    Mono %  12/10/2024 12.7  4.0 - 15.0 % Final    Eosinophil % 12/10/2024 3.3  0.0 - 8.0 % Final    Basophil % 12/10/2024 0.4  0.0 - 1.9 % Final    Differential Method 12/10/2024 Automated   Final    Sodium 12/10/2024 140  136 - 145 mmol/L Final    Potassium 12/10/2024 4.1  3.5 - 5.1 mmol/L Final    Chloride 12/10/2024 106  95 - 110 mmol/L Final    CO2 12/10/2024 23  23 - 29 mmol/L Final    Glucose 12/10/2024 85  70 - 110 mg/dL Final    BUN 12/10/2024 10  6 - 20 mg/dL Final    Creatinine 12/10/2024 0.8  0.5 - 1.4 mg/dL Final    Calcium 12/10/2024 9.5  8.7 - 10.5 mg/dL Final    Total Protein 12/10/2024 7.3  6.0 - 8.4 g/dL Final    Albumin 12/10/2024 3.8  3.5 - 5.2 g/dL Final    Total Bilirubin 12/10/2024 0.3  0.1 - 1.0 mg/dL Final    Alkaline Phosphatase 12/10/2024 59  40 - 150 U/L Final    AST 12/10/2024 21  10 - 40 U/L Final    ALT 12/10/2024 23  10 - 44 U/L Final    eGFR 12/10/2024 >60  >60 mL/min/1.73 m^2 Final    Anion Gap 12/10/2024 11  8 - 16 mmol/L Final       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    Heartburn 12/17/2021    Strawberry wai 1981    back of neck    Strawberry wai 1981    under nose     Past Surgical History:   Procedure Laterality Date    BILATERAL MASTECTOMY Bilateral 5/24/2023    Procedure: MASTECTOMY, BILATERAL;  Surgeon: Jenna Pickard MD;  Location: [facility];  Service: General;  Laterality: Bilateral;  2.5 HOURS / EMAIL SENT 5-9 @ 11:39 LK    COLPOSCOPY      ENDOSCOPIC CARPAL TUNNEL RELEASE Right 10/25/2024    Procedure: RELEASE, CARPAL TUNNEL, ENDOSCOPIC - RIGHT;  Surgeon: Vernon Olivas MD;  Location: [facility] OR;  Service: Orthopedics;  Laterality: Right;    INJECTION FOR SENTINEL NODE IDENTIFICATION Right 5/24/2023    Procedure: INJECTION, FOR SENTINEL NODE IDENTIFICATION;  Surgeon: Jenna Pickard MD;  Location: [facility] OR;  Service: General;  Laterality:  "Right;    RECONSTRUCTION OF BREAST WITH DEEP INFERIOR EPIGASTRIC ARTERY  (ROSITA) FREE FLAP Bilateral 2023    Procedure: RECONSTRUCTION, BREAST, USING ROSITA FREE FLAP / BILATERAL DEEP INFERIOR EPIGASTRIC PERFORATORS FLAPS;  Surgeon: Mikey Roche MD;  Location: [facility];  Service: Plastics;  Laterality: Bilateral;    ROBOT-ASSISTED LAPAROSCOPIC ABDOMINAL HYSTERECTOMY USING DA RUDOLPH XI N/A 2024    Procedure: XI ROBOTIC HYSTERECTOMY;  Surgeon: Benjy Valladares MD;  Location: [facility];  Service: OB/GYN;  Laterality: N/A;  1.5 hr case    ROBOT-ASSISTED LAPAROSCOPIC SALPINGO-OOPHORECTOMY USING DA RUDOLPH XI Bilateral 2024    Procedure: XI ROBOTIC SALPINGO-OOPHORECTOMY;  Surgeon: Benjy Valladares MD;  Location: [facility];  Service: OB/GYN;  Laterality: Bilateral;    SENTINEL LYMPH NODE BIOPSY Right 2023    Procedure: BIOPSY, LYMPH NODE, SENTINEL;  Surgeon: Jenna Pickard MD;  Location: [facility];  Service: General;  Laterality: Right;    WISDOM TOOTH EXTRACTION       Social History     Tobacco Use    Smoking status: Never     Passive exposure: Never    Smokeless tobacco: Never   Substance Use Topics    Alcohol use: Yes     Comment: social    Drug use: No     Family History   Problem Relation Name Age of Onset    Hyperlipidemia Mother      Kidney cancer Father  58    Cancer Brother  22        Parotid Gland cancer    Birth marks Child  0        strawberry wai(s)    Birth marks Child  0        strawberry wai(s)    Café-au-lait spots Maternal Uncle          "a small patch on his neck"    Café-au-lait spots Other nephew         "a spot on his arm and partial torso"    Other Other nephew 2        tested negative for macrocephaly    Cervical cancer Other      Throat cancer Other      Pancreatic cancer Other          1 second cousin ()    Other Other          brain tumors (several 2nd cousins)    Breast cancer Neg Hx      Colon cancer Neg Hx      Ovarian cancer Neg Hx      Melanoma Neg Hx       OB " History    Para Term  AB Living   2 2 2     2   SAB IAB Ectopic Multiple Live Births           2      # Outcome Date GA Lbr Gavin/2nd Weight Sex Type Anes PTL Lv   2 Term 14 39w1d  3.629 kg (8 lb) F Vag-Spont EPI N MARILYN   1 Term 12/30/10 38w0d  3.459 kg (7 lb 10 oz) M Vag-Spont   MARILYN      Birth Comments: delivered by Dr Guerrero       Current Outpatient Medications:     acetaminophen (TYLENOL) 650 MG TbSR, Take 1 tablet (650 mg total) by mouth every 6 (six) hours. (Patient not taking: Reported on 2025), Disp: 30 tablet, Rfl: 1    calcium acetate,phosphat bind, (PHOSLO) 667 mg capsule, Take 667 mg by mouth once daily., Disp: , Rfl:     docusate sodium (COLACE) 100 MG capsule, Take 1 capsule (100 mg total) by mouth 2 (two) times daily. (Patient not taking: Reported on 2025), Disp: 30 capsule, Rfl: 1    fezolinetant 45 mg Tab, Take 1 tablet (45 mg) by mouth once daily., Disp: 30 tablet, Rfl: 3    ibuprofen (ADVIL,MOTRIN) 600 MG tablet, Take 1 tablet (600 mg total) by mouth every 6 (six) hours. (Patient not taking: Reported on 2025), Disp: 30 tablet, Rfl: 1    multivitamin (THERAGRAN) per tablet, Take 1 tablet by mouth once daily., Disp: , Rfl:     oxyCODONE (ROXICODONE) 5 MG immediate release tablet, Take 1 tablet (5 mg total) by mouth every 4 (four) hours as needed for Pain. (Patient not taking: Reported on 2025), Disp: 15 tablet, Rfl: 0    semaglutide, weight loss, (WEGOVY) 0.25 mg/0.5 mL PnIj, Inject 0.25 mg into the skin every 7 days., Disp: 2 mL, Rfl: 3    tamoxifen (NOLVADEX) 20 MG Tab, Take 1 tablet (20 mg total) by mouth once daily., Disp: 30 tablet, Rfl: 11    traMADoL (ULTRAM) 50 mg tablet, Take 1 tablet (50 mg total) by mouth every 6 (six) hours as needed for Pain. (Patient not taking: Reported on 2025), Disp: 20 tablet, Rfl: 0    venlafaxine (EFFEXOR-XR) 150 MG Cp24, Take 1 capsule (150 mg total) by mouth once daily., Disp: 90 capsule, Rfl: 3    The 10-year ASCVD risk  "score (Samson SCOTT, et al., 2019) is: 0.8%    Values used to calculate the score:      Age: 44 years      Sex: Female      Is Non- : No      Diabetic: No      Tobacco smoker: No      Systolic Blood Pressure: 137 mmHg      Is BP treated: No      HDL Cholesterol: 64 mg/dL      Total Cholesterol: 237 mg/dL    Review of Systems:  General: No fever, chills, or weight loss.  Chest: No chest pain, shortness of breath, or palpitations.  Breast: No pain, masses, or nipple discharge.  Vulva: No pain, lesions, or itching.  Vagina: No relaxation, itching, discharge, or lesions.  Abdomen: No pain, nausea, vomiting, diarrhea, or constipation.  Urinary: No incontinence, nocturia, frequency, or dysuria.  Extremities:  No leg cramps, edema, or calf pain.  Neurologic: No headaches, dizziness, or visual changes.    Objective:     Vitals:    02/03/25 1637   Weight: 69.4 kg (153 lb)   Height: 5' 1" (1.549 m)     Body mass index is 28.91 kg/m².    PHYSICAL EXAM:  APPEARANCE: Well nourished, well developed, in no acute distress.  AFFECT: WNL, alert and oriented x 3      Assessment:      Malignant neoplasm of overlapping sites of right breast in female, estrogen receptor positive  -     Ambulatory referral/consult to [facility]    Overweight  -     semaglutide, weight loss, (WEGOVY) 0.25 mg/0.5 mL PnIj; Inject 0.25 mg into the skin every 7 days.  Dispense: 2 mL; Refill: 3    Metabolic syndrome  -     semaglutide, weight loss, (WEGOVY) 0.25 mg/0.5 mL PnIj; Inject 0.25 mg into the skin every 7 days.  Dispense: 2 mL; Refill: 3    Hot flashes, menopausal  -     fezolinetant 45 mg Tab; Take 1 tablet (45 mg) by mouth once daily.  Dispense: 30 tablet; Refill: 3        Plan:   Continue Veozah 45mg QD  Monitor LFTs [id number] for the first year.   Will recheck labs at follow up.  Goal is to shift body composition to increase lean muscle to improve metabolism and protect from osteoporosis.   Encouraged lean " protein with every meal.  Goal of  90g of protein per day  Wide variety of fruits and vegetables.   Continue walking for exercise for now.  Hold GLP-1 until after upcoming surgery  Post op will start Wegovy 0.25mg SC qweekly. Counseled on use.  Reviewed side effects and risks of medications. No family or personal history of thyroid cancer or pancreatitis. Denies underlying gallbladder issues.  Discussed that these are long term options for weight loss and risk of gaining weight back if discontinued   If not covered by insurance, we did discuss the option of compounded medications.  Reviewed the differences between Wegovy/Ozempic and compounded Semaglutide-methylcobalamin and that these medications are not FDA regulated   We did discuss testosterone therapy for menopausal symptoms  Reviewed risks vs benefits and risk of converting testosterone to estradiol.  She has a HRT consult with Dr. Urbano scheduled to discuss more.  Follow up in 8 weeks.    Instructed patient to call if she experiences any side effects or has any questions.    I spent a total of 35 minutes on the day of the visit.This includes face to face time and non-face to face time preparing to see the patient (eg, review of tests), obtaining and/or reviewing separately obtained history, documenting clinical information in the electronic or other health record, independently interpreting results and communicating results to the patient/family/caregiver, or care coordinator.

## 2025-02-10 ENCOUNTER — PATIENT MESSAGE (OUTPATIENT)
Dept: OBSTETRICS AND GYNECOLOGY | Facility: CLINIC | Age: 45
End: 2025-02-10
Payer: COMMERCIAL

## 2025-03-11 ENCOUNTER — LAB VISIT (OUTPATIENT)
Dept: LAB | Facility: HOSPITAL | Age: 45
End: 2025-03-11
Attending: INTERNAL MEDICINE
Payer: COMMERCIAL

## 2025-03-11 DIAGNOSIS — E78.2 MIXED HYPERLIPIDEMIA: ICD-10-CM

## 2025-03-11 LAB
CHOLEST SERPL-MCNC: 220 MG/DL (ref 120–199)
CHOLEST/HDLC SERPL: 3.7 {RATIO} (ref 2–5)
ESTIMATED AVG GLUCOSE: 91 MG/DL (ref 68–131)
HBA1C MFR BLD: 4.8 % (ref 4–5.6)
HDLC SERPL-MCNC: 59 MG/DL (ref 40–75)
HDLC SERPL: 26.8 % (ref 20–50)
LDLC SERPL CALC-MCNC: 128.8 MG/DL (ref 63–159)
NONHDLC SERPL-MCNC: 161 MG/DL
TRIGL SERPL-MCNC: 161 MG/DL (ref 30–150)
TSH SERPL DL<=0.005 MIU/L-ACNC: 2 UIU/ML (ref 0.4–4)

## 2025-03-11 PROCEDURE — 83036 HEMOGLOBIN GLYCOSYLATED A1C: CPT | Performed by: INTERNAL MEDICINE

## 2025-03-11 PROCEDURE — 80061 LIPID PANEL: CPT | Performed by: INTERNAL MEDICINE

## 2025-03-11 PROCEDURE — 84443 ASSAY THYROID STIM HORMONE: CPT | Performed by: INTERNAL MEDICINE

## 2025-03-12 ENCOUNTER — OFFICE VISIT (OUTPATIENT)
Dept: INTERNAL MEDICINE | Facility: CLINIC | Age: 45
End: 2025-03-12
Payer: COMMERCIAL

## 2025-03-12 VITALS
HEART RATE: 80 BPM | HEIGHT: 61 IN | BODY MASS INDEX: 28.47 KG/M2 | SYSTOLIC BLOOD PRESSURE: 136 MMHG | DIASTOLIC BLOOD PRESSURE: 77 MMHG | OXYGEN SATURATION: 99 % | WEIGHT: 150.81 LBS

## 2025-03-12 DIAGNOSIS — N95.1 MENOPAUSAL SYMPTOMS: ICD-10-CM

## 2025-03-12 DIAGNOSIS — Z00.00 ENCOUNTER FOR ANNUAL HEALTH EXAMINATION: Primary | ICD-10-CM

## 2025-03-12 DIAGNOSIS — H53.8 BLURRY VISION, BILATERAL: ICD-10-CM

## 2025-03-12 PROCEDURE — 3078F DIAST BP <80 MM HG: CPT | Mod: CPTII,S$GLB,, | Performed by: INTERNAL MEDICINE

## 2025-03-12 PROCEDURE — 3044F HG A1C LEVEL LT 7.0%: CPT | Mod: CPTII,S$GLB,, | Performed by: INTERNAL MEDICINE

## 2025-03-12 PROCEDURE — 99999 PR PBB SHADOW E&M-EST. PATIENT-LVL III: CPT | Mod: PBBFAC,,, | Performed by: INTERNAL MEDICINE

## 2025-03-12 PROCEDURE — 99396 PREV VISIT EST AGE 40-64: CPT | Mod: S$GLB,,, | Performed by: INTERNAL MEDICINE

## 2025-03-12 PROCEDURE — 3008F BODY MASS INDEX DOCD: CPT | Mod: CPTII,S$GLB,, | Performed by: INTERNAL MEDICINE

## 2025-03-12 PROCEDURE — 3075F SYST BP GE 130 - 139MM HG: CPT | Mod: CPTII,S$GLB,, | Performed by: INTERNAL MEDICINE

## 2025-03-12 NOTE — PROGRESS NOTES
Subjective:       Patient ID: Dov Hernandez is a 44 y.o. female.    Chief Complaint: Annual exam     Feeling well today. No new complaints.     Hx of breast cancer: dx in April 2023 through screening mammogram.  Status postBilateral mastectomy with ROSITA flap reconstruction and right SLNB 5/24/23: IDC, 2.2cm; 1/1LN+   Declined ALND and proceeded with XRT.      She had another breast reconstruction surgery on 2/17.  Lipo and breast lift and left breast implant, healing well  Started on wegovy by CHRIS Kessler.  Has been on for a few weeks  Notes fatigue and intermittent fogginess    Switched recently to tamoxifen and has been having hot flashes. On veozah for hot flashes, has greatly helped   Alsp having worsening bilateral carpal tunnel. Tried gabapentin but did not help had carpal tunnel surgery which has helped tremendously.     Has hx of breast cancer.   She had abnormal mammogram in March 2023.  She underwent a right breast biopsy that showed IDC, 2 mm, ER 30%, WA 30%, Her 2 karrie negative, Grade 1, low Ki 67 at 5% with associated DCIS.    She underwent b/l mastectomy in May 2023 with ROSITA flap reconstruction and right SLNB. Tumor size was 2.2 cm, 1/1 SLN was positive for metastatic carcinoma.    She then had chemo from 7/2023 to 9/2023 and XRT which she completed in 11.2023.   Now On arimidex.   Also on anastrozole and she is considering bilateral oophorectomy with her gynecologist and if she chooses to undergo the procedure, will stop Zoladex after her surgery.   Had reconstructive surgery in Feb 2024 with plastic surgeon outside of [facility]. Has had complicated post op course with infections most recently treated with Augmentin.      Had elevated lipids during treatment but repeat done yesterday showed cholesterol is now back in normal range since being off treatment.     Generalized Anxiety/depression: started after diagnosis. Was started on Effexor by oncologist and is doing well on this medicaiton.       HLD: most recent LDL down o 147 from 183 previously.        Health Maintenance:  Colon Cancer Screening: start at age 45   Mammogram: breast cancer hx as above.   HIV: negative 2013   Hep C: negative 2020   Lipids: as above.   Pap Smear: normal PAP and HPV co test in 2024./  Vaccines:  up to date.             Follow-up  Pertinent negatives include no abdominal pain, arthralgias, chest pain, chills, coughing, headaches, myalgias, nausea or vomiting.     Review of Systems   Constitutional:  Negative for activity change, appetite change and chills.   HENT:  Negative for ear pain, sinus pressure/congestion and sneezing.    Respiratory:  Negative for cough and shortness of breath.    Cardiovascular:  Negative for chest pain, palpitations and leg swelling.   Gastrointestinal:  Negative for abdominal distention, abdominal pain, constipation, diarrhea, nausea and vomiting.   Genitourinary:  Negative for dysuria and hematuria.   Musculoskeletal:  Negative for arthralgias, back pain and myalgias.   Neurological:  Negative for dizziness and headaches.   Psychiatric/Behavioral:  Negative for agitation. The patient is not nervous/anxious.            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    Heartburn 12/17/2021    Strawberry wai 1981    back of neck    Strawberry wai 1981    under nose     Past Surgical History:   Procedure Laterality Date    BILATERAL MASTECTOMY Bilateral 5/24/2023    Procedure: MASTECTOMY, BILATERAL;  Surgeon: Jenna Pickard MD;  Location: [facility];  Service: General;  Laterality: Bilateral;  2.5 HOURS / EMAIL SENT 5-9 @ 11:39 LK    COLPOSCOPY      ENDOSCOPIC CARPAL TUNNEL RELEASE Right 10/25/2024    Procedure: RELEASE, CARPAL TUNNEL, ENDOSCOPIC - RIGHT;  Surgeon: Vernon Olivas MD;  Location[facility] OR;  Service: Orthopedics;  Laterality: Right;    INJECTION FOR  SENTINEL NODE IDENTIFICATION Right 5/24/2023    Procedure: INJECTION, FOR SENTINEL NODE IDENTIFICATION;  Surgeon: Jenna Pickard MD;  Location: [facility];  Service: General;  Laterality: Right;    RECONSTRUCTION OF BREAST WITH DEEP INFERIOR EPIGASTRIC ARTERY  (ROSITA) FREE FLAP Bilateral 5/24/2023    Procedure: RECONSTRUCTION, BREAST, USING ROSITA FREE FLAP / BILATERAL DEEP INFERIOR EPIGASTRIC PERFORATORS FLAPS;  Surgeon: Mikey Roche MD;  Location: [facility];  Service: Plastics;  Laterality: Bilateral;    ROBOT-ASSISTED LAPAROSCOPIC ABDOMINAL HYSTERECTOMY USING DA RUDOLPH XI N/A 12/17/2024    Procedure: XI ROBOTIC HYSTERECTOMY;  Surgeon: Benjy Valladares MD;  Location: [facility];  Service: OB/GYN;  Laterality: N/A;  1.5 hr case    ROBOT-ASSISTED LAPAROSCOPIC SALPINGO-OOPHORECTOMY USING DA RUDOLPH XI Bilateral 12/17/2024    Procedure: XI ROBOTIC SALPINGO-OOPHORECTOMY;  Surgeon: Benjy Valladares MD;  Location: [facility];  Service: OB/GYN;  Laterality: Bilateral;    SENTINEL LYMPH NODE BIOPSY Right 5/24/2023    Procedure: BIOPSY, LYMPH NODE, SENTINEL;  Surgeon: Jenna Pickard MD;  Location: [facility];  Service: General;  Laterality: Right;    WISDOM TOOTH EXTRACTION        Problem List[1]     Objective:      Physical Exam  Constitutional:       Appearance: Normal appearance.   HENT:      Head: Normocephalic.   Cardiovascular:      Rate and Rhythm: Normal rate and regular rhythm.      Pulses: Normal pulses.      Heart sounds: Normal heart sounds.   Pulmonary:      Effort: Pulmonary effort is normal.      Breath sounds: Normal breath sounds.   Abdominal:      General: Abdomen is flat. Bowel sounds are normal.      Palpations: Abdomen is soft.   Musculoskeletal:         General: Normal range of motion.      Cervical back: Normal range of motion and neck supple.   Skin:     General: Skin is warm and dry.   Neurological:      General: No focal deficit present.      Mental Status: She is alert and oriented to person, place, and  time.   Psychiatric:         Mood and Affect: Mood normal.         Assessment:       Problem List Items Addressed This Visit    None  Visit Diagnoses         Encounter for annual health examination    -  Primary      Blurry vision, bilateral        Relevant Orders    Ambulatory referral/consult to Optometry            Plan:         Dov was seen today for follow-up.    Diagnoses and all orders for this visit:    Encounter for annual health examination  Colon Cancer Screening: start at age 45   Mammogram: breast cancer hx as above.   HIV: negative 2013   Hep C: negative 2020   Lipids: as above.   Pap Smear: normal PAP and HPV co test in 2024./  Vaccines:  up to date.   Annual wellness exam completed.     All medications, histories, and concerns reviewed, reconciled, and addressed.     Appropriate Screenings per pt's sex and age have been reviewed and discussed with pt.       Blurry vision, bilateral  -     Ambulatory referral/consult to Optometry; Future                 Letitia Sherman MD   Internal Medicine   Primary Care           [1]   Patient Active Problem List  Diagnosis    Pain in joint, pelvic region and thigh    Acne varioliformis    Heartburn    Malignant neoplasm of overlapping sites of right breast in female, estrogen receptor positive    Secondary adenocarcinoma of lymph node    Anxiety    Hot flashes    Muscle tightness    At risk for lymphedema    Weakness of both upper extremities    Neuropathy    Carpal tunnel syndrome, right    S/P RA-TLH/BSO - 12/17/24    Hot flashes, menopausal    Fatigue

## 2025-03-18 ENCOUNTER — PATIENT MESSAGE (OUTPATIENT)
Dept: INTERNAL MEDICINE | Facility: CLINIC | Age: 45
End: 2025-03-18
Payer: COMMERCIAL

## 2025-03-20 ENCOUNTER — OFFICE VISIT (OUTPATIENT)
Dept: HEMATOLOGY/ONCOLOGY | Facility: CLINIC | Age: 45
End: 2025-03-20
Payer: COMMERCIAL

## 2025-03-20 ENCOUNTER — LAB VISIT (OUTPATIENT)
Dept: LAB | Facility: HOSPITAL | Age: 45
End: 2025-03-20
Attending: INTERNAL MEDICINE
Payer: COMMERCIAL

## 2025-03-20 VITALS
RESPIRATION RATE: 17 BRPM | OXYGEN SATURATION: 97 % | DIASTOLIC BLOOD PRESSURE: 78 MMHG | HEIGHT: 61 IN | WEIGHT: 149.94 LBS | TEMPERATURE: 98 F | HEART RATE: 89 BPM | SYSTOLIC BLOOD PRESSURE: 122 MMHG | BODY MASS INDEX: 28.31 KG/M2

## 2025-03-20 DIAGNOSIS — C77.9 SECONDARY ADENOCARCINOMA OF LYMPH NODE: ICD-10-CM

## 2025-03-20 DIAGNOSIS — M54.9 DORSALGIA, UNSPECIFIED: ICD-10-CM

## 2025-03-20 DIAGNOSIS — M54.50 ACUTE MIDLINE LOW BACK PAIN WITHOUT SCIATICA: ICD-10-CM

## 2025-03-20 DIAGNOSIS — Z17.0 MALIGNANT NEOPLASM OF OVERLAPPING SITES OF RIGHT BREAST IN FEMALE, ESTROGEN RECEPTOR POSITIVE: Primary | ICD-10-CM

## 2025-03-20 DIAGNOSIS — Z17.0 MALIGNANT NEOPLASM OF OVERLAPPING SITES OF RIGHT BREAST IN FEMALE, ESTROGEN RECEPTOR POSITIVE: ICD-10-CM

## 2025-03-20 DIAGNOSIS — C50.811 MALIGNANT NEOPLASM OF OVERLAPPING SITES OF RIGHT BREAST IN FEMALE, ESTROGEN RECEPTOR POSITIVE: ICD-10-CM

## 2025-03-20 DIAGNOSIS — M54.9 TENDERNESS OVER SPINE: ICD-10-CM

## 2025-03-20 DIAGNOSIS — C50.811 MALIGNANT NEOPLASM OF OVERLAPPING SITES OF RIGHT BREAST IN FEMALE, ESTROGEN RECEPTOR POSITIVE: Primary | ICD-10-CM

## 2025-03-20 LAB
ALBUMIN SERPL BCP-MCNC: 3.6 G/DL (ref 3.5–5.2)
ALP SERPL-CCNC: 48 U/L (ref 40–150)
ALT SERPL W/O P-5'-P-CCNC: 26 U/L (ref 10–44)
ANION GAP SERPL CALC-SCNC: 8 MMOL/L (ref 8–16)
AST SERPL-CCNC: 19 U/L (ref 10–40)
BASOPHILS # BLD AUTO: 0.03 K/UL (ref 0–0.2)
BASOPHILS NFR BLD: 0.7 % (ref 0–1.9)
BILIRUB SERPL-MCNC: 0.4 MG/DL (ref 0.1–1)
BUN SERPL-MCNC: 23 MG/DL (ref 6–20)
CALCIUM SERPL-MCNC: 9.2 MG/DL (ref 8.7–10.5)
CHLORIDE SERPL-SCNC: 106 MMOL/L (ref 95–110)
CO2 SERPL-SCNC: 27 MMOL/L (ref 23–29)
CREAT SERPL-MCNC: 0.8 MG/DL (ref 0.5–1.4)
DIFFERENTIAL METHOD BLD: NORMAL
EOSINOPHIL # BLD AUTO: 0.1 K/UL (ref 0–0.5)
EOSINOPHIL NFR BLD: 1.9 % (ref 0–8)
ERYTHROCYTE [DISTWIDTH] IN BLOOD BY AUTOMATED COUNT: 12.1 % (ref 11.5–14.5)
EST. GFR  (NO RACE VARIABLE): >60 ML/MIN/1.73 M^2
GLUCOSE SERPL-MCNC: 85 MG/DL (ref 70–110)
HCT VFR BLD AUTO: 38.3 % (ref 37–48.5)
HGB BLD-MCNC: 12.5 G/DL (ref 12–16)
IMM GRANULOCYTES # BLD AUTO: 0.01 K/UL (ref 0–0.04)
IMM GRANULOCYTES NFR BLD AUTO: 0.2 % (ref 0–0.5)
LYMPHOCYTES # BLD AUTO: 1.3 K/UL (ref 1–4.8)
LYMPHOCYTES NFR BLD: 30.8 % (ref 18–48)
MCH RBC QN AUTO: 30.8 PG (ref 27–31)
MCHC RBC AUTO-ENTMCNC: 32.6 G/DL (ref 32–36)
MCV RBC AUTO: 94 FL (ref 82–98)
MONOCYTES # BLD AUTO: 0.5 K/UL (ref 0.3–1)
MONOCYTES NFR BLD: 10.7 % (ref 4–15)
NEUTROPHILS # BLD AUTO: 2.4 K/UL (ref 1.8–7.7)
NEUTROPHILS NFR BLD: 55.7 % (ref 38–73)
NRBC BLD-RTO: 0 /100 WBC
PLATELET # BLD AUTO: 237 K/UL (ref 150–450)
PMV BLD AUTO: 9.5 FL (ref 9.2–12.9)
POTASSIUM SERPL-SCNC: 4.4 MMOL/L (ref 3.5–5.1)
PROT SERPL-MCNC: 7.1 G/DL (ref 6–8.4)
RBC # BLD AUTO: 4.06 M/UL (ref 4–5.4)
SODIUM SERPL-SCNC: 141 MMOL/L (ref 136–145)
WBC # BLD AUTO: 4.28 K/UL (ref 3.9–12.7)

## 2025-03-20 PROCEDURE — 1159F MED LIST DOCD IN RCRD: CPT | Mod: CPTII,S$GLB,, | Performed by: NURSE PRACTITIONER

## 2025-03-20 PROCEDURE — G2211 COMPLEX E/M VISIT ADD ON: HCPCS | Mod: S$GLB,,, | Performed by: NURSE PRACTITIONER

## 2025-03-20 PROCEDURE — 3044F HG A1C LEVEL LT 7.0%: CPT | Mod: CPTII,S$GLB,, | Performed by: NURSE PRACTITIONER

## 2025-03-20 PROCEDURE — 3078F DIAST BP <80 MM HG: CPT | Mod: CPTII,S$GLB,, | Performed by: NURSE PRACTITIONER

## 2025-03-20 PROCEDURE — 99214 OFFICE O/P EST MOD 30 MIN: CPT | Mod: S$GLB,,, | Performed by: NURSE PRACTITIONER

## 2025-03-20 PROCEDURE — 3008F BODY MASS INDEX DOCD: CPT | Mod: CPTII,S$GLB,, | Performed by: NURSE PRACTITIONER

## 2025-03-20 PROCEDURE — 99999 PR PBB SHADOW E&M-EST. PATIENT-LVL IV: CPT | Mod: PBBFAC,,, | Performed by: NURSE PRACTITIONER

## 2025-03-20 PROCEDURE — 85025 COMPLETE CBC W/AUTO DIFF WBC: CPT | Performed by: INTERNAL MEDICINE

## 2025-03-20 PROCEDURE — 3074F SYST BP LT 130 MM HG: CPT | Mod: CPTII,S$GLB,, | Performed by: NURSE PRACTITIONER

## 2025-03-20 PROCEDURE — 80053 COMPREHEN METABOLIC PANEL: CPT | Performed by: INTERNAL MEDICINE

## 2025-03-20 PROCEDURE — 36415 COLL VENOUS BLD VENIPUNCTURE: CPT | Performed by: INTERNAL MEDICINE

## 2025-03-20 NOTE — PROGRESS NOTES
Subjective     Patient ID: Dov Hernandez is a 44 y.o. female.    Chief Complaint: Malignant neoplasm of overlapping sites of right breast in     HPI    In the interval:  She is feeling generally well today.  Tolerating tamoxifen well  She had another breast reconstruction surgery on 2/17.  Lipo and breast lift and left breast implant, healing well  Started on wegovy by CHRIS Kessler.  Has been on for a few weeks  Notes fatigue and intermittent fogginess  Having some intermittent blurry vision.  She has a visit with optometry scheduled  Notes new lower back pain, comes and goes, about 6 weeks, does not radiate, midline and on sides  On veozah for hot flashes, has greatly helped    Prior to last visit with Dr. Piedra:  Underwent the carpal tunnel surgery- instant relief- will need to do the left in the future  2 infections in left breast- placed expander- implant later down the line    Hot flashes on Tamoxifen  Started Semaglutide 3 weeks ago- lost 3 lbs (now on 0.2 dosing)- ordered by plastic surgeon    Present today for an urgent care visit for a lump noted under her right axilla three days ago.  The lump is tender and feels cord like.  No redness or swelling.  No fever.  She has had many problems with healing of the left reconstructed breast and has recently been on augmentin for 4 to 5 weeks for infection on her left side.  Completed augmentin about two weeks ago.  Other than this new lump, she is feeling ell today    Presents for follow up on Tamoxifen (changed from AI)  Easier to tolerate  Pain in joints resolved    Robotic hysterectomy on Tuesday     PMH:           Encounter Diagnoses   Name Primary?    Malignant neoplasm of overlapping sites of right breast in female, estrogen receptor positive Yes    Secondary adenocarcinoma of lymph node      Aromatase inhibitor use            Cancer Staging   Malignant neoplasm of overlapping sites of right breast in female, estrogen receptor positive  Staging form:  "Breast, AJCC 8th Edition  - Clinical stage from 4/26/2023: Stage IB (cT2, cN0, cM0, G1, ER+, GA+, HER2: Equivocal) - Signed by Jenna Pickard MD on 4/26/2023  - Pathologic stage from 6/14/2023: Stage IB (pT2, pN1(sn), cM0, G2, ER+, GA+, HER2-) - Signed by Fran Ken Jr., MD on 6/17/2023        Oncology History  - normal screening mammogram in 2022.   - Screening mammogram in March 2023 showed right breast calcifications, measured to be 39 mm on diagnostic mammogram.      - Right breast biopsy 4/13/23: IDC, 2 mm, ER 30%, GA 30%, Her 2 karrie negative, Grade 1, low Ki 67 at 5% with associated DCIS.       - Breast MRI 4/25/23: 2 cm mass     - Case discussed at multi d breast tumor board.   Bilateral mastectomy with ROSITA flap reconstruction and right SLNB 5/24/23: IDC, 2.2cm; 1/1LN+   Declined ALND and proceeded with XRT.      - Oncotype RS 29; RR 23%; Adjuvant chemotherapy is recommended.     Baseline PET reviewed, no residual or metastatic disease noted.      - Adjuvant TC x 4 7/13/23 - 9/14/23  S/p cycle 1 of TC on 7/13/23.      - Zoladex started 11/15/23, LMP was in June 2023.  Anastrozole started December 2023      - discussed planning oophorectomy to avoid monthly injections of zoladex December 2024      Additional PMH  Had repair of the right ear drum. Still feels hearing is muffled on that side.-- further surgery pending        Social History   Social History                Tobacco Use    Smoking status: Never    Smokeless tobacco: Never   Substance Use Topics    Alcohol use: Yes       Comment: social    Drug use: No                     Family History   Problem Relation Name Age of Onset    Hyperlipidemia Mother        Kidney cancer Father   58    Cancer Brother   22         Parotid Gland cancer    Birth marks Child   0         strawberry wai(s)    Birth marks Child   0         strawberry wai(s)    Café-au-lait spots Maternal Uncle             "a small patch on his neck"    Café-au-lait spots Other " "nephew           "a spot on his arm and partial torso"    Other Other nephew 2         tested negative for macrocephaly    Cervical cancer Other        Throat cancer Other        Pancreatic cancer Other             1 second cousin ()    Other Other             brain tumors (several 2nd cousins)    Breast cancer Neg Hx        Colon cancer Neg Hx        Ovarian cancer Neg Hx        Melanoma Neg Hx                  Past Medical History:   Diagnosis Date    Acne varioliformis 2017     OCP & aziza from derm, Doxy caused yeast    Heartburn 2021    Strawberry wai 1981     back of neck    Strawberry wai 1981     under nose                Past Surgical History:   Procedure Laterality Date    BILATERAL MASTECTOMY Bilateral 2023     Procedure: MASTECTOMY, BILATERAL;  Surgeon: Jenna Pickard MD;  [facility];  Service: General;  Laterality: Bilateral;  2.5 HOURS / EMAIL SENT  @ 11:39 LK    COLPOSCOPY        INJECTION FOR SENTINEL NODE IDENTIFICATION Right 2023     Procedure: INJECTION, FOR SENTINEL NODE IDENTIFICATION;  Surgeon: Jenna Pickard MD;  [facility];  Service: General;  Laterality: Right;    RECONSTRUCTION OF BREAST WITH DEEP INFERIOR EPIGASTRIC ARTERY  (ROSITA) FREE FLAP Bilateral 2023     Procedure: RECONSTRUCTION, BREAST, USING ROSITA FREE FLAP / BILATERAL DEEP INFERIOR EPIGASTRIC PERFORATORS FLAPS;  Surgeon: Mikey Roche MD;  [facility];  Service: Plastics;  Laterality: Bilateral;    SENTINEL LYMPH NODE BIOPSY Right 2023     Procedure: BIOPSY, LYMPH NODE, SENTINEL;  Surgeon: Jenna Pickard MD;  [facility];  Service: General;  Laterality: Right;    WISDOM TOOTH EXTRACTION       Review of Systems   Constitutional:  Negative for activity change, appetite change, chills, fatigue and unexpected weight change.   Respiratory:  Negative for cough and shortness of breath.    Cardiovascular:  Negative for chest pain, palpitations and " leg swelling.   Gastrointestinal:  Negative for abdominal distention, abdominal pain, blood in stool and change in bowel habit.   Genitourinary:  Negative for decreased urine volume, difficulty urinating, frequency and vaginal bleeding.   Musculoskeletal:  Positive for arthralgias (improving) and back pain.   Integumentary:  Negative for pallor, rash and breast mass.        Recent surgery as above   Neurological:  Negative for dizziness, weakness, numbness (better) and headaches.   Psychiatric/Behavioral:  Negative for dysphoric mood. The patient is not nervous/anxious.    Breast: Negative for mass         Objective   Physical Exam  Constitutional:       General: She is not in acute distress.     Appearance: Normal appearance. She is not ill-appearing.   HENT:      Head: Normocephalic and atraumatic.   Eyes:      Extraocular Movements: Extraocular movements intact.   Cardiovascular:      Rate and Rhythm: Normal rate and regular rhythm.      Pulses: Normal pulses.      Heart sounds: Normal heart sounds. No murmur heard.     No friction rub.   Pulmonary:      Effort: Pulmonary effort is normal. No respiratory distress.      Breath sounds: Normal breath sounds.   Chest:      Comments: No axillary adenopathy noted.  The remained of breast exam deferred given recent surgery  Musculoskeletal:         General: Tenderness present.      Cervical back: Normal range of motion.      Comments: Mild spinal and paraspinal tenderness   Lymphadenopathy:      Cervical: No cervical adenopathy.   Skin:     General: Skin is warm and dry.   Neurological:      General: No focal deficit present.      Mental Status: She is alert and oriented to person, place, and time.       Labs- reviewed     Assessment and Plan     1. Malignant neoplasm of overlapping sites of right breast in female, estrogen receptor positive  -     [facility]; Future; Expected date: 03/20/2025  -     Comprehensive Metabolic Panel; Future; Expected date: 03/20/2025  -      MRI Lumbar Spine W WO Contrast; Future; Expected date: 03/20/2025    2. Secondary adenocarcinoma of lymph node  -     CBC Oncology; Future; Expected date: 03/20/2025  -     Comprehensive Metabolic Panel; Future; Expected date: 03/20/2025    3. Acute midline low back pain without sciatica  -     MRI Lumbar Spine W WO Contrast; Future; Expected date: 03/20/2025    4. Tenderness over spine  -     MRI Lumbar Spine W WO Contrast; Future; Expected date: 03/20/2025    5. Dorsalgia, unspecified  -     MRI Lumbar Spine W WO Contrast; Future; Expected date: 03/20/2025      Continue Tamoxifen- much better tolerated  Acupuncture referral previously placed  Continue to see Rox for weight loss  For back pain: ok to take ibuprofen, alternate ice and heat, lidocaine patches, will get MRI given tenderness  Rtc in 3 months with labs prior    Route Chart for Scheduling    Med Onc Chart Routing  Urgent    Follow up with physician 3 months. with dr Piedra   Follow up with IDA    Infusion scheduling note    Injection scheduling note    Labs CBC and CMP   Scheduling:  Preferred lab:  Lab interval:  in 3 months, same day as provider visit   Imaging MRI   lumbar mri now   Pharmacy appointment    Other referrals                   code applied: patient requires or will require a continuous, longitudinal, and active collaborative plan of care related to this patient's health condition, breast cancer --the management of which requires the direction of a practitioner with specialized clinical knowledge, skill, and expertise.      Supportive Plan Information  OP BREAST GOSERELIN Q4W Lidia Osman MD   Associated Diagnosis: Malignant neoplasm of overlapping sites of right breast in female, estrogen receptor positive Stage IB, Stage IB cT2, cN0, cM0, G1, ER+, TN+, HER2: Equivocal, pT2, pN1(sn), cM0, G2, ER+, TN+, HER2- noted on 4/26/2023   Line of treatment: Adjuvant   Treatment goal: Curative     Upcoming Treatment Dates - OP BREAST  GOSERELIN Q4W    5/31/2024       Chemotherapy       goserelin (ZOLADEX) injection 3.6 mg       Total time of this visit, including time spent face to face with patient and/or via video/audio, and also in preparing for today's visit for MDM and documentation. (Medical Decision Making, including consideration of possible diagnoses, management options, complex medical record review, review of diagnostic tests and information, consideration and discussion of significant complications based on comorbidities, and discussion with providers involved with the care of the patient) is 30 minutes. Greater than 50% was spent face to face with the patient counseling and coordinating care.    Lalitha Zamarripa, NAHED

## 2025-03-20 NOTE — TELEPHONE ENCOUNTER
No care due was identified.  Health [facility] Embedded Care Due Messages. Reference number: [id number].   3/20/2025 8:02:10 AM CDT

## 2025-03-20 NOTE — TELEPHONE ENCOUNTER
Refill Routing Note   Medication(s) are not appropriate for processing by [facility] for the following reason(s):        No active prescription written by provider    [facility] action(s):  Defer        Medication Therapy Plan:  AS OF  25      Appointments  past 12m or future 3m with PCP    Date Provider   Last Visit   3/12/2025 Letitia Sherman MD   Next Visit   Visit date not found Letitia Sherman MD   ED visits in past 90 days: 0        Note composed:9:03 AM 2025

## 2025-03-20 NOTE — PROGRESS NOTES
The patient location is: home  The chief complaint leading to consultation is: fatigue, hot flashes    Visit type: audiovisual    Each patient to whom he or she provides medical services by telemedicine is:  (1) informed of the relationship between the physician and patient and the respective role of any other health care provider with respect to management of the patient; and (2) notified that he or she may decline to receive medical services by telemedicine and may withdraw from such care at any time.    Notes: Dov Hernandez  44 y.o. is here to seek an integrative approach to discuss side effects related to breast cancer treatment. Dov Hernandez  was referred by Dr. Yolanda wang. provider found     Started on wegovy, from natalie.   Has been on for a few weeks  Fatigue  Intermittent fogginess  Having some lower back pain, comes and goes, about 6 weeks, does not radiate, midline and on sides  On veozah for hot flashes, have helped      HPI  Mrs. Hernandez was diagnosed with breast cancer after a routine mammogram. She had a double mastectomy with ROSITA flap reconstruction on 5/2023. She had complications with the reconstruction and has additional upcoming surgeries to complete the reconstruction. She completed chemo and radiation. She reports she tolerated chemo and radiation well. She did not tolerate Arimidex due to joint pain. She is now on Tamoxifen and had a hysterectomy 12/17/2024. She is having a lot of fatigue and hot flashes. She is sleeping well, but feels she cannot get enough sleep and is always exhausted. She has a good appetite and follows a healthy diet. Her physical activity has been low due to the many surgeries. She does walk in the neighborhood. She is very active at home and with her family.   She has been  almost 15 years. She has 2 children, 11 and 14. She working from home    Pillars Assessment    Sleep  How many hours of sleep per night? 8 hours  Do you have trouble falling asleep,  staying asleep or waking up earlier than you need to? no  Do you have daytime fatigue? yes  Do you need medication for sleep? no  Do you use any supplements or other interventions for sleep? no    Resilience  Rate your current level of stress- low    Purpose  Do you feel you have a vision or a life purpose? Yes    Environment  Any exposures:no known exposures    Nutrition   Food allergies or sensitivities: no  Do you adhere to a particular type of diet? no  Do you have any concerns with your eating habits? no    Exercise  How would you describe your physical activity level? 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    Heartburn 12/17/2021    Strawberry wai 1981    back of neck    Strawberry wai 1981    under nose      Past Surgical History   Past Surgical History:   Procedure Laterality Date    BILATERAL MASTECTOMY Bilateral 5/24/2023    Procedure: MASTECTOMY, BILATERAL;  Surgeon: Jenna Pickard MD;  Location: [facility];  Service: General;  Laterality: Bilateral;  2.5 HOURS / EMAIL SENT 5-9 @ 11:39 LK    COLPOSCOPY      ENDOSCOPIC CARPAL TUNNEL RELEASE Right 10/25/2024    Procedure: RELEASE, CARPAL TUNNEL, ENDOSCOPIC - RIGHT;  Surgeon: Vernon Olivas MD;  Location: [facility];  Service: Orthopedics;  Laterality: Right;    INJECTION FOR SENTINEL NODE IDENTIFICATION Right 5/24/2023    Procedure: INJECTION, FOR SENTINEL NODE IDENTIFICATION;  Surgeon: Jenna Pickard MD;  Location: [facility];  Service: General;  Laterality: Right;    RECONSTRUCTION OF BREAST WITH DEEP INFERIOR EPIGASTRIC ARTERY  (ROSITA) FREE FLAP Bilateral 5/24/2023    Procedure: RECONSTRUCTION, BREAST, USING ROSITA FREE FLAP / BILATERAL DEEP INFERIOR EPIGASTRIC PERFORATORS FLAPS;  Surgeon: Mikey Roche MD;  Location: [facility];  Service: Plastics;  Laterality: Bilateral;     "ROBOT-ASSISTED LAPAROSCOPIC ABDOMINAL HYSTERECTOMY USING DA RUDOLPH XI N/A 2024    Procedure: XI ROBOTIC HYSTERECTOMY;  Surgeon: Benjy Valladares MD;  Location: [facility];  Service: OB/GYN;  Laterality: N/A;  1.5 hr case    ROBOT-ASSISTED LAPAROSCOPIC SALPINGO-OOPHORECTOMY USING DA RUDOLPH XI Bilateral 2024    Procedure: XI ROBOTIC SALPINGO-OOPHORECTOMY;  Surgeon: Benjy Valladares MD;  Location: [facility];  Service: OB/GYN;  Laterality: Bilateral;    SENTINEL LYMPH NODE BIOPSY Right 2023    Procedure: BIOPSY, LYMPH NODE, SENTINEL;  Surgeon: Jenna Pickard MD;  Location: [facility];  Service: General;  Laterality: Right;    WISDOM TOOTH EXTRACTION        Family History   Family History   Problem Relation Name Age of Onset    Hyperlipidemia Mother      Kidney cancer Father  58    Cancer Brother  22        Parotid Gland cancer    Birth marks Child  0        strawberry wai(s)    Birth marks Child  0        strawberry wai(s)    Café-au-lait spots Maternal Uncle          "a small patch on his neck"    Café-au-lait spots Other nephew         "a spot on his arm and partial torso"    Other Other nephew 2        tested negative for macrocephaly    Cervical cancer Other      Throat cancer Other      Pancreatic cancer Other          1 second cousin ()    Other Other          brain tumors (several 2nd cousins)    Breast cancer Neg Hx      Colon cancer Neg Hx      Ovarian cancer Neg Hx      Melanoma Neg Hx        Allergies  Review of patient's allergies indicates:  No Active Allergies     Current Medications:    Current Outpatient Medications:     acetaminophen (TYLENOL) 650 MG TbSR, Take 1 tablet (650 mg total) by mouth every 6 (six) hours., Disp: 30 tablet, Rfl: 1    calcium acetate,phosphat bind, (PHOSLO) 667 mg capsule, Take 667 mg by mouth once daily., Disp: , Rfl:     docusate sodium (COLACE) 100 MG capsule, Take 1 capsule (100 mg total) by mouth 2 (two) times daily., Disp: 30 capsule, Rfl: 1    " fezolinetant 45 mg Tab, Take 1 tablet (45 mg) by mouth once daily., Disp: 30 tablet, Rfl: 3    ibuprofen (ADVIL,MOTRIN) 600 MG tablet, Take 1 tablet (600 mg total) by mouth every 6 (six) hours., Disp: 30 tablet, Rfl: 1    multivitamin (THERAGRAN) per tablet, Take 1 tablet by mouth once daily., Disp: , Rfl:     oxyCODONE (ROXICODONE) 5 MG immediate release tablet, Take 1 tablet (5 mg total) by mouth every 4 (four) hours as needed for Pain., Disp: 15 tablet, Rfl: 0    semaglutide, weight loss, (WEGOVY) 0.25 mg/0.5 mL PnIj, Inject 0.25 mg into the skin every 7 days., Disp: 2 mL, Rfl: 3    tamoxifen (NOLVADEX) 20 MG Tab, Take 1 tablet (20 mg total) by mouth once daily., Disp: 30 tablet, Rfl: 11    traMADoL (ULTRAM) 50 mg tablet, Take 1 tablet (50 mg total) by mouth every 6 (six) hours as needed for Pain., Disp: 20 tablet, Rfl: 0    venlafaxine (EFFEXOR-XR) 150 MG Cp24, Take 1 capsule (150 mg total) by mouth once daily., Disp: 90 capsule, Rfl: 3     Review of Systems  Review of Systems   Constitutional:  Positive for malaise/fatigue.        Hot flashes   HENT: Negative.     Eyes: Negative.    Respiratory: Negative.     Cardiovascular: Negative.    Gastrointestinal: Negative.    Genitourinary: Negative.    Musculoskeletal: Negative.    Skin: Negative.    Neurological: Negative.    Endo/Heme/Allergies: Negative.    Psychiatric/Behavioral: Negative.          Physical Exam      There were no vitals filed for this visit.  There is no height or weight on file to calculate BMI.  Physical Exam  Constitutional:       Appearance: Normal appearance.   Neurological:      Mental Status: She is alert.   Psychiatric:         Mood and Affect: Mood normal.         Behavior: Behavior normal.        ASSESSMENT :  No diagnosis found.     PLAN:  Reviewed all information discussed at today's visit and all questions were answered.    Counseled on healthy lifestyle and behavior modifications   Veozah 45 mg once daily  CMP in 1 month and then  every 3 months after.   Ferritin, iron and TIBC, ferritin, and B 12 within the next week.   Referral to Acupuncture  I explained acupuncture can reduce fatigue,  pain, and neuropathy. It can also assist with behavioral health such as depression and anxiety and improve overall sleep quality. Acupuncture helps improve overall symptoms from treatment.   I discussed and recommended the following support services:  Yoga I suggested Luke Chi and/or Yoga as these practices reduce stress, increases flexibility and muscle strength, improves balance and promotes serenity in the power of movement to help fight disease and boost your immune system.   Music and relaxation therapy and Meditation which can decrease stress by lowering blood pressure, slowing breathing, and helping you be more present in the moment. It improves sleep by relaxing the body and mind at the end of the day.Meditation also trains you how to focus on one thing at a time, improving concentration. It also promotes emotional well-being by decreasing depression and anxiety, and helping create a more positive outlook on life.    We discussed possible causes for fatigue including iron, B 12, and Folate deficiency.  If these come back normal, I discussed placing a referral to Dr. Urbano in [facility].     Follow up with [facility] in 1 month, virtual    I spent a total of 43 minutes on the day of the visit.This includes face to face time and non-face to face time preparing to see the patient (eg, review of tests), obtaining and/or reviewing separately obtained history, documenting clinical information in the electronic or other health record, independently interpreting results and communicating results to the patient/family/caregiver, or care coordinator.

## 2025-03-21 ENCOUNTER — TELEPHONE (OUTPATIENT)
Dept: HEMATOLOGY/ONCOLOGY | Facility: CLINIC | Age: 45
End: 2025-03-21
Payer: COMMERCIAL

## 2025-03-21 RX ORDER — VENLAFAXINE HYDROCHLORIDE 150 MG/1
150 CAPSULE, EXTENDED RELEASE ORAL
Qty: 90 CAPSULE | Refills: 3 | Status: SHIPPED | OUTPATIENT
Start: 2025-03-21

## 2025-03-25 ENCOUNTER — HOSPITAL ENCOUNTER (OUTPATIENT)
Dept: RADIOLOGY | Facility: HOSPITAL | Age: 45
Discharge: HOME | End: 2025-03-25
Attending: NURSE PRACTITIONER
Payer: COMMERCIAL

## 2025-03-25 DIAGNOSIS — M54.50 ACUTE MIDLINE LOW BACK PAIN WITHOUT SCIATICA: ICD-10-CM

## 2025-03-25 DIAGNOSIS — M54.9 TENDERNESS OVER SPINE: ICD-10-CM

## 2025-03-25 DIAGNOSIS — Z17.0 MALIGNANT NEOPLASM OF OVERLAPPING SITES OF RIGHT BREAST IN FEMALE, ESTROGEN RECEPTOR POSITIVE: ICD-10-CM

## 2025-03-25 DIAGNOSIS — M54.9 DORSALGIA, UNSPECIFIED: ICD-10-CM

## 2025-03-25 DIAGNOSIS — C50.811 MALIGNANT NEOPLASM OF OVERLAPPING SITES OF RIGHT BREAST IN FEMALE, ESTROGEN RECEPTOR POSITIVE: ICD-10-CM

## 2025-03-25 PROCEDURE — 72158 MRI LUMBAR SPINE W/O & W/DYE: CPT | Mod: 26,,, | Performed by: INTERNAL MEDICINE

## 2025-03-25 PROCEDURE — 25500020 PHARM REV CODE 255: Performed by: NURSE PRACTITIONER

## 2025-03-25 PROCEDURE — A9585 GADOBUTROL INJECTION: HCPCS | Performed by: NURSE PRACTITIONER

## 2025-03-25 PROCEDURE — 72158 MRI LUMBAR SPINE W/O & W/DYE: CPT | Mod: TC

## 2025-03-25 RX ORDER — GADOBUTROL 604.72 MG/ML
6 INJECTION INTRAVENOUS
Status: COMPLETED | OUTPATIENT
Start: 2025-03-25 | End: 2025-03-25

## 2025-03-25 RX ADMIN — GADOBUTROL 6 ML: 604.72 INJECTION INTRAVENOUS at 02:03

## 2025-03-26 ENCOUNTER — RESULTS FOLLOW-UP (OUTPATIENT)
Dept: HEMATOLOGY/ONCOLOGY | Facility: CLINIC | Age: 45
End: 2025-03-26

## 2025-03-31 ENCOUNTER — OFFICE VISIT (OUTPATIENT)
Dept: OBSTETRICS AND GYNECOLOGY | Facility: CLINIC | Age: 45
End: 2025-03-31
Payer: COMMERCIAL

## 2025-03-31 VITALS — BODY MASS INDEX: 27.75 KG/M2 | WEIGHT: 147 LBS | HEIGHT: 61 IN

## 2025-03-31 DIAGNOSIS — E66.3 OVERWEIGHT: ICD-10-CM

## 2025-03-31 DIAGNOSIS — C50.811 MALIGNANT NEOPLASM OF OVERLAPPING SITES OF RIGHT BREAST IN FEMALE, ESTROGEN RECEPTOR POSITIVE: Primary | ICD-10-CM

## 2025-03-31 DIAGNOSIS — N95.1 MENOPAUSAL SYMPTOMS: ICD-10-CM

## 2025-03-31 DIAGNOSIS — E88.810 METABOLIC SYNDROME: ICD-10-CM

## 2025-03-31 DIAGNOSIS — Z17.0 MALIGNANT NEOPLASM OF OVERLAPPING SITES OF RIGHT BREAST IN FEMALE, ESTROGEN RECEPTOR POSITIVE: Primary | ICD-10-CM

## 2025-03-31 PROCEDURE — 3008F BODY MASS INDEX DOCD: CPT | Mod: CPTII,95,, | Performed by: PHYSICIAN ASSISTANT

## 2025-03-31 PROCEDURE — 1160F RVW MEDS BY RX/DR IN RCRD: CPT | Mod: CPTII,95,, | Performed by: PHYSICIAN ASSISTANT

## 2025-03-31 PROCEDURE — 1159F MED LIST DOCD IN RCRD: CPT | Mod: CPTII,95,, | Performed by: PHYSICIAN ASSISTANT

## 2025-03-31 PROCEDURE — 3044F HG A1C LEVEL LT 7.0%: CPT | Mod: CPTII,95,, | Performed by: PHYSICIAN ASSISTANT

## 2025-03-31 PROCEDURE — 98005 SYNCH AUDIO-VIDEO EST LOW 20: CPT | Mod: 95,,, | Performed by: PHYSICIAN ASSISTANT

## 2025-03-31 RX ORDER — SEMAGLUTIDE 0.5 MG/.5ML
0.5 INJECTION, SOLUTION SUBCUTANEOUS
Qty: 2 ML | Refills: 3 | Status: SHIPPED | OUTPATIENT
Start: 2025-03-31

## 2025-03-31 NOTE — PROGRESS NOTES
The patient location is: home  The chief complaint leading to consultation is: follow up    Visit type: audiovisual    Face to Face time with patient: 10 minutes  15 minutes of total time spent on the encounter, which includes face to face time and non-face to face time preparing to see the patient (eg, review of tests), Obtaining and/or reviewing separately obtained history, Documenting clinical information in the electronic or other health record, Independently interpreting results (not separately reported) and communicating results to the patient/family/caregiver, or Care coordination (not separately reported).         Each patient to whom he or she provides medical services by telemedicine is:  (1) informed of the relationship between the physician and patient and the respective role of any other health care provider with respect to management of the patient; and (2) notified that he or she may decline to receive medical services by telemedicine and may withdraw from such care at any time.    Notes:    Subjective:      Dov Hernandez is a 44 y.o. female who presents for follow up. She is taking Wegovy 0.25mg SC weekly for weight loss. Has noticed reduce appetite. Tolerating well without side effects but not seeing significant weight loss in the first 4 weeks. Weight will fluctuate a couple pounds and then come back. She is eating high protein diet. Exercising regularly with walking.  Taking veozah 45mg for hot flashes and wishes to continue.    Oncology history: Stage IB (pT2, pN1(sn), cM0, G2, ER+, OR+, HER2-) breast cancer s/p bilateral mastectomy with ROSITA flap reconstruction and right SLNB on 5/24/2023, Adjuvant Taxotere Cytoxan through 9/14/2023, and radiation through 11/7/2023. She is currently taking tamoxifen.   S/p robotic assisted total hysterectomy and bso on 12/17/2024 for cancer risk reduction.       12/10/2024  AST 21  ALT 23  3/20/2025  AST 19  ALT 26     PCP: Dr. Letitia Sherman    Routine  labs: 9/17/2024  WWE: 5/13/2024 with Dr. Moeller  Pap smear: 5/20/2024  Mammogram: s/p bilateral mastectomy  DEXA: 12/18/2023    Lab Visit on 03/20/2025   Component Date Value Ref Range Status    WBC 03/20/2025 4.28  3.90 - 12.70 K/uL Final    RBC 03/20/2025 4.06  4.00 - 5.40 M/uL Final    Hemoglobin 03/20/2025 12.5  12.0 - 16.0 g/dL Final    Hematocrit 03/20/2025 38.3  37.0 - 48.5 % Final    MCV 03/20/2025 94  82 - 98 fL Final    MCH 03/20/2025 30.8  27.0 - 31.0 pg Final    MCHC 03/20/2025 32.6  32.0 - 36.0 g/dL Final    RDW 03/20/2025 12.1  11.5 - 14.5 % Final    Platelets 03/20/2025 237  150 - 450 K/uL Final    MPV 03/20/2025 9.5  9.2 - 12.9 fL Final    Immature Granulocytes 03/20/2025 0.2  0.0 - 0.5 % Final    Gran # (ANC) 03/20/2025 2.4  1.8 - 7.7 K/uL Final    Immature Grans (Abs) 03/20/2025 0.01  0.00 - 0.04 K/uL Final    Lymph # 03/20/2025 1.3  1.0 - 4.8 K/uL Final    Mono # 03/20/2025 0.5  0.3 - 1.0 K/uL Final    Eos # 03/20/2025 0.1  0.0 - 0.5 K/uL Final    Baso # 03/20/2025 0.03  0.00 - 0.20 K/uL Final    nRBC 03/20/2025 0  0 /100 WBC Final    Gran % 03/20/2025 55.7  38.0 - 73.0 % Final    Lymph % 03/20/2025 30.8  18.0 - 48.0 % Final    Mono % 03/20/2025 10.7  4.0 - 15.0 % Final    Eosinophil % 03/20/2025 1.9  0.0 - 8.0 % Final    Basophil % 03/20/2025 0.7  0.0 - 1.9 % Final    Differential Method 03/20/2025 Automated   Final    Sodium 03/20/2025 141  136 - 145 mmol/L Final    Potassium 03/20/2025 4.4  3.5 - 5.1 mmol/L Final    Chloride 03/20/2025 106  95 - 110 mmol/L Final    CO2 03/20/2025 27  23 - 29 mmol/L Final    Glucose 03/20/2025 85  70 - 110 mg/dL Final    BUN 03/20/2025 23 (H)  6 - 20 mg/dL Final    Creatinine 03/20/2025 0.8  0.5 - 1.4 mg/dL Final    Calcium 03/20/2025 9.2  8.7 - 10.5 mg/dL Final    Total Protein 03/20/2025 7.1  6.0 - 8.4 g/dL Final    Albumin 03/20/2025 3.6  3.5 - 5.2 g/dL Final    Total Bilirubin 03/20/2025 0.4  0.1 - 1.0 mg/dL Final    Alkaline Phosphatase 03/20/2025 48  40 -  150 U/L Final    AST 03/20/2025 19  10 - 40 U/L Final    ALT 03/20/2025 26  10 - 44 U/L Final    eGFR 03/20/2025 >60.0  >60 mL/min/1.73 m^2 Final    Anion Gap 03/20/2025 8  8 - 16 mmol/L Final   Lab Visit on 03/11/2025   Component Date Value Ref Range Status    Cholesterol 03/11/2025 220 (H)  120 - 199 mg/dL Final    Triglycerides 03/11/2025 161 (H)  30 - 150 mg/dL Final    HDL 03/11/2025 59  40 - 75 mg/dL Final    LDL Cholesterol 03/11/2025 128.8  63.0 - 159.0 mg/dL Final    HDL/Cholesterol Ratio 03/11/2025 26.8  20.0 - 50.0 % Final    Total Cholesterol/HDL Ratio 03/11/2025 3.7  2.0 - 5.0 Final    Non-HDL Cholesterol 03/11/2025 161  mg/dL Final    Hemoglobin A1C 03/11/2025 4.8  4.0 - 5.6 % Final    Estimated Avg Glucose 03/11/2025 91  68 - 131 mg/dL Final    TSH 03/11/2025 2.003  0.400 - 4.000 uIU/mL Final   Lab Visit on 01/03/2025   Component Date Value Ref Range Status    Ferritin 01/03/2025 71  20.0 - 300.0 ng/mL Final    Iron 01/03/2025 98  30 - 160 ug/dL Final    Transferrin 01/03/2025 297  200 - 375 mg/dL Final    TIBC 01/03/2025 440  250 - 450 ug/dL Final    Saturated Iron 01/03/2025 22  20 - 50 % Final    Vitamin B-12 01/03/2025 472  210 - 950 pg/mL Final    Folate 01/03/2025 13.6  4.0 - 24.0 ng/mL Final       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    Heartburn 12/17/2021    Strawberry wai 1981    back of neck    Strawberry wai 1981    under nose     Past Surgical History:   Procedure Laterality Date    BILATERAL MASTECTOMY Bilateral 5/24/2023    Procedure: MASTECTOMY, BILATERAL;  Surgeon: Jenna Pickard MD;  Location: [facility];  Service: General;  Laterality: Bilateral;  2.5 HOURS / EMAIL SENT 5-9 @ 11:39 LK    COLPOSCOPY      ENDOSCOPIC CARPAL TUNNEL RELEASE Right 10/25/2024    Procedure: RELEASE, CARPAL TUNNEL, ENDOSCOPIC - RIGHT;  Surgeon: Vernon Olivas MD;   "Location: [facility] OR;  Service: Orthopedics;  Laterality: Right;    INJECTION FOR SENTINEL NODE IDENTIFICATION Right 2023    Procedure: INJECTION, FOR SENTINEL NODE IDENTIFICATION;  Surgeon: Jenna Pickard MD;  Location: [facility];  Service: General;  Laterality: Right;    RECONSTRUCTION OF BREAST WITH DEEP INFERIOR EPIGASTRIC ARTERY  (ROSITA) FREE FLAP Bilateral 2023    Procedure: RECONSTRUCTION, BREAST, USING ROSITA FREE FLAP / BILATERAL DEEP INFERIOR EPIGASTRIC PERFORATORS FLAPS;  Surgeon: Mikey Roche MD;  Location: [facility];  Service: Plastics;  Laterality: Bilateral;    ROBOT-ASSISTED LAPAROSCOPIC ABDOMINAL HYSTERECTOMY USING DA RUDOLPH XI N/A 2024    Procedure: XI ROBOTIC HYSTERECTOMY;  Surgeon: Benjy Valladares MD;  Location: [facility];  Service: OB/GYN;  Laterality: N/A;  1.5 hr case    ROBOT-ASSISTED LAPAROSCOPIC SALPINGO-OOPHORECTOMY USING DA RUDOLPH XI Bilateral 2024    Procedure: XI ROBOTIC SALPINGO-OOPHORECTOMY;  Surgeon: Benjy Valladares MD;  Location: [facility];  Service: OB/GYN;  Laterality: Bilateral;    SENTINEL LYMPH NODE BIOPSY Right 2023    Procedure: BIOPSY, LYMPH NODE, SENTINEL;  Surgeon: Jenna Pickard MD;  Location: [facility];  Service: General;  Laterality: Right;    WISDOM TOOTH EXTRACTION       Social History[1]  Family History   Problem Relation Name Age of Onset    Hyperlipidemia Mother      Kidney cancer Father  58    Cancer Brother  22        Parotid Gland cancer    Birth marks Child  0        strawberry wai(s)    Birth marks Child  0        strawberry wai(s)    Café-au-lait spots Maternal Uncle          "a small patch on his neck"    Café-au-lait spots Other nephew         "a spot on his arm and partial torso"    Other Other nephew 2        tested negative for macrocephaly    Cervical cancer Other      Throat cancer Other      Pancreatic cancer Other          1 second cousin ()    Other Other          brain tumors (several 2nd cousins)    Breast cancer " "Neg Hx      Colon cancer Neg Hx      Ovarian cancer Neg Hx      Melanoma Neg Hx       OB History    Para Term  AB Living   2 2 2   2   SAB IAB Ectopic Multiple Live Births       2      # Outcome Date GA Lbr Gavin/2nd Weight Sex Type Anes PTL Lv   2 Term 14 39w1d  3.629 kg (8 lb) F Vag-Spont EPI N MARILYN   1 Term 12/30/10 38w0d  3.459 kg (7 lb 10 oz) M Vag-Spont   MARILYN      Birth Comments: delivered by Dr Guerrero     Current Medications[2]    Review of Systems:  General: No fever, chills.  Chest: No chest pain, shortness of breath, or palpitations.  Breast: No pain, masses, or nipple discharge.  Vulva: No pain, lesions, or itching.  Vagina: No relaxation, itching, discharge, or lesions.  Abdomen: No pain, nausea, vomiting, diarrhea, or constipation.  Urinary: No incontinence, nocturia, frequency, or dysuria.  Extremities:  No leg cramps, edema, or calf pain.  Neurologic: No headaches, dizziness, or visual changes.    Objective:     Vitals:    25 1249   Weight: 66.7 kg (147 lb)   Height: 5' 1" (1.549 m)     Body mass index is 27.78 kg/m².    PHYSICAL EXAM:  APPEARANCE: Well nourished, well developed, in no acute distress.  AFFECT: WNL, alert and oriented x 3      Assessment:      Malignant neoplasm of overlapping sites of right breast in female, estrogen receptor positive    Overweight  -     semaglutide, weight loss, (WEGOVY) 0.5 mg/0.5 mL PnIj; Inject 0.5 mg into the skin every 7 days.  Dispense: 2 mL; Refill: 3    Menopausal symptoms  -     fezolinetant 45 mg Tab; Take 1 tablet (45 mg) by mouth once daily.  Dispense: 30 tablet; Refill: 3    Metabolic syndrome  -     semaglutide, weight loss, (WEGOVY) 0.5 mg/0.5 mL PnIj; Inject 0.5 mg into the skin every 7 days.  Dispense: 2 mL; Refill: 3        Plan:   Continue low glycemic diet with lean protein at each meal.  Maintain hydration.  Continue walking regularly for exercise.  Increase Wegovy to 0.5mg SC weekly next week when comes back from her " trip  Consider transitioning to Zepound 5mg if not tolerating or not seeing desired results  Continue Veozah 45mg QD  Monitor LFTs [id number] for the first year.   Follow up in 3 months    Instructed patient to call if she experiences any side effects or has any questions.    I spent a total of 15 minutes on the day of the visit.This includes face to face time and non-face to face time preparing to see the patient (eg, review of tests), obtaining and/or reviewing separately obtained history, documenting clinical information in the electronic or other health record, independently interpreting results and communicating results to the patient/family/caregiver, or care coordinator.          [1]   Social History  Tobacco Use    Smoking status: Never     Passive exposure: Never    Smokeless tobacco: Never   Substance Use Topics    Alcohol use: Yes     Comment: social    Drug use: No   [2]   Current Outpatient Medications:     acetaminophen (TYLENOL) 650 MG TbSR, Take 1 tablet (650 mg total) by mouth every 6 (six) hours., Disp: 30 tablet, Rfl: 1    calcium acetate,phosphat bind, (PHOSLO) 667 mg capsule, Take 667 mg by mouth once daily., Disp: , Rfl:     docusate sodium (COLACE) 100 MG capsule, Take 1 capsule (100 mg total) by mouth 2 (two) times daily. (Patient not taking: Reported on 3/20/2025), Disp: 30 capsule, Rfl: 1    fezolinetant 45 mg Tab, Take 1 tablet (45 mg) by mouth once daily., Disp: 30 tablet, Rfl: 3    ibuprofen (ADVIL,MOTRIN) 600 MG tablet, Take 1 tablet (600 mg total) by mouth every 6 (six) hours., Disp: 30 tablet, Rfl: 1    multivitamin (THERAGRAN) per tablet, Take 1 tablet by mouth once daily., Disp: , Rfl:     oxyCODONE (ROXICODONE) 5 MG immediate release tablet, Take 1 tablet (5 mg total) by mouth every 4 (four) hours as needed for Pain. (Patient not taking: Reported on 3/20/2025), Disp: 15 tablet, Rfl: 0    semaglutide, weight loss, (WEGOVY) 0.5 mg/0.5 mL PnIj, Inject 0.5 mg into the skin every 7 days.,  Disp: 2 mL, Rfl: 3    tamoxifen (NOLVADEX) 20 MG Tab, Take 1 tablet (20 mg total) by mouth once daily., Disp: 30 tablet, Rfl: 11    traMADoL (ULTRAM) 50 mg tablet, Take 1 tablet (50 mg total) by mouth every 6 (six) hours as needed for Pain. (Patient not taking: Reported on 3/20/2025), Disp: 20 tablet, Rfl: 0    venlafaxine (EFFEXOR-XR) 150 MG Cp24, TAKE 1 CAPSULE BY MOUTH ONCE DAILY, Disp: 90 capsule, Rfl: 3

## 2025-04-09 ENCOUNTER — PATIENT MESSAGE (OUTPATIENT)
Dept: OBSTETRICS AND GYNECOLOGY | Facility: CLINIC | Age: 45
End: 2025-04-09
Payer: COMMERCIAL

## 2025-04-21 ENCOUNTER — PATIENT MESSAGE (OUTPATIENT)
Dept: ADMINISTRATIVE | Facility: OTHER | Age: 45
End: 2025-04-21
Payer: COMMERCIAL

## 2025-04-21 ENCOUNTER — TELEPHONE (OUTPATIENT)
Dept: HEMATOLOGY/ONCOLOGY | Facility: CLINIC | Age: 45
End: 2025-04-21
Payer: COMMERCIAL

## 2025-04-21 NOTE — TELEPHONE ENCOUNTER
Called and spoke to patient to confirm the in office appointment on 4/28/25 and to complete the questionnaire

## 2025-04-28 ENCOUNTER — TELEPHONE (OUTPATIENT)
Dept: HEMATOLOGY/ONCOLOGY | Facility: CLINIC | Age: 45
End: 2025-04-28
Payer: COMMERCIAL

## 2025-05-05 ENCOUNTER — LAB VISIT (OUTPATIENT)
Dept: LAB | Facility: OTHER | Age: 45
End: 2025-05-05
Attending: OBSTETRICS & GYNECOLOGY
Payer: COMMERCIAL

## 2025-05-05 ENCOUNTER — OFFICE VISIT (OUTPATIENT)
Dept: OBSTETRICS AND GYNECOLOGY | Facility: CLINIC | Age: 45
End: 2025-05-05
Attending: OBSTETRICS & GYNECOLOGY
Payer: COMMERCIAL

## 2025-05-05 VITALS
WEIGHT: 149 LBS | HEART RATE: 78 BPM | SYSTOLIC BLOOD PRESSURE: 137 MMHG | HEIGHT: 61 IN | BODY MASS INDEX: 28.13 KG/M2 | DIASTOLIC BLOOD PRESSURE: 91 MMHG

## 2025-05-05 DIAGNOSIS — Z17.0 MALIGNANT NEOPLASM OF OVERLAPPING SITES OF RIGHT BREAST IN FEMALE, ESTROGEN RECEPTOR POSITIVE: ICD-10-CM

## 2025-05-05 DIAGNOSIS — Z78.0 MENOPAUSE: Primary | ICD-10-CM

## 2025-05-05 DIAGNOSIS — C50.811 MALIGNANT NEOPLASM OF OVERLAPPING SITES OF RIGHT BREAST IN FEMALE, ESTROGEN RECEPTOR POSITIVE: ICD-10-CM

## 2025-05-05 DIAGNOSIS — Z78.0 MENOPAUSE: ICD-10-CM

## 2025-05-05 LAB
ESTRADIOL SERPL HS-MCNC: <10 PG/ML
TESTOST SERPL-MCNC: 21 NG/DL (ref 5–73)

## 2025-05-05 PROCEDURE — 3008F BODY MASS INDEX DOCD: CPT | Mod: CPTII,S$GLB,, | Performed by: OBSTETRICS & GYNECOLOGY

## 2025-05-05 PROCEDURE — 36415 COLL VENOUS BLD VENIPUNCTURE: CPT

## 2025-05-05 PROCEDURE — 3080F DIAST BP >= 90 MM HG: CPT | Mod: CPTII,S$GLB,, | Performed by: OBSTETRICS & GYNECOLOGY

## 2025-05-05 PROCEDURE — 84402 ASSAY OF FREE TESTOSTERONE: CPT

## 2025-05-05 PROCEDURE — 3044F HG A1C LEVEL LT 7.0%: CPT | Mod: CPTII,S$GLB,, | Performed by: OBSTETRICS & GYNECOLOGY

## 2025-05-05 PROCEDURE — 84403 ASSAY OF TOTAL TESTOSTERONE: CPT

## 2025-05-05 PROCEDURE — 99214 OFFICE O/P EST MOD 30 MIN: CPT | Mod: S$GLB,,, | Performed by: OBSTETRICS & GYNECOLOGY

## 2025-05-05 PROCEDURE — 82670 ASSAY OF TOTAL ESTRADIOL: CPT

## 2025-05-05 PROCEDURE — 99999 PR PBB SHADOW E&M-EST. PATIENT-LVL IV: CPT | Mod: PBBFAC,,, | Performed by: OBSTETRICS & GYNECOLOGY

## 2025-05-05 PROCEDURE — 3075F SYST BP GE 130 - 139MM HG: CPT | Mod: CPTII,S$GLB,, | Performed by: OBSTETRICS & GYNECOLOGY

## 2025-05-05 PROCEDURE — 1159F MED LIST DOCD IN RCRD: CPT | Mod: CPTII,S$GLB,, | Performed by: OBSTETRICS & GYNECOLOGY

## 2025-05-05 RX ORDER — HYDROCORTISONE 25 MG/G
1 CREAM TOPICAL 2 TIMES DAILY PRN
COMMUNITY
Start: 2025-03-24

## 2025-05-05 NOTE — PROGRESS NOTES
Subjective:      Dov Hernandez is a 44 y.o. female who presents to discuss hormone replacement therapy.  Menarche occurred at age 14 and the patient went into menopause at 43 years of age, which was 1fatigue  years ago. Patient is requesting hormone replacement therapy due to hysterectomy with BSO The patient is not taking hormone replacement therapy. Patient denies post-menopausal vaginal bleeding. The patient is sexually active.  She denies the following contraindications to HRT:  Vaginal bleeding, history of VTE/PE, thrombophilia,  breast cancer, or active liver disease.     She is on Wegovy secondary to weight gain and elevated cholesterol. No family history.   She was on Arimidex and had severe joint pain.  She reports extreme fatigue even with 10 hours of sleep. She is on Veozah- helped with hot flashes and night sweats. She denies vaginal dryness She is on Effexor 150mg XR- started prior to her bialteral mastectomy in 2023 due to anxiety related to diagnosis and surgery. She remained on it for hot flashes. She does not have low libido. She does have brain fog.     Oncology History  - normal screening mammogram in 2022.   - Screening mammogram in March 2023 showed right breast calcifications, measured to be 39 mm on diagnostic mammogram.      - Right breast biopsy 4/13/23: IDC, 2 mm, ER 30%, HI 30%, Her 2 karrie negative, Grade 1, low Ki 67 at 5% with associated DCIS.       - Breast MRI 4/25/23: 2 cm mass     - Case discussed at [facility] breast tumor board.   Bilateral mastectomy with ROSITA flap reconstruction and right SLNB 5/24/23: IDC, 2.2cm; 1/1LN+   Declined ALND and proceeded with XRT.      - Oncotype RS 29; RR 23%; Adjuvant chemotherapy is recommended.     Baseline PET reviewed, no residual or metastatic disease noted.      - Adjuvant TC x 4 7/13/23 - 9/14/23  S/p cycle 1 of TC on 7/13/23.      - Zoladex started 11/15/23, LMP was in June 2023.  Anastrozole started December 2023    Changed to Tamoxifen  due to intolerance of AI     Logano, Letitia NICHOLSON MD           [unfilled]   Hemoglobin A1C   Date Value Ref Range Status   03/11/2025 4.8 4.0 - 5.6 % Final     Comment:     ADA Screening Guidelines:  5.7-6.4%  Consistent with prediabetes  >or=6.5%  Consistent with diabetes    High levels of fetal hemoglobin interfere with the HbA1C  assay. Heterozygous hemoglobin variants (HbS, HgC, etc)do  not significantly interfere with this assay.   However, presence of multiple variants may affect accuracy.     01/26/2024 5.1 4.0 - 5.6 % Final     Comment:     ADA Screening Guidelines:  5.7-6.4%  Consistent with prediabetes  >or=6.5%  Consistent with diabetes    High levels of fetal hemoglobin interfere with the HbA1C  assay. Heterozygous hemoglobin variants (HbS, HgC, etc)do  not significantly interfere with this assay.   However, presence of multiple variants may affect accuracy.     06/04/2013 4.9 4.0 - 6.2 % Final           Pap smear: 5/20/2024  Mammogram: bilateral mastectomy  DEXA: normal      Lab Visit on 03/20/2025   Component Date Value Ref Range Status    WBC 03/20/2025 4.28  3.90 - 12.70 K/uL Final    RBC 03/20/2025 4.06  4.00 - 5.40 M/uL Final    Hemoglobin 03/20/2025 12.5  12.0 - 16.0 g/dL Final    Hematocrit 03/20/2025 38.3  37.0 - 48.5 % Final    MCV 03/20/2025 94  82 - 98 fL Final    MCH 03/20/2025 30.8  27.0 - 31.0 pg Final    MCHC 03/20/2025 32.6  32.0 - 36.0 g/dL Final    RDW 03/20/2025 12.1  11.5 - 14.5 % Final    Platelets 03/20/2025 237  150 - 450 K/uL Final    MPV 03/20/2025 9.5  9.2 - 12.9 fL Final    Immature Granulocytes 03/20/2025 0.2  0.0 - 0.5 % Final    Gran # (ANC) 03/20/2025 2.4  1.8 - 7.7 K/uL Final    Immature Grans (Abs) 03/20/2025 0.01  0.00 - 0.04 K/uL Final    Lymph # 03/20/2025 1.3  1.0 - 4.8 K/uL Final    Mono # 03/20/2025 0.5  0.3 - 1.0 K/uL Final    Eos # 03/20/2025 0.1  0.0 - 0.5 K/uL Final    Baso # 03/20/2025 0.03  0.00 - 0.20 K/uL Final    nRBC 03/20/2025 0  0 /100 WBC  Final    Gran % 03/20/2025 55.7  38.0 - 73.0 % Final    Lymph % 03/20/2025 30.8  18.0 - 48.0 % Final    Mono % 03/20/2025 10.7  4.0 - 15.0 % Final    Eosinophil % 03/20/2025 1.9  0.0 - 8.0 % Final    Basophil % 03/20/2025 0.7  0.0 - 1.9 % Final    Differential Method 03/20/2025 Automated   Final    Sodium 03/20/2025 141  136 - 145 mmol/L Final    Potassium 03/20/2025 4.4  3.5 - 5.1 mmol/L Final    Chloride 03/20/2025 106  95 - 110 mmol/L Final    CO2 03/20/2025 27  23 - 29 mmol/L Final    Glucose 03/20/2025 85  70 - 110 mg/dL Final    BUN 03/20/2025 23 (H)  6 - 20 mg/dL Final    Creatinine 03/20/2025 0.8  0.5 - 1.4 mg/dL Final    Calcium 03/20/2025 9.2  8.7 - 10.5 mg/dL Final    Total Protein 03/20/2025 7.1  6.0 - 8.4 g/dL Final    Albumin 03/20/2025 3.6  3.5 - 5.2 g/dL Final    Total Bilirubin 03/20/2025 0.4  0.1 - 1.0 mg/dL Final    Alkaline Phosphatase 03/20/2025 48  40 - 150 U/L Final    AST 03/20/2025 19  10 - 40 U/L Final    ALT 03/20/2025 26  10 - 44 U/L Final    eGFR 03/20/2025 >60.0  >60 mL/min/1.73 m^2 Final    Anion Gap 03/20/2025 8  8 - 16 mmol/L Final   Lab Visit on 03/11/2025   Component Date Value Ref Range Status    Cholesterol 03/11/2025 220 (H)  120 - 199 mg/dL Final    Triglycerides 03/11/2025 161 (H)  30 - 150 mg/dL Final    HDL 03/11/2025 59  40 - 75 mg/dL Final    LDL Cholesterol 03/11/2025 128.8  63.0 - 159.0 mg/dL Final    HDL/Cholesterol Ratio 03/11/2025 26.8  20.0 - 50.0 % Final    Total Cholesterol/HDL Ratio 03/11/2025 3.7  2.0 - 5.0 Final    Non-HDL Cholesterol 03/11/2025 161  mg/dL Final    Hemoglobin A1C 03/11/2025 4.8  4.0 - 5.6 % Final    Estimated Avg Glucose 03/11/2025 91  68 - 131 mg/dL Final    TSH 03/11/2025 2.003  0.400 - 4.000 uIU/mL Final       Past Medical History:   Diagnosis Date    Abnormal Pap smear of cervix     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    OCP & aziza from derm, Doxy caused yeast    BRCA1 negative     BRCA2 negative     Breast cancer     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    Heartburn 12/17/2021    Strawberry wai 1981    back of neck    Strawberry wai 1981    under nose     Past Surgical History:   Procedure Laterality Date    BILATERAL MASTECTOMY Bilateral 5/24/2023    Procedure: MASTECTOMY, BILATERAL;  Surgeon: Jenna Pickard MD;  Location: [facility];  Service: General;  Laterality: Bilateral;  2.5 HOURS / EMAIL SENT 5-9 @ 11:39 LK    COLPOSCOPY      ENDOSCOPIC CARPAL TUNNEL RELEASE Right 10/25/2024    Procedure: RELEASE, CARPAL TUNNEL, ENDOSCOPIC - RIGHT;  Surgeon: Vernon Olivas MD;  Location: [facility];  Service: Orthopedics;  Laterality: Right;    INJECTION FOR SENTINEL NODE IDENTIFICATION Right 5/24/2023    Procedure: INJECTION, FOR SENTINEL NODE IDENTIFICATION;  Surgeon: Jenna Pickard MD;  Location: [facility];  Service: General;  Laterality: Right;    RECONSTRUCTION OF BREAST WITH DEEP INFERIOR EPIGASTRIC ARTERY  (ROSITA) FREE FLAP Bilateral 5/24/2023    Procedure: RECONSTRUCTION, BREAST, USING ROSITA FREE FLAP / BILATERAL DEEP INFERIOR EPIGASTRIC PERFORATORS FLAPS;  Surgeon: Mikey Roche MD;  Location: [facility];  Service: Plastics;  Laterality: Bilateral;    ROBOT-ASSISTED LAPAROSCOPIC ABDOMINAL HYSTERECTOMY USING DA RUDOLPH XI N/A 12/17/2024    Procedure: XI ROBOTIC HYSTERECTOMY;  Surgeon: Benjy Valladares MD;  Location: [facility];  Service: OB/GYN;  Laterality: N/A;  1.5 hr case    ROBOT-ASSISTED LAPAROSCOPIC SALPINGO-OOPHORECTOMY USING DA RUDOLPH XI Bilateral 12/17/2024    Procedure: XI ROBOTIC SALPINGO-OOPHORECTOMY;  Surgeon: Benjy Valladares MD;  Location: [facility];  Service: OB/GYN;  Laterality: Bilateral;    SENTINEL LYMPH NODE BIOPSY Right 5/24/2023    Procedure: BIOPSY, LYMPH NODE, SENTINEL;  Surgeon: Jenna Pickard MD;  Location: [facility];  Service: General;  Laterality: Right;    WISDOM TOOTH EXTRACTION       Social History[1]  Family History   Problem Relation Name Age of Onset    Kidney cancer Father Raciel 59    Hyperlipidemia Mother       "Cancer Brother Keny 22        parotid gland cancer    Birth ramirez Child Fatuma 0        strawberry wai(s)    Birth ramirez Child Nella 0        strawberry wai(s)    Café-au-lait spots Maternal Uncle Kelvin         "a small patch on his neck"    Café-au-lait spots Nephew          "a spot on his arm and partial torso"    Other (other) Nephew          tested negative for macrocephaly    Cervical cancer Other      Throat cancer Other      Pancreatic cancer Other          1 second cousin ()    Other Other          brain tumors (several 2nd cousins)    Breast cancer Neg Hx      Colon cancer Neg Hx      Ovarian cancer Neg Hx      Melanoma Neg Hx      Uterine cancer Neg Hx       OB History    Para Term  AB Living   2 2 2   2   SAB IAB Ectopic Multiple Live Births       2      # Outcome Date GA Lbr Gavin/2nd Weight Sex Type Anes PTL Lv   2 Term 14 39w1d  3.629 kg (8 lb) F Vag-Spont EPI N MARILYN   1 Term 12/30/10 38w0d  3.459 kg (7 lb 10 oz) M Vag-Spont   MARILYN      Birth Comments: delivered by Dr Guerrero     Current Medications[2]    Vitals:    25 1310   BP: (!) 137/91   Pulse: 78   Weight: 67.6 kg (149 lb)   Height: 5' 1" (1.549 m)   PainSc:   4   PainLoc: Breast     Body mass index is 28.15 kg/m².    ROS:  Constitutional: no weight loss, weight gain, fever,+ fatigue  Eyes:  No vision changes, glasses/contacts  ENT/Mouth: No ulcers, sinus problems, ears ringing, headache  Cardiovascular: No inability to lie flat, chest pain, exercise intolerance, swelling, heart palpitations  Respiratory: No wheezing, coughing blood, shortness of breath, or cough  Gastrointestinal: No diarrhea, bloody stool, nausea/vomiting, constipation, gas, hemorrhoids  Genitourinary: No blood in urine, painful urination, urgency of urination, frequency of urination, incomplete emptying, incontinence, abnormal bleeding, painful periods, heavy periods, vaginal discharge, vaginal odor, painful intercourse, sexual problems, " bleeding after intercourse.  Musculoskeletal: No muscle weakness  Skin/Breast: No painful breasts, nipple discharge, masses, rash, ulcers  Neurological: No passing out, seizures, numbness, headache  Endocrine: No diabetes, hypothyroid, hyperthyroid, +hot flashes, hair loss, abnormal hair growth, ance  Psychiatric: No depression, crying  Hematologic: No bruises, bleeding, swollen lymph nodes, anemia.    Assessment:    Malignant neoplasm of overlapping sites of right breast in female, estrogen receptor positive  -     Ambulatory referral/consult to [facility]      1. Menopause  Testosterone, free    Testosterone    Estradiol      2. Malignant neoplasm of overlapping sites of right breast in female, estrogen receptor positive  Ambulatory referral/consult to [facility]            Plan:   Risks and benefits of hormone replacement therapy were discussed.  Hormone replacement therapy options, including bioidentical versus non-bioidentical hormones, as well as alternatives discussed.  Her that testosterone is not FDA approved for women.   Testosterone cypionate 50 mg IM monthly.  Counseled her on hair growth, hair loss, deepening of the voice and clitoromegaly.  Counseled her that would do low-dose testosterone and check labs frequently.  Can get baseline labs today and if she decides she wants to pursue testosterone treatment would recommend 37 mg IM injections monthly for 3 months then repeat labs 2-3 weeks after her 3rd dose and do a follow up appointment.  She may be able to get off feels a as well as Effexor with testosterone treatment  Patient is aware this is off-label use.       Total time 25 minutes.  Face-to-face, review of medical record and arranging follow-up                [1]   Social History  Tobacco Use    Smoking status: Never     Passive exposure: Never    Smokeless tobacco: Never   Substance Use Topics    Alcohol use: Yes     Comment: social    Drug use: No    [2]   Current Outpatient Medications:     hydrocortisone 2.5 % cream, Apply 1 application  topically 2 (two) times daily as needed., Disp: , Rfl:     acetaminophen (TYLENOL) 650 MG TbSR, Take 1 tablet (650 mg total) by mouth every 6 (six) hours., Disp: 30 tablet, Rfl: 1    calcium acetate,phosphat bind, (PHOSLO) 667 mg capsule, Take 667 mg by mouth once daily., Disp: , Rfl:     docusate sodium (COLACE) 100 MG capsule, Take 1 capsule (100 mg total) by mouth 2 (two) times daily. (Patient not taking: Reported on 3/20/2025), Disp: 30 capsule, Rfl: 1    fezolinetant 45 mg Tab, Take 1 tablet (45 mg) by mouth once daily., Disp: 30 tablet, Rfl: 3    ibuprofen (ADVIL,MOTRIN) 600 MG tablet, Take 1 tablet (600 mg total) by mouth every 6 (six) hours., Disp: 30 tablet, Rfl: 1    multivitamin (THERAGRAN) per tablet, Take 1 tablet by mouth once daily., Disp: , Rfl:     oxyCODONE (ROXICODONE) 5 MG immediate release tablet, Take 1 tablet (5 mg total) by mouth every 4 (four) hours as needed for Pain. (Patient not taking: Reported on 3/20/2025), Disp: 15 tablet, Rfl: 0    semaglutide, weight loss, (WEGOVY) 0.5 mg/0.5 mL PnIj, Inject 0.5 mg into the skin every 7 days., Disp: 2 mL, Rfl: 3    tamoxifen (NOLVADEX) 20 MG Tab, Take 1 tablet (20 mg total) by mouth once daily., Disp: 30 tablet, Rfl: 11    traMADoL (ULTRAM) 50 mg tablet, Take 1 tablet (50 mg total) by mouth every 6 (six) hours as needed for Pain. (Patient not taking: Reported on 3/20/2025), Disp: 20 tablet, Rfl: 0    venlafaxine (EFFEXOR-XR) 150 MG Cp24, TAKE 1 CAPSULE BY MOUTH ONCE DAILY, Disp: 90 capsule, Rfl: 3

## 2025-05-08 LAB — W FREE TESTOSTERONE: 0.5 PG/ML

## 2025-05-10 ENCOUNTER — RESULTS FOLLOW-UP (OUTPATIENT)
Dept: OBSTETRICS AND GYNECOLOGY | Facility: CLINIC | Age: 45
End: 2025-05-10

## 2025-05-16 ENCOUNTER — PATIENT MESSAGE (OUTPATIENT)
Dept: OBSTETRICS AND GYNECOLOGY | Facility: CLINIC | Age: 45
End: 2025-05-16
Payer: COMMERCIAL

## 2025-05-16 DIAGNOSIS — R21 RASH: Primary | ICD-10-CM

## 2025-05-16 RX ORDER — TRIAMCINOLONE ACETONIDE 1 MG/G
CREAM TOPICAL 2 TIMES DAILY
Qty: 80 G | Refills: 0 | Status: SHIPPED | OUTPATIENT
Start: 2025-05-16

## 2025-06-27 DIAGNOSIS — Z17.0 MALIGNANT NEOPLASM OF OVERLAPPING SITES OF RIGHT BREAST IN FEMALE, ESTROGEN RECEPTOR POSITIVE: ICD-10-CM

## 2025-06-27 DIAGNOSIS — C50.811 MALIGNANT NEOPLASM OF OVERLAPPING SITES OF RIGHT BREAST IN FEMALE, ESTROGEN RECEPTOR POSITIVE: ICD-10-CM

## 2025-06-27 RX ORDER — TAMOXIFEN CITRATE 20 MG/1
20 TABLET ORAL DAILY
Qty: 30 TABLET | Refills: 11 | Status: SHIPPED | OUTPATIENT
Start: 2025-06-27 | End: 2026-06-27

## 2025-06-27 RX ORDER — TAMOXIFEN CITRATE 20 MG/1
20 TABLET ORAL
Qty: 30 TABLET | Refills: 11 | Status: SHIPPED | OUTPATIENT
Start: 2025-06-27

## 2025-06-30 ENCOUNTER — PATIENT MESSAGE (OUTPATIENT)
Dept: HEMATOLOGY/ONCOLOGY | Facility: CLINIC | Age: 45
End: 2025-06-30

## 2025-06-30 ENCOUNTER — OFFICE VISIT (OUTPATIENT)
Dept: HEMATOLOGY/ONCOLOGY | Facility: CLINIC | Age: 45
End: 2025-06-30
Payer: COMMERCIAL

## 2025-06-30 ENCOUNTER — PATIENT MESSAGE (OUTPATIENT)
Dept: OBSTETRICS AND GYNECOLOGY | Facility: CLINIC | Age: 45
End: 2025-06-30
Payer: COMMERCIAL

## 2025-06-30 ENCOUNTER — LAB VISIT (OUTPATIENT)
Dept: LAB | Facility: HOSPITAL | Age: 45
End: 2025-06-30
Payer: COMMERCIAL

## 2025-06-30 VITALS
HEART RATE: 76 BPM | SYSTOLIC BLOOD PRESSURE: 133 MMHG | TEMPERATURE: 97 F | HEIGHT: 61 IN | RESPIRATION RATE: 17 BRPM | WEIGHT: 141.75 LBS | OXYGEN SATURATION: 99 % | BODY MASS INDEX: 26.76 KG/M2 | DIASTOLIC BLOOD PRESSURE: 84 MMHG

## 2025-06-30 DIAGNOSIS — R53.82 CHRONIC FATIGUE: ICD-10-CM

## 2025-06-30 DIAGNOSIS — C50.811 MALIGNANT NEOPLASM OF OVERLAPPING SITES OF RIGHT BREAST IN FEMALE, ESTROGEN RECEPTOR POSITIVE: ICD-10-CM

## 2025-06-30 DIAGNOSIS — F41.9 ANXIETY: ICD-10-CM

## 2025-06-30 DIAGNOSIS — E66.3 OVERWEIGHT: Primary | ICD-10-CM

## 2025-06-30 DIAGNOSIS — Z17.0 MALIGNANT NEOPLASM OF OVERLAPPING SITES OF RIGHT BREAST IN FEMALE, ESTROGEN RECEPTOR POSITIVE: Primary | ICD-10-CM

## 2025-06-30 DIAGNOSIS — N95.1 HOT FLASHES, MENOPAUSAL: ICD-10-CM

## 2025-06-30 DIAGNOSIS — Z90.710 STATUS POST HYSTERECTOMY: ICD-10-CM

## 2025-06-30 DIAGNOSIS — C77.9 SECONDARY ADENOCARCINOMA OF LYMPH NODE: ICD-10-CM

## 2025-06-30 DIAGNOSIS — C50.811 MALIGNANT NEOPLASM OF OVERLAPPING SITES OF RIGHT BREAST IN FEMALE, ESTROGEN RECEPTOR POSITIVE: Primary | ICD-10-CM

## 2025-06-30 DIAGNOSIS — Z17.0 MALIGNANT NEOPLASM OF OVERLAPPING SITES OF RIGHT BREAST IN FEMALE, ESTROGEN RECEPTOR POSITIVE: ICD-10-CM

## 2025-06-30 DIAGNOSIS — E88.810 METABOLIC SYNDROME: ICD-10-CM

## 2025-06-30 LAB
ABSOLUTE NEUTROPHIL COUNT (OHS): 2.29 K/UL (ref 1.8–7.7)
ALBUMIN SERPL BCP-MCNC: 3.7 G/DL (ref 3.5–5.2)
ALP SERPL-CCNC: 41 UNIT/L (ref 40–150)
ALT SERPL W/O P-5'-P-CCNC: 23 UNIT/L (ref 10–44)
ANION GAP (OHS): 7 MMOL/L (ref 8–16)
AST SERPL-CCNC: 22 UNIT/L (ref 11–45)
BILIRUB SERPL-MCNC: 0.3 MG/DL (ref 0.1–1)
BUN SERPL-MCNC: 11 MG/DL (ref 6–20)
CALCIUM SERPL-MCNC: 8.8 MG/DL (ref 8.7–10.5)
CHLORIDE SERPL-SCNC: 109 MMOL/L (ref 95–110)
CO2 SERPL-SCNC: 25 MMOL/L (ref 23–29)
CREAT SERPL-MCNC: 0.8 MG/DL (ref 0.5–1.4)
ERYTHROCYTE [DISTWIDTH] IN BLOOD BY AUTOMATED COUNT: 12.1 % (ref 11.5–14.5)
GFR SERPLBLD CREATININE-BSD FMLA CKD-EPI: >60 ML/MIN/1.73/M2
GLUCOSE SERPL-MCNC: 85 MG/DL (ref 70–110)
HCT VFR BLD AUTO: 37.5 % (ref 37–48.5)
HGB BLD-MCNC: 12.6 GM/DL (ref 12–16)
IMM GRANULOCYTES # BLD AUTO: 0.01 K/UL (ref 0–0.04)
MCH RBC QN AUTO: 31 PG (ref 27–31)
MCHC RBC AUTO-ENTMCNC: 33.6 G/DL (ref 32–36)
MCV RBC AUTO: 92 FL (ref 82–98)
PLATELET # BLD AUTO: 215 K/UL (ref 150–450)
PMV BLD AUTO: 9.4 FL (ref 9.2–12.9)
POTASSIUM SERPL-SCNC: 4.8 MMOL/L (ref 3.5–5.1)
PROT SERPL-MCNC: 6.8 GM/DL (ref 6–8.4)
RBC # BLD AUTO: 4.06 M/UL (ref 4–5.4)
SODIUM SERPL-SCNC: 141 MMOL/L (ref 136–145)
WBC # BLD AUTO: 4.05 K/UL (ref 3.9–12.7)

## 2025-06-30 PROCEDURE — 99999 PR PBB SHADOW E&M-EST. PATIENT-LVL IV: CPT | Mod: PBBFAC,,, | Performed by: INTERNAL MEDICINE

## 2025-06-30 PROCEDURE — 3008F BODY MASS INDEX DOCD: CPT | Mod: CPTII,S$GLB,, | Performed by: INTERNAL MEDICINE

## 2025-06-30 PROCEDURE — 82247 BILIRUBIN TOTAL: CPT

## 2025-06-30 PROCEDURE — 3044F HG A1C LEVEL LT 7.0%: CPT | Mod: CPTII,S$GLB,, | Performed by: INTERNAL MEDICINE

## 2025-06-30 PROCEDURE — 1159F MED LIST DOCD IN RCRD: CPT | Mod: CPTII,S$GLB,, | Performed by: INTERNAL MEDICINE

## 2025-06-30 PROCEDURE — 99214 OFFICE O/P EST MOD 30 MIN: CPT | Mod: S$GLB,,, | Performed by: INTERNAL MEDICINE

## 2025-06-30 PROCEDURE — 85027 COMPLETE CBC AUTOMATED: CPT

## 2025-06-30 PROCEDURE — 36415 COLL VENOUS BLD VENIPUNCTURE: CPT

## 2025-06-30 PROCEDURE — 3079F DIAST BP 80-89 MM HG: CPT | Mod: CPTII,S$GLB,, | Performed by: INTERNAL MEDICINE

## 2025-06-30 PROCEDURE — 3075F SYST BP GE 130 - 139MM HG: CPT | Mod: CPTII,S$GLB,, | Performed by: INTERNAL MEDICINE

## 2025-06-30 RX ORDER — TIRZEPATIDE 7.5 MG/.5ML
7.5 INJECTION, SOLUTION SUBCUTANEOUS
Qty: 2 ML | Refills: 3 | Status: SHIPPED | OUTPATIENT
Start: 2025-06-30 | End: 2025-07-01 | Stop reason: SDUPTHER

## 2025-06-30 NOTE — PROGRESS NOTES
Subjective     Patient ID: Dov Hernandez is a 44 y.o. female.    Chief Complaint: Breast Cancer    HPI    Returns for follow up of breast cancer  Concerned about fatigue    Reviewed below:  Goes to be by 10 PM at the latest  Up at 6:30 AM for driving son to practice  Goes back to sleep on return    3-4 months ago noted more fatigue than is typical  Naps during day  Works from home    There were no medication changes other than started Wegovy-- lost 15 lbs  Started in April  We did discuss B12 replacement may be needed  Takes a MVT daily- compounded  Citracal with Vit D3    Hot flashes better on testosterone and with weight loss and meds (Veozah)    Remains on Tamoxifen     Patient and mom have continued questions regarding monitoring- need for imaging  We reviewed Signatera     PMH:           Encounter Diagnoses   Name Primary?    Malignant neoplasm of overlapping sites of right breast in female, estrogen receptor positive Yes    Secondary adenocarcinoma of lymph node      Aromatase inhibitor use            Cancer Staging   Malignant neoplasm of overlapping sites of right breast in female, estrogen receptor positive  Staging form: Breast, AJCC 8th Edition  - Clinical stage from 4/26/2023: Stage IB (cT2, cN0, cM0, G1, ER+, OK+, HER2: Equivocal) - Signed by Jenna Pickard MD on 4/26/2023  - Pathologic stage from 6/14/2023: Stage IB (pT2, pN1(sn), cM0, G2, ER+, OK+, HER2-) - Signed by Fran Ken Jr., MD on 6/17/2023        Oncology History  - normal screening mammogram in 2022.   - Screening mammogram in March 2023 showed right breast calcifications, measured to be 39 mm on diagnostic mammogram.      - Right breast biopsy 4/13/23: IDC, 2 mm, ER 30%, OK 30%, Her 2 karrie negative, Grade 1, low Ki 67 at 5% with associated DCIS.       - Breast MRI 4/25/23: 2 cm mass     - Case discussed at [facility].   Bilateral mastectomy with ROSITA flap reconstruction and right SLNB 5/24/23: IDC, 2.2cm;  "1/1LN+   Declined ALND and proceeded with XRT.      - Oncotype RS 29; RR 23%; Adjuvant chemotherapy is recommended.     Baseline PET reviewed, no residual or metastatic disease noted.      - Adjuvant TC x 4 23 - 23  S/p cycle 1 of TC on 23.      - Zoladex started 11/15/23, LMP was in 2023.  Anastrozole started 2023   Changed to Tamoxifen for tolerance    - oophorectomy with hysterectomy in Dec 2024     Additional PMH  Had repair of the right ear drum. Still feels hearing is muffled on that side.-- further surgery pending  Carpal tunnel surgery        Social History                Tobacco Use    Smoking status: Never    Smokeless tobacco: Never   Substance Use Topics    Alcohol use: Yes       Comment: social    Drug use: No                     Family History   Problem Relation Name Age of Onset    Hyperlipidemia Mother        Kidney cancer Father   58    Cancer Brother   22         Parotid Gland cancer    Birth marks Child   0         strawberry wai(s)    Birth marks Child   0         strawberry wai(s)    Café-au-lait spots Maternal Uncle             "a small patch on his neck"    Café-au-lait spots Other nephew           "a spot on his arm and partial torso"    Other Other nephew 2         tested negative for macrocephaly    Cervical cancer Other        Throat cancer Other        Pancreatic cancer Other             1 second cousin ()    Other Other             brain tumors (several 2nd cousins)    Breast cancer Neg Hx        Colon cancer Neg Hx        Ovarian cancer Neg Hx        Melanoma Neg Hx                  Past Medical History:   Diagnosis Date    Acne varioliformis 2017     OCP & aziza from derm, Doxy caused yeast    Heartburn 2021    Strawberry wai 1981     back of neck    Strawberry wai 1981     under nose                Past Surgical History:   Procedure Laterality Date    BILATERAL MASTECTOMY Bilateral 2023     " Procedure: MASTECTOMY, BILATERAL;  Surgeon: Jenna Pickard MD;  Location: [facility];  Service: General;  Laterality: Bilateral;  2.5 HOURS / EMAIL SENT 5-9 @ 11:39 LK    COLPOSCOPY        INJECTION FOR SENTINEL NODE IDENTIFICATION Right 5/24/2023     Procedure: INJECTION, FOR SENTINEL NODE IDENTIFICATION;  Surgeon: Jenna Pickard MD;  Location: [facility];  Service: General;  Laterality: Right;    RECONSTRUCTION OF BREAST WITH DEEP INFERIOR EPIGASTRIC ARTERY  (ROSITA) FREE FLAP Bilateral 5/24/2023     Procedure: RECONSTRUCTION, BREAST, USING ROSITA FREE FLAP / BILATERAL DEEP INFERIOR EPIGASTRIC PERFORATORS FLAPS;  Surgeon: Mikey Roche MD;  Location: [facility];  Service: Plastics;  Laterality: Bilateral;    SENTINEL LYMPH NODE BIOPSY Right 5/24/2023     Procedure: BIOPSY, LYMPH NODE, SENTINEL;  Surgeon: Jenna Pickard MD;  Location: [facility];  Service: General;  Laterality: Right;    WISDOM TOOTH EXTRACTION         Review of Systems   Constitutional:  Negative for activity change, appetite change, chills, fatigue and unexpected weight change.   Respiratory:  Negative for cough and shortness of breath.    Cardiovascular:  Negative for chest pain, palpitations and leg swelling.   Gastrointestinal:  Negative for abdominal distention, abdominal pain, blood in stool and change in bowel habit.   Genitourinary:  Negative for decreased urine volume, difficulty urinating, frequency and vaginal bleeding.   Musculoskeletal:  Negative for arthralgias.   Integumentary:  Negative for pallor, rash and breast mass.   Neurological:  Negative for dizziness, weakness, numbness (better) and headaches.   Psychiatric/Behavioral:  Negative for dysphoric mood. The patient is not nervous/anxious.    Breast: Negative for mass         Objective     Physical Exam  Vitals and nursing note reviewed.   Constitutional:       General: She is not in acute distress.     Appearance: Normal appearance. She is well-developed and normal weight. She is  not ill-appearing.      Comments: Very pleasant  ECOG= 0   Presents with her mom   HENT:      Head: Normocephalic and atraumatic.   Eyes:      General: No scleral icterus.     Extraocular Movements: Extraocular movements intact.      Conjunctiva/sclera: Conjunctivae normal.      Pupils: Pupils are equal, round, and reactive to light.      Right eye: Pupil is round and reactive.      Left eye: Pupil is round and reactive.   Neck:      Thyroid: No thyromegaly.      Vascular: No JVD.      Trachea: No tracheal deviation.   Cardiovascular:      Rate and Rhythm: Normal rate and regular rhythm.      Heart sounds: Normal heart sounds. No murmur heard.     No friction rub. No gallop.   Pulmonary:      Effort: Pulmonary effort is normal. No respiratory distress.      Breath sounds: Normal breath sounds. No wheezing, rhonchi or rales.      Comments: Breats- post reconstruction-- no chest wall or LN concerns  Abdominal:      General: Abdomen is flat. Bowel sounds are normal. There is no distension.      Palpations: Abdomen is soft. There is no mass.      Tenderness: There is no abdominal tenderness. There is no guarding or rebound.   Musculoskeletal:         General: No swelling, tenderness or deformity. Normal range of motion.      Cervical back: Normal range of motion and neck supple.      Right lower leg: No edema.      Left lower leg: No edema.   Lymphadenopathy:      Head:      Right side of head: No submandibular adenopathy.      Left side of head: No submandibular adenopathy.      Cervical: No cervical adenopathy.      Right cervical: No superficial, deep or posterior cervical adenopathy.     Left cervical: No superficial, deep or posterior cervical adenopathy.      Upper Body:      Right upper body: No supraclavicular adenopathy.      Left upper body: No supraclavicular adenopathy.   Skin:     General: Skin is warm and dry.      Coloration: Skin is not jaundiced or pale.      Findings: No petechiae.   Neurological:       General: No focal deficit present.      Mental Status: She is alert and oriented to person, place, and time.      Cranial Nerves: No cranial nerve deficit.      Motor: No weakness.      Coordination: Coordination normal.      Gait: Gait normal.      Deep Tendon Reflexes: Reflexes are normal and symmetric.   Psychiatric:         Mood and Affect: Mood normal. Mood is not anxious or depressed.         Behavior: Behavior normal.         Thought Content: Thought content normal.         Judgment: Judgment normal.   Labs- reviewed     Assessment and Plan     1. Malignant neoplasm of overlapping sites of right breast in female, estrogen receptor positive    2. Secondary adenocarcinoma of lymph node    3. S/P RA-TLH/BSO - 12/17/24    4. Hot flashes, menopausal    5. Anxiety    6. Chronic fatigue    Fatigue reviewed  Recommend TSH, B12   We will also discuss thoughts with our integrative team    Breast cancer  Clinically AURELIA  Continue Tamoxifen  We will discuss Signatera with Darci regarding current guidelines  Reviewed why imaging not in surveillance guidelines  Signatera discussed   Insurance coverage is based on patients that qualify  - current coverage is Stage IIB or higher regardless of markers and for patients that received neoadjuvant therapy     Route Chart for Scheduling    Med Onc Chart Routing      Follow up with physician 3 months.   Follow up with IDA    Infusion scheduling note    Injection scheduling note    Labs    Imaging    Pharmacy appointment    Other referrals                Supportive Plan Information  OP BREAST GOSERELIN Q4W Lidia Osman MD   Associated Diagnosis: Malignant neoplasm of overlapping sites of right breast in female, estrogen receptor positive Stage IB, Stage IB cT2, cN0, cM0, G1, ER+, KY+, HER2: Equivocal, pT2, pN1(sn), cM0, G2, ER+, KY+, HER2- noted on 4/26/2023   Line of treatment: Adjuvant   Treatment goal: Curative     Upcoming Treatment Dates - OP BREAST GOSERELIN  Q4W    5/31/2024       Chemotherapy       goserelin (ZOLADEX) injection 3.6 mg

## 2025-07-01 RX ORDER — TIRZEPATIDE 7.5 MG/.5ML
7.5 INJECTION, SOLUTION SUBCUTANEOUS
Qty: 2 ML | Refills: 3 | Status: SHIPPED | OUTPATIENT
Start: 2025-07-01

## 2025-07-07 ENCOUNTER — OFFICE VISIT (OUTPATIENT)
Dept: OBSTETRICS AND GYNECOLOGY | Facility: CLINIC | Age: 45
End: 2025-07-07
Payer: COMMERCIAL

## 2025-07-07 VITALS — BODY MASS INDEX: 26.43 KG/M2 | HEIGHT: 61 IN | WEIGHT: 140 LBS

## 2025-07-07 DIAGNOSIS — E66.3 OVERWEIGHT: ICD-10-CM

## 2025-07-07 DIAGNOSIS — N95.1 MENOPAUSAL SYMPTOMS: ICD-10-CM

## 2025-07-07 DIAGNOSIS — C50.811 MALIGNANT NEOPLASM OF OVERLAPPING SITES OF RIGHT BREAST IN FEMALE, ESTROGEN RECEPTOR POSITIVE: Primary | ICD-10-CM

## 2025-07-07 DIAGNOSIS — Z17.0 MALIGNANT NEOPLASM OF OVERLAPPING SITES OF RIGHT BREAST IN FEMALE, ESTROGEN RECEPTOR POSITIVE: Primary | ICD-10-CM

## 2025-07-07 PROCEDURE — 1159F MED LIST DOCD IN RCRD: CPT | Mod: CPTII,95,, | Performed by: PHYSICIAN ASSISTANT

## 2025-07-07 PROCEDURE — 1160F RVW MEDS BY RX/DR IN RCRD: CPT | Mod: CPTII,95,, | Performed by: PHYSICIAN ASSISTANT

## 2025-07-07 PROCEDURE — 3044F HG A1C LEVEL LT 7.0%: CPT | Mod: CPTII,95,, | Performed by: PHYSICIAN ASSISTANT

## 2025-07-07 PROCEDURE — 98005 SYNCH AUDIO-VIDEO EST LOW 20: CPT | Mod: 95,,, | Performed by: PHYSICIAN ASSISTANT

## 2025-07-07 RX ORDER — TESTOSTERONE CYPIONATE 200 MG/ML
50 INJECTION, SOLUTION INTRAMUSCULAR
Status: SHIPPED | OUTPATIENT
Start: 2025-07-07 | End: 2025-12-22

## 2025-07-07 NOTE — PROGRESS NOTES
The patient location is: [place]  The chief complaint leading to consultation is: follow up weight loss    Visit type: audiovisual    Face to Face time with patient: 15 minutes  25 minutes of total time spent on the encounter, which includes face to face time and non-face to face time preparing to see the patient (eg, review of tests), Obtaining and/or reviewing separately obtained history, Documenting clinical information in the electronic or other health record, Independently interpreting results (not separately reported) and communicating results to the patient/family/caregiver, or Care coordination (not separately reported).         Each patient to whom he or she provides medical services by telemedicine is:  (1) informed of the relationship between the physician and patient and the respective role of any other health care provider with respect to management of the patient; and (2) notified that he or she may decline to receive medical services by telemedicine and may withdraw from such care at any time.    Notes:   Subjective:      Dov Hernandez is a 44 y.o. female with history of breast cancer currently taking tamoxifen daily who presents for weight loss follow up. She just transitioned from Wegovy 1.0mg to Zepbound 7.5mg SC weekly. She will be starting that this week. Continues to eat well and seeing slow, steady weight loss.  Saw Dr. Urbano who recommended starting testosterone. Baseline testosterone levels are low on recent labs. Sleeping well but continues to have fatigue. She has been thinking about it and discussed with her oncologist. She is ready to try testosterone IM to help. Veozah continues to help with hot flashes and wishes to continue.     Oncology history: Stage IB (pT2, pN1(sn), cM0, G2, ER+, ND+, HER2-) breast cancer s/p bilateral mastectomy with ROSITA flap reconstruction and right SLNB on 5/24/2023, Adjuvant Taxotere Cytoxan through 9/14/2023, and radiation through 11/7/2023. She is  currently taking tamoxifen.   S/p robotic assisted total hysterectomy and bso on 12/17/2024 for cancer risk reduction.      Current tx:  Zepbound 7.5mg SC weekly  Veozah 45mg QD     12/10/2024  AST 21  ALT 23  3/20/2025  AST 19  ALT 26  6/30/2025  AST 22  ALT 23     PCP: Dr. Letitia Sherman    Routine labs: 9/17/2024  WWE: 5/13/2024 with Dr. Moeller  Pap smear: 5/20/2024  Mammogram: s/p bilateral mastectomy  DEXA: 12/18/2023    Lab Visit on 06/30/2025   Component Date Value Ref Range Status    WBC 06/30/2025 4.05  3.90 - 12.70 K/uL Final    RBC 06/30/2025 4.06  4.00 - 5.40 M/uL Final    HGB 06/30/2025 12.6  12.0 - 16.0 gm/dL Final    HCT 06/30/2025 37.5  37.0 - 48.5 % Final    MCV 06/30/2025 92  82 - 98 fL Final    MCH 06/30/2025 31.0  27.0 - 31.0 pg Final    MCHC 06/30/2025 33.6  32.0 - 36.0 g/dL Final    RDW 06/30/2025 12.1  11.5 - 14.5 % Final    Platelet Count 06/30/2025 215  150 - 450 K/uL Final    MPV 06/30/2025 9.4  9.2 - 12.9 fL Final    Neut # 06/30/2025 2.29  1.8 - 7.7 K/uL Final    Imm Grans # 06/30/2025 0.01  0.00 - 0.04 K/uL Final    Sodium 06/30/2025 141  136 - 145 mmol/L Final    Potassium 06/30/2025 4.8  3.5 - 5.1 mmol/L Final    Chloride 06/30/2025 109  95 - 110 mmol/L Final    CO2 06/30/2025 25  23 - 29 mmol/L Final    Glucose 06/30/2025 85  70 - 110 mg/dL Final    BUN 06/30/2025 11  6 - 20 mg/dL Final    Creatinine 06/30/2025 0.8  0.5 - 1.4 mg/dL Final    Calcium 06/30/2025 8.8  8.7 - 10.5 mg/dL Final    Protein Total 06/30/2025 6.8  6.0 - 8.4 gm/dL Final    Albumin 06/30/2025 3.7  3.5 - 5.2 g/dL Final    Bilirubin Total 06/30/2025 0.3  0.1 - 1.0 mg/dL Final    ALP 06/30/2025 41  40 - 150 unit/L Final    AST 06/30/2025 22  11 - 45 unit/L Final    ALT 06/30/2025 23  10 - 44 unit/L Final    Anion Gap 06/30/2025 7 (L)  8 - 16 mmol/L Final    eGFR 06/30/2025 >60  >60 mL/min/1.73/m2 Final   Lab Visit on 05/05/2025   Component Date Value Ref Range Status    Free Testosterone 05/05/2025 0.5  pg/mL Final     Testosterone Total 05/05/2025 21  5 - 73 ng/dL Final    Estradiol Level 05/05/2025 <10  See comment pg/mL Final       Past Medical History:   Diagnosis Date    Abnormal Pap smear of cervix     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;  Service: General;  Laterality: Bilateral;  2.5 HOURS / EMAIL SENT 5-9 @ 11:39 LK    COLPOSCOPY      ENDOSCOPIC CARPAL TUNNEL RELEASE Right 10/25/2024    Procedure: RELEASE, CARPAL TUNNEL, ENDOSCOPIC - RIGHT;  Surgeon: Vernon Olivas MD;  Location: [facility];  Service: Orthopedics;  Laterality: Right;    INJECTION FOR SENTINEL NODE IDENTIFICATION Right 5/24/2023    Procedure: INJECTION, FOR SENTINEL NODE IDENTIFICATION;  Surgeon: Jenna Pickard MD;  Location: [facility];  Service: General;  Laterality: Right;    RECONSTRUCTION OF BREAST WITH DEEP INFERIOR EPIGASTRIC ARTERY  (ROSITA) FREE FLAP Bilateral 5/24/2023    Procedure: RECONSTRUCTION, BREAST, USING ROSITA FREE FLAP / BILATERAL DEEP INFERIOR EPIGASTRIC PERFORATORS FLAPS;  Surgeon: Mikey Roche MD;  Location: [facility];  Service: Plastics;  Laterality: Bilateral;    ROBOT-ASSISTED LAPAROSCOPIC ABDOMINAL HYSTERECTOMY USING DA RUDOLPH XI N/A 12/17/2024    Procedure: XI ROBOTIC HYSTERECTOMY;  Surgeon: Benjy Valladares MD;  Location: [facility];  Service: OB/GYN;  Laterality: N/A;  1.5 hr case    ROBOT-ASSISTED LAPAROSCOPIC SALPINGO-OOPHORECTOMY USING DA RUDOLPH XI Bilateral 12/17/2024    Procedure: XI ROBOTIC SALPINGO-OOPHORECTOMY;  Surgeon: Benjy Valladares MD;  Location: [facility];  Service: OB/GYN;  Laterality:  "Bilateral;    SENTINEL LYMPH NODE BIOPSY Right 2023    Procedure: BIOPSY, LYMPH NODE, SENTINEL;  Surgeon: Jenna Pickard MD;  Location: [facility];  Service: General;  Laterality: Right;    WISDOM TOOTH EXTRACTION       Social History[1]  Family History   Problem Relation Name Age of Onset    Kidney cancer Father Raciel 59    Hyperlipidemia Mother      Cancer Brother Keny 22        parotid gland cancer    Birth ramirez Child Fatuma 0        strawberry wai(s)    Birth ramirez Child Nella 0        strawberry wai(s)    Café-au-lait spots Maternal Uncle Kelvin         "a small patch on his neck"    Caf[place]-au[place] spots Nephew          "a spot on his arm and partial torso"    Other (other) Nephew          tested negative for macrocephaly    Cervical cancer Other      Throat cancer Other      Pancreatic cancer Other          1 second cousin ()    Other Other          brain tumors (several 2nd cousins)    Breast cancer Neg Hx      Colon cancer Neg Hx      Ovarian cancer Neg Hx      Melanoma Neg Hx      Uterine cancer Neg Hx       OB History    Para Term  AB Living   2 2 2   2   SAB IAB Ectopic Multiple Live Births       2      # Outcome Date GA Lbr Gavin/2nd Weight Sex Type Anes PTL Lv   2 Term 14 39w1d  3.629 kg (8 lb) F Vag-Spont EPI N MARILYN   1 Term 12/30/10 38w0d  3.459 kg (7 lb 10 oz) M Vag-Spont   MARILYN      Birth Comments: delivered by Dr Guerrero     Current Medications[2]    Review of Systems:  General: No fever, chills.  Chest: No chest pain, shortness of breath, or palpitations.  Breast: No pain, masses, or nipple discharge.  Vulva: No pain, lesions, or itching.  Vagina: No relaxation, itching, discharge, or lesions.  Abdomen: No pain, nausea, vomiting, diarrhea, or constipation.  Urinary: No incontinence, nocturia, frequency, or dysuria.  Extremities:  No leg cramps, edema, or calf pain.  Neurologic: No headaches, dizziness, or visual changes.    Objective:     Vitals:    25 1510 " "  Weight: 63.5 kg (140 lb)   Height: 5' 1" (1.549 m)     Body mass index is 26.45 kg/m².    PHYSICAL EXAM:  APPEARANCE: Well nourished, well developed, in no acute distress.  AFFECT: WNL, alert and oriented x 3      Assessment:      Malignant neoplasm of overlapping sites of right breast in female, estrogen receptor positive    Menopausal symptoms  -     testosterone cypionate injection 50 mg  -     Prior authorization Order    Overweight        Plan:   Continue low glycemic diet with lean protein at each meal.  Maintain hydration.  Starting Zepbound 7.5mg SC this week  Reviewed benefits and side effects of testosterone, including risk of converting testosterone to estradiol.  Start testosterone 50mg IM [id number]  Labs at follow up  Follow up in 3 months    Instructed patient to call if she experiences any side effects or has any questions.    I spent a total of 25 minutes on the day of the visit.This includes face to face time and non-face to face time preparing to see the patient (eg, review of tests), obtaining and/or reviewing separately obtained history, documenting clinical information in the electronic or other health record, independently interpreting results and communicating results to the patient/family/caregiver, or care coordinator.                                [1]   Social History  Tobacco Use    Smoking status: Never     Passive exposure: Never    Smokeless tobacco: Never   Substance Use Topics    Alcohol use: Yes     Comment: social    Drug use: No   [2]   Current Outpatient Medications:     acetaminophen (TYLENOL) 650 MG TbSR, Take 1 tablet (650 mg total) by mouth every 6 (six) hours., Disp: 30 tablet, Rfl: 1    calcium acetate,phosphat bind, (PHOSLO) 667 mg capsule, Take 667 mg by mouth once daily., Disp: , Rfl:     docusate sodium (COLACE) 100 MG capsule, Take 1 capsule (100 mg total) by mouth 2 (two) times daily. (Patient not taking: Reported on 6/30/2025), Disp: 30 capsule, Rfl: 1    " fezolinetant 45 mg Tab, Take 1 tablet (45 mg) by mouth once daily., Disp: 30 tablet, Rfl: 3    hydrocortisone 2.5 % cream, Apply 1 application  topically 2 (two) times daily as needed., Disp: , Rfl:     ibuprofen (ADVIL,MOTRIN) 600 MG tablet, Take 1 tablet (600 mg total) by mouth every 6 (six) hours., Disp: 30 tablet, Rfl: 1    multivitamin (THERAGRAN) per tablet, Take 1 tablet by mouth once daily., Disp: , Rfl:     oxyCODONE (ROXICODONE) 5 MG immediate release tablet, Take 1 tablet (5 mg total) by mouth every 4 (four) hours as needed for Pain. (Patient not taking: Reported on 6/30/2025), Disp: 15 tablet, Rfl: 0    tamoxifen (NOLVADEX) 20 MG Tab, Take 1 tablet (20 mg total) by mouth once daily., Disp: 30 tablet, Rfl: 11    tamoxifen (NOLVADEX) 20 MG Tab, TAKE 1 TABLET BY MOUTH ONCE DAILY, Disp: 30 tablet, Rfl: 11    tirzepatide, weight loss, (ZEPBOUND) 7.5 mg/0.5 mL PnIj, Inject 7.5 mg (one pen) into the skin every 7 days., Disp: 2 mL, Rfl: 3    traMADoL (ULTRAM) 50 mg tablet, Take 1 tablet (50 mg total) by mouth every 6 (six) hours as needed for Pain. (Patient not taking: Reported on 6/30/2025), Disp: 20 tablet, Rfl: 0    triamcinolone acetonide 0.1% (KENALOG) 0.1 % cream, Apply topically 2 (two) times daily. To AA x 5-7 days PRN, Disp: 80 g, Rfl: 0    venlafaxine (EFFEXOR-XR) 150 MG Cp24, TAKE 1 CAPSULE BY MOUTH ONCE DAILY, Disp: 90 capsule, Rfl: 3    Current Facility-Administered Medications:     testosterone cypionate injection 50 mg, 50 mg, Intramuscular, Q28 Days,

## 2025-07-08 ENCOUNTER — PATIENT MESSAGE (OUTPATIENT)
Dept: HEMATOLOGY/ONCOLOGY | Facility: CLINIC | Age: 45
End: 2025-07-08
Payer: COMMERCIAL

## 2025-07-28 ENCOUNTER — PATIENT MESSAGE (OUTPATIENT)
Dept: HEMATOLOGY/ONCOLOGY | Facility: CLINIC | Age: 45
End: 2025-07-28
Payer: COMMERCIAL

## 2025-07-30 ENCOUNTER — PATIENT MESSAGE (OUTPATIENT)
Dept: OBSTETRICS AND GYNECOLOGY | Facility: CLINIC | Age: 45
End: 2025-07-30
Payer: COMMERCIAL

## 2025-08-18 ENCOUNTER — TELEPHONE (OUTPATIENT)
Dept: OPTOMETRY | Facility: CLINIC | Age: 45
End: 2025-08-18
Payer: COMMERCIAL

## 2025-08-20 ENCOUNTER — CLINICAL SUPPORT (OUTPATIENT)
Dept: OBSTETRICS AND GYNECOLOGY | Facility: CLINIC | Age: 45
End: 2025-08-20
Payer: COMMERCIAL

## 2025-08-20 DIAGNOSIS — N95.1 MENOPAUSAL SYMPTOMS: Primary | ICD-10-CM

## 2025-08-20 PROCEDURE — 99999 PR PBB SHADOW E&M-EST. PATIENT-LVL I: CPT | Mod: PBBFAC,,,

## 2025-08-20 PROCEDURE — 96372 THER/PROPH/DIAG INJ SC/IM: CPT | Mod: S$GLB,,, | Performed by: PHYSICIAN ASSISTANT

## 2025-08-20 RX ADMIN — TESTOSTERONE CYPIONATE 50 MG: 200 INJECTION, SOLUTION INTRAMUSCULAR at 01:08

## 2025-08-22 ENCOUNTER — PATIENT MESSAGE (OUTPATIENT)
Dept: OBSTETRICS AND GYNECOLOGY | Facility: CLINIC | Age: 45
End: 2025-08-22
Payer: COMMERCIAL

## (undated) DEVICE — DRAPE OPMI STERILE

## (undated) DEVICE — JELLY SURGILUBE 5GR

## (undated) DEVICE — GOWN ECLIPSE REINF LVL4 TWL XL

## (undated) DEVICE — NDL SAFETY 22G X 1.5 ECLIPSE

## (undated) DEVICE — SOL NORMAL USPCA 0.9%

## (undated) DEVICE — DRAPE CORETEMP FLD WRM 56X62IN

## (undated) DEVICE — TOURNIQUET SB QC DP 18X4IN

## (undated) DEVICE — UNDERGLOVES BIOGEL PI SIZE 8

## (undated) DEVICE — SUT STRATAFIX PDO 2-0 MH

## (undated) DEVICE — STOCKINETTE TUBULAR 2PL 6 X 4

## (undated) DEVICE — SUT VICRYL 4-0 RB1 27IN UD

## (undated) DEVICE — SUT VICRYL PLUS 0 CT1 36IN

## (undated) DEVICE — POSITIONER IV ARMBOARD FOAM

## (undated) DEVICE — GLOVE BIOGEL SKINSENSE PI 6.5

## (undated) DEVICE — CORD BIPOLAR 12 FOOT

## (undated) DEVICE — TIP RUMI BLUE DISPOSABLE 5/BX

## (undated) DEVICE — SYR 10CC LUER LOCK

## (undated) DEVICE — SEE MEDLINE ITEM 152529

## (undated) DEVICE — EVACUATOR WOUND BULB 100CC

## (undated) DEVICE — SUT 9/0 5IN ETHILON BLK MON

## (undated) DEVICE — SEAL CANN UNIVERSAL 5-12MM

## (undated) DEVICE — SUT MCRYL PLUS 4-0 PS2 27IN

## (undated) DEVICE — ELECTRODE BLADE INSULATED 1 IN

## (undated) DEVICE — CONTAINER SPEC OR STRL 4.5OZ

## (undated) DEVICE — GLOVE BIOGEL SKINSENSE PI 8.0

## (undated) DEVICE — ADHESIVE DERMABOND ADVANCED

## (undated) DEVICE — SET IRR CYSTO 4 LEAD 90IN

## (undated) DEVICE — IRRIGATOR ENDOSCOPY DISP.

## (undated) DEVICE — DRESSING XEROFORM NONADH 1X8IN

## (undated) DEVICE — TRAY DO THE ROBOT

## (undated) DEVICE — Device

## (undated) DEVICE — TOWEL OR DISP STRL BLUE 4/PK

## (undated) DEVICE — SOL POVIDONE PREP IODINE 4 OZ

## (undated) DEVICE — DRAPE THREE-QTR REINF 53X77IN

## (undated) DEVICE — KIT WING PAD POSITIONING

## (undated) DEVICE — DRAPE STERI LONG

## (undated) DEVICE — SOL ELECTROLUBE ANTI-STIC

## (undated) DEVICE — GEL AQUASONIC 100 STERILE20GM

## (undated) DEVICE — POSITIONER HEAD DONUT 9IN FOAM

## (undated) DEVICE — GLOVE BIOGEL SKINSENSE PI 7.5

## (undated) DEVICE — CLIP DOUBLE MICRO.

## (undated) DEVICE — GOWN NONREINF SET-IN SLV XL

## (undated) DEVICE — SYS SEE SHARP SCP ANTIFG LNG

## (undated) DEVICE — SLING ARM LARGE FOAM STRAP

## (undated) DEVICE — SPEARS EYE 10/PK

## (undated) DEVICE — DRAPE ARM DAVINCI XI

## (undated) DEVICE — GLOVE SENSICARE PI GRN 6.5

## (undated) DEVICE — DRAPE STERI INSTRUMENT 1018

## (undated) DEVICE — CLIPPER BLADE MOD 4406 (CAREF)

## (undated) DEVICE — APPLIER LIGACLIP MED 11IN

## (undated) DEVICE — DEVICE SNAPSECURE FOL CATH

## (undated) DEVICE — PAD CAST 2 IN X 4YDS STERILE

## (undated) DEVICE — PROBE FLOW DOPPLER

## (undated) DEVICE — OCCLUDER COLPO-PNEUMO STERILE

## (undated) DEVICE — SPONGE COTTON TRAY 4X4IN

## (undated) DEVICE — BLADE SURG #15 CARBON STEEL

## (undated) DEVICE — KIT ENDO CARPEL TUNNAL SINGLE

## (undated) DEVICE — CLAMP SINGLE MICRO.

## (undated) DEVICE — CATH IV CATHLON W/HUB18GAX

## (undated) DEVICE — INSERT CUSHIONPRONE VIEW LARGE

## (undated) DEVICE — OBTURATOR BLADELESS 8MM XI CLR

## (undated) DEVICE — SUT 0 VLOC GR GS-21

## (undated) DEVICE — SPONGE BULKEE II ABSRB 6X6.75

## (undated) DEVICE — BANDAGE MATRIX HK LOOP 2IN 5YD

## (undated) DEVICE — APPLICATOR CHLORAPREP ORN 26ML

## (undated) DEVICE — KIT ANTIFOG W/SPONG & FLUID

## (undated) DEVICE — ELECTRODE REM PLYHSV RETURN 9

## (undated) DEVICE — PORT ACCESS 8MM W/120MM LOW

## (undated) DEVICE — SOL IRR SOD CHL .9% POUR

## (undated) DEVICE — CABLE EXT DOPPLER FLOW PROBE

## (undated) DEVICE — DRAPE COLUMN DAVINCI XI

## (undated) DEVICE — SUT ETHILON 2-0 FS 18IN BLK

## (undated) DEVICE — UNDERGLOVES BIOGEL PI SZ 7 LF

## (undated) DEVICE — NDL HYPO REG 25G X 1 1/2

## (undated) DEVICE — SUT VICRYL 2-0 CT-2 VCP269H

## (undated) DEVICE — APPLIER CLIP LIAGCLIP 9.375IN

## (undated) DEVICE — TROCAR ENDOPATH XCEL 5X100MM

## (undated) DEVICE — COVER TIP CURVED SCISSORS XI

## (undated) DEVICE — SPONGE LAP 18X18 PREWASHED

## (undated) DEVICE — SOL IRRI STRL WATER 1000ML

## (undated) DEVICE — SUT VICRYL PLUS 0 CT1 18IN

## (undated) DEVICE — GLOVE SENSICARE PI SURG 6.5

## (undated) DEVICE — COVER MAYO STAND REINFRCD 30

## (undated) DEVICE — SUT V-LOC 180 GRN GS-21 12IN

## (undated) DEVICE — SKINMARKER W/RULER DEVON

## (undated) DEVICE — SOL BETADINE 5%

## (undated) DEVICE — BRA CLASSIC COMFORM BLK SZ 36

## (undated) DEVICE — SUT 4-0 ETHILON 18 PS-2

## (undated) DEVICE — GLOVE SENSICARE PI SURG 6

## (undated) DEVICE — SUT STRATAFIX PGAPCL 3 FS-1

## (undated) DEVICE — GLOVE BIOGEL PI MICRO SZ 7.5

## (undated) DEVICE — SYS PRINEO SKIN CLOSURE

## (undated) DEVICE — HOLDER DRAIN POUCH PINK

## (undated) DEVICE — DRAPE STERI-DRAPE 1000 17X11IN

## (undated) DEVICE — SUT MONO 2-0 CT-1 UNDYED

## (undated) DEVICE — SOL POVIDONE SCRUB IODINE 4 OZ

## (undated) DEVICE — SUT PROLENE 0 CT1 30IN BLUE

## (undated) DEVICE — BLANKET HYPER ADULT 24X60IN

## (undated) DEVICE — SUT 2/0 30IN SILK BLK BRAI

## (undated) DEVICE — COVER CAMERA OPERATING ROOM

## (undated) DEVICE — SUT MONOCRYL PLUS UD 3-0 27

## (undated) DEVICE — BLADE PEAK PLASMA

## (undated) DEVICE — SKIN MARKER DEVON 160

## (undated) DEVICE — CLOSURE SKIN STERI STRIP 1/2X4